# Patient Record
Sex: FEMALE | Race: WHITE | NOT HISPANIC OR LATINO
[De-identification: names, ages, dates, MRNs, and addresses within clinical notes are randomized per-mention and may not be internally consistent; named-entity substitution may affect disease eponyms.]

---

## 2017-04-03 ENCOUNTER — APPOINTMENT (OUTPATIENT)
Dept: OTOLARYNGOLOGY | Facility: CLINIC | Age: 82
End: 2017-04-03

## 2017-04-03 VITALS
WEIGHT: 125 LBS | HEIGHT: 66 IN | HEART RATE: 53 BPM | SYSTOLIC BLOOD PRESSURE: 143 MMHG | BODY MASS INDEX: 20.09 KG/M2 | DIASTOLIC BLOOD PRESSURE: 63 MMHG

## 2017-04-03 RX ORDER — LEVOTHYROXINE SODIUM 0.11 MG/1
112 TABLET ORAL
Qty: 90 | Refills: 0 | Status: ACTIVE | COMMUNITY
Start: 2016-10-31

## 2017-04-05 PROBLEM — Z86.79 HISTORY OF ATRIAL FIBRILLATION: Status: RESOLVED | Noted: 2017-04-05 | Resolved: 2017-04-05

## 2017-04-05 PROBLEM — Z86.39 HISTORY OF HYPOTHYROIDISM: Status: RESOLVED | Noted: 2017-04-05 | Resolved: 2017-04-05

## 2017-04-05 PROBLEM — I10 HTN (HYPERTENSION), BENIGN: Status: RESOLVED | Noted: 2017-04-05 | Resolved: 2017-04-05

## 2017-04-05 PROBLEM — Z86.39 HISTORY OF HYPERCHOLESTEROLEMIA: Status: RESOLVED | Noted: 2017-04-05 | Resolved: 2017-04-05

## 2017-04-05 PROBLEM — Z87.19 HISTORY OF HIATAL HERNIA: Status: RESOLVED | Noted: 2017-04-05 | Resolved: 2017-04-05

## 2017-04-05 PROBLEM — Z87.19 HISTORY OF SMALL BOWEL OBSTRUCTION: Status: RESOLVED | Noted: 2017-04-05 | Resolved: 2017-04-05

## 2017-04-06 ENCOUNTER — APPOINTMENT (OUTPATIENT)
Dept: THORACIC SURGERY | Facility: CLINIC | Age: 82
End: 2017-04-06

## 2017-04-06 DIAGNOSIS — Z86.79 PERSONAL HISTORY OF OTHER DISEASES OF THE CIRCULATORY SYSTEM: ICD-10-CM

## 2017-04-06 DIAGNOSIS — Z86.39 PERSONAL HISTORY OF OTHER ENDOCRINE, NUTRITIONAL AND METABOLIC DISEASE: ICD-10-CM

## 2017-04-06 DIAGNOSIS — I10 ESSENTIAL (PRIMARY) HYPERTENSION: ICD-10-CM

## 2017-04-06 DIAGNOSIS — Z87.19 PERSONAL HISTORY OF OTHER DISEASES OF THE DIGESTIVE SYSTEM: ICD-10-CM

## 2017-04-20 ENCOUNTER — APPOINTMENT (OUTPATIENT)
Dept: THORACIC SURGERY | Facility: CLINIC | Age: 82
End: 2017-04-20

## 2017-04-20 DIAGNOSIS — Z80.0 FAMILY HISTORY OF MALIGNANT NEOPLASM OF DIGESTIVE ORGANS: ICD-10-CM

## 2017-04-20 DIAGNOSIS — Z82.49 FAMILY HISTORY OF ISCHEMIC HEART DISEASE AND OTHER DISEASES OF THE CIRCULATORY SYSTEM: ICD-10-CM

## 2017-04-21 VITALS
HEART RATE: 62 BPM | DIASTOLIC BLOOD PRESSURE: 76 MMHG | SYSTOLIC BLOOD PRESSURE: 132 MMHG | BODY MASS INDEX: 20.18 KG/M2 | WEIGHT: 125 LBS | OXYGEN SATURATION: 98 % | RESPIRATION RATE: 16 BRPM

## 2017-04-21 PROBLEM — Z82.49 FAMILY HISTORY OF MYOCARDIAL INFARCTION: Status: ACTIVE | Noted: 2017-04-21

## 2017-04-21 PROBLEM — Z80.0 FAMILY HISTORY OF PANCREATIC CANCER: Status: ACTIVE | Noted: 2017-04-21

## 2017-04-21 RX ORDER — AMLODIPINE BESYLATE 5 MG/1
5 TABLET ORAL
Qty: 90 | Refills: 0 | Status: COMPLETED | COMMUNITY
Start: 2016-10-24 | End: 2017-04-21

## 2017-10-19 ENCOUNTER — APPOINTMENT (OUTPATIENT)
Dept: THORACIC SURGERY | Facility: CLINIC | Age: 82
End: 2017-10-19
Payer: MEDICARE

## 2017-10-19 VITALS
RESPIRATION RATE: 18 BRPM | OXYGEN SATURATION: 99 % | HEART RATE: 110 BPM | WEIGHT: 127 LBS | BODY MASS INDEX: 20.5 KG/M2 | SYSTOLIC BLOOD PRESSURE: 122 MMHG | DIASTOLIC BLOOD PRESSURE: 82 MMHG

## 2017-10-19 PROCEDURE — 99214 OFFICE O/P EST MOD 30 MIN: CPT

## 2017-10-19 RX ORDER — UBIDECARENONE/VIT E ACET 100MG-5
CAPSULE ORAL
Refills: 0 | Status: ACTIVE | COMMUNITY

## 2017-10-19 RX ORDER — VIT A AND D3 IN COD LIVER OIL 1250-135
1000 CAPSULE ORAL
Refills: 0 | Status: ACTIVE | COMMUNITY

## 2017-10-19 RX ORDER — OSELTAMIVIR PHOSPHATE 75 MG/1
75 CAPSULE ORAL
Qty: 10 | Refills: 0 | Status: COMPLETED | COMMUNITY
Start: 2016-12-23 | End: 2017-10-19

## 2018-05-29 ENCOUNTER — APPOINTMENT (OUTPATIENT)
Dept: OTOLARYNGOLOGY | Facility: CLINIC | Age: 83
End: 2018-05-29
Payer: MEDICARE

## 2018-05-29 VITALS
HEIGHT: 66 IN | SYSTOLIC BLOOD PRESSURE: 145 MMHG | HEART RATE: 71 BPM | DIASTOLIC BLOOD PRESSURE: 78 MMHG | BODY MASS INDEX: 1.93 KG/M2 | WEIGHT: 12 LBS

## 2018-05-29 DIAGNOSIS — H90.3 SENSORINEURAL HEARING LOSS, BILATERAL: ICD-10-CM

## 2018-05-29 DIAGNOSIS — H93.13 TINNITUS, BILATERAL: ICD-10-CM

## 2018-05-29 DIAGNOSIS — H61.23 IMPACTED CERUMEN, BILATERAL: ICD-10-CM

## 2018-05-29 PROCEDURE — 92567 TYMPANOMETRY: CPT

## 2018-05-29 PROCEDURE — 69210 REMOVE IMPACTED EAR WAX UNI: CPT

## 2018-05-29 PROCEDURE — 92557 COMPREHENSIVE HEARING TEST: CPT

## 2018-05-29 PROCEDURE — 99214 OFFICE O/P EST MOD 30 MIN: CPT | Mod: 25

## 2018-10-23 ENCOUNTER — APPOINTMENT (OUTPATIENT)
Dept: THORACIC SURGERY | Facility: CLINIC | Age: 83
End: 2018-10-23
Payer: MEDICARE

## 2018-10-23 VITALS
TEMPERATURE: 97.8 F | RESPIRATION RATE: 16 BRPM | HEIGHT: 66 IN | SYSTOLIC BLOOD PRESSURE: 152 MMHG | DIASTOLIC BLOOD PRESSURE: 69 MMHG | OXYGEN SATURATION: 99 % | HEART RATE: 60 BPM | WEIGHT: 118 LBS | BODY MASS INDEX: 18.96 KG/M2

## 2018-10-23 PROCEDURE — 99214 OFFICE O/P EST MOD 30 MIN: CPT

## 2018-10-23 RX ORDER — SIMVASTATIN 10 MG/1
10 TABLET, FILM COATED ORAL
Qty: 90 | Refills: 0 | Status: DISCONTINUED | COMMUNITY
Start: 2016-02-03 | End: 2018-10-23

## 2018-10-23 RX ORDER — APIXABAN 2.5 MG/1
2.5 TABLET, FILM COATED ORAL
Refills: 0 | Status: ACTIVE | COMMUNITY

## 2018-10-23 RX ORDER — METOPROLOL SUCCINATE 25 MG/1
25 TABLET, EXTENDED RELEASE ORAL DAILY
Refills: 0 | Status: ACTIVE | COMMUNITY
Start: 2016-10-31

## 2018-10-23 RX ORDER — ASPIRIN 81 MG
81 TABLET, DELAYED RELEASE (ENTERIC COATED) ORAL
Refills: 0 | Status: DISCONTINUED | COMMUNITY
End: 2018-10-23

## 2018-10-23 RX ORDER — POTASSIUM CHLORIDE 1500 MG/1
20 TABLET, EXTENDED RELEASE ORAL
Refills: 0 | Status: ACTIVE | COMMUNITY
Start: 2016-07-11

## 2018-10-23 RX ORDER — AMLODIPINE BESYLATE 10 MG/1
10 TABLET ORAL
Qty: 90 | Refills: 0 | Status: DISCONTINUED | COMMUNITY
Start: 2016-11-28 | End: 2018-10-23

## 2018-10-23 RX ORDER — OMEPRAZOLE 40 MG/1
40 CAPSULE, DELAYED RELEASE ORAL TWICE DAILY
Refills: 0 | Status: ACTIVE | COMMUNITY
Start: 2016-10-31

## 2018-11-05 ENCOUNTER — FORM ENCOUNTER (OUTPATIENT)
Age: 83
End: 2018-11-05

## 2018-11-06 ENCOUNTER — OUTPATIENT (OUTPATIENT)
Dept: OUTPATIENT SERVICES | Facility: HOSPITAL | Age: 83
LOS: 1 days | End: 2018-11-06

## 2018-11-06 ENCOUNTER — APPOINTMENT (OUTPATIENT)
Dept: THORACIC SURGERY | Facility: CLINIC | Age: 83
End: 2018-11-06
Payer: MEDICARE

## 2018-11-06 ENCOUNTER — APPOINTMENT (OUTPATIENT)
Dept: RADIOLOGY | Facility: HOSPITAL | Age: 83
End: 2018-11-06
Payer: MEDICARE

## 2018-11-06 VITALS
HEIGHT: 66 IN | WEIGHT: 114 LBS | SYSTOLIC BLOOD PRESSURE: 150 MMHG | BODY MASS INDEX: 18.32 KG/M2 | OXYGEN SATURATION: 98 % | DIASTOLIC BLOOD PRESSURE: 77 MMHG | RESPIRATION RATE: 16 BRPM | HEART RATE: 78 BPM

## 2018-11-06 DIAGNOSIS — K22.0 ACHALASIA OF CARDIA: ICD-10-CM

## 2018-11-06 PROCEDURE — 74220 X-RAY XM ESOPHAGUS 1CNTRST: CPT | Mod: 26

## 2018-11-06 PROCEDURE — 99213 OFFICE O/P EST LOW 20 MIN: CPT

## 2019-08-23 ENCOUNTER — APPOINTMENT (OUTPATIENT)
Dept: WOUND CARE | Facility: CLINIC | Age: 84
End: 2019-08-23
Payer: MEDICARE

## 2019-08-23 DIAGNOSIS — R64 CACHEXIA: ICD-10-CM

## 2019-08-23 DIAGNOSIS — S41.131S: ICD-10-CM

## 2019-08-23 DIAGNOSIS — S41.101A UNSPECIFIED OPEN WOUND OF RIGHT UPPER ARM, INITIAL ENCOUNTER: ICD-10-CM

## 2019-08-23 PROCEDURE — 99203 OFFICE O/P NEW LOW 30 MIN: CPT

## 2019-08-23 NOTE — REASON FOR VISIT
[Consultation] : a consultation visit [Family Member] : family member [FreeTextEntry1] : right arm wound

## 2019-08-26 ENCOUNTER — INPATIENT (INPATIENT)
Facility: HOSPITAL | Age: 84
LOS: 8 days | Discharge: HOME | End: 2019-09-04
Attending: SURGERY | Admitting: SURGERY
Payer: MEDICARE

## 2019-08-26 VITALS
TEMPERATURE: 98 F | WEIGHT: 132.28 LBS | RESPIRATION RATE: 16 BRPM | OXYGEN SATURATION: 98 % | HEART RATE: 61 BPM | SYSTOLIC BLOOD PRESSURE: 110 MMHG | DIASTOLIC BLOOD PRESSURE: 65 MMHG

## 2019-08-26 LAB
ALBUMIN SERPL ELPH-MCNC: 3.9 G/DL — SIGNIFICANT CHANGE UP (ref 3.3–5)
ALP SERPL-CCNC: 128 U/L — HIGH (ref 40–120)
ALT FLD-CCNC: 7 U/L — SIGNIFICANT CHANGE UP (ref 4–33)
ANION GAP SERPL CALC-SCNC: 16 MMO/L — HIGH (ref 7–14)
AST SERPL-CCNC: 22 U/L — SIGNIFICANT CHANGE UP (ref 4–32)
BASE EXCESS BLDV CALC-SCNC: 1 MMOL/L — SIGNIFICANT CHANGE UP
BASOPHILS # BLD AUTO: 0.08 K/UL — SIGNIFICANT CHANGE UP (ref 0–0.2)
BASOPHILS NFR BLD AUTO: 0.7 % — SIGNIFICANT CHANGE UP (ref 0–2)
BILIRUB SERPL-MCNC: 0.3 MG/DL — SIGNIFICANT CHANGE UP (ref 0.2–1.2)
BLOOD GAS VENOUS - CREATININE: 0.71 MG/DL — SIGNIFICANT CHANGE UP (ref 0.5–1.3)
BUN SERPL-MCNC: 16 MG/DL — SIGNIFICANT CHANGE UP (ref 7–23)
CALCIUM SERPL-MCNC: 9.3 MG/DL — SIGNIFICANT CHANGE UP (ref 8.4–10.5)
CHLORIDE BLDV-SCNC: 104 MMOL/L — SIGNIFICANT CHANGE UP (ref 96–108)
CHLORIDE SERPL-SCNC: 97 MMOL/L — LOW (ref 98–107)
CO2 SERPL-SCNC: 24 MMOL/L — SIGNIFICANT CHANGE UP (ref 22–31)
CREAT SERPL-MCNC: 0.69 MG/DL — SIGNIFICANT CHANGE UP (ref 0.5–1.3)
EOSINOPHIL # BLD AUTO: 0.04 K/UL — SIGNIFICANT CHANGE UP (ref 0–0.5)
EOSINOPHIL NFR BLD AUTO: 0.3 % — SIGNIFICANT CHANGE UP (ref 0–6)
GAS PNL BLDV: 133 MMOL/L — LOW (ref 136–146)
GLUCOSE BLDV-MCNC: 132 MG/DL — HIGH (ref 70–99)
GLUCOSE SERPL-MCNC: 133 MG/DL — HIGH (ref 70–99)
HCO3 BLDV-SCNC: 26 MMOL/L — SIGNIFICANT CHANGE UP (ref 20–27)
HCT VFR BLD CALC: 33.1 % — LOW (ref 34.5–45)
HCT VFR BLDV CALC: 33.1 % — LOW (ref 34.5–45)
HGB BLD-MCNC: 10.5 G/DL — LOW (ref 11.5–15.5)
HGB BLDV-MCNC: 10.7 G/DL — LOW (ref 11.5–15.5)
IMM GRANULOCYTES NFR BLD AUTO: 0.4 % — SIGNIFICANT CHANGE UP (ref 0–1.5)
LACTATE BLDV-MCNC: 2 MMOL/L — SIGNIFICANT CHANGE UP (ref 0.5–2)
LIDOCAIN IGE QN: 90.6 U/L — HIGH (ref 7–60)
LYMPHOCYTES # BLD AUTO: 1.7 K/UL — SIGNIFICANT CHANGE UP (ref 1–3.3)
LYMPHOCYTES # BLD AUTO: 14.9 % — SIGNIFICANT CHANGE UP (ref 13–44)
MCHC RBC-ENTMCNC: 29.2 PG — SIGNIFICANT CHANGE UP (ref 27–34)
MCHC RBC-ENTMCNC: 31.7 % — LOW (ref 32–36)
MCV RBC AUTO: 91.9 FL — SIGNIFICANT CHANGE UP (ref 80–100)
MONOCYTES # BLD AUTO: 0.53 K/UL — SIGNIFICANT CHANGE UP (ref 0–0.9)
MONOCYTES NFR BLD AUTO: 4.6 % — SIGNIFICANT CHANGE UP (ref 2–14)
NEUTROPHILS # BLD AUTO: 9.03 K/UL — HIGH (ref 1.8–7.4)
NEUTROPHILS NFR BLD AUTO: 79.1 % — HIGH (ref 43–77)
NRBC # FLD: 0 K/UL — SIGNIFICANT CHANGE UP (ref 0–0)
PCO2 BLDV: 36 MMHG — LOW (ref 41–51)
PH BLDV: 7.45 PH — HIGH (ref 7.32–7.43)
PLATELET # BLD AUTO: 386 K/UL — SIGNIFICANT CHANGE UP (ref 150–400)
PMV BLD: 9.5 FL — SIGNIFICANT CHANGE UP (ref 7–13)
PO2 BLDV: 77 MMHG — HIGH (ref 35–40)
POTASSIUM BLDV-SCNC: 3.8 MMOL/L — SIGNIFICANT CHANGE UP (ref 3.4–4.5)
POTASSIUM SERPL-MCNC: 4.3 MMOL/L — SIGNIFICANT CHANGE UP (ref 3.5–5.3)
POTASSIUM SERPL-SCNC: 4.3 MMOL/L — SIGNIFICANT CHANGE UP (ref 3.5–5.3)
PROT SERPL-MCNC: 7.2 G/DL — SIGNIFICANT CHANGE UP (ref 6–8.3)
RBC # BLD: 3.6 M/UL — LOW (ref 3.8–5.2)
RBC # FLD: 13.3 % — SIGNIFICANT CHANGE UP (ref 10.3–14.5)
SAO2 % BLDV: 93.8 % — HIGH (ref 60–85)
SODIUM SERPL-SCNC: 137 MMOL/L — SIGNIFICANT CHANGE UP (ref 135–145)
WBC # BLD: 11.43 K/UL — HIGH (ref 3.8–10.5)
WBC # FLD AUTO: 11.43 K/UL — HIGH (ref 3.8–10.5)

## 2019-08-26 PROCEDURE — 74018 RADEX ABDOMEN 1 VIEW: CPT | Mod: 26

## 2019-08-26 RX ORDER — ACETAMINOPHEN 500 MG
1000 TABLET ORAL ONCE
Refills: 0 | Status: COMPLETED | OUTPATIENT
Start: 2019-08-26 | End: 2019-08-26

## 2019-08-26 RX ORDER — SODIUM CHLORIDE 9 MG/ML
1000 INJECTION INTRAMUSCULAR; INTRAVENOUS; SUBCUTANEOUS ONCE
Refills: 0 | Status: COMPLETED | OUTPATIENT
Start: 2019-08-26 | End: 2019-08-26

## 2019-08-26 RX ADMIN — SODIUM CHLORIDE 1000 MILLILITER(S): 9 INJECTION INTRAMUSCULAR; INTRAVENOUS; SUBCUTANEOUS at 21:51

## 2019-08-26 RX ADMIN — Medication 400 MILLIGRAM(S): at 21:04

## 2019-08-26 NOTE — ED PROVIDER NOTE - TEMPLATE, MLM
General Pt has hx of hypertension. She is prescribed lisinopril, but does not take any medication at home. Pts BP in hospital consistently in 110s.   - monitor BP in hospital, restart home lisinopril if pt becomes hypertensive Pt has hx of asthma, is prescribed singulair, advair, and a rescue inhaler. Pt not taking any of her home inhalers regularly. Reports she last used rescue inhaler over a month ago and singulair two weeks ago. Currently not wheezing or SOB on exam.  - Duonebs PRN for wheezing

## 2019-08-26 NOTE — ED PROVIDER NOTE - NS ED MD DISPO ISOLATION TYPES
October 15, 2018     Patient: Dirk Good   YOB: 2006   Date of Visit: 10/15/2018       To Whom it May Concern:    Dirk Good is under my professional care  She was seen in my office on 10/15/2018  She may return to gym class or sports on 10/16/18 with return to play protocol       If you have any questions or concerns, please don't hesitate to call           Sincerely,          KAILYN Angulo        CC: No Recipients None

## 2019-08-26 NOTE — ED PROVIDER NOTE - CLINICAL SUMMARY MEDICAL DECISION MAKING FREE TEXT BOX
89F presenting with abdominal pain and distention. having bowel movements and passing gas but unable to tolerate po, abdomen is distended and symptoms are similar to previous episodes of sbo. h/o multiple abdominal surgeries and multiple sbo's. plan for labs, ct abdomen and pelvis, symptom control. will reassess.

## 2019-08-26 NOTE — ED PROVIDER NOTE - OBJECTIVE STATEMENT
89F, pmh of hypothyroid, AFIB (on eliquis) presenting with abdominal pain. patient reports abdominal pain and distention sine 2pm today. pain is similar to previous episodes of sbo. had bowel movement and has passed gas. unable to tolerate food or fluids. has nausea and vomiting. denies any fever, chest pain, difficulty breathing, pain or burning with urination, pain or swelling of lower extremities. 89F, pmh of hypothyroid, AFIB (on eliquis) presenting with abdominal pain. patient reports abdominal pain and distention sine 2pm today. pain is similar to previous episodes of sbo. had bowel movement and has passed gas. unable to tolerate food or fluids. has nausea and vomiting. denies any fever, chest pain, difficulty breathing, pain or burning with urination, pain or swelling of lower extremities.    Daughter Emmy [phone]

## 2019-08-26 NOTE — ED ADULT NURSE REASSESSMENT NOTE - NS ED NURSE REASSESS COMMENT FT1
Report received from CHIP Acevedo. Pt is A&Ox4, ambulates with a walker. Pt c/o of abd pain & distension, hx of SBO. Last BM was 12pm, abd pain began at 2pm. Abd is distended. Pt has a left broken finger from mechanical fall from August 3. Pt had a door hit her behind as she was walking with walker and was pushed forward. Pt has healing wound from same fall. Wound is wrapped in gauze. Family at bedside.

## 2019-08-26 NOTE — ED PROVIDER NOTE - ATTENDING CONTRIBUTION TO CARE
I have personally performed a face to face bedside history and physical examination of this patient. I have discussed the history, examination, review of systems, assessment and plan of management with the resident. I have reviewed the electronic medical record and amended it to reflect my history, review of systems, physical exam, assessment and plan.    89F, pmh of hypothyroid, AFIB (on eliquis) presenting with abdominal pain. patient reports abdominal pain and distention sine 2pm today. pain is similar to previous episodes of sbo. had bowel movement and has passed gas. unable to tolerate food or fluids. has nausea and vomiting. denies any fever, chest pain, difficulty breathing, pain or burning with urination, pain or swelling of lower extremities.  VSS afebrile.  Exam alert, non-toxic appearing, +distention, minimally tender in all quadrants, decreased bowel sounds.  Suspect sbo given history and exam.  Labs, IVF, ct.  Dispo pending.

## 2019-08-26 NOTE — ED ADULT TRIAGE NOTE - CHIEF COMPLAINT QUOTE
Pt complaining of abdominal pain and N/V since earlier today. Pt states she has a hx of multiple SbBO. states her pain is similar to her last SBO. Pt denies chest pain, SOB, fever or chills. Pt appears uncomfortable in triage.

## 2019-08-26 NOTE — ED PROVIDER NOTE - PROGRESS NOTE DETAILS
called to bedside because patient was complaining arm was cold at IV site. hand has 2+ radial pulse and normal sensation. forearm is cold along path of IV, likely 2/2 fluid infusion. - resident Cheikh Oneil called by radiology, patient has high grade sbo. patient seen by surgery, NG placed and cxr ordered. called patient's daughter Emmy and updated on results. - resident Cheikh Oneil called by patient's daughter Emmy and told that the patient previously went into cardiac arrest when she had an NG tube placed and electrolytes were not checked daily. informed surgery team A resident and recommended patient should have daily labs drawn including electrolytes. - resident Cheikh Oneil

## 2019-08-26 NOTE — ED ADULT NURSE NOTE - OBJECTIVE STATEMENT
Pt a&ox3 c/o abdominal pain starting at 1400 accompanied with n/v, and abdominal distention, last bowel movement was at 1200, pt reports symptoms feel similar to when she had a sbo in the past, pt breathing even and unlabored, denies cp/discomfort, denies headache/dizziness, skin is cool dry and intact, ivl placed, labs sent, will continue to monitor.

## 2019-08-26 NOTE — ED PROVIDER NOTE - PHYSICAL EXAMINATION
general: uncomfortable appearing female, no acute distress   heent: normocephalic, atraumatic   respiratory: normal work of breathing, lungs clear to auscultation bilaterally   cardiac: regular rate and rhythm   abdomen: soft, distended, diffuse tenderness to palpation   msk: No swelling or tenderness of lower extremities   skin: warm, dry   neuro: A&Ox3

## 2019-08-27 DIAGNOSIS — Z90.49 ACQUIRED ABSENCE OF OTHER SPECIFIED PARTS OF DIGESTIVE TRACT: Chronic | ICD-10-CM

## 2019-08-27 DIAGNOSIS — K56.609 UNSPECIFIED INTESTINAL OBSTRUCTION, UNSPECIFIED AS TO PARTIAL VERSUS COMPLETE OBSTRUCTION: ICD-10-CM

## 2019-08-27 DIAGNOSIS — Z98.890 OTHER SPECIFIED POSTPROCEDURAL STATES: Chronic | ICD-10-CM

## 2019-08-27 DIAGNOSIS — Z90.710 ACQUIRED ABSENCE OF BOTH CERVIX AND UTERUS: Chronic | ICD-10-CM

## 2019-08-27 LAB
APTT BLD: 26.9 SEC — LOW (ref 27.5–36.3)
BLD GP AB SCN SERPL QL: NEGATIVE — SIGNIFICANT CHANGE UP
HCT VFR BLD CALC: 33.1 % — LOW (ref 34.5–45)
HGB BLD-MCNC: 10.2 G/DL — LOW (ref 11.5–15.5)
INR BLD: 1.24 — HIGH (ref 0.88–1.17)
MCHC RBC-ENTMCNC: 28.7 PG — SIGNIFICANT CHANGE UP (ref 27–34)
MCHC RBC-ENTMCNC: 30.8 % — LOW (ref 32–36)
MCV RBC AUTO: 93 FL — SIGNIFICANT CHANGE UP (ref 80–100)
NRBC # FLD: 0 K/UL — SIGNIFICANT CHANGE UP (ref 0–0)
PLATELET # BLD AUTO: 336 K/UL — SIGNIFICANT CHANGE UP (ref 150–400)
PMV BLD: 10 FL — SIGNIFICANT CHANGE UP (ref 7–13)
PROTHROM AB SERPL-ACNC: 13.8 SEC — HIGH (ref 9.8–13.1)
RBC # BLD: 3.56 M/UL — LOW (ref 3.8–5.2)
RBC # FLD: 13.3 % — SIGNIFICANT CHANGE UP (ref 10.3–14.5)
RH IG SCN BLD-IMP: POSITIVE — SIGNIFICANT CHANGE UP
RH IG SCN BLD-IMP: POSITIVE — SIGNIFICANT CHANGE UP
WBC # BLD: 8.72 K/UL — SIGNIFICANT CHANGE UP (ref 3.8–10.5)
WBC # FLD AUTO: 8.72 K/UL — SIGNIFICANT CHANGE UP (ref 3.8–10.5)

## 2019-08-27 PROCEDURE — 71045 X-RAY EXAM CHEST 1 VIEW: CPT | Mod: 26

## 2019-08-27 PROCEDURE — 76000 FLUOROSCOPY <1 HR PHYS/QHP: CPT | Mod: 26

## 2019-08-27 PROCEDURE — 74177 CT ABD & PELVIS W/CONTRAST: CPT | Mod: 26

## 2019-08-27 RX ORDER — ONDANSETRON 8 MG/1
4 TABLET, FILM COATED ORAL ONCE
Refills: 0 | Status: COMPLETED | OUTPATIENT
Start: 2019-08-27 | End: 2019-08-27

## 2019-08-27 RX ORDER — ENOXAPARIN SODIUM 100 MG/ML
60 INJECTION SUBCUTANEOUS
Refills: 0 | Status: DISCONTINUED | OUTPATIENT
Start: 2019-08-27 | End: 2019-08-31

## 2019-08-27 RX ORDER — ACETAMINOPHEN 500 MG
1000 TABLET ORAL ONCE
Refills: 0 | Status: COMPLETED | OUTPATIENT
Start: 2019-08-27 | End: 2019-08-27

## 2019-08-27 RX ORDER — LIDOCAINE 4 G/100G
10 CREAM TOPICAL ONCE
Refills: 0 | Status: COMPLETED | OUTPATIENT
Start: 2019-08-27 | End: 2019-08-27

## 2019-08-27 RX ORDER — ENOXAPARIN SODIUM 100 MG/ML
40 INJECTION SUBCUTANEOUS DAILY
Refills: 0 | Status: DISCONTINUED | OUTPATIENT
Start: 2019-08-27 | End: 2019-08-27

## 2019-08-27 RX ORDER — LEVOTHYROXINE SODIUM 125 MCG
70 TABLET ORAL AT BEDTIME
Refills: 0 | Status: DISCONTINUED | OUTPATIENT
Start: 2019-08-27 | End: 2019-08-31

## 2019-08-27 RX ORDER — DEXTROSE MONOHYDRATE, SODIUM CHLORIDE, AND POTASSIUM CHLORIDE 50; .745; 4.5 G/1000ML; G/1000ML; G/1000ML
1000 INJECTION, SOLUTION INTRAVENOUS
Refills: 0 | Status: DISCONTINUED | OUTPATIENT
Start: 2019-08-27 | End: 2019-08-29

## 2019-08-27 RX ORDER — SODIUM CHLORIDE 9 MG/ML
1000 INJECTION, SOLUTION INTRAVENOUS
Refills: 0 | Status: DISCONTINUED | OUTPATIENT
Start: 2019-08-27 | End: 2019-08-27

## 2019-08-27 RX ORDER — HEPARIN SODIUM 5000 [USP'U]/ML
1100 INJECTION INTRAVENOUS; SUBCUTANEOUS
Qty: 25000 | Refills: 0 | Status: DISCONTINUED | OUTPATIENT
Start: 2019-08-27 | End: 2019-08-27

## 2019-08-27 RX ORDER — LEVOTHYROXINE SODIUM 125 MCG
137 TABLET ORAL
Qty: 0 | Refills: 0 | DISCHARGE

## 2019-08-27 RX ADMIN — Medication 400 MILLIGRAM(S): at 02:26

## 2019-08-27 RX ADMIN — HEPARIN SODIUM 11 UNIT(S)/HR: 5000 INJECTION INTRAVENOUS; SUBCUTANEOUS at 10:07

## 2019-08-27 RX ADMIN — SODIUM CHLORIDE 75 MILLILITER(S): 9 INJECTION, SOLUTION INTRAVENOUS at 10:07

## 2019-08-27 RX ADMIN — HEPARIN SODIUM 11 UNIT(S)/HR: 5000 INJECTION INTRAVENOUS; SUBCUTANEOUS at 07:14

## 2019-08-27 RX ADMIN — Medication 70 MICROGRAM(S): at 23:05

## 2019-08-27 RX ADMIN — DEXTROSE MONOHYDRATE, SODIUM CHLORIDE, AND POTASSIUM CHLORIDE 75 MILLILITER(S): 50; .745; 4.5 INJECTION, SOLUTION INTRAVENOUS at 13:59

## 2019-08-27 RX ADMIN — ENOXAPARIN SODIUM 60 MILLIGRAM(S): 100 INJECTION SUBCUTANEOUS at 18:38

## 2019-08-27 RX ADMIN — Medication 400 MILLIGRAM(S): at 10:28

## 2019-08-27 RX ADMIN — Medication 1000 MILLIGRAM(S): at 10:58

## 2019-08-27 RX ADMIN — LIDOCAINE 10 MILLILITER(S): 4 CREAM TOPICAL at 04:59

## 2019-08-27 RX ADMIN — SODIUM CHLORIDE 75 MILLILITER(S): 9 INJECTION, SOLUTION INTRAVENOUS at 06:11

## 2019-08-27 RX ADMIN — ONDANSETRON 4 MILLIGRAM(S): 8 TABLET, FILM COATED ORAL at 02:29

## 2019-08-27 NOTE — ED ADULT NURSE REASSESSMENT NOTE - NS ED NURSE REASSESS COMMENT FT1
Pt has 9x3cm wound from fall on August 3. Pt seeing wound care MD for tx. Wound is pink in color. Pt instructed RN not to removed the "special dressing." Dressing & wound is clean, dry and intact.

## 2019-08-27 NOTE — H&P ADULT - NSICDXPASTSURGICALHX_GEN_ALL_CORE_FT
PAST SURGICAL HISTORY:  H/O colectomy     History of repair of hiatal hernia     S/P appendectomy     S/P hysterectomy

## 2019-08-27 NOTE — ED ADULT NURSE REASSESSMENT NOTE - NS ED NURSE REASSESS COMMENT FT1
NG tube in place. Pt walked to bathroom, 1 person assist. Pt's skin is clean dry and intact. Pt walked back to stretcher, repositioned with blankets. Call bell within reach.

## 2019-08-27 NOTE — H&P ADULT - NSHPLABSRESULTS_GEN_ALL_CORE
CT Abdomen and Pelvis w/ IV Cont (08.27.19 @ 03:14)    FINDINGS:    LUNG BASES:  There are no pleural effusions. The heart is enlarged.   Bibasilar streaky atelectasis.  PERITONEUM:  There is no free air or focal collection.  Mild free fluid.  LIVER: Normal.  SPLEEN: Normal.  GALLBLADDER: Not visualized  BILIARY TREE: Mild pneumobilia. Mild prominence the biliary system is   within normal limits for postoperative patient.  PANCREAS: Normal.  ADRENAL GLANDS: Normal.  KIDNEYS: Indeterminate 1.7 cm hypodensity in the left anterior mid kidney   may be further characterized with ultrasound.  BOWEL: Clips at the region of the GE junction may be related to previous   fundoplication. The stomach is massively distended and enters the pelvis.   The duodenum is filled with fluid and air. Numerous fluidfilled loops of   jejunum with a transition point in the left lower quadrant, best seen   coronal most likely related to adhesions from previous hysterectomy.   Distal small bowel bile ducts are collapsed. Surgical anastomotic sutures   are seen in the right colon. The colon is collapsed. There is mild   mesenteric edema and interloop fluid.    URINARY BLADDER: Collapsed.  PELVIC ORGANS: No pelvic masses.    There is no significant adenopathy.  VASCULATURE: The aorta is not dilated. There is moderate atherosclerotic   vascular calcification.  RETROPERITONEUM:  There is no mass.  BONES: 3 long screws are seen in the left proximal femur. Mild   levoscoliosis of the lumbar spine  ABDOMINAL WALL: Unremarkable.    IMPRESSION:    High-grade small bowel obstruction with transition point in the left   lower quadrant, likely related to postoperative adhesions. Mild   mesenteric edema and intrahepatic fluid. Distal small bowel is collapsed.   Status post right hemicolectomy. The stomach is massively distended and   the patient would benefit from nasogastric tube placement.

## 2019-08-27 NOTE — ED ADULT NURSE REASSESSMENT NOTE - NS ED NURSE REASSESS COMMENT FT1
Received report from CHIP Loredo. Pt AxOx4 at this time. Pt appears comfortable, is sleeping but arousable to touch and verbal stimuli. Pt c/o return of abdominal pain- wishes to receive more tylenol at this time. Abdomen soft and nondistended. MAR to be notified of pt's pain. Pt denies CP, SOB, dizziness, H/A, chills. Pt on Heparin infusion at 11mls/hr and on maintenance fluids. IV sites dry and intact. Pt vitally stable, VS as noted. Pt tolerating NG tube well- NG tube attached to suction. MD at bedside, will continue to monitor.

## 2019-08-27 NOTE — ED ADULT NURSE REASSESSMENT NOTE - NS ED NURSE REASSESS COMMENT FT1
Pt returned back from CT. Endorses "pain feels better" and denies nausea. Awaiting CT results. Respirations even & unlabored.

## 2019-08-27 NOTE — H&P ADULT - ASSESSMENT
89F PMH hypothyroidism, A-fib (on Eliquis), DDD, HTN, GERD, achalasia s/p myotomy, multiple abdominal surgeries including; Hysterectomy, appendectomy, colectomy (for volvulus), hiatal hernia repair, ex-lap SHERRY for SBO (8/2018), presenting with abdominal pain, found on imaging to have high grade SBO.    - Admit to A team surgery  - NPO/IVF  - NGT to LCWS - f/u CXR for placement  - Serial abdominal exams, no standing pain medication  - Heparin gtt (pt on Eliquis at home)  - Monitor for GI function  - Discussed with attending Dr. Marcelo Hickey PGY3  A team surgery  [id number]

## 2019-08-27 NOTE — H&P ADULT - ATTENDING COMMENTS
Patient seen and examined.    She presented to [facility] with complaints of abdominal distension, abdominal pain, and nausea. She has had multiple small bowel obstructions in the past. Approximately a year ago, she had to undergo a ex-lap, and SHERRY for a SBO at an outside hospital.    At the current time, after she had her NGT placed, she denies any nausea, vomiting, fever or chills. She denies any abdominal pain, and feels she is less distended. She is not passing any flatus or having bowel movements.     On exam: she is awake, alert and oriented  She is breathing comfortably  There is no scleral icterus  Her abdomen is soft, distended, and NOT tender, NGT in place    89 year old woman with SBO  1. NPO  2. IV fluids  3. NGT to low wall suction  4. Serial abdominal exams  5. Await GI function

## 2019-08-27 NOTE — PROVIDER CONTACT NOTE (OTHER) - ACTION/TREATMENT ORDERED:
OK to try for ordered BMP later. OK to try for ordered BMP later. Will speak to senior about removal of NG tube.

## 2019-08-27 NOTE — PROVIDER CONTACT NOTE (OTHER) - SITUATION
Patient CBC and PTT sent but BMP was unable to be drawn. Patient is very hard stick and was stuck twice.

## 2019-08-27 NOTE — H&P ADULT - HISTORY OF PRESENT ILLNESS
89F PMH hypothyroidism, A-fib (on Eliquis), DDD, HTN, GERD, achalasia s/p myotomy, multiple abdominal surgeries including; Hysterectomy, appendectomy, colectomy (for volvulus), hiatal hernia repair, ex-lap SHERRY for SBO (8/2018), presenting with abdominal pain. Patient states she has had innumerous SBOs in the past, all treated at [facility]. Patient developed abdominal pain yesterday morning after eating breakfast, associated with several episodes of nausea and emesis. Last BM was yesterday AM. Last flatus yesterday afternoon. Pt states these symptoms are similar to those of previous SBOs.     Upon arrival to [facility] ED, AVSS. Afebrile. Normotensive. WBC 11.43. Alkphos 128. Lipase 90. CT abd/pelvis obtained, which demonstrated high grade small bowel obstruction with transition point in the LLQ. Mild mesenteric edema and intrahepatic fluid.

## 2019-08-27 NOTE — H&P ADULT - NSHPPHYSICALEXAM_GEN_ALL_CORE
Gen: Laying in bed, in NAD  Resp: Nonlabored, no increased WOB  Abd: Soft, distended. Mildly TTP in epigastric area. No rebound or guarding  Ext: No C/C/E

## 2019-08-28 LAB
ANION GAP SERPL CALC-SCNC: 11 MMO/L — SIGNIFICANT CHANGE UP (ref 7–14)
BUN SERPL-MCNC: 21 MG/DL — SIGNIFICANT CHANGE UP (ref 7–23)
CALCIUM SERPL-MCNC: 8.2 MG/DL — LOW (ref 8.4–10.5)
CHLORIDE SERPL-SCNC: 99 MMOL/L — SIGNIFICANT CHANGE UP (ref 98–107)
CO2 SERPL-SCNC: 28 MMOL/L — SIGNIFICANT CHANGE UP (ref 22–31)
CREAT SERPL-MCNC: 0.68 MG/DL — SIGNIFICANT CHANGE UP (ref 0.5–1.3)
GLUCOSE SERPL-MCNC: 112 MG/DL — HIGH (ref 70–99)
HCT VFR BLD CALC: 28.2 % — LOW (ref 34.5–45)
HGB BLD-MCNC: 9.1 G/DL — LOW (ref 11.5–15.5)
MAGNESIUM SERPL-MCNC: 2.1 MG/DL — SIGNIFICANT CHANGE UP (ref 1.6–2.6)
MCHC RBC-ENTMCNC: 29.4 PG — SIGNIFICANT CHANGE UP (ref 27–34)
MCHC RBC-ENTMCNC: 32.3 % — SIGNIFICANT CHANGE UP (ref 32–36)
MCV RBC AUTO: 91 FL — SIGNIFICANT CHANGE UP (ref 80–100)
NRBC # FLD: 0.02 K/UL — SIGNIFICANT CHANGE UP (ref 0–0)
PHOSPHATE SERPL-MCNC: 3.9 MG/DL — SIGNIFICANT CHANGE UP (ref 2.5–4.5)
PLATELET # BLD AUTO: 307 K/UL — SIGNIFICANT CHANGE UP (ref 150–400)
PMV BLD: 9.8 FL — SIGNIFICANT CHANGE UP (ref 7–13)
POTASSIUM SERPL-MCNC: 3.9 MMOL/L — SIGNIFICANT CHANGE UP (ref 3.5–5.3)
POTASSIUM SERPL-SCNC: 3.9 MMOL/L — SIGNIFICANT CHANGE UP (ref 3.5–5.3)
RBC # BLD: 3.1 M/UL — LOW (ref 3.8–5.2)
RBC # FLD: 13.4 % — SIGNIFICANT CHANGE UP (ref 10.3–14.5)
SODIUM SERPL-SCNC: 138 MMOL/L — SIGNIFICANT CHANGE UP (ref 135–145)
WBC # BLD: 5.67 K/UL — SIGNIFICANT CHANGE UP (ref 3.8–10.5)
WBC # FLD AUTO: 5.67 K/UL — SIGNIFICANT CHANGE UP (ref 3.8–10.5)

## 2019-08-28 RX ORDER — BENZOCAINE AND MENTHOL 5; 1 G/100ML; G/100ML
1 LIQUID ORAL ONCE
Refills: 0 | Status: DISCONTINUED | OUTPATIENT
Start: 2019-08-28 | End: 2019-08-28

## 2019-08-28 RX ORDER — BENZOCAINE AND MENTHOL 5; 1 G/100ML; G/100ML
1 LIQUID ORAL ONCE
Refills: 0 | Status: COMPLETED | OUTPATIENT
Start: 2019-08-28 | End: 2019-08-28

## 2019-08-28 RX ADMIN — BENZOCAINE AND MENTHOL 1 LOZENGE: 5; 1 LIQUID ORAL at 22:09

## 2019-08-28 RX ADMIN — ENOXAPARIN SODIUM 60 MILLIGRAM(S): 100 INJECTION SUBCUTANEOUS at 18:04

## 2019-08-28 RX ADMIN — ENOXAPARIN SODIUM 60 MILLIGRAM(S): 100 INJECTION SUBCUTANEOUS at 05:52

## 2019-08-28 RX ADMIN — BENZOCAINE AND MENTHOL 1 LOZENGE: 5; 1 LIQUID ORAL at 18:50

## 2019-08-28 RX ADMIN — Medication 70 MICROGRAM(S): at 22:18

## 2019-08-28 NOTE — PROGRESS NOTE ADULT - ASSESSMENT
89F PMH hypothyroidism, A-fib (on Eliquis), DDD, HTN, GERD, achalasia s/p myotomy, multiple abdominal surgeries including; Hysterectomy, appendectomy, colectomy (for volvulus), hiatal hernia repair, ex-lap SHERRY for SBO (8/2018), presenting with abdominal pain, found on imaging to have high grade SBO.    Plan   - Diet: NPO, NGT to LCWS  - IVF: LR @ 75  - DVT PPx: Heparin gtt (pt on Eliquis at home)  - Pain: Give as needed, no standing  - Monitor for GI function      A team surgery  [id number] 89F PMH hypothyroidism, A-fib (on Eliquis), DDD, HTN, GERD, achalasia s/p myotomy, multiple abdominal surgeries including; Hysterectomy, appendectomy, colectomy (for volvulus), hiatal hernia repair, ex-lap SHERRY for SBO (8/2018), presenting with abdominal pain, found on imaging to have high grade SBO.    Plan   - Diet: NPO, NGT to LCWS  - IVF: LR @ 75  - DVT PPx: Heparin gtt (pt on Eliquis at home)  - Pain: Give as needed, no standing  - Monitor for GI function  - Monitor Urine output and f/u AM BMP      A team surgery  [id number]

## 2019-08-28 NOTE — PROGRESS NOTE ADULT - SUBJECTIVE AND OBJECTIVE BOX
GENERAL SURGERY DAILY PROGRESS NOTE:    Interval:  No acute events overnight.    S: Patient seen and examined. Reports pain is well controlled. NPO with NGT in place.  - Flatus, - BM. - OOB.    O:   Exam:  Gen: NAD. Well developed, alert and cooperative.   Resp: No addition work of breathing.   Card: NSR. No peripheral edema, cyanosis or pallor.   Abd: No masses. Softly distended. Diffusely tender. No rebound or gaurding.   Ext: WWP. No significant deformity. Able to move all extremities equally.  Neuro: AA&Ox3. No focal defects.    Vital Signs Last 24 Hrs  T(C): 36.6 (28 Aug 2019 01:48), Max: 36.8 (27 Aug 2019 09:50)  T(F): 97.9 (28 Aug 2019 01:48), Max: 98.3 (27 Aug 2019 09:50)  HR: 69 (28 Aug 2019 01:48) (63 - 70)  BP: 146/53 (28 Aug 2019 01:48) (123/59 - 159/71)  BP(mean): --  RR: 18 (28 Aug 2019 01:48) (17 - 19)  SpO2: 99% (28 Aug 2019 01:48) (97% - 100%)    I&O's Detail    27 Aug 2019 07:01  -  28 Aug 2019 04:28  --------------------------------------------------------  IN:    heparin Infusion: 55 mL    IV PiggyBack: 100 mL    lactated ringers.: 600 mL  Total IN: 755 mL    OUT:    Nasoenteral Tube: 2650 mL    Voided: 900 mL  Total OUT: 3550 mL    Total NET: -2795 mL          Daily Height in cm: 167.64 (27 Aug 2019 22:12)    Daily     MEDICATIONS  (STANDING):  dextrose 5% + sodium chloride 0.45% with potassium chloride 20 mEq/L 1000 milliLiter(s) (75 mL/Hr) IV Continuous <Continuous>  enoxaparin Injectable 60 milliGRAM(s) SubCutaneous two times a day  levothyroxine Injectable 70 MICROGram(s) IV Push at bedtime    MEDICATIONS  (PRN):      LABS:                        10.2   8.72  )-----------( 336      ( 27 Aug 2019 12:19 )             33.1     08-26    137  |  97<L>  |  16  ----------------------------<  133<H>  4.3   |  24  |  0.69    Ca    9.3      26 Aug 2019 20:16    TPro  7.2  /  Alb  3.9  /  TBili  0.3  /  DBili  x   /  AST  22  /  ALT  7   /  AlkPhos  128<H>  08-26    PT/INR - ( 27 Aug 2019 05:10 )   PT: 13.8 SEC;   INR: 1.24          PTT - ( 27 Aug 2019 05:10 )  PTT:26.9 SEC GENERAL SURGERY DAILY PROGRESS NOTE:    Interval:  No acute events overnight.    S: Patient seen and examined. Reports pain is well controlled. NPO with NGT in place.  - Flatus, - BM. - OOB.    O:   Exam:  Gen: NAD. Well developed, alert and cooperative.   Resp: No addition work of breathing.   Card: NSR. No peripheral edema, cyanosis or pallor.   Abd: No masses. Softly distended. Diffusely tender around umbilicus. No rebound or guarding   Ext: WWP. No significant deformity. Able to move all extremities equally.  Neuro: AA&Ox3. No focal defects.    Vital Signs Last 24 Hrs  T(C): 36.6 (28 Aug 2019 01:48), Max: 36.8 (27 Aug 2019 09:50)  T(F): 97.9 (28 Aug 2019 01:48), Max: 98.3 (27 Aug 2019 09:50)  HR: 69 (28 Aug 2019 01:48) (63 - 70)  BP: 146/53 (28 Aug 2019 01:48) (123/59 - 159/71)  BP(mean): --  RR: 18 (28 Aug 2019 01:48) (17 - 19)  SpO2: 99% (28 Aug 2019 01:48) (97% - 100%)    I&O's Detail    27 Aug 2019 07:01  -  28 Aug 2019 04:28  --------------------------------------------------------  IN:    heparin Infusion: 55 mL    IV PiggyBack: 100 mL    lactated ringers.: 600 mL  Total IN: 755 mL    OUT:    Nasoenteral Tube: 2650 mL    Voided: 900 mL  Total OUT: 3550 mL    Total NET: -2795 mL          Daily Height in cm: 167.64 (27 Aug 2019 22:12)    Daily     MEDICATIONS  (STANDING):  dextrose 5% + sodium chloride 0.45% with potassium chloride 20 mEq/L 1000 milliLiter(s) (75 mL/Hr) IV Continuous <Continuous>  enoxaparin Injectable 60 milliGRAM(s) SubCutaneous two times a day  levothyroxine Injectable 70 MICROGram(s) IV Push at bedtime    MEDICATIONS  (PRN):      LABS:                        10.2   8.72  )-----------( 336      ( 27 Aug 2019 12:19 )             33.1     08-26    137  |  97<L>  |  16  ----------------------------<  133<H>  4.3   |  24  |  0.69    Ca    9.3      26 Aug 2019 20:16    TPro  7.2  /  Alb  3.9  /  TBili  0.3  /  DBili  x   /  AST  22  /  ALT  7   /  AlkPhos  128<H>  08-26    PT/INR - ( 27 Aug 2019 05:10 )   PT: 13.8 SEC;   INR: 1.24          PTT - ( 27 Aug 2019 05:10 )  PTT:26.9 SEC

## 2019-08-29 LAB
ANION GAP SERPL CALC-SCNC: 9 MMO/L — SIGNIFICANT CHANGE UP (ref 7–14)
BUN SERPL-MCNC: 14 MG/DL — SIGNIFICANT CHANGE UP (ref 7–23)
CALCIUM SERPL-MCNC: 8.7 MG/DL — SIGNIFICANT CHANGE UP (ref 8.4–10.5)
CHLORIDE SERPL-SCNC: 100 MMOL/L — SIGNIFICANT CHANGE UP (ref 98–107)
CO2 SERPL-SCNC: 29 MMOL/L — SIGNIFICANT CHANGE UP (ref 22–31)
CREAT SERPL-MCNC: 0.56 MG/DL — SIGNIFICANT CHANGE UP (ref 0.5–1.3)
GLUCOSE SERPL-MCNC: 101 MG/DL — HIGH (ref 70–99)
HCT VFR BLD CALC: 27.3 % — LOW (ref 34.5–45)
HGB BLD-MCNC: 8.8 G/DL — LOW (ref 11.5–15.5)
MAGNESIUM SERPL-MCNC: 2 MG/DL — SIGNIFICANT CHANGE UP (ref 1.6–2.6)
MCHC RBC-ENTMCNC: 29.3 PG — SIGNIFICANT CHANGE UP (ref 27–34)
MCHC RBC-ENTMCNC: 32.2 % — SIGNIFICANT CHANGE UP (ref 32–36)
MCV RBC AUTO: 91 FL — SIGNIFICANT CHANGE UP (ref 80–100)
NRBC # FLD: 0 K/UL — SIGNIFICANT CHANGE UP (ref 0–0)
PHOSPHATE SERPL-MCNC: 3 MG/DL — SIGNIFICANT CHANGE UP (ref 2.5–4.5)
PLATELET # BLD AUTO: 290 K/UL — SIGNIFICANT CHANGE UP (ref 150–400)
PMV BLD: 9.7 FL — SIGNIFICANT CHANGE UP (ref 7–13)
POTASSIUM SERPL-MCNC: 3.7 MMOL/L — SIGNIFICANT CHANGE UP (ref 3.5–5.3)
POTASSIUM SERPL-SCNC: 3.7 MMOL/L — SIGNIFICANT CHANGE UP (ref 3.5–5.3)
RBC # BLD: 3 M/UL — LOW (ref 3.8–5.2)
RBC # FLD: 13.2 % — SIGNIFICANT CHANGE UP (ref 10.3–14.5)
SODIUM SERPL-SCNC: 138 MMOL/L — SIGNIFICANT CHANGE UP (ref 135–145)
WBC # BLD: 5.4 K/UL — SIGNIFICANT CHANGE UP (ref 3.8–10.5)
WBC # FLD AUTO: 5.4 K/UL — SIGNIFICANT CHANGE UP (ref 3.8–10.5)

## 2019-08-29 RX ORDER — BENZOCAINE AND MENTHOL 5; 1 G/100ML; G/100ML
1 LIQUID ORAL ONCE
Refills: 0 | Status: COMPLETED | OUTPATIENT
Start: 2019-08-29 | End: 2019-08-29

## 2019-08-29 RX ORDER — ACETAMINOPHEN 500 MG
1000 TABLET ORAL ONCE
Refills: 0 | Status: COMPLETED | OUTPATIENT
Start: 2019-08-29 | End: 2019-08-29

## 2019-08-29 RX ORDER — BENZOCAINE AND MENTHOL 5; 1 G/100ML; G/100ML
1 LIQUID ORAL ONCE
Refills: 0 | Status: DISCONTINUED | OUTPATIENT
Start: 2019-08-29 | End: 2019-08-29

## 2019-08-29 RX ORDER — DEXTROSE MONOHYDRATE, SODIUM CHLORIDE, AND POTASSIUM CHLORIDE 50; .745; 4.5 G/1000ML; G/1000ML; G/1000ML
1000 INJECTION, SOLUTION INTRAVENOUS
Refills: 0 | Status: DISCONTINUED | OUTPATIENT
Start: 2019-08-29 | End: 2019-08-31

## 2019-08-29 RX ORDER — POTASSIUM CHLORIDE 20 MEQ
10 PACKET (EA) ORAL ONCE
Refills: 0 | Status: COMPLETED | OUTPATIENT
Start: 2019-08-29 | End: 2019-08-29

## 2019-08-29 RX ADMIN — Medication 100 MILLIEQUIVALENT(S): at 09:48

## 2019-08-29 RX ADMIN — Medication 70 MICROGRAM(S): at 23:19

## 2019-08-29 RX ADMIN — DEXTROSE MONOHYDRATE, SODIUM CHLORIDE, AND POTASSIUM CHLORIDE 75 MILLILITER(S): 50; .745; 4.5 INJECTION, SOLUTION INTRAVENOUS at 17:39

## 2019-08-29 RX ADMIN — Medication 400 MILLIGRAM(S): at 22:06

## 2019-08-29 RX ADMIN — BENZOCAINE AND MENTHOL 1 LOZENGE: 5; 1 LIQUID ORAL at 09:48

## 2019-08-29 RX ADMIN — Medication 1000 MILLIGRAM(S): at 22:40

## 2019-08-29 RX ADMIN — BENZOCAINE AND MENTHOL 1 LOZENGE: 5; 1 LIQUID ORAL at 17:37

## 2019-08-29 RX ADMIN — DEXTROSE MONOHYDRATE, SODIUM CHLORIDE, AND POTASSIUM CHLORIDE 75 MILLILITER(S): 50; .745; 4.5 INJECTION, SOLUTION INTRAVENOUS at 09:48

## 2019-08-29 RX ADMIN — ENOXAPARIN SODIUM 60 MILLIGRAM(S): 100 INJECTION SUBCUTANEOUS at 17:37

## 2019-08-29 RX ADMIN — ENOXAPARIN SODIUM 60 MILLIGRAM(S): 100 INJECTION SUBCUTANEOUS at 05:06

## 2019-08-29 RX ADMIN — DEXTROSE MONOHYDRATE, SODIUM CHLORIDE, AND POTASSIUM CHLORIDE 75 MILLILITER(S): 50; .745; 4.5 INJECTION, SOLUTION INTRAVENOUS at 22:06

## 2019-08-29 NOTE — PROGRESS NOTE ADULT - ASSESSMENT
89F PMH hypothyroidism, A-fib (on Eliquis), DDD, HTN, GERD, achalasia s/p myotomy, multiple abdominal surgeries including; Hysterectomy, appendectomy, colectomy (for volvulus), hiatal hernia repair, ex-lap SHERRY for SBO (8/2018), presenting with abdominal pain, found on imaging to have high grade SBO.    Plan   - Diet: NPO, NGT to LCWS  - IVF: MIVF @ 75  - A fib: On therapeutic dosing of lovenox  - Pain: Give as needed, no standing  - Monitor for GI function  - Monitor Urine output and f/u AM BMP      A team surgery  [id number]

## 2019-08-29 NOTE — PROGRESS NOTE ADULT - SUBJECTIVE AND OBJECTIVE BOX
Interval Events:    No acute events overnight  NGT 1050 overnight    S: Patient denies fevers, chills, nausea, emesis, chest pain, SOB.  Pain is well controlled aside from discomfort around NGT. Passing minimal flatus, - BM's  O: Vital Signs Last 24 Hrs  T(C): 36.7 (29 Aug 2019 09:33), Max: 36.9 (29 Aug 2019 00:51)  T(F): 98 (29 Aug 2019 09:33), Max: 98.4 (29 Aug 2019 00:51)  HR: 51 (29 Aug 2019 09:33) (51 - 64)  BP: 142/58 (29 Aug 2019 09:33) (136/57 - 152/55)  BP(mean): --  RR: 18 (29 Aug 2019 09:33) (17 - 18)  SpO2: 96% (29 Aug 2019 09:33) (96% - 100%)      28 Aug 2019 07:01  -  29 Aug 2019 07:00  --------------------------------------------------------  IN:    dextrose 5% + sodium chloride 0.45% with potassium chloride 20 mEq/L: 1800 mL  Total IN: 1800 mL    OUT:    Nasoenteral Tube: 1050 mL    Voided: 1150 mL  Total OUT: 2200 mL    Total NET: -400 mL          Physical Exam:    Gen: Elderly female in no acute distress  Resp: Clear to auscultation bilaterally with no wheezes, rale, or rhonchi  CV: Regular rate and rhythm with no murmur, gallop, or rub  GI: Soft, non-tender, non-distended with normoactive bowel sounds; overall much improved from initial exam.  No masses. NGT on suction with greenish-brown output.  MSK: Moves all extremities equally  Skin: No rashes    Labs:                        8.8    5.40  )-----------( 290      ( 29 Aug 2019 06:43 )             27.3     29 Aug 2019 06:43    138    |  100    |  14     ----------------------------<  101    3.7     |  29     |  0.56     Ca    8.7        29 Aug 2019 06:43  Phos  3.0       29 Aug 2019 06:43  Mg     2.0       29 Aug 2019 06:43        CAPILLARY BLOOD GLUCOSE                    MEDICATIONS  (STANDING):  dextrose 5% + sodium chloride 0.45% with potassium chloride 20 mEq/L 1000 milliLiter(s) (75 mL/Hr) IV Continuous <Continuous>  enoxaparin Injectable 60 milliGRAM(s) SubCutaneous two times a day  levothyroxine Injectable 70 MICROGram(s) IV Push at bedtime    MEDICATIONS  (PRN):

## 2019-08-30 LAB
ANION GAP SERPL CALC-SCNC: 11 MMO/L — SIGNIFICANT CHANGE UP (ref 7–14)
BUN SERPL-MCNC: 10 MG/DL — SIGNIFICANT CHANGE UP (ref 7–23)
CALCIUM SERPL-MCNC: 8.3 MG/DL — LOW (ref 8.4–10.5)
CHLORIDE SERPL-SCNC: 99 MMOL/L — SIGNIFICANT CHANGE UP (ref 98–107)
CO2 SERPL-SCNC: 26 MMOL/L — SIGNIFICANT CHANGE UP (ref 22–31)
CREAT SERPL-MCNC: 0.51 MG/DL — SIGNIFICANT CHANGE UP (ref 0.5–1.3)
GLUCOSE SERPL-MCNC: 106 MG/DL — HIGH (ref 70–99)
HCT VFR BLD CALC: 28.8 % — LOW (ref 34.5–45)
HGB BLD-MCNC: 8.9 G/DL — LOW (ref 11.5–15.5)
MAGNESIUM SERPL-MCNC: 1.9 MG/DL — SIGNIFICANT CHANGE UP (ref 1.6–2.6)
MCHC RBC-ENTMCNC: 28.8 PG — SIGNIFICANT CHANGE UP (ref 27–34)
MCHC RBC-ENTMCNC: 30.9 % — LOW (ref 32–36)
MCV RBC AUTO: 93.2 FL — SIGNIFICANT CHANGE UP (ref 80–100)
NRBC # FLD: 0 K/UL — SIGNIFICANT CHANGE UP (ref 0–0)
PHOSPHATE SERPL-MCNC: 2.9 MG/DL — SIGNIFICANT CHANGE UP (ref 2.5–4.5)
PLATELET # BLD AUTO: 283 K/UL — SIGNIFICANT CHANGE UP (ref 150–400)
PMV BLD: 10 FL — SIGNIFICANT CHANGE UP (ref 7–13)
POTASSIUM SERPL-MCNC: 3.7 MMOL/L — SIGNIFICANT CHANGE UP (ref 3.5–5.3)
POTASSIUM SERPL-SCNC: 3.7 MMOL/L — SIGNIFICANT CHANGE UP (ref 3.5–5.3)
RBC # BLD: 3.09 M/UL — LOW (ref 3.8–5.2)
RBC # FLD: 13.2 % — SIGNIFICANT CHANGE UP (ref 10.3–14.5)
SODIUM SERPL-SCNC: 136 MMOL/L — SIGNIFICANT CHANGE UP (ref 135–145)
WBC # BLD: 5.15 K/UL — SIGNIFICANT CHANGE UP (ref 3.8–10.5)
WBC # FLD AUTO: 5.15 K/UL — SIGNIFICANT CHANGE UP (ref 3.8–10.5)

## 2019-08-30 PROCEDURE — 74018 RADEX ABDOMEN 1 VIEW: CPT | Mod: 26

## 2019-08-30 PROCEDURE — 74018 RADEX ABDOMEN 1 VIEW: CPT | Mod: 26,77

## 2019-08-30 RX ORDER — DIATRIZOATE MEGLUMINE 180 MG/ML
100 INJECTION, SOLUTION INTRAVESICAL ONCE
Refills: 0 | Status: COMPLETED | OUTPATIENT
Start: 2019-08-30 | End: 2019-08-30

## 2019-08-30 RX ORDER — POTASSIUM CHLORIDE 20 MEQ
10 PACKET (EA) ORAL
Refills: 0 | Status: DISCONTINUED | OUTPATIENT
Start: 2019-08-30 | End: 2019-08-30

## 2019-08-30 RX ORDER — BENZOCAINE AND MENTHOL 5; 1 G/100ML; G/100ML
1 LIQUID ORAL ONCE
Refills: 0 | Status: COMPLETED | OUTPATIENT
Start: 2019-08-30 | End: 2019-08-30

## 2019-08-30 RX ADMIN — BENZOCAINE AND MENTHOL 1 LOZENGE: 5; 1 LIQUID ORAL at 17:53

## 2019-08-30 RX ADMIN — DIATRIZOATE MEGLUMINE 100 MILLILITER(S): 180 INJECTION, SOLUTION INTRAVESICAL at 12:30

## 2019-08-30 RX ADMIN — DEXTROSE MONOHYDRATE, SODIUM CHLORIDE, AND POTASSIUM CHLORIDE 75 MILLILITER(S): 50; .745; 4.5 INJECTION, SOLUTION INTRAVENOUS at 17:56

## 2019-08-30 RX ADMIN — Medication 70 MICROGRAM(S): at 21:50

## 2019-08-30 RX ADMIN — ENOXAPARIN SODIUM 60 MILLIGRAM(S): 100 INJECTION SUBCUTANEOUS at 17:53

## 2019-08-30 RX ADMIN — ENOXAPARIN SODIUM 60 MILLIGRAM(S): 100 INJECTION SUBCUTANEOUS at 06:26

## 2019-08-30 NOTE — PROGRESS NOTE ADULT - SUBJECTIVE AND OBJECTIVE BOX
Interval Events:    No acute events overnight    S: Patient doing well, denies fevers, chills, nausea, emesis, chest pain, SOB.  Pain is well controlled. C/o some discomfort around NGT. -/- F/BM.    O: Vital Signs Last 24 Hrs  T(C): 36.5 (30 Aug 2019 01:29), Max: 37.1 (29 Aug 2019 17:38)  T(F): 97.7 (30 Aug 2019 01:29), Max: 98.7 (29 Aug 2019 17:38)  HR: 61 (30 Aug 2019 01:29) (51 - 65)  BP: 135/51 (30 Aug 2019 01:29) (135/51 - 153/61)  BP(mean): --  RR: 18 (30 Aug 2019 01:29) (16 - 18)  SpO2: 98% (30 Aug 2019 01:29) (96% - 99%)      28 Aug 2019 07:01  -  29 Aug 2019 07:00  --------------------------------------------------------  IN:    dextrose 5% + sodium chloride 0.45% with potassium chloride 20 mEq/L: 1800 mL  Total IN: 1800 mL    OUT:    Nasoenteral Tube: 1050 mL    Voided: 1150 mL  Total OUT: 2200 mL    Total NET: -400 mL      29 Aug 2019 07:01  -  30 Aug 2019 01:56  --------------------------------------------------------  IN:    dextrose 5% + sodium chloride 0.45% with potassium chloride 20 mEq/L: 1125 mL    IV PiggyBack: 100 mL  Total IN: 1225 mL    OUT:    Nasoenteral Tube: 400 mL    Voided: 1350 mL  Total OUT: 1750 mL    Total NET: -525 mL          Physical Exam:    Gen: Elderly female in no acute distress  Resp: Clear to auscultation bilaterally with no wheezes, rale, or rhonchi  CV: Regular rate and rhythm with no murmur, gallop, or rub  GI: Soft, non-tender, non-distended with normoactive bowel sounds; overall much improved from initial exam.  No masses. NGT on suction with greenish-brown output.  MSK: Moves all extremities equally  Skin: No rashes    Labs:                        8.8    5.40  )-----------( 290      ( 29 Aug 2019 06:43 )             27.3     29 Aug 2019 06:43    138    |  100    |  14     ----------------------------<  101    3.7     |  29     |  0.56     Ca    8.7        29 Aug 2019 06:43  Phos  3.0       29 Aug 2019 06:43  Mg     2.0       29 Aug 2019 06:43        CAPILLARY BLOOD GLUCOSE                    MEDICATIONS  (STANDING):  dextrose 5% + sodium chloride 0.45% with potassium chloride 20 mEq/L 1000 milliLiter(s) (75 mL/Hr) IV Continuous <Continuous>  enoxaparin Injectable 60 milliGRAM(s) SubCutaneous two times a day  levothyroxine Injectable 70 MICROGram(s) IV Push at bedtime    MEDICATIONS  (PRN): Interval Events:    No acute events overnight    S: Patient doing well, denies fevers, chills, nausea, emesis, chest pain, SOB.  Pain is well controlled. C/o some discomfort around NGT. -/- F/BM.    O:  Physical Exam:  Gen: Elderly female in no acute distress  Resp: Clear to auscultation bilaterally with no wheezes, rale, or rhonchi  CV: Regular rate and rhythm with no murmur, gallop, or rub  GI: Soft, non-tender, non-distended with normoactive bowel sounds; overall much improved from initial exam.  No masses. NGT on suction with greenish-brown output.  MSK: Moves all extremities equally  Skin: No rashes    T(C): 36.8 (08-30-19 @ 06:25), Max: 37.1 (08-29-19 @ 17:38)  HR: 60 (08-30-19 @ 06:25) (51 - 65)  BP: 148/73 (08-30-19 @ 06:25) (135/51 - 153/61)  RR: 16 (08-30-19 @ 06:25) (16 - 18)  SpO2: 97% (08-30-19 @ 06:25) (96% - 99%)  Wt(kg): --    LABS:                        8.8    5.40  )-----------( 290      ( 29 Aug 2019 06:43 )             27.3     08-29    138  |  100  |  14  ----------------------------<  101<H>  3.7   |  29  |  0.56    Ca    8.7      29 Aug 2019 06:43  Phos  3.0     08-29  Mg     2.0     08-29    Is&O's    08-28-19 @ 07:01  -  08-29-19 @ 07:00  --------------------------------------------------------  IN: 1800 mL / OUT: 2200 mL / NET: -400 mL    08-29-19 @ 07:01  -  08-30-19 @ 06:48  --------------------------------------------------------  IN: 1225 mL / OUT: 2000 mL / NET: -775 mL        MEDS  dextrose 5% + sodium chloride 0.45% with potassium chloride 20 mEq/L 1000 milliLiter(s) IV Continuous <Continuous>  enoxaparin Injectable 60 milliGRAM(s) SubCutaneous two times a day  levothyroxine Injectable 70 MICROGram(s) IV Push at bedtime

## 2019-08-30 NOTE — PROGRESS NOTE ADULT - ASSESSMENT
89F PMH hypothyroidism, A-fib (on Eliquis), DDD, HTN, GERD, achalasia s/p myotomy, multiple abdominal surgeries including; Hysterectomy, appendectomy, colectomy (for volvulus), hiatal hernia repair, ex-lap SHERRY for SBO (8/2018), presenting with abdominal pain, found on imaging to have high grade SBO.    Plan   - Diet: NPO, NGT to LCWS  - IVF: MIVF @ 75  - A fib: On therapeutic dosing of lovenox  - Pain: Give as needed, no standing  - Monitor for GI function  - Monitor Urine output and f/u AM BMP      A team surgery  [id number] 89F PMH hypothyroidism, A-fib (on Eliquis), DDD, HTN, GERD, achalasia s/p myotomy, multiple abdominal surgeries including; Hysterectomy, appendectomy, colectomy (for volvulus), hiatal hernia repair, ex-lap SHERRY for SBO (8/2018), presenting with abdominal pain, found on imaging to have high grade SBO.    Plan   - Diet: NPO, NGT to LCWS  	- consider clamp trial (NGT suction was off overnight)   - IVF: MIVF @ 75  - A fib: On therapeutic dosing of lovenox  - Pain: Give as needed, no standing  - Monitor for GI function  - Monitor Urine output and f/u AM BMP      A team surgery  [id number]

## 2019-08-31 LAB
ANION GAP SERPL CALC-SCNC: 8 MMO/L — SIGNIFICANT CHANGE UP (ref 7–14)
BUN SERPL-MCNC: 20 MG/DL — SIGNIFICANT CHANGE UP (ref 7–23)
CALCIUM SERPL-MCNC: 8.4 MG/DL — SIGNIFICANT CHANGE UP (ref 8.4–10.5)
CHLORIDE SERPL-SCNC: 99 MMOL/L — SIGNIFICANT CHANGE UP (ref 98–107)
CO2 SERPL-SCNC: 28 MMOL/L — SIGNIFICANT CHANGE UP (ref 22–31)
CREAT SERPL-MCNC: 0.53 MG/DL — SIGNIFICANT CHANGE UP (ref 0.5–1.3)
GLUCOSE SERPL-MCNC: 131 MG/DL — HIGH (ref 70–99)
HCT VFR BLD CALC: 26.3 % — LOW (ref 34.5–45)
HGB BLD-MCNC: 8.4 G/DL — LOW (ref 11.5–15.5)
MAGNESIUM SERPL-MCNC: 1.8 MG/DL — SIGNIFICANT CHANGE UP (ref 1.6–2.6)
MCHC RBC-ENTMCNC: 29.3 PG — SIGNIFICANT CHANGE UP (ref 27–34)
MCHC RBC-ENTMCNC: 31.9 % — LOW (ref 32–36)
MCV RBC AUTO: 91.6 FL — SIGNIFICANT CHANGE UP (ref 80–100)
NRBC # FLD: 0.03 K/UL — SIGNIFICANT CHANGE UP (ref 0–0)
PHOSPHATE SERPL-MCNC: 2.6 MG/DL — SIGNIFICANT CHANGE UP (ref 2.5–4.5)
PLATELET # BLD AUTO: 274 K/UL — SIGNIFICANT CHANGE UP (ref 150–400)
PMV BLD: 10.3 FL — SIGNIFICANT CHANGE UP (ref 7–13)
POTASSIUM SERPL-MCNC: 3.8 MMOL/L — SIGNIFICANT CHANGE UP (ref 3.5–5.3)
POTASSIUM SERPL-SCNC: 3.8 MMOL/L — SIGNIFICANT CHANGE UP (ref 3.5–5.3)
RBC # BLD: 2.87 M/UL — LOW (ref 3.8–5.2)
RBC # FLD: 13 % — SIGNIFICANT CHANGE UP (ref 10.3–14.5)
SODIUM SERPL-SCNC: 135 MMOL/L — SIGNIFICANT CHANGE UP (ref 135–145)
WBC # BLD: 4.92 K/UL — SIGNIFICANT CHANGE UP (ref 3.8–10.5)
WBC # FLD AUTO: 4.92 K/UL — SIGNIFICANT CHANGE UP (ref 3.8–10.5)

## 2019-08-31 PROCEDURE — 74018 RADEX ABDOMEN 1 VIEW: CPT | Mod: 26

## 2019-08-31 RX ORDER — APIXABAN 2.5 MG/1
2.5 TABLET, FILM COATED ORAL EVERY 12 HOURS
Refills: 0 | Status: DISCONTINUED | OUTPATIENT
Start: 2019-08-31 | End: 2019-09-01

## 2019-08-31 RX ORDER — DILTIAZEM HCL 120 MG
120 CAPSULE, EXT RELEASE 24 HR ORAL DAILY
Refills: 0 | Status: DISCONTINUED | OUTPATIENT
Start: 2019-08-31 | End: 2019-08-31

## 2019-08-31 RX ORDER — MAGNESIUM SULFATE 500 MG/ML
1 VIAL (ML) INJECTION ONCE
Refills: 0 | Status: COMPLETED | OUTPATIENT
Start: 2019-08-31 | End: 2019-08-31

## 2019-08-31 RX ORDER — DILTIAZEM HCL 120 MG
120 CAPSULE, EXT RELEASE 24 HR ORAL DAILY
Refills: 0 | Status: DISCONTINUED | OUTPATIENT
Start: 2019-08-31 | End: 2019-09-01

## 2019-08-31 RX ORDER — POTASSIUM CHLORIDE 20 MEQ
10 PACKET (EA) ORAL ONCE
Refills: 0 | Status: DISCONTINUED | OUTPATIENT
Start: 2019-08-31 | End: 2019-08-31

## 2019-08-31 RX ORDER — POTASSIUM CHLORIDE 20 MEQ
20 PACKET (EA) ORAL ONCE
Refills: 0 | Status: COMPLETED | OUTPATIENT
Start: 2019-08-31 | End: 2019-08-31

## 2019-08-31 RX ORDER — LEVOTHYROXINE SODIUM 125 MCG
137 TABLET ORAL DAILY
Refills: 0 | Status: DISCONTINUED | OUTPATIENT
Start: 2019-08-31 | End: 2019-09-04

## 2019-08-31 RX ADMIN — ENOXAPARIN SODIUM 60 MILLIGRAM(S): 100 INJECTION SUBCUTANEOUS at 17:50

## 2019-08-31 RX ADMIN — ENOXAPARIN SODIUM 60 MILLIGRAM(S): 100 INJECTION SUBCUTANEOUS at 05:23

## 2019-08-31 RX ADMIN — Medication 100 GRAM(S): at 17:50

## 2019-08-31 RX ADMIN — Medication 20 MILLIEQUIVALENT(S): at 17:50

## 2019-08-31 NOTE — PROGRESS NOTE ADULT - ASSESSMENT
89F PMH hypothyroidism, A-fib (on Eliquis), DDD, HTN, GERD, achalasia s/p myotomy, multiple abdominal surgeries including; Hysterectomy, appendectomy, colectomy (for volvulus), hiatal hernia repair, ex-lap SHERRY for SBO (8/2018), presenting with abdominal pain, found on imaging to have high grade SBO.    Plan   - Diet: CLD--> advance to LRD if tolerating at dinner  - IVF: MIVF @ 75. IV lock if tolerating  - Pain: PRN  - DVT PPx: therapuetic lovenox for Afib      A team surgery  [id number]

## 2019-08-31 NOTE — PROGRESS NOTE ADULT - SUBJECTIVE AND OBJECTIVE BOX
89y Female  resolving SBO    T(C): 36.4 (08-31-19 @ 05:21), Max: 36.8 (08-30-19 @ 14:11)  HR: 64 (08-31-19 @ 05:21) (64 - 72)  BP: 144/59 (08-31-19 @ 05:21) (131/71 - 153/74)  RR: 18 (08-31-19 @ 05:21) (16 - 18)  SpO2: 99% (08-31-19 @ 05:21) (98% - 100%)  Wt(kg): --    08-30 @ 07:01  -  08-31 @ 07:00  --------------------------------------------------------  IN: 1200 mL / OUT: 1700 mL / NET: -500 mL                            8.4    4.92  )-----------( 274      ( 31 Aug 2019 05:30 )             26.3     08-31    135  |  99  |  20  ----------------------------<  131<H>  3.8   |  28  |  0.53    Ca    8.4      31 Aug 2019 05:30  Phos  2.6     08-31  Mg     1.8     08-31          dextrose 5% + sodium chloride 0.45% with potassium chloride 20 mEq/L 1000 milliLiter(s) IV Continuous <Continuous>  enoxaparin Injectable 60 milliGRAM(s) SubCutaneous two times a day  levothyroxine Injectable 70 MICROGram(s) IV Push at bedtime    Physical exam    Plan     Ahsan Neal MD FACS FASCRS  [phone] office

## 2019-08-31 NOTE — PROGRESS NOTE ADULT - SUBJECTIVE AND OBJECTIVE BOX
Interval Events: {atient self removed NGT    S: Patient doing well, denies fevers, chills,chest pain, SOB.  Pain is well controlled. NGT out with no N/V. +/+ F/BM. +OOB.     O:  Physical Exam:  Gen: Elderly female in no acute distress  Resp: Clear to auscultation bilaterally   CV: RRR  GI: Soft, NT, ND. Normoactive bowel sounds.  No masses.   MSK: Moves all extremities equally  Skin: No rashes      T(C): 36.5 (08-31-19 @ 10:17), Max: 36.8 (08-30-19 @ 14:11)  HR: 64 (08-31-19 @ 10:17) (64 - 72)  BP: 165/75 (08-31-19 @ 10:17) (131/71 - 165/75)  RR: 18 (08-31-19 @ 10:17) (16 - 18)  SpO2: 97% (08-31-19 @ 10:17) (97% - 100%)  Wt(kg): --    LABS:                        8.4    4.92  )-----------( 274      ( 31 Aug 2019 05:30 )             26.3     08-31    135  |  99  |  20  ----------------------------<  131<H>  3.8   |  28  |  0.53    Ca    8.4      31 Aug 2019 05:30  Phos  2.6     08-31  Mg     1.8     08-31      RECENT CULTURES:      Is&O's    08-30-19 @ 07:01  -  08-31-19 @ 07:00  --------------------------------------------------------  IN: 1200 mL / OUT: 1700 mL / NET: -500 mL        MEDS  dextrose 5% + sodium chloride 0.45% with potassium chloride 20 mEq/L 1000 milliLiter(s) IV Continuous <Continuous>  enoxaparin Injectable 60 milliGRAM(s) SubCutaneous two times a day  levothyroxine Injectable 70 MICROGram(s) IV Push at bedtime  magnesium sulfate  IVPB 1 Gram(s) IV Intermittent once  potassium chloride  10 mEq/100 mL IVPB 10 milliEquivalent(s) IV Intermittent once

## 2019-09-01 LAB
ANION GAP SERPL CALC-SCNC: 9 MMO/L — SIGNIFICANT CHANGE UP (ref 7–14)
BLD GP AB SCN SERPL QL: NEGATIVE — SIGNIFICANT CHANGE UP
BUN SERPL-MCNC: 27 MG/DL — HIGH (ref 7–23)
CALCIUM SERPL-MCNC: 8.3 MG/DL — LOW (ref 8.4–10.5)
CHLORIDE SERPL-SCNC: 101 MMOL/L — SIGNIFICANT CHANGE UP (ref 98–107)
CO2 SERPL-SCNC: 27 MMOL/L — SIGNIFICANT CHANGE UP (ref 22–31)
CREAT SERPL-MCNC: 0.49 MG/DL — LOW (ref 0.5–1.3)
GLUCOSE SERPL-MCNC: 101 MG/DL — HIGH (ref 70–99)
HCT VFR BLD CALC: 20.3 % — CRITICAL LOW (ref 34.5–45)
HCT VFR BLD CALC: 21.8 % — LOW (ref 34.5–45)
HCT VFR BLD CALC: 27.1 % — LOW (ref 34.5–45)
HGB BLD-MCNC: 6.7 G/DL — CRITICAL LOW (ref 11.5–15.5)
HGB BLD-MCNC: 6.8 G/DL — CRITICAL LOW (ref 11.5–15.5)
HGB BLD-MCNC: 9.2 G/DL — LOW (ref 11.5–15.5)
MAGNESIUM SERPL-MCNC: 1.9 MG/DL — SIGNIFICANT CHANGE UP (ref 1.6–2.6)
MCHC RBC-ENTMCNC: 29.2 PG — SIGNIFICANT CHANGE UP (ref 27–34)
MCHC RBC-ENTMCNC: 30 PG — SIGNIFICANT CHANGE UP (ref 27–34)
MCHC RBC-ENTMCNC: 30.7 PG — SIGNIFICANT CHANGE UP (ref 27–34)
MCHC RBC-ENTMCNC: 31.2 % — LOW (ref 32–36)
MCHC RBC-ENTMCNC: 33 % — SIGNIFICANT CHANGE UP (ref 32–36)
MCHC RBC-ENTMCNC: 33.9 % — SIGNIFICANT CHANGE UP (ref 32–36)
MCV RBC AUTO: 90.3 FL — SIGNIFICANT CHANGE UP (ref 80–100)
MCV RBC AUTO: 91 FL — SIGNIFICANT CHANGE UP (ref 80–100)
MCV RBC AUTO: 93.6 FL — SIGNIFICANT CHANGE UP (ref 80–100)
NRBC # FLD: 0 K/UL — SIGNIFICANT CHANGE UP (ref 0–0)
PHOSPHATE SERPL-MCNC: 2.3 MG/DL — LOW (ref 2.5–4.5)
PLATELET # BLD AUTO: 203 K/UL — SIGNIFICANT CHANGE UP (ref 150–400)
PLATELET # BLD AUTO: 215 K/UL — SIGNIFICANT CHANGE UP (ref 150–400)
PLATELET # BLD AUTO: 239 K/UL — SIGNIFICANT CHANGE UP (ref 150–400)
PMV BLD: 10 FL — SIGNIFICANT CHANGE UP (ref 7–13)
PMV BLD: 10.5 FL — SIGNIFICANT CHANGE UP (ref 7–13)
PMV BLD: 9.8 FL — SIGNIFICANT CHANGE UP (ref 7–13)
POTASSIUM SERPL-MCNC: 4.3 MMOL/L — SIGNIFICANT CHANGE UP (ref 3.5–5.3)
POTASSIUM SERPL-SCNC: 4.3 MMOL/L — SIGNIFICANT CHANGE UP (ref 3.5–5.3)
RBC # BLD: 2.23 M/UL — LOW (ref 3.8–5.2)
RBC # BLD: 2.33 M/UL — LOW (ref 3.8–5.2)
RBC # BLD: 3 M/UL — LOW (ref 3.8–5.2)
RBC # FLD: 13.1 % — SIGNIFICANT CHANGE UP (ref 10.3–14.5)
RBC # FLD: 13.2 % — SIGNIFICANT CHANGE UP (ref 10.3–14.5)
RBC # FLD: 13.4 % — SIGNIFICANT CHANGE UP (ref 10.3–14.5)
RH IG SCN BLD-IMP: POSITIVE — SIGNIFICANT CHANGE UP
SODIUM SERPL-SCNC: 137 MMOL/L — SIGNIFICANT CHANGE UP (ref 135–145)
WBC # BLD: 5.3 K/UL — SIGNIFICANT CHANGE UP (ref 3.8–10.5)
WBC # BLD: 5.59 K/UL — SIGNIFICANT CHANGE UP (ref 3.8–10.5)
WBC # BLD: 5.66 K/UL — SIGNIFICANT CHANGE UP (ref 3.8–10.5)
WBC # FLD AUTO: 5.3 K/UL — SIGNIFICANT CHANGE UP (ref 3.8–10.5)
WBC # FLD AUTO: 5.59 K/UL — SIGNIFICANT CHANGE UP (ref 3.8–10.5)
WBC # FLD AUTO: 5.66 K/UL — SIGNIFICANT CHANGE UP (ref 3.8–10.5)

## 2019-09-01 RX ORDER — FUROSEMIDE 40 MG
40 TABLET ORAL ONCE
Refills: 0 | Status: DISCONTINUED | OUTPATIENT
Start: 2019-09-01 | End: 2019-09-01

## 2019-09-01 RX ORDER — SODIUM CHLORIDE 9 MG/ML
1000 INJECTION, SOLUTION INTRAVENOUS
Refills: 0 | Status: DISCONTINUED | OUTPATIENT
Start: 2019-09-01 | End: 2019-09-02

## 2019-09-01 RX ORDER — SODIUM CHLORIDE 9 MG/ML
1000 INJECTION, SOLUTION INTRAVENOUS
Refills: 0 | Status: DISCONTINUED | OUTPATIENT
Start: 2019-09-01 | End: 2019-09-01

## 2019-09-01 RX ORDER — FUROSEMIDE 40 MG
20 TABLET ORAL ONCE
Refills: 0 | Status: COMPLETED | OUTPATIENT
Start: 2019-09-01 | End: 2020-07-30

## 2019-09-01 RX ORDER — PANTOPRAZOLE SODIUM 20 MG/1
40 TABLET, DELAYED RELEASE ORAL
Refills: 0 | Status: DISCONTINUED | OUTPATIENT
Start: 2019-09-01 | End: 2019-09-04

## 2019-09-01 RX ORDER — FUROSEMIDE 40 MG
20 TABLET ORAL ONCE
Refills: 0 | Status: COMPLETED | OUTPATIENT
Start: 2019-09-01 | End: 2019-09-01

## 2019-09-01 RX ADMIN — SODIUM CHLORIDE 100 MILLILITER(S): 9 INJECTION, SOLUTION INTRAVENOUS at 21:54

## 2019-09-01 RX ADMIN — Medication 63.75 MILLIMOLE(S): at 10:04

## 2019-09-01 RX ADMIN — Medication 20 MILLIGRAM(S): at 12:16

## 2019-09-01 RX ADMIN — PANTOPRAZOLE SODIUM 40 MILLIGRAM(S): 20 TABLET, DELAYED RELEASE ORAL at 06:58

## 2019-09-01 RX ADMIN — PANTOPRAZOLE SODIUM 40 MILLIGRAM(S): 20 TABLET, DELAYED RELEASE ORAL at 17:55

## 2019-09-01 RX ADMIN — SODIUM CHLORIDE 100 MILLILITER(S): 9 INJECTION, SOLUTION INTRAVENOUS at 17:55

## 2019-09-01 RX ADMIN — Medication 137 MICROGRAM(S): at 06:58

## 2019-09-01 NOTE — PROVIDER CONTACT NOTE (CRITICAL VALUE NOTIFICATION) - ACTION/TREATMENT ORDERED:
MD made aware. Stated we would give unit of blood. Will continue to monitor.
MD made aware. No new orders received at this time. Will continue to monitor.

## 2019-09-01 NOTE — PROGRESS NOTE ADULT - ASSESSMENT
89F PMH hypothyroidism, A-fib (on Eliquis), DDD, HTN, GERD, achalasia s/p myotomy, multiple abdominal surgeries including; Hysterectomy, appendectomy, colectomy (for volvulus), hiatal hernia repair, ex-lap SHERRY for SBO (8/2018), presenting with abdominal pain, found on imaging to have high grade SBO.    Plan   - Diet: LRD  - IVF: None  - Pain: PRN  - DVT PPx: Eliquis for Afib, consider holding  - Holding home Dilt    A team surgery  [id number] 89F PMH hypothyroidism, A-fib (on Eliquis), DDD, HTN, GERD, achalasia s/p myotomy, multiple abdominal surgeries including; Hysterectomy, appendectomy, colectomy (for volvulus), hiatal hernia repair, ex-lap SHERRY for SBO (8/2018), presenting with abdominal pain, found on imaging to have high grade SBO.    Plan   - Diet: LRD  - IVF: None  - Pain: PRN  - DVT PPx: Eliquis for Afib, consider holding  - f/u post-transfusion CBC  - Holding home Dilt    A team surgery  [id number] 89F PMH hypothyroidism, A-fib (on Eliquis), DDD, HTN, GERD, achalasia s/p myotomy, multiple abdominal surgeries including; Hysterectomy, appendectomy, colectomy (for volvulus), hiatal hernia repair, ex-lap SHERRY for SBO (8/2018), presenting with abdominal pain, found on imaging to have high grade SBO.    Plan   - Diet: NPO  - IVF: D5 1/2 nl @75  - Pain: PRN  - DVT PPx: Eliquis for Afib, holding for possible bleed  - f/u post-transfusion CBC  - Holding home Dilt    A team surgery  [id number]

## 2019-09-01 NOTE — CONSULT NOTE ADULT - ASSESSMENT
Impression:  # Black Stools/BRBPR: In the setting of decreased hemoglobin, concerning for active GI bleed. Differential Diagnosis includes ulcer disease, angioectasias, esophagitis, erosive gastritis and slow right sided colonic bleed (ie. AVMs)  # Achalasia s/p Myotomy  # Volvulus s/p surgical repair  # Afib: Eliquis on hold  # GERD    Recommendation:  - Discussed potential EGD/Colon with patient given concerning for GI bleeding. She is currently refusing any endoscopic intervention and prefers to wait to see what her blood count does after transfusions.  - Trend CBC  - Clear liquid diet  - Protonix ggt  - Supportive care per primary team    Ami Jennings MD  Gastroenterology Fellow  [phone]  Please page on call fellow on weekends and after 5pm on weekdays

## 2019-09-01 NOTE — PROGRESS NOTE ADULT - SUBJECTIVE AND OBJECTIVE BOX
Interval Events: Patient found to have black BM with red blood on tp. H/H at 1:30 6.7/20.3. Second black BM recorded. Repeat at 3:30 stable at 6.8/20.8.    S: Patient doing well, denies fevers, chills, chest pain, SOB. Feels weak. No abdominal pain. Tolerating regular diet with no N/V. +/+ F/BM. Loose, black stool in toilet. +OOB without assistance.     O:  Physical Exam:  Gen: Elderly female in no acute distress  Resp: No extra work of breathing.   CV: RRR. No edema or pallor.   GI: Soft, NT, ND. Normoactive bowel sounds.  No masses.   MSK: Moves all extremities equally  Skin: No rashes Interval Events: Patient found to have black BM with red blood on tp. H/H at 1:30 6.7/20.3. Second black BM recorded. Repeat at 3:30 stable at 6.8/20.8.    S: Patient doing well, denies fevers, chills, chest pain, SOB. Feels weak. No abdominal pain. Tolerating regular diet with no N/V. +/+ F/BM. Loose, black stool in toilet. +OOB without assistance.     O:  Physical Exam:  Gen: Elderly female in no acute distress  Resp: No extra work of breathing.   CV: RRR. No edema or pallor.   GI: Soft, NT, ND. Normoactive bowel sounds.  No masses.   MSK: Moves all extremities equally  Skin: No rashes      T(C): 36.9 (09-01-19 @ 06:30), Max: 36.9 (08-31-19 @ 21:08)  HR: 67 (09-01-19 @ 06:30) (54 - 76)  BP: 163/73 (09-01-19 @ 06:30) (145/64 - 171/72)  RR: 16 (09-01-19 @ 06:30) (16 - 18)  SpO2: 100% (09-01-19 @ 06:30) (97% - 100%)  Wt(kg): --    LABS:                        6.8    5.30  )-----------( 239      ( 01 Sep 2019 03:55 )             21.8     09-01    137  |  101  |  27<H>  ----------------------------<  101<H>  4.3   |  27  |  0.49<L>    Ca    8.3<L>      01 Sep 2019 03:55  Phos  2.3     09-01  Mg     1.9     09-01    MEDS  furosemide   Injectable 20 milliGRAM(s) IV Push once  levothyroxine 137 MICROGram(s) Oral daily  pantoprazole  Injectable 40 milliGRAM(s) IV Push two times a day  sodium phosphate IVPB 15 milliMole(s) IV Intermittent once

## 2019-09-01 NOTE — CHART NOTE - NSCHARTNOTEFT_GEN_A_CORE
9.1.19 @ 00:00  Nurse called that patient was found to have a dark/black stool with blood on toilet paper. Patient asleep comfortably when arrived at bedside. States she often has blood on toilet paper at home. Denies dizziness, headache, SOB. No N/V. Has been OOB unassisted and tolerating diet.     Exam:  Physical Exam:   Gen: NAD. Alert, oriented and cooperative.   Resp: No addition work of breathing.   Card: RRR. No peripheral edema or pallor.   Abd: Soft, ND, NT.   Ext: WWP. Able to move all extremities equally.    Complete Blood Count STAT (09.01.19 @ 01:20)    WBC Count: 5.66 K/uL    RBC Count: 2.23 M/uL    Hemoglobin: 6.7 g/dL    Hematocrit: 20.3 %    Mean Cell Volume: 91.0 fL    Mean Cell Hemoglobin: 30.0 pg    Mean Cell Hemoglobin Conc: 33.0 %    Red Cell Distrib Width: 13.2 %    Platelet Count - Automated: 215 K/uL    MPV: 9.8 fl    Nucleated RBC #: 0 K/uL    H/H found to be down from 8.4/26.3 at 6:00.     Patient asymptomatic. A repeat CBC and T&S ordered for 5am.    9.1.19 @ 3:00  Patient had a second dark BM. No blood on toilet paper. Patient report feeling weak/fatigued. Still denies dizziness, headache and SOB. No N/V.    Physical exam largely unchanged. Patient remains alert and oriented. 9.1.19 @ 00:00  Nurse called that patient was found to have a dark/black stool with blood on toilet paper. Patient asleep comfortably when arrived at bedside. States she often has blood on toilet paper at home. Denies dizziness, headache, SOB. No N/V. Has been OOB unassisted and tolerating diet.     Exam:  Physical Exam:   Gen: NAD. Alert, oriented and cooperative.   Resp: No addition work of breathing.   Card: RRR. No peripheral edema or pallor.   Abd: Soft, ND, NT.   Ext: WWP. Able to move all extremities equally.    Complete Blood Count STAT (09.01.19 @ 01:20)    WBC Count: 5.66 K/uL    RBC Count: 2.23 M/uL    Hemoglobin: 6.7 g/dL    Hematocrit: 20.3 %    Mean Cell Volume: 91.0 fL    Mean Cell Hemoglobin: 30.0 pg    Mean Cell Hemoglobin Conc: 33.0 %    Red Cell Distrib Width: 13.2 %    Platelet Count - Automated: 215 K/uL    MPV: 9.8 fl    Nucleated RBC #: 0 K/uL    H/H found to be down from 8.4/26.3 at 6:00.     Patient asymptomatic. A repeat CBC and T&S ordered for 5am.    9.1.19 @ 3:00  Patient had a second dark BM. No blood on toilet paper. Patient report feeling weak/fatigued. Still denies dizziness, headache and SOB. No N/V.    Physical exam largely unchanged. Patient remains alert and oriented.    Complete Blood Count (09.01.19 @ 03:55)    WBC Count: 5.30 K/uL    RBC Count: 2.33 M/uL    Hemoglobin: 6.8: Test Repeated g/dL    Hematocrit: 21.8 %    Mean Cell Volume: 93.6 fL    Mean Cell Hemoglobin: 29.2 pg    Mean Cell Hemoglobin Conc: 31.2 %    Red Cell Distrib Width: 13.1 %    Platelet Count - Automated: 239 K/uL    MPV: 10.5 fl    Nucleated RBC #: 0 K/uL    H/H stable. Repeat CBC in am. Follow vitals and watch for symptoms.     ICU Vital Signs Last 24 Hrs  T(C): 36.5 (01 Sep 2019 03:10), Max: 36.9 (31 Aug 2019 21:08)  T(F): 97.7 (01 Sep 2019 03:10), Max: 98.5 (31 Aug 2019 21:08)  HR: 66 (01 Sep 2019 03:10) (54 - 76)  BP: 148/64 (01 Sep 2019 03:10) (144/59 - 171/72)  BP(mean): --  ABP: --  ABP(mean): --  RR: 17 (01 Sep 2019 03:10) (16 - 18)  SpO2: 99% (01 Sep 2019 03:10) (97% - 100%) 9.1.19 @ 00:00  Nurse called that patient was found to have a dark/black stool with blood on toilet paper. Patient asleep comfortably when arrived at bedside. States she often has blood on toilet paper at home. Denies dizziness, headache, SOB. No N/V. Has been OOB unassisted and tolerating diet.     Exam:  Physical Exam:   Gen: NAD. Alert, oriented and cooperative.   Resp: No addition work of breathing.   Card: RRR. No peripheral edema or pallor.   Abd: Soft, ND, NT.   Ext: WWP. Able to move all extremities equally.    Complete Blood Count STAT (09.01.19 @ 01:20)    WBC Count: 5.66 K/uL    RBC Count: 2.23 M/uL    Hemoglobin: 6.7 g/dL    Hematocrit: 20.3 %    Mean Cell Volume: 91.0 fL    Mean Cell Hemoglobin: 30.0 pg    Mean Cell Hemoglobin Conc: 33.0 %    Red Cell Distrib Width: 13.2 %    Platelet Count - Automated: 215 K/uL    MPV: 9.8 fl    Nucleated RBC #: 0 K/uL    H/H found to be down from 8.4/26.3 at 6:00.     Patient asymptomatic. A repeat CBC and T&S ordered for 5am.    9.1.19 @ 3:00  Patient had a second dark BM. No blood on toilet paper. Patient report feeling weak/fatigued. Still denies dizziness, headache and SOB. No N/V.    Physical exam largely unchanged. Patient remains alert and oriented.    Complete Blood Count (09.01.19 @ 03:55)    WBC Count: 5.30 K/uL    RBC Count: 2.33 M/uL    Hemoglobin: 6.8: Test Repeated g/dL    Hematocrit: 21.8 %    Mean Cell Volume: 93.6 fL    Mean Cell Hemoglobin: 29.2 pg    Mean Cell Hemoglobin Conc: 31.2 %    Red Cell Distrib Width: 13.1 %    Platelet Count - Automated: 239 K/uL    MPV: 10.5 fl    Nucleated RBC #: 0 K/uL    H/H stable, but <7.1.  Patient given one unit of pRBC. F/u CBC post-transfusion. Follow vitals and watch for symptoms.     ICU Vital Signs Last 24 Hrs  T(C): 36.5 (01 Sep 2019 03:10), Max: 36.9 (31 Aug 2019 21:08)  T(F): 97.7 (01 Sep 2019 03:10), Max: 98.5 (31 Aug 2019 21:08)  HR: 66 (01 Sep 2019 03:10) (54 - 76)  BP: 148/64 (01 Sep 2019 03:10) (144/59 - 171/72)  BP(mean): --  ABP: --  ABP(mean): --  RR: 17 (01 Sep 2019 03:10) (16 - 18)  SpO2: 99% (01 Sep 2019 03:10) (97% - 100%)

## 2019-09-02 LAB
ANION GAP SERPL CALC-SCNC: 10 MMO/L — SIGNIFICANT CHANGE UP (ref 7–14)
BUN SERPL-MCNC: 18 MG/DL — SIGNIFICANT CHANGE UP (ref 7–23)
CALCIUM SERPL-MCNC: 8.2 MG/DL — LOW (ref 8.4–10.5)
CHLORIDE SERPL-SCNC: 101 MMOL/L — SIGNIFICANT CHANGE UP (ref 98–107)
CO2 SERPL-SCNC: 26 MMOL/L — SIGNIFICANT CHANGE UP (ref 22–31)
CREAT SERPL-MCNC: 0.49 MG/DL — LOW (ref 0.5–1.3)
GLUCOSE SERPL-MCNC: 91 MG/DL — SIGNIFICANT CHANGE UP (ref 70–99)
HCT VFR BLD CALC: 26 % — LOW (ref 34.5–45)
HGB BLD-MCNC: 8.7 G/DL — LOW (ref 11.5–15.5)
MAGNESIUM SERPL-MCNC: 1.7 MG/DL — SIGNIFICANT CHANGE UP (ref 1.6–2.6)
MCHC RBC-ENTMCNC: 30.3 PG — SIGNIFICANT CHANGE UP (ref 27–34)
MCHC RBC-ENTMCNC: 33.5 % — SIGNIFICANT CHANGE UP (ref 32–36)
MCV RBC AUTO: 90.6 FL — SIGNIFICANT CHANGE UP (ref 80–100)
NRBC # FLD: 0 K/UL — SIGNIFICANT CHANGE UP (ref 0–0)
PHOSPHATE SERPL-MCNC: 3.3 MG/DL — SIGNIFICANT CHANGE UP (ref 2.5–4.5)
PLATELET # BLD AUTO: 184 K/UL — SIGNIFICANT CHANGE UP (ref 150–400)
PMV BLD: 9.6 FL — SIGNIFICANT CHANGE UP (ref 7–13)
POTASSIUM SERPL-MCNC: 3.4 MMOL/L — LOW (ref 3.5–5.3)
POTASSIUM SERPL-SCNC: 3.4 MMOL/L — LOW (ref 3.5–5.3)
RBC # BLD: 2.87 M/UL — LOW (ref 3.8–5.2)
RBC # FLD: 13.3 % — SIGNIFICANT CHANGE UP (ref 10.3–14.5)
SODIUM SERPL-SCNC: 137 MMOL/L — SIGNIFICANT CHANGE UP (ref 135–145)
WBC # BLD: 5.39 K/UL — SIGNIFICANT CHANGE UP (ref 3.8–10.5)
WBC # FLD AUTO: 5.39 K/UL — SIGNIFICANT CHANGE UP (ref 3.8–10.5)

## 2019-09-02 PROCEDURE — 99222 1ST HOSP IP/OBS MODERATE 55: CPT

## 2019-09-02 RX ORDER — SODIUM CHLORIDE 9 MG/ML
1000 INJECTION, SOLUTION INTRAVENOUS
Refills: 0 | Status: DISCONTINUED | OUTPATIENT
Start: 2019-09-02 | End: 2019-09-03

## 2019-09-02 RX ORDER — SIMETHICONE 80 MG/1
80 TABLET, CHEWABLE ORAL ONCE
Refills: 0 | Status: COMPLETED | OUTPATIENT
Start: 2019-09-02 | End: 2019-09-02

## 2019-09-02 RX ORDER — POTASSIUM CHLORIDE 20 MEQ
20 PACKET (EA) ORAL
Refills: 0 | Status: COMPLETED | OUTPATIENT
Start: 2019-09-02 | End: 2019-09-02

## 2019-09-02 RX ORDER — MAGNESIUM SULFATE 500 MG/ML
2 VIAL (ML) INJECTION ONCE
Refills: 0 | Status: COMPLETED | OUTPATIENT
Start: 2019-09-02 | End: 2019-09-02

## 2019-09-02 RX ORDER — ENOXAPARIN SODIUM 100 MG/ML
40 INJECTION SUBCUTANEOUS DAILY
Refills: 0 | Status: DISCONTINUED | OUTPATIENT
Start: 2019-09-02 | End: 2019-09-02

## 2019-09-02 RX ADMIN — Medication 20 MILLIEQUIVALENT(S): at 14:25

## 2019-09-02 RX ADMIN — Medication 137 MICROGRAM(S): at 05:31

## 2019-09-02 RX ADMIN — PANTOPRAZOLE SODIUM 40 MILLIGRAM(S): 20 TABLET, DELAYED RELEASE ORAL at 05:31

## 2019-09-02 RX ADMIN — SODIUM CHLORIDE 75 MILLILITER(S): 9 INJECTION, SOLUTION INTRAVENOUS at 21:13

## 2019-09-02 RX ADMIN — SIMETHICONE 80 MILLIGRAM(S): 80 TABLET, CHEWABLE ORAL at 21:13

## 2019-09-02 RX ADMIN — PANTOPRAZOLE SODIUM 40 MILLIGRAM(S): 20 TABLET, DELAYED RELEASE ORAL at 18:10

## 2019-09-02 RX ADMIN — Medication 50 GRAM(S): at 14:24

## 2019-09-02 NOTE — PROGRESS NOTE ADULT - ASSESSMENT
89F PMH hypothyroidism, A-fib (on Eliquis), DDD, HTN, GERD, achalasia s/p myotomy, multiple abdominal surgeries including; Hysterectomy, appendectomy, colectomy (for volvulus), hiatal hernia repair, ex-lap SHERRY for SBO (8/2018), presenting with abdominal pain, found on imaging to have high grade SBO.    Plan   - Diet: NPO  - IVF: LR@75  - Pain: PRN  - DVT PPx: Eliquis for Afib, holding for possible bleed  - f/u AM CBC  - Holding home Dilt    A team surgery  [id number]

## 2019-09-02 NOTE — PROGRESS NOTE ADULT - SUBJECTIVE AND OBJECTIVE BOX
Interval Events:    S/p 2 u PRBc's overnight.  H&H adelos appropriately, but fell to 8.7/26.0 on recheck.    S: Patient doing well, denies fevers, chills, nausea, emesis, chest pain, SOB.  Pain is well controlled. Tolerating PO w/o N/V.  +/- F/BM.    O: Vital Signs Last 24 Hrs  T(C): 36.8 (02 Sep 2019 05:30), Max: 36.8 (01 Sep 2019 08:45)  T(F): 98.2 (02 Sep 2019 05:30), Max: 98.3 (01 Sep 2019 08:45)  HR: 66 (02 Sep 2019 05:30) (53 - 69)  BP: 148/64 (02 Sep 2019 05:30) (141/56 - 174/70)  BP(mean): --  RR: 16 (02 Sep 2019 05:30) (16 - 17)  SpO2: 100% (02 Sep 2019 05:30) (97% - 100%)      01 Sep 2019 07:01  -  02 Sep 2019 06:57  --------------------------------------------------------  IN:    lactated ringers.: 1600 mL    Packed Red Blood Cells: 600 mL  Total IN: 2200 mL    OUT:    Voided: 700 mL  Total OUT: 700 mL    Total NET: 1500 mL          Physical Exam:    Gen: Well-developed, well-nourished in no acute distress  Resp: Clear to auscultation bilaterally with no wheezes, rale, or rhonchi  CV: Regular rate and rhythm with no murmur, gallop, or rub  GI: Soft, non-tender, non-distended with normoactive bowel sounds.  No masses.  MSK: Moves all extremities equally  Skin: No rashes    Labs:                        8.7    5.39  )-----------( 184      ( 02 Sep 2019 02:49 )             26.0     02 Sep 2019 02:49    137    |  101    |  18     ----------------------------<  91     3.4     |  26     |  0.49     Ca    8.2        02 Sep 2019 02:49  Phos  3.3       02 Sep 2019 02:49  Mg     1.7       02 Sep 2019 02:49        CAPILLARY BLOOD GLUCOSE                    MEDICATIONS  (STANDING):  lactated ringers. 1000 milliLiter(s) (100 mL/Hr) IV Continuous <Continuous>  levothyroxine 137 MICROGram(s) Oral daily  pantoprazole  Injectable 40 milliGRAM(s) IV Push two times a day    MEDICATIONS  (PRN):

## 2019-09-03 LAB
HCT VFR BLD CALC: 24.6 % — LOW (ref 34.5–45)
HGB BLD-MCNC: 8.1 G/DL — LOW (ref 11.5–15.5)
MCHC RBC-ENTMCNC: 30.3 PG — SIGNIFICANT CHANGE UP (ref 27–34)
MCHC RBC-ENTMCNC: 32.9 % — SIGNIFICANT CHANGE UP (ref 32–36)
MCV RBC AUTO: 92.1 FL — SIGNIFICANT CHANGE UP (ref 80–100)
NRBC # FLD: 0.05 K/UL — SIGNIFICANT CHANGE UP (ref 0–0)
NRBC FLD-RTO: 1 — SIGNIFICANT CHANGE UP
PLATELET # BLD AUTO: 184 K/UL — SIGNIFICANT CHANGE UP (ref 150–400)
PMV BLD: 10.2 FL — SIGNIFICANT CHANGE UP (ref 7–13)
RBC # BLD: 2.67 M/UL — LOW (ref 3.8–5.2)
RBC # FLD: 14 % — SIGNIFICANT CHANGE UP (ref 10.3–14.5)
WBC # BLD: 5.15 K/UL — SIGNIFICANT CHANGE UP (ref 3.8–10.5)
WBC # FLD AUTO: 5.15 K/UL — SIGNIFICANT CHANGE UP (ref 3.8–10.5)

## 2019-09-03 PROCEDURE — 99232 SBSQ HOSP IP/OBS MODERATE 35: CPT | Mod: GC

## 2019-09-03 PROCEDURE — 99231 SBSQ HOSP IP/OBS SF/LOW 25: CPT

## 2019-09-03 RX ORDER — HEPARIN SODIUM 5000 [USP'U]/ML
5000 INJECTION INTRAVENOUS; SUBCUTANEOUS EVERY 8 HOURS
Refills: 0 | Status: DISCONTINUED | OUTPATIENT
Start: 2019-09-03 | End: 2019-09-04

## 2019-09-03 RX ADMIN — PANTOPRAZOLE SODIUM 40 MILLIGRAM(S): 20 TABLET, DELAYED RELEASE ORAL at 05:46

## 2019-09-03 RX ADMIN — Medication 137 MICROGRAM(S): at 05:46

## 2019-09-03 RX ADMIN — PANTOPRAZOLE SODIUM 40 MILLIGRAM(S): 20 TABLET, DELAYED RELEASE ORAL at 17:36

## 2019-09-03 NOTE — PROVIDER CONTACT NOTE (OTHER) - ASSESSMENT
Pt AxOx4. VS stable. Pt complaining of feeling worn out.
PTT sent at 12:19 was QNS.
Patient is very hard stick and was stuck twice. Patient CBC and PTT was drawn but BMP could not be drawn.
Second PTT sent after first was QNS and result was QNS again. Patient was stuck 4 times total.
patient abdomen very distended. Patient has + flatus but requesting simethicone to relieve pain.
pt aaox4 sitting in bed in no apparent distress.

## 2019-09-03 NOTE — PROVIDER CONTACT NOTE (OTHER) - ACTION/TREATMENT ORDERED:
Simethicone order will not be placed due to SBO. Assist patient with ambulation to help pass flatus. Will replace NG tube if pain persists. Simethicone order will not be placed due to SBO. Assist pt. with ambulation to help pass flatus. Will replace NG tube if pain persists. Heparin ordered for DVT prophylaxis. OK to wait on daughter.

## 2019-09-03 NOTE — PROVIDER CONTACT NOTE (OTHER) - SITUATION
Patient abdomen distended and complaining of gas pain Patient abdomen distended and complaining of gas pain. Patient refusing heparin since team was supposed to contact cardiologist regarding medication. Pt. wants to wait on daughter before taking it.

## 2019-09-03 NOTE — PROGRESS NOTE ADULT - SUBJECTIVE AND OBJECTIVE BOX
Interval Events:   She continues to have black stools but at the same time she is refusing any endoscopic interventions.  She states she doesn't any invasive interventions.   She wants to give her body time to recover. She said I am 89 years and I want to be comfortable   No abdominal pain, nausea, vomiting    Allergies:  No Known Allergies      Hospital Medications:  heparin  Injectable 5000 Unit(s) SubCutaneous every 8 hours  levothyroxine 137 MICROGram(s) Oral daily  pantoprazole  Injectable 40 milliGRAM(s) IV Push two times a day      PMHX/PSHX:  Atrial fibrillation  Achalasia  Hypertension  Hypothyroidism  History of repair of hiatal hernia  H/O colectomy  S/P appendectomy  S/P hysterectomy      ROS:   General:  No fevers, chills or night sweats  ENT:  No sore throat or dysphagia  CV:  No pain or palpitations  Resp:  No dyspnea, cough or  wheezing  GI:  as above  Skin:  No rash or edema    PHYSICAL EXAM:   Vital Signs:  Vital Signs Last 24 Hrs  T(C): 36.6 (03 Sep 2019 16:39), Max: 36.8 (03 Sep 2019 10:16)  T(F): 97.8 (03 Sep 2019 16:39), Max: 98.3 (03 Sep 2019 10:16)  HR: 57 (03 Sep 2019 16:39) (53 - 62)  BP: 149/75 (03 Sep 2019 16:39) (116/60 - 153/56)  BP(mean): --  RR: 16 (03 Sep 2019 16:39) (16 - 18)  SpO2: 100% (03 Sep 2019 16:39) (99% - 100%)  Daily     Daily Weight in k.4 (03 Sep 2019 12:20)    PHYSICAL EXAM:   GENERAL:  NAD, Appears stated age  HEENT:  NC/AT,  conjunctivae clear and pink, sclera -anicteric  CHEST:  CTA B/L, Normal effort  HEART:  RRR S1/S2, No murmurs  ABDOMEN:  Soft, non-tender, non-distended, BS+  EXTREMITIES:  No cyanosis  SKIN:  Warm & Dry  NEURO:  Alert, oriented    LABS:                        8.1    5.15  )-----------( 184      ( 03 Sep 2019 06:22 )             24.6     Mean Cell Volume: 92.1 fL (- @ 06:22)        137  |  101  |  18  ----------------------------<  91  3.4<L>   |  26  |  0.49<L>    Ca    8.2<L>      02 Sep 2019 02:49  Phos  3.3     09-02  Mg     1.7     09                                    8.1    5.15  )-----------( 184      ( 03 Sep 2019 06:22 )             24.6                         8.7    5.39  )-----------( 184      ( 02 Sep 2019 02:49 )             26.0                         9.2    5.59  )-----------( 203      ( 01 Sep 2019 20:40 )             27.1                         6.8    5.30  )-----------( 239      ( 01 Sep 2019 03:55 )             21.8                         6.7    5.66  )-----------( 215      ( 01 Sep 2019 01:20 )             20.3       Imaging:

## 2019-09-03 NOTE — PROGRESS NOTE ADULT - ASSESSMENT
89F PMH hypothyroidism, A-fib (on Eliquis), DDD, HTN, GERD, achalasia s/p myotomy, multiple abdominal surgeries including; Hysterectomy, appendectomy, colectomy (for volvulus), hiatal hernia repair, ex-lap SHERRY for SBO (8/2018), presenting with abdominal pain, found on imaging to have high grade SBO.    Plan   - Diet: NPO  - IVF: LR@75  - Pain: PRN  - DVT PPx: Eliquis for Afib, holding for possible bleed  - f/u AM CBC  - Holding home Dilt    A team surgery  [id number] 89F PMH hypothyroidism, A-fib (on Eliquis), DDD, HTN, GERD, achalasia s/p myotomy, multiple abdominal surgeries including; Hysterectomy, appendectomy, colectomy (for volvulus), hiatal hernia repair, ex-lap SHERRY for SBO (8/2018), presenting with abdominal pain, found on imaging to have high grade SBO.    Plan   - Diet: Regular   - IVF: LR@75  - Pain: PRN  -hold eliquis for now  -trend CBC  - DVT PPx: SQH  - Holding home Dilt HR ~ 60's    A team surgery  [id number]

## 2019-09-03 NOTE — DIETITIAN INITIAL EVALUATION ADULT. - OTHER INFO
89 y/o female admitted with dx of small bowl obstruction - resolved with NGT being NPO as per GI. Visited pt for nutrition hx. Pt reported abdominal pain, N/V and bloody BMs for a couple days PTA. Said her appetite was poor for those few days, however it has improved since this hospital course. She does not think she had any wt loss PTA. UBW: 130 lbs. Pt denies food allergies, nausea/vomiting/diarrhea/constipation, or issues with chewing/swallowing at this time. Food preferences taken and will be honored.

## 2019-09-03 NOTE — DIETITIAN INITIAL EVALUATION ADULT. - PERTINENT MEDS FT
MEDICATIONS  (STANDING):  heparin  Injectable 5000 Unit(s) SubCutaneous every 8 hours  lactated ringers. 1000 milliLiter(s) (75 mL/Hr) IV Continuous <Continuous>  levothyroxine 137 MICROGram(s) Oral daily  pantoprazole  Injectable 40 milliGRAM(s) IV Push two times a day

## 2019-09-03 NOTE — PROGRESS NOTE ADULT - SUBJECTIVE AND OBJECTIVE BOX
Interval Events:    No acute events overnight    S: Patient doing well, denies fevers, chills, nausea, emesis, chest pain, SOB.  Pain is well controlled. Tolerating PO w/o N/V.  +/+ F/BM.  BM's dark and well formed, no víctor blood.    O: Vital Signs Last 24 Hrs  T(C): 36.4 (03 Sep 2019 01:35), Max: 36.9 (02 Sep 2019 12:29)  T(F): 97.5 (03 Sep 2019 01:35), Max: 98.5 (02 Sep 2019 12:29)  HR: 60 (03 Sep 2019 01:35) (60 - 66)  BP: 153/56 (03 Sep 2019 01:35) (115/50 - 153/56)  BP(mean): --  RR: 17 (03 Sep 2019 01:35) (16 - 17)  SpO2: 99% (03 Sep 2019 01:35) (99% - 110%)      01 Sep 2019 07:01  -  02 Sep 2019 07:00  --------------------------------------------------------  IN:    lactated ringers.: 1600 mL    Packed Red Blood Cells: 600 mL  Total IN: 2200 mL    OUT:    Voided: 700 mL  Total OUT: 700 mL    Total NET: 1500 mL      02 Sep 2019 07:01  -  03 Sep 2019 04:47  --------------------------------------------------------  IN:  Total IN: 0 mL    OUT:    Voided: 400 mL  Total OUT: 400 mL    Total NET: -400 mL          Physical Exam:    Gen: Elderly female in no acute distress  Resp: Clear to auscultation bilaterally with no wheezes, rale, or rhonchi  CV: Regular rate and rhythm with no murmur, gallop, or rub  GI: Soft, non-tender, non-distended with normoactive bowel sounds.  No masses.  MSK: Moves all extremities equally  Skin: No rashes    Labs:                        8.7    5.39  )-----------( 184      ( 02 Sep 2019 02:49 )             26.0     02 Sep 2019 02:49    137    |  101    |  18     ----------------------------<  91     3.4     |  26     |  0.49     Ca    8.2        02 Sep 2019 02:49  Phos  3.3       02 Sep 2019 02:49  Mg     1.7       02 Sep 2019 02:49        CAPILLARY BLOOD GLUCOSE                    MEDICATIONS  (STANDING):  lactated ringers. 1000 milliLiter(s) (75 mL/Hr) IV Continuous <Continuous>  levothyroxine 137 MICROGram(s) Oral daily  pantoprazole  Injectable 40 milliGRAM(s) IV Push two times a day    MEDICATIONS  (PRN): Interval Events:    No acute events overnight    S: Patient doing well, denies fevers, chills, nausea, emesis, chest pain, SOB.  Pain is well controlled. Tolerating PO w/o N/V.  +/+ F/BM.  BM's dark and well formed, no víctor blood.    O: Vital Signs Last 24 Hrs  T(C): 36.8 (09-03-19 @ 10:16), Max: 36.9 (09-02-19 @ 12:29)  HR: 55 (09-03-19 @ 10:16) (55 - 64)  BP: 128/54 (09-03-19 @ 10:16) (115/50 - 153/56)  ABP: --  ABP(mean): --  RR: 18 (09-03-19 @ 10:16) (16 - 18)  SpO2: 99% (09-03-19 @ 10:16) (99% - 110%)  Wt(kg): --  CVP(mm Hg): --      09-02 @ 07:01  -  09-03 @ 07:00  --------------------------------------------------------  IN:    lactated ringers.: 900 mL  Total IN: 900 mL    OUT:    Voided: 500 mL  Total OUT: 500 mL    Total NET: 400 mL      09-03 @ 07:01  -  09-03 @ 10:32  --------------------------------------------------------  IN:  Total IN: 0 mL    OUT:    Voided: 200 mL  Total OUT: 200 mL    Total NET: -200 mL        Physical Exam:    Gen: Elderly female in no acute distress  Resp: Clear to auscultation bilaterally with no wheezes, rale, or rhonchi  CV: Regular rate and rhythm with no murmur, gallop, or rub  GI: Soft, non-tender, non-distended with normoactive bowel sounds.  No masses.  MSK: Moves all extremities equally  Skin: No rashes    Labs:             CBC (09-03 @ 06:22)                              8.1<L>                         5.15    )----------------(  184        --    % Neutrophils, --    % Lymphocytes, ANC: --                                  24.6<L>  CBC (09-02 @ 02:49)                              8.7<L>                         5.39    )----------------(  184        --    % Neutrophils, --    % Lymphocytes, ANC: --                                  26.0<L>    BMP (09-02 @ 02:49)             137     |  101     |  18    		Ca++ --      Ca 8.2<L>             ---------------------------------( 91    		Mg 1.7                3.4<L>  |  26      |  0.49<L>			Ph 3.3             MEDICATIONS  (STANDING):  lactated ringers. 1000 milliLiter(s) (75 mL/Hr) IV Continuous <Continuous>  levothyroxine 137 MICROGram(s) Oral daily  pantoprazole  Injectable 40 milliGRAM(s) IV Push two times a day    MEDICATIONS  (PRN):

## 2019-09-03 NOTE — PROGRESS NOTE ADULT - ASSESSMENT
Impression:  # Black Stools/ BRBPR: h/h continue to be trending down, concerning for active GI bleed. Differential Diagnosis includes ulcer disease, angioectasias, esophagitis, erosive gastritis and slow right sided colonic bleed (ie. AVMs)  # Achalasia s/p Myotomy  # Volvulus s/p surgical repair  # Afib: Eliquis on hold  # GERD    Recommendation:  - Discussed potential EGD/Colon with patient given concerning for GI bleeding. She is still refusing any endoscopic intervention and prefers to wait until bleeding stops on it's own.  - Trend CBC and transfuse if Hb below 7  - Clear liquid diet  - Protonix ggt  - Supportive care per primary team

## 2019-09-03 NOTE — PROVIDER CONTACT NOTE (MEDICATION) - ASSESSMENT
Patient refusing heparin. Patient was awaiting arrival of daughter before taking heparin. Daughter at bedside and pt. confirmed does not want heparin. Would like to speak to team.

## 2019-09-04 ENCOUNTER — TRANSCRIPTION ENCOUNTER (OUTPATIENT)
Age: 84
End: 2019-09-04

## 2019-09-04 VITALS
TEMPERATURE: 98 F | RESPIRATION RATE: 17 BRPM | OXYGEN SATURATION: 100 % | HEART RATE: 52 BPM | SYSTOLIC BLOOD PRESSURE: 131 MMHG | DIASTOLIC BLOOD PRESSURE: 56 MMHG

## 2019-09-04 LAB
ANION GAP SERPL CALC-SCNC: 11 MMO/L — SIGNIFICANT CHANGE UP (ref 7–14)
BUN SERPL-MCNC: 14 MG/DL — SIGNIFICANT CHANGE UP (ref 7–23)
CALCIUM SERPL-MCNC: 8.3 MG/DL — LOW (ref 8.4–10.5)
CHLORIDE SERPL-SCNC: 100 MMOL/L — SIGNIFICANT CHANGE UP (ref 98–107)
CO2 SERPL-SCNC: 26 MMOL/L — SIGNIFICANT CHANGE UP (ref 22–31)
CREAT SERPL-MCNC: 0.63 MG/DL — SIGNIFICANT CHANGE UP (ref 0.5–1.3)
GLUCOSE SERPL-MCNC: 102 MG/DL — HIGH (ref 70–99)
HCT VFR BLD CALC: 27.6 % — LOW (ref 34.5–45)
HGB BLD-MCNC: 8.9 G/DL — LOW (ref 11.5–15.5)
MAGNESIUM SERPL-MCNC: 1.8 MG/DL — SIGNIFICANT CHANGE UP (ref 1.6–2.6)
MCHC RBC-ENTMCNC: 30.3 PG — SIGNIFICANT CHANGE UP (ref 27–34)
MCHC RBC-ENTMCNC: 32.2 % — SIGNIFICANT CHANGE UP (ref 32–36)
MCV RBC AUTO: 93.9 FL — SIGNIFICANT CHANGE UP (ref 80–100)
NRBC # FLD: 0 K/UL — SIGNIFICANT CHANGE UP (ref 0–0)
PHOSPHATE SERPL-MCNC: 3 MG/DL — SIGNIFICANT CHANGE UP (ref 2.5–4.5)
PLATELET # BLD AUTO: 223 K/UL — SIGNIFICANT CHANGE UP (ref 150–400)
PMV BLD: 10.6 FL — SIGNIFICANT CHANGE UP (ref 7–13)
POTASSIUM SERPL-MCNC: 3.9 MMOL/L — SIGNIFICANT CHANGE UP (ref 3.5–5.3)
POTASSIUM SERPL-SCNC: 3.9 MMOL/L — SIGNIFICANT CHANGE UP (ref 3.5–5.3)
RBC # BLD: 2.94 M/UL — LOW (ref 3.8–5.2)
RBC # FLD: 14 % — SIGNIFICANT CHANGE UP (ref 10.3–14.5)
SODIUM SERPL-SCNC: 137 MMOL/L — SIGNIFICANT CHANGE UP (ref 135–145)
WBC # BLD: 7.37 K/UL — SIGNIFICANT CHANGE UP (ref 3.8–10.5)
WBC # FLD AUTO: 7.37 K/UL — SIGNIFICANT CHANGE UP (ref 3.8–10.5)

## 2019-09-04 PROCEDURE — 99232 SBSQ HOSP IP/OBS MODERATE 35: CPT | Mod: GC

## 2019-09-04 RX ORDER — DILTIAZEM HCL 120 MG
120 CAPSULE, EXT RELEASE 24 HR ORAL
Qty: 0 | Refills: 0 | DISCHARGE

## 2019-09-04 RX ORDER — PANTOPRAZOLE SODIUM 20 MG/1
1 TABLET, DELAYED RELEASE ORAL
Qty: 30 | Refills: 0
Start: 2019-09-04 | End: 2019-10-03

## 2019-09-04 RX ORDER — APIXABAN 2.5 MG/1
1 TABLET, FILM COATED ORAL
Qty: 0 | Refills: 0 | DISCHARGE

## 2019-09-04 RX ORDER — MAGNESIUM SULFATE 500 MG/ML
1 VIAL (ML) INJECTION ONCE
Refills: 0 | Status: DISCONTINUED | OUTPATIENT
Start: 2019-09-04 | End: 2019-09-04

## 2019-09-04 RX ADMIN — PANTOPRAZOLE SODIUM 40 MILLIGRAM(S): 20 TABLET, DELAYED RELEASE ORAL at 06:13

## 2019-09-04 RX ADMIN — Medication 137 MICROGRAM(S): at 06:13

## 2019-09-04 NOTE — DISCHARGE NOTE PROVIDER - NSDCCPCAREPLAN_GEN_ALL_CORE_FT
PRINCIPAL DISCHARGE DIAGNOSIS  Diagnosis: SBO (small bowel obstruction)  Assessment and Plan of Treatment: ACTIVITY: You may return to your usual level of physical activity. If you are taking narcotic pain medication DO NOT drive a car, operate machinery or make important decisions.  DIET: Return to your usual diet.  NOTIFY YOUR SURGEON IF: Increased pain, any fever (over 100.4 F) persistent nausea/vomiting.  Please follow up with your primary care physician in 1-2 weeks regarding your hospitalization.  Please follow up with your surgeon, Dr. Marcelo Jang in 1-2 weeks. Please call office to make an appointment.

## 2019-09-04 NOTE — DISCHARGE NOTE PROVIDER - NSDCFUADDAPPT_GEN_ALL_CORE_FT
Please follow up with your cardiologist: Dr. Dial. Your Cardizem and Eliquis were discontinued on discharge. Please follow up with your cardiologist: Dr. Dial. Your Cardizem and Eliquis were discontinued on discharge.      Please follow up at the Gastroenterology Clinic. Please call the office listed for an appointment (Dr. Mason)

## 2019-09-04 NOTE — PROGRESS NOTE ADULT - SUBJECTIVE AND OBJECTIVE BOX
Interval Events:    No acute events overnight    S: Patient doing well, denies fevers, chills, nausea, emesis, chest pain, SOB.  Pain is well controlled. Tolerating PO w/o N/V.  +/+ F/BM.  BM's non-bloody    O: Vital Signs Last 24 Hrs  T(C): 36.4 (04 Sep 2019 06:07), Max: 36.8 (03 Sep 2019 10:16)  T(F): 97.6 (04 Sep 2019 06:07), Max: 98.3 (03 Sep 2019 10:16)  HR: 58 (04 Sep 2019 06:07) (53 - 60)  BP: 143/61 (04 Sep 2019 06:07) (128/54 - 158/63)  BP(mean): --  RR: 17 (04 Sep 2019 06:07) (16 - 18)  SpO2: 99% (04 Sep 2019 06:07) (98% - 100%)      02 Sep 2019 07:01  -  03 Sep 2019 07:00  --------------------------------------------------------  IN:    lactated ringers.: 900 mL  Total IN: 900 mL    OUT:    Voided: 500 mL  Total OUT: 500 mL    Total NET: 400 mL      03 Sep 2019 07:01  -  04 Sep 2019 06:50  --------------------------------------------------------  IN:    lactated ringers.: 825 mL  Total IN: 825 mL    OUT:    Voided: 1150 mL  Total OUT: 1150 mL    Total NET: -325 mL          Physical Exam:    Gen: Well-developed, well-nourished in no acute distress  Resp: Clear to auscultation bilaterally with no wheezes, rale, or rhonchi  CV: Regular rate and rhythm with no murmur, gallop, or rub  GI: Soft, non-tender, non-distended with normoactive bowel sounds.  No masses.  MSK: Moves all extremities equally  Skin: No rashes    Labs:                        8.9    7.37  )-----------( 223      ( 04 Sep 2019 05:00 )             27.6     04 Sep 2019 05:00    137    |  100    |  14     ----------------------------<  102    3.9     |  26     |  0.63     Ca    8.3        04 Sep 2019 05:00  Phos  3.0       04 Sep 2019 05:00  Mg     1.8       04 Sep 2019 05:00        CAPILLARY BLOOD GLUCOSE                    MEDICATIONS  (STANDING):  heparin  Injectable 5000 Unit(s) SubCutaneous every 8 hours  levothyroxine 137 MICROGram(s) Oral daily  pantoprazole  Injectable 40 milliGRAM(s) IV Push two times a day    MEDICATIONS  (PRN):

## 2019-09-04 NOTE — DISCHARGE NOTE NURSING/CASE MANAGEMENT/SOCIAL WORK - PATIENT PORTAL LINK FT
You can access the FollowMyHealth Patient Portal offered by [facility] by registering at the following website: [email or web address]. By joining [employer]’s FollowMyHealth portal, you will also be able to view your health information using other applications (apps) compatible with our system.

## 2019-09-04 NOTE — PROGRESS NOTE ADULT - ASSESSMENT
89F PMH hypothyroidism, A-fib (on Eliquis), DDD, HTN, GERD, achalasia s/p myotomy, multiple abdominal surgeries including; Hysterectomy, appendectomy, colectomy (for volvulus), hiatal hernia repair, ex-lap SHERRY for SBO (8/2018), presenting with abdominal pain, found on imaging to have high grade SBO.    Plan   - Diet: Regular   - Pain: PRN  -hold eliquis for now  -trend CBC  - DVT PPx: SQH  - Holding home Dilt HR ~ 60's    H&H stable likely discharge today    A team surgery  [id number]

## 2019-09-04 NOTE — DISCHARGE NOTE PROVIDER - NSFOLLOWUPCLINICS_GEN_ALL_ED_FT
[facility]  Gastroenterology  [address] [place] [id number]  Phone: [phone]  Fax:   Follow Up Time:

## 2019-09-04 NOTE — DISCHARGE NOTE NURSING/CASE MANAGEMENT/SOCIAL WORK - NSDCPNDISPN_GEN_ALL_CORE
Activities of daily living, including home environment that might     exacerbate pain or reduce effectiveness of the pain management plan of care as well as strategies to address these issues/Safe use, storage and disposal of opioids when prescribed/Side effects of pain management treatment

## 2019-09-04 NOTE — DISCHARGE NOTE NURSING/CASE MANAGEMENT/SOCIAL WORK - NSDCFUADDAPPT_GEN_ALL_CORE_FT
Please follow up with your cardiologist: Dr. Dial. Your Cardizem and Eliquis were discontinued on discharge.

## 2019-09-04 NOTE — PROGRESS NOTE ADULT - PROVIDER SPECIALTY LIST ADULT
Colorectal Surgery
Gastroenterology
Colorectal Surgery
Colorectal Surgery
Gastroenterology

## 2019-09-04 NOTE — PROGRESS NOTE ADULT - SUBJECTIVE AND OBJECTIVE BOX
Interval Events:   H/H stable. She continues to report     Allergies:  No Known Allergies      Hospital Medications:  heparin  Injectable 5000 Unit(s) SubCutaneous every 8 hours  levothyroxine 137 MICROGram(s) Oral daily  magnesium sulfate  IVPB 1 Gram(s) IV Intermittent once  pantoprazole  Injectable 40 milliGRAM(s) IV Push two times a day      PMHX/PSHX:  Atrial fibrillation  Achalasia  Hypertension  Hypothyroidism  History of repair of hiatal hernia  H/O colectomy  S/P appendectomy  S/P hysterectomy      ROS:   General:  No fevers, chills or night sweats  ENT:  No sore throat or dysphagia  CV:  No pain or palpitations  Resp:  No dyspnea, cough or  wheezing  GI:  as above  Skin:  No rash or edema    PHYSICAL EXAM:   Vital Signs:  Vital Signs Last 24 Hrs  T(C): 36.6 (04 Sep 2019 10:31), Max: 36.7 (03 Sep 2019 13:19)  T(F): 97.8 (04 Sep 2019 10:31), Max: 98.1 (03 Sep 2019 13:19)  HR: 52 (04 Sep 2019 10:31) (52 - 60)  BP: 131/56 (04 Sep 2019 10:31) (131/56 - 158/63)  BP(mean): --  RR: 17 (04 Sep 2019 10:31) (16 - 17)  SpO2: 100% (04 Sep 2019 10:31) (98% - 100%)  Daily     Daily Weight in k.4 (03 Sep 2019 12:20)    GENERAL:  NAD, Appears stated age  HEENT:  NC/AT,  conjunctivae clear and pink, sclera -anicteric  CHEST:  Normal Effort, Breath sounds clear  HEART:  RRR, S1 + S2, no murmurs  ABDOMEN:  Soft, non-tender, non-distended, normoactive bowel sounds,  no masses ,no hepato-splenomegaly, no signs of chronic liver disease  EXTEREMITIES:  no cyanosis or edema  SKIN:  Warm & Dry.No rash or erythema  NEURO:  Alert, oriented    LABS:                        8.9    7.37  )-----------( 223      ( 04 Sep 2019 05:00 )             27.6     Mean Cell Volume: 93.9 fL (19 @ 05:00)        137  |  100  |  14  ----------------------------<  102<H>  3.9   |  26  |  0.63    Ca    8.3<L>      04 Sep 2019 05:00  Phos  3.0     09-04  Mg     1.8     09-04                                    8.9    7.37  )-----------( 223      ( 04 Sep 2019 05:00 )             27.6                         8.1    5.15  )-----------( 184      ( 03 Sep 2019 06:22 )             24.6                         8.7    5.39  )-----------( 184      ( 02 Sep 2019 02:49 )             26.0                         9.2    5.59  )-----------( 203      ( 01 Sep 2019 20:40 )             27.1       Imaging: Interval Events:   H/H stable. She continues to report black stools but her H&H is better today.  She continues to refuse any  endoscopic interventions.  She wants to follow up as outpatient.  No abdominal pain, nausea, vomiting      Allergies:  No Known Allergies      Hospital Medications:  heparin  Injectable 5000 Unit(s) SubCutaneous every 8 hours  levothyroxine 137 MICROGram(s) Oral daily  magnesium sulfate  IVPB 1 Gram(s) IV Intermittent once  pantoprazole  Injectable 40 milliGRAM(s) IV Push two times a day      PMHX/PSHX:  Atrial fibrillation  Achalasia  Hypertension  Hypothyroidism  History of repair of hiatal hernia  H/O colectomy  S/P appendectomy  S/P hysterectomy      ROS:   General:  No fevers, chills or night sweats  ENT:  No sore throat or dysphagia  CV:  No pain or palpitations  Resp:  No dyspnea, cough or  wheezing  GI:  as above  Skin:  No rash or edema    PHYSICAL EXAM:   Vital Signs:  Vital Signs Last 24 Hrs  T(C): 36.6 (04 Sep 2019 10:31), Max: 36.7 (03 Sep 2019 13:19)  T(F): 97.8 (04 Sep 2019 10:31), Max: 98.1 (03 Sep 2019 13:19)  HR: 52 (04 Sep 2019 10:31) (52 - 60)  BP: 131/56 (04 Sep 2019 10:31) (131/56 - 158/63)  BP(mean): --  RR: 17 (04 Sep 2019 10:31) (16 - 17)  SpO2: 100% (04 Sep 2019 10:31) (98% - 100%)  Daily     Daily Weight in k.4 (03 Sep 2019 12:20)    PHYSICAL EXAM:   GENERAL:  NAD, Appears stated age  HEENT:  NC/AT,  conjunctivae clear and pink, sclera -anicteric  CHEST:  CTA B/L, Normal effort  HEART:  RRR S1/S2, No murmurs  ABDOMEN:  Soft, non-tender, non-distended, BS+  EXTREMITIES:  No cyanosis  SKIN:  Warm & Dry  NEURO:  Alert, oriented    LABS:                        8.9    7.37  )-----------( 223      ( 04 Sep 2019 05:00 )             27.6     Mean Cell Volume: 93.9 fL (- @ 05:00)        137  |  100  |  14  ----------------------------<  102<H>  3.9   |  26  |  0.63    Ca    8.3<L>      04 Sep 2019 05:00  Phos  3.0     09-04  Mg     1.8     09-04                                    8.9    7.37  )-----------( 223      ( 04 Sep 2019 05:00 )             27.6                         8.1    5.15  )-----------( 184      ( 03 Sep 2019 06:22 )             24.6                         8.7    5.39  )-----------( 184      ( 02 Sep 2019 02:49 )             26.0                         9.2    5.59  )-----------( 203      ( 01 Sep 2019 20:40 )             27.1       Imaging:

## 2019-09-04 NOTE — PROGRESS NOTE ADULT - ASSESSMENT
Impression:  # Black Stools/ BRBPR: h/h suspected GI bleed. Differential Diagnosis includes ulcer disease, angioectasias, esophagitis, erosive gastritis and slow right sided colonic bleed (ie. AVMs)  # Achalasia s/p Myotomy  # Volvulus s/p surgical repair  # Afib: Eliquis on hold  # GERD    Recommendation:  - Discussed potential EGD/Colon with patient given concerning for GI bleeding. She is still refusing any endoscopic intervention and prefers to follow up as outpatient giving her H&H is stable now.  - Trend CBC and transfuse if Hb below 7 while she is in the hospital  - advance diet as per surgery team  - PPI po 40 mg/ daily upon discharge   - Supportive care per primary team  - Outpatient follow up with GI

## 2019-09-04 NOTE — DISCHARGE NOTE PROVIDER - HOSPITAL COURSE
89F PMH hypothyroidism, A-fib (on Eliquis), DDD, HTN, GERD, achalasia s/p myotomy, multiple abdominal surgeries including; Hysterectomy, appendectomy, colectomy (for volvulus), hiatal hernia repair, ex-lap SHERRY for SBO (8/2018), presenting with abdominal pain. Patient states she has had innumerous SBOs in the past, all treated at [facility]. Patient developed abdominal pain yesterday morning after eating breakfast, associated with several episodes of nausea and emesis. Last BM was yesterday AM. Last flatus yesterday afternoon. Pt states these symptoms are similar to those of previous SBOs.         CT abd/pelvis obtained, which demonstrated high grade small bowel obstruction with transition point in the LLQ. Mild mesenteric edema and intrahepatic fluid.  NGT placed.  After return of bowel function and her diet was advanced as tolerated. On 9/1 Pt had an episode of melana and a drop in h/h. Pt received 2 unit of PRBC and GI was consulted. At this time patient refusing endoscopy. Pts h/h remained stable and she continued to tolerate a diet and have GI function. At this time patient has been deemed stable for discharge by attending.

## 2019-09-04 NOTE — PROGRESS NOTE ADULT - ATTENDING COMMENTS
resolving sbo  araceli diet   possible dc tomorrow if continues to do well
Patient seen and examined.  She denies any nausea, vomiting, fever or chills.  She denies any abdominal pain  She states she is passing a lot of flatus and had a non-bloody BM  She denies chest pain, shortness of breath, or palpitations    On exam she is awake, alert and oriented  She is breathing comfortably on room air  She is moving her extremities  Her abdomen is soft, not tender and not distended                          8.9    7.37  )-----------( 223      ( 04 Sep 2019 05:00 )             27.6       Ms. Bess with resolved SBO and stable H/H  1. Plan for discharge to home today
Patient seen and examined.  She feels less distended, denies abdominal pain, denies nausea or vomiting  States she hasn't passed flatus since yesterday afternoon, but feels rumbling.    Vital Signs Last 24 Hrs  T(C): 36.7 (29 Aug 2019 09:33), Max: 36.9 (29 Aug 2019 00:51)  T(F): 98 (29 Aug 2019 09:33), Max: 98.4 (29 Aug 2019 00:51)  HR: 51 (29 Aug 2019 09:33) (51 - 64)  BP: 142/58 (29 Aug 2019 09:33) (136/57 - 152/55)  BP(mean): --  RR: 18 (29 Aug 2019 09:33) (17 - 18)  SpO2: 96% (29 Aug 2019 09:33) (96% - 100%)    I&O's Detail    28 Aug 2019 07:01  -  29 Aug 2019 07:00  --------------------------------------------------------  IN:    dextrose 5% + sodium chloride 0.45% with potassium chloride 20 mEq/L: 1800 mL  Total IN: 1800 mL    OUT:    Nasoenteral Tube: 1050 mL    Voided: 1150 mL  Total OUT: 2200 mL    Total NET: -400 mL      29 Aug 2019 07:01  -  29 Aug 2019 13:45  --------------------------------------------------------  IN:    dextrose 5% + sodium chloride 0.45% with potassium chloride 20 mEq/L: 300 mL    IV PiggyBack: 100 mL  Total IN: 400 mL    OUT:    Nasoenteral Tube: 200 mL  Total OUT: 200 mL    Total NET: 200 mL      Gen: comfortable, awake, alert  ResP: breathing comfortably  Abd: soft, much less distended, not tender. NGT in place 1000mL of output over 24 hours, 200mL already today    - Continue NGT to wall suction  - Await more GI function
no further melena  hct stable  refusing endo  adv diet  hold blood thinners  Patient seen and examined   agree with above plan
Patient seen and examined.  Feels better  Had some flatus and BM    Abd is soft, much less distended, NGT in place    Follow up small bowel follow through  If contrast in colon, will remove NGT tomorrow
Pt seen. Agree with above.    - cont PPI  - d/w pt and family regarding egd (pt refusing)  - f/u HH, if cont dropping would transfuse
She is comfortable, denies nausea, vomiting, fever or chills.  Denies abdominal pain  States she is passing flatus, at least three times    Vital Signs Last 24 Hrs  T(C): 36.7 (28 Aug 2019 21:25), Max: 36.9 (28 Aug 2019 05:45)  T(F): 98 (28 Aug 2019 21:25), Max: 98.4 (28 Aug 2019 05:45)  HR: 56 (28 Aug 2019 21:25) (56 - 72)  BP: 151/66 (28 Aug 2019 21:25) (118/49 - 151/66)  BP(mean): --  RR: 18 (28 Aug 2019 21:25) (18 - 18)  SpO2: 99% (28 Aug 2019 21:25) (98% - 100%)    gen: comfortable, awake, alert  Abd: soft, not tender, and much less distended. NGT in place    - Continue serial abd exams  - Continue NGT to suction  - If continues to pass flatus, will clamp NGT tomorrow morning.
hold all blood thinners  transfuse  f/u hct  gi involvemnt for pan endoscopy

## 2019-09-04 NOTE — DISCHARGE NOTE PROVIDER - CARE PROVIDER_API CALL
Marcelo Jang)  Surgery  [address], [address]  [place] [address]  Phone: [phone]  Fax: [phone]  Follow Up Time: 1 week

## 2019-09-11 PROBLEM — I48.91 UNSPECIFIED ATRIAL FIBRILLATION: Chronic | Status: ACTIVE | Noted: 2019-08-27

## 2019-09-11 PROBLEM — I10 ESSENTIAL (PRIMARY) HYPERTENSION: Chronic | Status: ACTIVE | Noted: 2019-08-27

## 2019-09-11 PROBLEM — K22.0 ACHALASIA OF CARDIA: Chronic | Status: ACTIVE | Noted: 2019-08-27

## 2019-09-11 PROBLEM — E03.9 HYPOTHYROIDISM, UNSPECIFIED: Chronic | Status: ACTIVE | Noted: 2019-08-27

## 2019-09-23 ENCOUNTER — EMERGENCY (EMERGENCY)
Facility: HOSPITAL | Age: 84
LOS: 1 days | Discharge: HOME | End: 2019-09-23
Attending: EMERGENCY MEDICINE | Admitting: EMERGENCY MEDICINE
Payer: MEDICARE

## 2019-09-23 VITALS
DIASTOLIC BLOOD PRESSURE: 61 MMHG | TEMPERATURE: 98 F | OXYGEN SATURATION: 99 % | RESPIRATION RATE: 16 BRPM | SYSTOLIC BLOOD PRESSURE: 138 MMHG | HEART RATE: 59 BPM

## 2019-09-23 VITALS
SYSTOLIC BLOOD PRESSURE: 165 MMHG | HEART RATE: 70 BPM | RESPIRATION RATE: 16 BRPM | DIASTOLIC BLOOD PRESSURE: 72 MMHG | TEMPERATURE: 97 F | OXYGEN SATURATION: 100 %

## 2019-09-23 DIAGNOSIS — Z98.890 OTHER SPECIFIED POSTPROCEDURAL STATES: Chronic | ICD-10-CM

## 2019-09-23 DIAGNOSIS — Z90.710 ACQUIRED ABSENCE OF BOTH CERVIX AND UTERUS: Chronic | ICD-10-CM

## 2019-09-23 DIAGNOSIS — Z90.49 ACQUIRED ABSENCE OF OTHER SPECIFIED PARTS OF DIGESTIVE TRACT: Chronic | ICD-10-CM

## 2019-09-23 LAB
ALBUMIN SERPL ELPH-MCNC: 3.3 G/DL — SIGNIFICANT CHANGE UP (ref 3.3–5)
ALP SERPL-CCNC: 90 U/L — SIGNIFICANT CHANGE UP (ref 40–120)
ALT FLD-CCNC: 5 U/L — SIGNIFICANT CHANGE UP (ref 4–33)
ANION GAP SERPL CALC-SCNC: 14 MMO/L — SIGNIFICANT CHANGE UP (ref 7–14)
AST SERPL-CCNC: 11 U/L — SIGNIFICANT CHANGE UP (ref 4–32)
BASE EXCESS BLDV CALC-SCNC: 6.4 MMOL/L — SIGNIFICANT CHANGE UP
BASOPHILS # BLD AUTO: 0.06 K/UL — SIGNIFICANT CHANGE UP (ref 0–0.2)
BASOPHILS NFR BLD AUTO: 0.9 % — SIGNIFICANT CHANGE UP (ref 0–2)
BILIRUB SERPL-MCNC: 0.4 MG/DL — SIGNIFICANT CHANGE UP (ref 0.2–1.2)
BLD GP AB SCN SERPL QL: NEGATIVE — SIGNIFICANT CHANGE UP
BLOOD GAS VENOUS - CREATININE: 0.66 MG/DL — SIGNIFICANT CHANGE UP (ref 0.5–1.3)
BUN SERPL-MCNC: 13 MG/DL — SIGNIFICANT CHANGE UP (ref 7–23)
CALCIUM SERPL-MCNC: 8.6 MG/DL — SIGNIFICANT CHANGE UP (ref 8.4–10.5)
CHLORIDE BLDV-SCNC: 100 MMOL/L — SIGNIFICANT CHANGE UP (ref 96–108)
CHLORIDE SERPL-SCNC: 97 MMOL/L — LOW (ref 98–107)
CO2 SERPL-SCNC: 24 MMOL/L — SIGNIFICANT CHANGE UP (ref 22–31)
CREAT SERPL-MCNC: 0.59 MG/DL — SIGNIFICANT CHANGE UP (ref 0.5–1.3)
EOSINOPHIL # BLD AUTO: 0.06 K/UL — SIGNIFICANT CHANGE UP (ref 0–0.5)
EOSINOPHIL NFR BLD AUTO: 0.9 % — SIGNIFICANT CHANGE UP (ref 0–6)
GAS PNL BLDV: 134 MMOL/L — LOW (ref 136–146)
GLUCOSE BLDV-MCNC: 97 MG/DL — SIGNIFICANT CHANGE UP (ref 70–99)
GLUCOSE SERPL-MCNC: 98 MG/DL — SIGNIFICANT CHANGE UP (ref 70–99)
HCO3 BLDV-SCNC: 29 MMOL/L — HIGH (ref 20–27)
HCT VFR BLD CALC: 29.2 % — LOW (ref 34.5–45)
HCT VFR BLDV CALC: 29.9 % — LOW (ref 34.5–45)
HGB BLD-MCNC: 9.3 G/DL — LOW (ref 11.5–15.5)
HGB BLDV-MCNC: 9.7 G/DL — LOW (ref 11.5–15.5)
IMM GRANULOCYTES NFR BLD AUTO: 0.5 % — SIGNIFICANT CHANGE UP (ref 0–1.5)
LACTATE BLDV-MCNC: 1.5 MMOL/L — SIGNIFICANT CHANGE UP (ref 0.5–2)
LYMPHOCYTES # BLD AUTO: 1.72 K/UL — SIGNIFICANT CHANGE UP (ref 1–3.3)
LYMPHOCYTES # BLD AUTO: 25.9 % — SIGNIFICANT CHANGE UP (ref 13–44)
MCHC RBC-ENTMCNC: 30 PG — SIGNIFICANT CHANGE UP (ref 27–34)
MCHC RBC-ENTMCNC: 31.8 % — LOW (ref 32–36)
MCV RBC AUTO: 94.2 FL — SIGNIFICANT CHANGE UP (ref 80–100)
MONOCYTES # BLD AUTO: 0.44 K/UL — SIGNIFICANT CHANGE UP (ref 0–0.9)
MONOCYTES NFR BLD AUTO: 6.6 % — SIGNIFICANT CHANGE UP (ref 2–14)
NEUTROPHILS # BLD AUTO: 4.34 K/UL — SIGNIFICANT CHANGE UP (ref 1.8–7.4)
NEUTROPHILS NFR BLD AUTO: 65.2 % — SIGNIFICANT CHANGE UP (ref 43–77)
NRBC # FLD: 0 K/UL — SIGNIFICANT CHANGE UP (ref 0–0)
PCO2 BLDV: 57 MMHG — HIGH (ref 41–51)
PH BLDV: 7.37 PH — SIGNIFICANT CHANGE UP (ref 7.32–7.43)
PLATELET # BLD AUTO: 267 K/UL — SIGNIFICANT CHANGE UP (ref 150–400)
PMV BLD: 9.7 FL — SIGNIFICANT CHANGE UP (ref 7–13)
PO2 BLDV: 31 MMHG — LOW (ref 35–40)
POTASSIUM BLDV-SCNC: 3.2 MMOL/L — LOW (ref 3.4–4.5)
POTASSIUM SERPL-MCNC: 3.5 MMOL/L — SIGNIFICANT CHANGE UP (ref 3.5–5.3)
POTASSIUM SERPL-SCNC: 3.5 MMOL/L — SIGNIFICANT CHANGE UP (ref 3.5–5.3)
PROT SERPL-MCNC: 6.5 G/DL — SIGNIFICANT CHANGE UP (ref 6–8.3)
RBC # BLD: 3.1 M/UL — LOW (ref 3.8–5.2)
RBC # FLD: 14.9 % — HIGH (ref 10.3–14.5)
RH IG SCN BLD-IMP: POSITIVE — SIGNIFICANT CHANGE UP
SAO2 % BLDV: 46.6 % — LOW (ref 60–85)
SODIUM SERPL-SCNC: 135 MMOL/L — SIGNIFICANT CHANGE UP (ref 135–145)
WBC # BLD: 6.65 K/UL — SIGNIFICANT CHANGE UP (ref 3.8–10.5)
WBC # FLD AUTO: 6.65 K/UL — SIGNIFICANT CHANGE UP (ref 3.8–10.5)

## 2019-09-23 PROCEDURE — 99284 EMERGENCY DEPT VISIT MOD MDM: CPT | Mod: 25,GC

## 2019-09-23 PROCEDURE — 71045 X-RAY EXAM CHEST 1 VIEW: CPT | Mod: 26

## 2019-09-23 PROCEDURE — 93010 ELECTROCARDIOGRAM REPORT: CPT

## 2019-09-23 PROCEDURE — 74177 CT ABD & PELVIS W/CONTRAST: CPT | Mod: 26

## 2019-09-23 RX ORDER — SODIUM CHLORIDE 9 MG/ML
500 INJECTION INTRAMUSCULAR; INTRAVENOUS; SUBCUTANEOUS ONCE
Refills: 0 | Status: COMPLETED | OUTPATIENT
Start: 2019-09-23 | End: 2019-09-23

## 2019-09-23 RX ADMIN — SODIUM CHLORIDE 500 MILLILITER(S): 9 INJECTION INTRAMUSCULAR; INTRAVENOUS; SUBCUTANEOUS at 18:30

## 2019-09-23 NOTE — ED PROVIDER NOTE - PATIENT PORTAL LINK FT
You can access the FollowMyHealth Patient Portal offered by [facility] by registering at the following website: [email or web address]. By joining [employer]’s FollowMyHealth portal, you will also be able to view your health information using other applications (apps) compatible with our system.

## 2019-09-23 NOTE — ED PROVIDER NOTE - OBJECTIVE STATEMENT
Patient is a 89 yo F with history of hypothyroidism, A-fib (on Eliquis), DDD, HTN, GERD, achalasia s/p myotomy, multiple abdominal surgeries including; Hysterectomy, appendectomy, colectomy (for volvulus), hiatal hernia repair, ex-lap SHERRY for SBO (8/2018), presenting with abdominal distention and discomfort. LBM was yesterday. Patient has been unable to pass gas since last night. She called her doctor who told her to come to the ED for evaluation for possible SBO. Patient has had SBO 8 other times, last time August 2019. Deneis nausea, vomiting. No fevers. Berny BECKER: Patient is a 89 yo F with history of hypothyroidism, A-fib (on Eliquis), DDD, HTN, GERD, achalasia s/p myotomy, multiple abdominal surgeries including; Hysterectomy, appendectomy, colectomy (for volvulus), hiatal hernia repair, ex-lap SHERRY for SBO (8/2018), presenting with abdominal distention and discomfort. LBM was yesterday. Patient has been unable to pass gas since last night. She called her doctor who told her to come to the ED for evaluation for possible SBO. Patient has had SBO 8 other times, last time August 2019. Deneis nausea, vomiting. No fevers.

## 2019-09-23 NOTE — ED ADULT NURSE NOTE - OBJECTIVE STATEMENT
as per triage note, "Pt with PMH of multiple SBOs, last admission in August for same, c/o abd pain, constipation, and inability to pass gas since early this morning. Pt denies N/V. Pt was sent to ED by her doctor."

## 2019-09-23 NOTE — ED ADULT TRIAGE NOTE - CHIEF COMPLAINT QUOTE
Pt with PMH of multiple SBOs, last admission in August for same, c/o abd pain, constipation, and inability to pass gas since early this morning. Pt denies N/V. Pt was sent to ED by her doctor.

## 2019-09-23 NOTE — ED PROVIDER NOTE - CARE PLAN
Principal Discharge DX:	Abdominal pain  Secondary Diagnosis:	Ileus  Secondary Diagnosis:	Abdominal distension

## 2019-09-23 NOTE — ED PROVIDER NOTE - CARE PROVIDER_API CALL
Marcelo Jang)  Surgery  [address], [address]  [place] [address]  Phone: [phone]  Fax: [phone]  Follow Up Time:

## 2019-09-23 NOTE — ED PROVIDER NOTE - CLINICAL SUMMARY MEDICAL DECISION MAKING FREE TEXT BOX
hx of multiple abdominal surgeries and SBO many times, now here for abdominal distention, decreased flatus. Exam shows abd distention. Concern for SBO. Plan for labs, CT A/P. Patient cannot tolerate PO secondary to achalasia. Will get CT A/P with IV contrast only.

## 2019-09-23 NOTE — ED PROVIDER NOTE - PROGRESS NOTE DETAILS
Sebastian, PGY3: Patient endorsing improvement in all symptoms. Patient feels abdomen non-tendern, less distended, and patient now passing gas. Has flatus while examining patient in room. Patient continues to deny nausea or vomiting, and would like to go home. Will discharge patient home w/ PMD and general surgeon followup.

## 2019-09-23 NOTE — ED PROVIDER NOTE - NSFOLLOWUPINSTRUCTIONS_ED_ALL_ED_FT
Please follow up with your general surgeon, Dr. Jang.    Marcelo Jang)  Surgery  [address] [facility] [facility] [address] [facility], [address]  [place] [address]  Phone: [phone]    Please follow up with a primary care provider as soon as possible. If you do not have a primary care doctor, you can make an appointment with one at the following location.   [facility] [place]  Phone: [phone]  Fax: [phone]  Address: [address] [place] [address], [address], [place] [address]    Additionally, you may use the following web address to find a [facility] physician close to you: [email or web address]     Please return to the emergency department if you experience worsening symptoms, such as abdominal distention, vomiting, not passing gas or stool, or if you develop new symptoms like headache, neck stiffness, fever/chills, changes in vision, chest pain, shortness of breath, difficulty breathing, palpitations, lightheadedness, weakness, dizziness, numbness, tingling, changes in your urine, blood in the urine, painful urination, syncope (passing out), or for any other concerns.

## 2019-09-24 ENCOUNTER — APPOINTMENT (OUTPATIENT)
Dept: THORACIC SURGERY | Facility: CLINIC | Age: 84
End: 2019-09-24
Payer: MEDICARE

## 2019-09-24 VITALS
WEIGHT: 103 LBS | RESPIRATION RATE: 18 BRPM | HEART RATE: 63 BPM | SYSTOLIC BLOOD PRESSURE: 142 MMHG | OXYGEN SATURATION: 98 % | DIASTOLIC BLOOD PRESSURE: 74 MMHG | BODY MASS INDEX: 16.62 KG/M2

## 2019-09-24 PROCEDURE — 99214 OFFICE O/P EST MOD 30 MIN: CPT

## 2019-09-24 RX ORDER — DILTIAZEM HYDROCHLORIDE 60 MG/1
TABLET, COATED ORAL
Refills: 0 | Status: ACTIVE | COMMUNITY

## 2019-09-25 NOTE — CONSULT LETTER
[Dear  ___] : Dear  [unfilled], [( Thank you for referring [unfilled] for consultation for _____ )] : Thank you for referring [unfilled] for consultation for [unfilled] [Consult Letter:] : I had the pleasure of evaluating your patient, [unfilled]. [Please see my note below.] : Please see my note below. [Consult Closing:] : Thank you very much for allowing me to participate in the care of this patient.  If you have any questions, please do not hesitate to contact me. [Sincerely,] : Sincerely, [FreeTextEntry2] : Adria Staton MD  [FreeTextEntry3] : Asif Jang MD, MPH \par System Director of Thoracic Surgery \par Director of Comprehensive Lung and Foregut [place] \par Professor Cardiovascular & Thoracic Surgery  \par [facility] [employer] at [facility]\par

## 2019-09-25 NOTE — ASSESSMENT
[FreeTextEntry1] : 89 y/o F, never smoker, w/ multiple PMHx including HTN, Achalasia s/p dilation x 2, hiatal hernia s/o repair w/ Mesh, also hx of emergency Volvulus surgical repair in 4/2014.\par \par Now 4yr s/p EGD CRE balloon dilation of GE junction up to 16mm in size, Botox injection 100 units into the lower esophageal sphincter on 8/10/15. \par EGD dilation on 12/22/15.\par EGD, balloon dilation of GE junction on 3/3/16. Path showed JEFFRY.\par Now 3.5yr s/p EGD Lap Heller myotomy, and extensive lysis of adhesion on 4/13/16.\par \par Barium Esophagram on 6/18/19:\par - decreased motility in the distal half of the esophagus, w/ mildly dilated proximal esophagus\par - multiple tertiary contractions seen\par - findings suspicious for achalasia\par \par CT A/P w/ IV contrast on 8/27/19:\par - clips at the region of the GE junction r/t previous fundoplication\par - stomach is massively distended and enters the pelvis\par - High-grade small bowel obstruction w/ transition point in the Lt lower quadrant, likely r/t post-op adhesions\par \par I have reviewed the patient's medical records and diagnostic images at time of this office consultation and have made the following recommendation:\par 1. Barium Esophagram\par 2. I recommended an EGD w/ dilation and botox injection on 10/10/19. Risks and benefits and alternatives explained to patient, all questions answered, patient agreed to proceed with procedure.\par 3. Hold Eliquis 3 days prior to procedure\par \par \par Written by Minh Leggett NP, acting as a scribe for Dr. Asif Sumner.\par \par The documentation recorded by the scribe accurately reflects the service I personally performed and the decisions made by me. ASIF SUMNER MD\par

## 2019-09-25 NOTE — HISTORY OF PRESENT ILLNESS
[FreeTextEntry1] : 89 y/o F, never smoker, w/ multiple PMHx including HTN, Achalasia s/p dilation x 2, hiatal hernia s/o repair w/ Mesh, also hx of emergency Volvulus surgical repair in 4/2014.\par \par Now 4yr s/p EGD CRE balloon dilation of GE junction up to 16mm in size, Botox injection 100 units into the lower esophageal sphincter on 8/10/15. \par EGD dilation on 12/22/15.\par EGD, balloon dilation of GE junction on 3/3/16. Path showed JEFFRY.\par Now 3.5yr s/p EGD Lap Heller myotomy, and extensive lysis of adhesion on 4/13/16.\par \par Barium swallow on 11/6/2018:\par -Multiple tertiary contractions of the esophagus consistent with significant dysmotility\par -No evidence of achalasia\par \par Barium Esophagram on 6/18/19:\par - decreased motility in the distal half of the esophagus, w/ mildly dilated proximal esophagus\par - multiple tertiary contractions seen\par - findings suspicious for achalasia\par \par CT A/P w/ IV contrast on 8/27/19:\par - clips at the region of the GE junction r/t previous fundoplication\par - stomach is massively distended and enters the pelvis\par - High-grade small bowel obstruction w/ transition point in the Lt lower quadrant, likely r/t post-op adhesions\par \par Patient is here today for a follow up. Recently hospitalized for small bowel obstruction, c/o severe dysphagia, food regurgitation, significant wt loss.

## 2019-09-25 NOTE — PHYSICAL EXAM
[Heart Rate And Rhythm] : heart rate was normal and rhythm regular [Auscultation Breath Sounds / Voice Sounds] : lungs were clear to auscultation bilaterally [Heart Sounds] : normal S1 and S2 [Heart Sounds Gallop] : no gallops [Murmurs] : no murmurs [Heart Sounds Pericardial Friction Rub] : no pericardial rub [Examination Of The Chest] : the chest was normal in appearance [Chest Visual Inspection Thoracic Asymmetry] : no chest asymmetry [Diminished Respiratory Excursion] : normal chest expansion [Bowel Sounds] : normal bowel sounds [Abdomen Soft] : soft [Abdomen Tenderness] : non-tender [Abdomen Mass (___ Cm)] : no abdominal mass palpated [Nail Clubbing] : no clubbing  or cyanosis of the fingernails [Musculoskeletal - Swelling] : no joint swelling seen [Skin Color & Pigmentation] : normal skin color and pigmentation [Skin Turgor] : normal skin turgor [] : no rash [Deep Tendon Reflexes (DTR)] : deep tendon reflexes were 2+ and symmetric [Sensation] : the sensory exam was normal to light touch and pinprick [Oriented To Time, Place, And Person] : oriented to person, place, and time [No Focal Deficits] : no focal deficits [Impaired Insight] : insight and judgment were intact [Affect] : the affect was normal [FreeTextEntry1] : using a walker

## 2019-09-25 NOTE — DATA REVIEWED
[FreeTextEntry1] : Barium Esophagram on 6/18/19:\par - decreased motility in the distal half of the esophagus, w/ mildly dilated proximal esophagus\par - multiple tertiary contractions seen\par - findings suspicious for achalasia\par \par CT A/P w/ IV contrast on 8/27/19:\par - clips at the region of the GE junction r/t previous fundoplication\par - stomach is massively distended and enters the pelvis\par - High-grade small bowel obstruction w/ transition point in the Lt lower quadrant, likely r/t post-op adhesions

## 2019-10-03 ENCOUNTER — FORM ENCOUNTER (OUTPATIENT)
Age: 84
End: 2019-10-03

## 2019-10-04 ENCOUNTER — APPOINTMENT (OUTPATIENT)
Dept: RADIOLOGY | Facility: HOSPITAL | Age: 84
End: 2019-10-04
Payer: MEDICARE

## 2019-10-04 ENCOUNTER — OUTPATIENT (OUTPATIENT)
Dept: OUTPATIENT SERVICES | Facility: HOSPITAL | Age: 84
LOS: 1 days | End: 2019-10-04

## 2019-10-04 DIAGNOSIS — Z90.710 ACQUIRED ABSENCE OF BOTH CERVIX AND UTERUS: Chronic | ICD-10-CM

## 2019-10-04 DIAGNOSIS — Z90.49 ACQUIRED ABSENCE OF OTHER SPECIFIED PARTS OF DIGESTIVE TRACT: Chronic | ICD-10-CM

## 2019-10-04 DIAGNOSIS — Z98.890 OTHER SPECIFIED POSTPROCEDURAL STATES: Chronic | ICD-10-CM

## 2019-10-04 DIAGNOSIS — K22.0 ACHALASIA OF CARDIA: ICD-10-CM

## 2019-10-04 PROCEDURE — 74220 X-RAY XM ESOPHAGUS 1CNTRST: CPT | Mod: 26

## 2019-10-08 ENCOUNTER — APPOINTMENT (OUTPATIENT)
Dept: THORACIC SURGERY | Facility: HOSPITAL | Age: 84
End: 2019-10-08

## 2019-10-08 ENCOUNTER — OUTPATIENT (OUTPATIENT)
Dept: OUTPATIENT SERVICES | Facility: HOSPITAL | Age: 84
LOS: 1 days | Discharge: HOME | End: 2019-10-08
Payer: MEDICARE

## 2019-10-08 DIAGNOSIS — Z90.710 ACQUIRED ABSENCE OF BOTH CERVIX AND UTERUS: Chronic | ICD-10-CM

## 2019-10-08 DIAGNOSIS — Z90.49 ACQUIRED ABSENCE OF OTHER SPECIFIED PARTS OF DIGESTIVE TRACT: Chronic | ICD-10-CM

## 2019-10-08 DIAGNOSIS — K22.0 ACHALASIA OF CARDIA: ICD-10-CM

## 2019-10-08 DIAGNOSIS — Z98.890 OTHER SPECIFIED POSTPROCEDURAL STATES: Chronic | ICD-10-CM

## 2019-10-08 PROCEDURE — 43235 EGD DIAGNOSTIC BRUSH WASH: CPT

## 2019-10-08 RX ORDER — SODIUM CHLORIDE 9 MG/ML
1000 INJECTION, SOLUTION INTRAVENOUS
Refills: 0 | Status: DISCONTINUED | OUTPATIENT
Start: 2019-10-08 | End: 2019-10-25

## 2019-10-08 RX ORDER — METOPROLOL TARTRATE 50 MG
2.5 TABLET ORAL ONCE
Refills: 0 | Status: COMPLETED | OUTPATIENT
Start: 2019-10-08 | End: 2019-10-08

## 2019-10-08 RX ADMIN — Medication 105 MILLIGRAM(S): at 09:40

## 2019-10-08 RX ADMIN — SODIUM CHLORIDE 30 MILLILITER(S): 9 INJECTION, SOLUTION INTRAVENOUS at 07:55

## 2019-10-08 NOTE — BRIEF OPERATIVE NOTE - OPERATION/FINDINGS
EGD with visualization of foreign body. Erosion of mesh into esophageal lumen near GE junction around 45cm.

## 2019-10-08 NOTE — BRIEF OPERATIVE NOTE - COMMENTS
EGD with visualization of foreign body. Erosion of mesh into esophageal lumen near GE junction around 45cm.
Statement Selected

## 2019-10-08 NOTE — BRIEF OPERATIVE NOTE - NSICDXBRIEFPROCEDURE_GEN_ALL_CORE_FT
PROCEDURES:  EGD (esophagogastroduodenoscopy) 08-Oct-2019 08:47:32 EGD w/ visualization of foreign body Manda Ramirez

## 2019-10-10 ENCOUNTER — APPOINTMENT (OUTPATIENT)
Dept: THORACIC SURGERY | Facility: HOSPITAL | Age: 84
End: 2019-10-10

## 2019-10-10 ENCOUNTER — OTHER (OUTPATIENT)
Age: 84
End: 2019-10-10

## 2019-10-13 ENCOUNTER — FORM ENCOUNTER (OUTPATIENT)
Age: 84
End: 2019-10-13

## 2019-10-14 ENCOUNTER — APPOINTMENT (OUTPATIENT)
Dept: CT IMAGING | Facility: IMAGING CENTER | Age: 84
End: 2019-10-14

## 2019-10-14 ENCOUNTER — OUTPATIENT (OUTPATIENT)
Dept: OUTPATIENT SERVICES | Facility: HOSPITAL | Age: 84
LOS: 1 days | End: 2019-10-14
Payer: MEDICARE

## 2019-10-14 DIAGNOSIS — Z90.710 ACQUIRED ABSENCE OF BOTH CERVIX AND UTERUS: Chronic | ICD-10-CM

## 2019-10-14 DIAGNOSIS — Z98.890 OTHER SPECIFIED POSTPROCEDURAL STATES: Chronic | ICD-10-CM

## 2019-10-14 DIAGNOSIS — Z00.8 ENCOUNTER FOR OTHER GENERAL EXAMINATION: ICD-10-CM

## 2019-10-14 DIAGNOSIS — Z90.49 ACQUIRED ABSENCE OF OTHER SPECIFIED PARTS OF DIGESTIVE TRACT: Chronic | ICD-10-CM

## 2019-10-14 PROCEDURE — 71260 CT THORAX DX C+: CPT

## 2019-10-14 PROCEDURE — 74177 CT ABD & PELVIS W/CONTRAST: CPT | Mod: 26

## 2019-10-14 PROCEDURE — 71260 CT THORAX DX C+: CPT | Mod: 26

## 2019-10-14 PROCEDURE — 74177 CT ABD & PELVIS W/CONTRAST: CPT

## 2019-10-15 ENCOUNTER — INPATIENT (INPATIENT)
Facility: HOSPITAL | Age: 84
LOS: 22 days | Discharge: NURSING, REHAB OR LONG-TERM CARE | End: 2019-11-07
Attending: THORACIC SURGERY (CARDIOTHORACIC VASCULAR SURGERY) | Admitting: THORACIC SURGERY (CARDIOTHORACIC VASCULAR SURGERY)
Payer: MEDICARE

## 2019-10-15 ENCOUNTER — APPOINTMENT (OUTPATIENT)
Dept: THORACIC SURGERY | Facility: CLINIC | Age: 84
End: 2019-10-15
Payer: MEDICARE

## 2019-10-15 VITALS
TEMPERATURE: 97 F | RESPIRATION RATE: 18 BRPM | OXYGEN SATURATION: 97 % | DIASTOLIC BLOOD PRESSURE: 67 MMHG | SYSTOLIC BLOOD PRESSURE: 140 MMHG | HEIGHT: 66 IN | HEART RATE: 62 BPM | WEIGHT: 98.11 LBS

## 2019-10-15 VITALS
BODY MASS INDEX: 15.82 KG/M2 | RESPIRATION RATE: 18 BRPM | WEIGHT: 98 LBS | OXYGEN SATURATION: 98 % | SYSTOLIC BLOOD PRESSURE: 133 MMHG | DIASTOLIC BLOOD PRESSURE: 78 MMHG | HEART RATE: 59 BPM

## 2019-10-15 DIAGNOSIS — Z90.710 ACQUIRED ABSENCE OF BOTH CERVIX AND UTERUS: Chronic | ICD-10-CM

## 2019-10-15 DIAGNOSIS — R13.0 APHAGIA: ICD-10-CM

## 2019-10-15 DIAGNOSIS — I10 ESSENTIAL (PRIMARY) HYPERTENSION: ICD-10-CM

## 2019-10-15 DIAGNOSIS — I48.91 UNSPECIFIED ATRIAL FIBRILLATION: ICD-10-CM

## 2019-10-15 DIAGNOSIS — K22.0 ACHALASIA OF CARDIA: ICD-10-CM

## 2019-10-15 DIAGNOSIS — Z90.49 ACQUIRED ABSENCE OF OTHER SPECIFIED PARTS OF DIGESTIVE TRACT: Chronic | ICD-10-CM

## 2019-10-15 DIAGNOSIS — Z98.890 OTHER SPECIFIED POSTPROCEDURAL STATES: Chronic | ICD-10-CM

## 2019-10-15 DIAGNOSIS — E03.9 HYPOTHYROIDISM, UNSPECIFIED: ICD-10-CM

## 2019-10-15 DIAGNOSIS — R13.10 DYSPHAGIA, UNSPECIFIED: ICD-10-CM

## 2019-10-15 LAB
ALBUMIN SERPL ELPH-MCNC: 3.2 G/DL — LOW (ref 3.3–5)
ALP SERPL-CCNC: 88 U/L — SIGNIFICANT CHANGE UP (ref 40–120)
ALT FLD-CCNC: 5 U/L — SIGNIFICANT CHANGE UP (ref 4–33)
ANION GAP SERPL CALC-SCNC: 10 MMO/L — SIGNIFICANT CHANGE UP (ref 7–14)
APTT BLD: 31.2 SEC — SIGNIFICANT CHANGE UP (ref 27.5–36.3)
AST SERPL-CCNC: 10 U/L — SIGNIFICANT CHANGE UP (ref 4–32)
BASOPHILS # BLD AUTO: 0.05 K/UL — SIGNIFICANT CHANGE UP (ref 0–0.2)
BASOPHILS NFR BLD AUTO: 1.1 % — SIGNIFICANT CHANGE UP (ref 0–2)
BILIRUB SERPL-MCNC: 0.4 MG/DL — SIGNIFICANT CHANGE UP (ref 0.2–1.2)
BLD GP AB SCN SERPL QL: NEGATIVE — SIGNIFICANT CHANGE UP
BUN SERPL-MCNC: 14 MG/DL — SIGNIFICANT CHANGE UP (ref 7–23)
CALCIUM SERPL-MCNC: 8.6 MG/DL — SIGNIFICANT CHANGE UP (ref 8.4–10.5)
CHLORIDE SERPL-SCNC: 98 MMOL/L — SIGNIFICANT CHANGE UP (ref 98–107)
CO2 SERPL-SCNC: 32 MMOL/L — HIGH (ref 22–31)
CREAT SERPL-MCNC: 0.56 MG/DL — SIGNIFICANT CHANGE UP (ref 0.5–1.3)
EOSINOPHIL # BLD AUTO: 0.02 K/UL — SIGNIFICANT CHANGE UP (ref 0–0.5)
EOSINOPHIL NFR BLD AUTO: 0.4 % — SIGNIFICANT CHANGE UP (ref 0–6)
GLUCOSE SERPL-MCNC: 90 MG/DL — SIGNIFICANT CHANGE UP (ref 70–99)
HBA1C BLD-MCNC: 5.2 % — SIGNIFICANT CHANGE UP (ref 4–5.6)
HCT VFR BLD CALC: 28.5 % — LOW (ref 34.5–45)
HGB BLD-MCNC: 9.2 G/DL — LOW (ref 11.5–15.5)
IMM GRANULOCYTES NFR BLD AUTO: 0.4 % — SIGNIFICANT CHANGE UP (ref 0–1.5)
INR BLD: 1.05 — SIGNIFICANT CHANGE UP (ref 0.88–1.17)
LYMPHOCYTES # BLD AUTO: 1.09 K/UL — SIGNIFICANT CHANGE UP (ref 1–3.3)
LYMPHOCYTES # BLD AUTO: 23.6 % — SIGNIFICANT CHANGE UP (ref 13–44)
MCHC RBC-ENTMCNC: 30 PG — SIGNIFICANT CHANGE UP (ref 27–34)
MCHC RBC-ENTMCNC: 32.3 % — SIGNIFICANT CHANGE UP (ref 32–36)
MCV RBC AUTO: 92.8 FL — SIGNIFICANT CHANGE UP (ref 80–100)
MONOCYTES # BLD AUTO: 0.28 K/UL — SIGNIFICANT CHANGE UP (ref 0–0.9)
MONOCYTES NFR BLD AUTO: 6.1 % — SIGNIFICANT CHANGE UP (ref 2–14)
NEUTROPHILS # BLD AUTO: 3.15 K/UL — SIGNIFICANT CHANGE UP (ref 1.8–7.4)
NEUTROPHILS NFR BLD AUTO: 68.4 % — SIGNIFICANT CHANGE UP (ref 43–77)
NRBC # FLD: 0 K/UL — SIGNIFICANT CHANGE UP (ref 0–0)
NT-PROBNP SERPL-SCNC: 1510 PG/ML — SIGNIFICANT CHANGE UP
PLATELET # BLD AUTO: 305 K/UL — SIGNIFICANT CHANGE UP (ref 150–400)
PMV BLD: 9.7 FL — SIGNIFICANT CHANGE UP (ref 7–13)
POTASSIUM SERPL-MCNC: 3.1 MMOL/L — LOW (ref 3.5–5.3)
POTASSIUM SERPL-SCNC: 3.1 MMOL/L — LOW (ref 3.5–5.3)
PREALB SERPL-MCNC: 10 MG/DL — LOW (ref 20–40)
PROT SERPL-MCNC: 5.9 G/DL — LOW (ref 6–8.3)
PROTHROM AB SERPL-ACNC: 11.7 SEC — SIGNIFICANT CHANGE UP (ref 9.8–13.1)
RBC # BLD: 3.07 M/UL — LOW (ref 3.8–5.2)
RBC # FLD: 14.7 % — HIGH (ref 10.3–14.5)
RH IG SCN BLD-IMP: POSITIVE — SIGNIFICANT CHANGE UP
SODIUM SERPL-SCNC: 140 MMOL/L — SIGNIFICANT CHANGE UP (ref 135–145)
TSH SERPL-MCNC: 4.32 UIU/ML — HIGH (ref 0.27–4.2)
WBC # BLD: 4.61 K/UL — SIGNIFICANT CHANGE UP (ref 3.8–10.5)
WBC # FLD AUTO: 4.61 K/UL — SIGNIFICANT CHANGE UP (ref 3.8–10.5)

## 2019-10-15 PROCEDURE — 71045 X-RAY EXAM CHEST 1 VIEW: CPT | Mod: 26

## 2019-10-15 PROCEDURE — 99213 OFFICE O/P EST LOW 20 MIN: CPT | Mod: PD

## 2019-10-15 PROCEDURE — 99222 1ST HOSP IP/OBS MODERATE 55: CPT | Mod: GC

## 2019-10-15 RX ORDER — SODIUM CHLORIDE 9 MG/ML
3 INJECTION INTRAMUSCULAR; INTRAVENOUS; SUBCUTANEOUS EVERY 8 HOURS
Refills: 0 | Status: DISCONTINUED | OUTPATIENT
Start: 2019-10-15 | End: 2019-10-22

## 2019-10-15 RX ORDER — SODIUM CHLORIDE 9 MG/ML
1000 INJECTION, SOLUTION INTRAVENOUS
Refills: 0 | Status: DISCONTINUED | OUTPATIENT
Start: 2019-10-15 | End: 2019-10-15

## 2019-10-15 RX ORDER — PANTOPRAZOLE SODIUM 20 MG/1
40 TABLET, DELAYED RELEASE ORAL DAILY
Refills: 0 | Status: DISCONTINUED | OUTPATIENT
Start: 2019-10-15 | End: 2019-10-22

## 2019-10-15 RX ORDER — HEPARIN SODIUM 5000 [USP'U]/ML
5000 INJECTION INTRAVENOUS; SUBCUTANEOUS EVERY 12 HOURS
Refills: 0 | Status: DISCONTINUED | OUTPATIENT
Start: 2019-10-15 | End: 2019-10-16

## 2019-10-15 RX ORDER — POTASSIUM CHLORIDE 20 MEQ
10 PACKET (EA) ORAL
Refills: 0 | Status: COMPLETED | OUTPATIENT
Start: 2019-10-15 | End: 2019-10-15

## 2019-10-15 RX ORDER — LEVOTHYROXINE SODIUM 125 MCG
68.5 TABLET ORAL AT BEDTIME
Refills: 0 | Status: DISCONTINUED | OUTPATIENT
Start: 2019-10-15 | End: 2019-10-22

## 2019-10-15 RX ORDER — DEXTROSE MONOHYDRATE, SODIUM CHLORIDE, AND POTASSIUM CHLORIDE 50; .745; 4.5 G/1000ML; G/1000ML; G/1000ML
1000 INJECTION, SOLUTION INTRAVENOUS
Refills: 0 | Status: DISCONTINUED | OUTPATIENT
Start: 2019-10-15 | End: 2019-10-16

## 2019-10-15 RX ORDER — METOPROLOL TARTRATE 50 MG
2.5 TABLET ORAL EVERY 6 HOURS
Refills: 0 | Status: DISCONTINUED | OUTPATIENT
Start: 2019-10-15 | End: 2019-10-18

## 2019-10-15 RX ADMIN — Medication 100 MILLIEQUIVALENT(S): at 23:19

## 2019-10-15 RX ADMIN — DEXTROSE MONOHYDRATE, SODIUM CHLORIDE, AND POTASSIUM CHLORIDE 75 MILLILITER(S): 50; .745; 4.5 INJECTION, SOLUTION INTRAVENOUS at 21:43

## 2019-10-15 RX ADMIN — SODIUM CHLORIDE 3 MILLILITER(S): 9 INJECTION INTRAMUSCULAR; INTRAVENOUS; SUBCUTANEOUS at 21:35

## 2019-10-15 RX ADMIN — Medication 100 MILLIEQUIVALENT(S): at 21:44

## 2019-10-15 NOTE — H&P ADULT - PROBLEM SELECTOR PLAN 1
Admit to thoracic surgery under Dr. Jang  NPO as pt has been vomiting   IVF for hydration   GI consulted - Manometry testing ordered - preop  CBC, CMP, prealbumin, coags, T&S, tsh, ekg, echo ordered

## 2019-10-15 NOTE — CONSULT NOTE ADULT - ASSESSMENT
Impression:  # Dysphagia secondary to mesh eroding into the esophagus  # Afib (on Eliquis)  # Achalasia s/p myotomy  # Volvulus s/p surgical repair  # SBO s/p ex-lap with lysis of adhesions     Recommendation:  - IV hydration  - NPO @ [facility] for Manometry with Dr. Mansfield tomorrow  - Supportive care per primary team    Ami Jennings MD  Gastroenterology Fellow  [phone]  Please page on call fellow on weekends and after 5pm on weekdays

## 2019-10-15 NOTE — H&P ADULT - ASSESSMENT
89 y/o female w/ PMH of HTN, achalasia s/p dilation x2, HH s/p repair w/ mesh, emergency Volvulus surgical repair 4/2014, s/p EGD CRE balloon dilation w/ botox injection 8/2015, s/p EGD dilation 3/2016, s/p EGD lap heller myotomy 2016 and recent Barium swallow on 6/2019 for dysphagia which ended up showing decreased motility with EGD on 10/8/19 showing what appeared to be erosion of mesh into esophagus. Pt is now s/p CT abdomen done yesterday and persistent vomiting since then unable to tolerate PO food. She was seen in the Thoracic surgery office today by Dr. clemente and was admitted. 89 y/o female w/ PMH of HTN, achalasia s/p dilation x2, HH s/p repair w/ mesh, emergency Volvulus surgical repair 4/2014, s/p EGD CRE balloon dilation w/ botox injection 8/2015, s/p EGD dilation 3/2016, s/p EGD lap heller myotomy 2016 and recent Barium swallow on 6/2019 for dysphagia which ended up showing decreased motility with EGD on 10/8/19 showing what appeared to be erosion of mesh into esophagus.    Presented to office and was seen by Dr. Jang for persistent vomiting since then unable to tolerate PO food.   Admitted to Thoracic surgery

## 2019-10-15 NOTE — HISTORY OF PRESENT ILLNESS
[FreeTextEntry1] : 89 y/o F, never smoker, w/ multiple PMHx including HTN, Achalasia s/p dilation x 2, hiatal hernia s/o repair w/ Mesh, also hx of emergency Volvulus surgical repair in 4/2014.\par \par Now 4yr s/p EGD CRE balloon dilation of GE junction up to 16mm in size, Botox injection 100 units into the lower esophageal sphincter on 8/10/15. \par EGD dilation on 12/22/15.\par EGD, balloon dilation of GE junction on 3/3/16. Path showed JEFFRY.\par Now 3.5yr s/p EGD Lap Heller myotomy, and extensive lysis of adhesion on 4/13/16.\par \par Barium swallow on 11/6/2018:\par -Multiple tertiary contractions of the esophagus consistent with significant dysmotility\par -No evidence of achalasia\par \par Barium Esophagram on 6/18/19:\par - decreased motility in the distal half of the esophagus, w/ mildly dilated proximal esophagus\par - multiple tertiary contractions seen\par - findings suspicious for achalasia\par \par CT A/P w/ IV contrast on 8/27/19:\par - clips at the region of the GE junction r/t previous fundoplication\par - stomach is massively distended and enters the pelvis\par - High-grade small bowel obstruction w/ transition point in the Lt lower quadrant, likely r/t post-op adhesions\par \par Barium Esophagram on 10/4/19. The patient swallowed barium which prompted significant belching. The proximal esophagus is mildly dilated. The esophagus demonstrates normal course and contour. Multiple tertiary contractions were observed consistent with severe dysmotility. +GE reflux.\par \par Pt is s/p EGD on 10/8/19. \par \par CT C/A/P w/ contrast on 10/14/19:\par - Small amount of ascites, obscuring thorough visualization of the GE junction and distal esophagus \par - No gross abnormality identified in this location however\par - Dilated main, right and left pulmonary arteries which can be seen with pulmonary hypertension\par \par Patient is here today for a follow up, c/o dysphagia and food regurgitation since 10/14/19, unable to keep anything down.

## 2019-10-15 NOTE — H&P ADULT - PROBLEM SELECTOR PLAN 2
PAF   NSR now  start lopressor 2.5 q6 with parameters   on eliquis at home  - unable to take PO meds - reassess need for AC after manometry  Cardiac consult for cardiac evaluation

## 2019-10-15 NOTE — CONSULT LETTER
[Consult Letter:] : I had the pleasure of evaluating your patient, [unfilled]. [Dear  ___] : Dear  [unfilled], [Consult Closing:] : Thank you very much for allowing me to participate in the care of this patient.  If you have any questions, please do not hesitate to contact me. [( Thank you for referring [unfilled] for consultation for _____ )] : Thank you for referring [unfilled] for consultation for [unfilled] [Please see my note below.] : Please see my note below. [Sincerely,] : Sincerely, [FreeTextEntry2] : Adria Staton MD  [FreeTextEntry3] : Asif Jang MD, MPH \par System Director of Thoracic Surgery \par Director of Comprehensive Lung and Foregut [place] \par Professor Cardiovascular & Thoracic Surgery  \par [facility] [employer] at [facility]\par

## 2019-10-15 NOTE — CONSULT NOTE ADULT - SUBJECTIVE AND OBJECTIVE BOX
Chief Complaint:  Patient is a 89y old  Female who presents with a chief complaint of dysphagia    HPI:  89 year old female with hx of hypothyroidism, A-fib (on Eliquis), DDD, HTN, GERD, achalasia s/p myotomy (2016), volvulus s/p surgical repair, SBO s/p ex-lap with lysis of adhesions and recent admission for SBO (resolved with NGT) presents with dysphagia. Patient went to Dr. Asif Jang's office today complaining of dysphagia and food regurgitation since 10/14/19 so she was sent to the hospital. Of note, patient underwent an EGD with Dr. Jang on 10/8/19 showing rosion of mesh into esophageal lumen near GE junction around 45cm. She is scheduled for manometry testing with Dr. Mansfield tomorrow. . No recent complaints of fevers, chills, chest pain, shortness of breath, diarrhea, constipation, abdominal pain, melena, hematochezia, hematemesis and dizziness.    Allergies:  No Known Allergies    Home Medications:  Reviewed    Hospital Medications:  None    PMHX/PSHX:  Atrial fibrillation  Achalasia  Hypertension  Hypothyroidism  History of repair of hiatal hernia  H/O colectomy  S/P appendectomy  S/P hysterectomy    Family history:    No pertinent family hx    Social History:   Denies tobacco use, EtOH use or illicit drug use     ROS:   General:  No fevers or chills  ENT:  No sore throat or dysphagia  CV:  No pain or palpitations  Resp:  No dyspnea, cough, wheezing  GI:  + dysphagia, No pain, No nausea, No vomiting, No rectal bleeding, No tarry stools  Skin:  No rash or edema    PHYSICAL EXAM:   GENERAL:  NAD, Appears stated age  HEENT:  NC/AT,  conjunctivae clear and pink, sclera -anicteric  CHEST:  CTA B/L, Normal effort  HEART:  RRR S1/S2  ABDOMEN:  Soft, non-tender, non-distended, BS+  EXTEREMITIES:  No cyanosis  SKIN:  Warm & Dry.  NEURO:  Alert, oriented    Vital Signs:  Vital Signs Last 24 Hrs  T(C): 36.3 (15 Oct 2019 15:25), Max: 36.3 (15 Oct 2019 15:25)  T(F): 97.4 (15 Oct 2019 15:25), Max: 97.4 (15 Oct 2019 15:25)  HR: 62 (15 Oct 2019 15:25) (62 - 62)  BP: 140/67 (15 Oct 2019 15:25) (140/67 - 140/67)  BP(mean): --  RR: 18 (15 Oct 2019 15:25) (18 - 18)  SpO2: 97% (15 Oct 2019 15:25) (97% - 97%)  Daily Height in cm: 167.64 (15 Oct 2019 15:25)    Daily     LABS:                      Imaging:

## 2019-10-15 NOTE — H&P ADULT - HISTORY OF PRESENT ILLNESS
89 y/o female w/ PMH of HTN, achalasia s/p dilation x2, HH s/p repair w/ mesh, emergency Volvulus surgical repair 4/2014, s/p EGD CRE balloon dilation w/ botox injection 8/2015, s/p EGD dilation 3/2016, s/p EGD lap heller myotomy 2016 and recent Barium swallow on 6/2019 for dysphagia which ended up showing decreased motility with EGD on 10/8/19 showing what appeared to be erosion of mesh into esophagus. Pt is now s/p CT abdomen done yesterday and persistent vomiting since then unable to tolerate PO food. She was seen in the Thoracic surgery office today by Dr. clemente and was admitted.       At bedside pt seen with daughter. She is a thin female and is stable in NAD. At this time she denies sob, cp.  She does admit to vomiting 4x yesterday into this morning after eating. She has not been vomiting since she stopped eating.

## 2019-10-15 NOTE — PHYSICAL EXAM
[Auscultation Breath Sounds / Voice Sounds] : lungs were clear to auscultation bilaterally [Heart Rate And Rhythm] : heart rate was normal and rhythm regular [Heart Sounds] : normal S1 and S2 [Heart Sounds Gallop] : no gallops [Murmurs] : no murmurs [Heart Sounds Pericardial Friction Rub] : no pericardial rub [Examination Of The Chest] : the chest was normal in appearance [Chest Visual Inspection Thoracic Asymmetry] : no chest asymmetry [Diminished Respiratory Excursion] : normal chest expansion [Bowel Sounds] : normal bowel sounds [Abdomen Soft] : soft [Skin Color & Pigmentation] : normal skin color and pigmentation [Skin Turgor] : normal skin turgor [] : no rash [Deep Tendon Reflexes (DTR)] : deep tendon reflexes were 2+ and symmetric [Sensation] : the sensory exam was normal to light touch and pinprick [No Focal Deficits] : no focal deficits [Oriented To Time, Place, And Person] : oriented to person, place, and time [Impaired Insight] : insight and judgment were intact [Affect] : the affect was normal [FreeTextEntry1] : using a walker

## 2019-10-15 NOTE — H&P ADULT - NSHPSOCIALHISTORY_GEN_ALL_CORE
Lives with: Daughter  Occupation: Retired      Tobacco use: Denies  Alcohol use: Denies  Illicit drug use: Denies

## 2019-10-15 NOTE — ASSESSMENT
[FreeTextEntry1] : 89 y/o F, never smoker, w/ multiple PMHx including HTN, Achalasia s/p dilation x 2, hiatal hernia s/o repair w/ Mesh, also hx of emergency Volvulus surgical repair in 4/2014.\par \par Now 4yr s/p EGD CRE balloon dilation of GE junction up to 16mm in size, Botox injection 100 units into the lower esophageal sphincter on 8/10/15. \par EGD dilation on 12/22/15.\par EGD, balloon dilation of GE junction on 3/3/16. Path showed JEFFRY.\par Now 3.5yr s/p EGD Lap Heller myotomy, and extensive lysis of adhesion on 4/13/16.\par \par CT A/P w/ IV contrast on 8/27/19:\par - clips at the region of the GE junction r/t previous fundoplication\par - stomach is massively distended and enters the pelvis\par - High-grade small bowel obstruction w/ transition point in the Lt lower quadrant, likely r/t post-op adhesions\par \par Barium Esophagram on 10/4/19. The patient swallowed barium which prompted significant belching. The proximal esophagus is mildly dilated. The esophagus demonstrates normal course and contour. Multiple tertiary contractions were observed consistent with severe dysmotility. +GE reflux.\par \par Pt is s/p EGD on 10/8/19. \par \par CT C/A/P w/ contrast on 10/14/19:\par - Small amount of ascites, obscuring thorough visualization of the GE junction and distal esophagus \par - No gross abnormality identified in this location however\par - Dilated main, right and left pulmonary arteries which can be seen with pulmonary hypertension\par \par I have reviewed the patient's medical records and diagnostic images at time of this office consultation and have made the following recommendation:\par 1. Direct admit patient to [address] [place] [address] for IV hydration, will let Dr. Elidia Mansfield do Manometry while patient is in house (10/16/19).\par \par \par Written by Minh Leggett NP, acting as a scribe for Dr. Asif Sumner.\par \par The documentation recorded by the scribe accurately reflects the service I personally performed and the decisions made by me. ASIF SUMNER MD\par

## 2019-10-15 NOTE — H&P ADULT - NSHPPHYSICALEXAM_GEN_ALL_CORE
Neuro: A+O x 3, non-focal, speech clear and intact  HEENT: PERRL, EOMI, oral mucosa pink and moist  Neck: supple, no JVD  CV: regular rate, regular rhythm, +S1S2, no murmurs or rub  Pulm/chest: lung sounds CTA and equal bilaterally, no accessory muscle use noted  Abd: soft, NT, ND, +BS  Ext: Moves all extremities x 4, without clubbing/cyanosis/edema  Skin: warm, well perfused, no rashes

## 2019-10-15 NOTE — DATA REVIEWED
[FreeTextEntry1] : CT C/A/P w/ contrast on 10/14/19:\par - Small amount of ascites, obscuring thorough visualization of the GE junction and distal esophagus \par - No gross abnormality identified in this location however\par - Dilated main, right and left pulmonary arteries which can be seen with pulmonary hypertension\par

## 2019-10-16 ENCOUNTER — APPOINTMENT (OUTPATIENT)
Dept: GASTROENTEROLOGY | Facility: HOSPITAL | Age: 84
End: 2019-10-16

## 2019-10-16 LAB
ANION GAP SERPL CALC-SCNC: 14 MMO/L — SIGNIFICANT CHANGE UP (ref 7–14)
BLD GP AB SCN SERPL QL: NEGATIVE — SIGNIFICANT CHANGE UP
BUN SERPL-MCNC: 11 MG/DL — SIGNIFICANT CHANGE UP (ref 7–23)
CALCIUM SERPL-MCNC: 8.3 MG/DL — LOW (ref 8.4–10.5)
CHLORIDE SERPL-SCNC: 99 MMOL/L — SIGNIFICANT CHANGE UP (ref 98–107)
CO2 SERPL-SCNC: 27 MMOL/L — SIGNIFICANT CHANGE UP (ref 22–31)
CREAT SERPL-MCNC: 0.49 MG/DL — LOW (ref 0.5–1.3)
GLUCOSE SERPL-MCNC: 111 MG/DL — HIGH (ref 70–99)
HCT VFR BLD CALC: 27.9 % — LOW (ref 34.5–45)
HGB BLD-MCNC: 9.1 G/DL — LOW (ref 11.5–15.5)
MCHC RBC-ENTMCNC: 30.7 PG — SIGNIFICANT CHANGE UP (ref 27–34)
MCHC RBC-ENTMCNC: 32.6 % — SIGNIFICANT CHANGE UP (ref 32–36)
MCV RBC AUTO: 94.3 FL — SIGNIFICANT CHANGE UP (ref 80–100)
NRBC # FLD: 0 K/UL — SIGNIFICANT CHANGE UP (ref 0–0)
PLATELET # BLD AUTO: 302 K/UL — SIGNIFICANT CHANGE UP (ref 150–400)
PMV BLD: 9.8 FL — SIGNIFICANT CHANGE UP (ref 7–13)
POTASSIUM SERPL-MCNC: 3.3 MMOL/L — LOW (ref 3.5–5.3)
POTASSIUM SERPL-SCNC: 3.3 MMOL/L — LOW (ref 3.5–5.3)
RBC # BLD: 2.96 M/UL — LOW (ref 3.8–5.2)
RBC # FLD: 14.6 % — HIGH (ref 10.3–14.5)
RH IG SCN BLD-IMP: POSITIVE — SIGNIFICANT CHANGE UP
SODIUM SERPL-SCNC: 140 MMOL/L — SIGNIFICANT CHANGE UP (ref 135–145)
WBC # BLD: 5.1 K/UL — SIGNIFICANT CHANGE UP (ref 3.8–10.5)
WBC # FLD AUTO: 5.1 K/UL — SIGNIFICANT CHANGE UP (ref 3.8–10.5)

## 2019-10-16 PROCEDURE — 93306 TTE W/DOPPLER COMPLETE: CPT | Mod: 26

## 2019-10-16 PROCEDURE — 91010 ESOPHAGUS MOTILITY STUDY: CPT | Mod: 26,GC

## 2019-10-16 PROCEDURE — 99232 SBSQ HOSP IP/OBS MODERATE 35: CPT

## 2019-10-16 PROCEDURE — 99223 1ST HOSP IP/OBS HIGH 75: CPT

## 2019-10-16 PROCEDURE — 91037 ESOPH IMPED FUNCTION TEST: CPT | Mod: 26,GC

## 2019-10-16 PROCEDURE — 99231 SBSQ HOSP IP/OBS SF/LOW 25: CPT

## 2019-10-16 RX ORDER — HEPARIN SODIUM 5000 [USP'U]/ML
INJECTION INTRAVENOUS; SUBCUTANEOUS
Qty: 25000 | Refills: 0 | Status: DISCONTINUED | OUTPATIENT
Start: 2019-10-16 | End: 2019-10-22

## 2019-10-16 RX ORDER — POTASSIUM CHLORIDE 20 MEQ
10 PACKET (EA) ORAL ONCE
Refills: 0 | Status: COMPLETED | OUTPATIENT
Start: 2019-10-16 | End: 2019-10-16

## 2019-10-16 RX ADMIN — SODIUM CHLORIDE 3 MILLILITER(S): 9 INJECTION INTRAMUSCULAR; INTRAVENOUS; SUBCUTANEOUS at 20:50

## 2019-10-16 RX ADMIN — PANTOPRAZOLE SODIUM 40 MILLIGRAM(S): 20 TABLET, DELAYED RELEASE ORAL at 12:07

## 2019-10-16 RX ADMIN — HEPARIN SODIUM 800 UNIT(S)/HR: 5000 INJECTION INTRAVENOUS; SUBCUTANEOUS at 20:49

## 2019-10-16 RX ADMIN — SODIUM CHLORIDE 3 MILLILITER(S): 9 INJECTION INTRAMUSCULAR; INTRAVENOUS; SUBCUTANEOUS at 17:51

## 2019-10-16 RX ADMIN — Medication 100 MILLIEQUIVALENT(S): at 00:17

## 2019-10-16 RX ADMIN — Medication 100 MILLIEQUIVALENT(S): at 10:07

## 2019-10-16 RX ADMIN — Medication 2.5 MILLIGRAM(S): at 05:31

## 2019-10-16 RX ADMIN — SODIUM CHLORIDE 3 MILLILITER(S): 9 INJECTION INTRAMUSCULAR; INTRAVENOUS; SUBCUTANEOUS at 05:30

## 2019-10-16 RX ADMIN — HEPARIN SODIUM 5000 UNIT(S): 5000 INJECTION INTRAVENOUS; SUBCUTANEOUS at 05:31

## 2019-10-16 NOTE — CONSULT NOTE ADULT - ASSESSMENT
89 y/o woman with HTN, achalasia s/p dilation x2, HH s/p repair w/ mesh, emergency Volvulus surgical repair 4/2014, s/p EGD CRE balloon dilation w/ botox injection 8/2015, s/p EGD dilation 3/2016, s/p EGD lap heller myotomy 2016 and recent Barium swallow on 6/2019 for dysphagia which ended up showing decreased motility with EGD on 10/8/19 showing what appeared to be erosion of mesh into esophagus.    Presented to office and was seen by Dr. Jang for persistent vomiting since then unable to tolerate PO food.     Admitted to Thoracic surgery with plan for surgery.  Patient to undergo manometry testing with GI testing.    Patient has a history of paroxysmal atrial fibrillation.  Currently in SR with PVCs on telemetry.  She has been on Eliquis 2.5 mg BID.  Can hold this for now and transition to heparin gtt if needed.      From the cardiac perspective, patient reports being asymptomatic without current cardiac complaints.  Denies significant past cardiac issues.      Echocardiogram demonstrated LVEF 51%, with mild segmental LV systolic dysfunction, with hypokinesis of the basal inferior and basal inferolateral wall, mild diastolic dysfunction, mild MR, severely dilated LA.    The patient may proceed with surgery without the need for additional cardiac testing or risk stratification.

## 2019-10-16 NOTE — PROGRESS NOTE ADULT - SUBJECTIVE AND OBJECTIVE BOX
Subjective: Patient denies any chest pains, SOB, or dizziness at this time.    Vital Signs:  Vital Signs Last 24 Hrs  T(C): 36.7 (10-16-19 @ 07:57), Max: 36.7 (10-16-19 @ 07:57)  T(F): 98.1 (10-16-19 @ 07:57), Max: 98.1 (10-16-19 @ 07:57)  HR: 62 (10-16-19 @ 07:57) (58 - 70)  BP: 130/57 (10-16-19 @ 07:57) (124/58 - 151/72)  RR: 17 (10-16-19 @ 07:57) (16 - 18)  SpO2: 98% (10-16-19 @ 07:57) (97% - 99%) on (O2)  89y    General: WN/WD NAD  Neurology: Awake, nonfocal, LOCK x 4  Eyes: Scleras clear, PERRLA/ EOMI, Gross vision intact  ENT: Gross hearing intact, grossly patent pharynx, no stridor  Neck: Neck supple, trachea midline, No JVD  Respiratory: CTA B/L, No wheezing, rales, rhonchi  CV: RRR, S1S2, no murmurs, rubs or gallops  Abdominal: Soft, NT, ND  Extremities: No edema, + peripheral pulses  Skin: No Rashes, Hematoma, Ecchymosis  Lymphatic: No Neck, axilla, groin LAD  Psych: Oriented x 3, normal affect      Relevant labs, radiology and Medications reviewed                        9.1    5.10  )-----------( 302      ( 16 Oct 2019 06:21 )             27.9     10-16    140  |  99  |  11  ----------------------------<  111<H>  3.3<L>   |  27  |  0.49<L>    Ca    8.3<L>      16 Oct 2019 06:21    TPro  5.9<L>  /  Alb  3.2<L>  /  TBili  0.4  /  DBili  x   /  AST  10  /  ALT  5   /  AlkPhos  88  10-15    PT/INR - ( 15 Oct 2019 18:37 )   PT: 11.7 SEC;   INR: 1.05          PTT - ( 15 Oct 2019 18:37 )  PTT:31.2 SEC  MEDICATIONS  (STANDING):  dextrose 5% + sodium chloride 0.45% with potassium chloride 10 mEq/L 1000 milliLiter(s) (75 mL/Hr) IV Continuous <Continuous>  heparin  Injectable 5000 Unit(s) SubCutaneous every 12 hours  levothyroxine Injectable 68.5 MICROGram(s) IV Push at bedtime  metoprolol tartrate Injectable 2.5 milliGRAM(s) IV Push every 6 hours  pantoprazole  Injectable 40 milliGRAM(s) IV Push daily  sodium chloride 0.9% lock flush 3 milliLiter(s) IV Push every 8 hours    MEDICATIONS  (PRN):        Assessment  89y Female  w/ PAST MEDICAL & SURGICAL HISTORY:  Atrial fibrillation  Achalasia  Hypertension  Hypothyroidism  History of repair of hiatal hernia  H/O colectomy  S/P appendectomy  S/P hysterectomy    Admitted on 10/15/19 from office with complaints of Dysphagia and Vomiting.  Being evaluated for surgery.    PLAN  Cardio: Dr. Brown to see patient for clearance for surgery.  TTE today.  Received eliquis yesterday - will discuss with Cardiology whether patient can be transitioned to hepain drip.  GI: NPO now for manometry today with Dr. Mansfield.  After manometry, will start Ensure shakes to evaluate whether patient tolerates.  Renal: Monitor urine output, supplement electrolytes as needed  Vasc: Heparin SC/SCDs for DVT prophylaxis  Heme: Stable H/H.  Therapy: OOB/ambulate    Discussed with Dr. Jang and Cardiothoracic Team at AM rounds.    Murray Downey PA-C  Thoracic Surgery   #[id number]

## 2019-10-16 NOTE — CONSULT NOTE ADULT - SUBJECTIVE AND OBJECTIVE BOX
Cardiology/Vascular Medicine Inpatient Consultation Note    HISTORY OF PRESENT ILLNESS:  89 y/o woman with HTN, achalasia s/p dilation x2, HH s/p repair w/ mesh, emergency Volvulus surgical repair 4/2014, s/p EGD CRE balloon dilation w/ botox injection 8/2015, s/p EGD dilation 3/2016, s/p EGD lap heller myotomy 2016 and recent Barium swallow on 6/2019 for dysphagia which ended up showing decreased motility with EGD on 10/8/19 showing what appeared to be erosion of mesh into esophagus.    Presented to office and was seen by Dr. Jang for persistent vomiting since then unable to tolerate PO food.     Admitted to Thoracic surgery with plan for surgery.  Patient to undergo manometry testing with GI testing.    Patient has a history of paroxysmal atrial fibrillation.  Currently in SR with PVCs on telemetry.  She has been on Eliquis 2.5 mg BID.  Can hold this for now and transition to heparin gtt if needed.      From the cardiac perspective, patient reports being asymptomatic without current cardiac complaints.  Denies significant past cardiac issues.      Echocardiogram demonstrated LVEF 51%, with mild segmental LV systolic dysfunction, with hypokinesis of the basal inferior and basal inferolateral wall, mild diastolic dysfunction, mild MR, severely dilated LA.    The patient may proceed with surgery without the need for additional cardiac testing or risk stratification.      CONCLUSIONS:  1. Mitral annular calcification, otherwise normal mitral  valve. Mild mitral regurgitation.  2. Calcified trileaflet aortic valve with normal opening.  Mild aortic regurgitation.  3. Severely dilated left atrium.  LA volume index = 60  cc/m2.  4. Normal left ventricular internal dimensions and wall  thicknesses.  5. Mild segmental left ventricular systolicdysfunction.  Hypokinesis of the basal inferior wall and basal  inferolateral wall.  6. Mild diastolic dysfunction (Stage I).  7. Normal right ventricular size and function.  8. Estimated right ventricular systolic pressure equals 45  mm Hg, assumingright atrial pressure equals 10 mm Hg,  consistent with mild pulmonary hypertension.    Allergies  No Known Allergies  	  MEDICATIONS:  heparin  Infusion.  Unit(s)/Hr IV Continuous <Continuous>  metoprolol tartrate Injectable 2.5 milliGRAM(s) IV Push every 6 hours  pantoprazole  Injectable 40 milliGRAM(s) IV Push daily  levothyroxine Injectable 68.5 MICROGram(s) IV Push at bedtime  sodium chloride 0.9% lock flush 3 milliLiter(s) IV Push every 8 hours      PAST MEDICAL & SURGICAL HISTORY:  Atrial fibrillation  Achalasia  Hypertension  Hypothyroidism  History of repair of hiatal hernia  H/O colectomy  S/P appendectomy  S/P hysterectomy      FAMILY HISTORY:  NC    SOCIAL HISTORY:    NC    REVIEW OF SYSTEMS:  As per HPI    PHYSICAL EXAM:  T(C): 36.5 (10-16-19 @ 16:10), Max: 36.7 (10-16-19 @ 07:57)  HR: 57 (10-16-19 @ 16:58) (50 - 70)  BP: 152/75 (10-16-19 @ 16:58) (124/58 - 152/75)  RR: 16 (10-16-19 @ 16:10) (16 - 18)  SpO2: 99% (10-16-19 @ 16:58) (98% - 100%)  Wt(kg): --  I&O's Summary    15 Oct 2019 07:01  -  16 Oct 2019 07:00  --------------------------------------------------------  IN: 900 mL / OUT: 1200 mL / NET: -300 mL    16 Oct 2019 07:01  -  16 Oct 2019 17:37  --------------------------------------------------------  IN: 150 mL / OUT: 500 mL / NET: -350 mL        Appearance: NAD, elderly woman, laying flat in bed  HEENT:   Normal oral mucosa, PERRL, EOMI	  Cardiovascular: Normal S1 S2, No JVD, Grade 2/6 SM  Respiratory: Decreased breath sounds bilateral bases  Psychiatry: Awake, alert  Gastrointestinal:  Soft, Non-tender, + BS	  Skin: No rashes, No ecchymoses, No cyanosis	  Neurologic: Non-focal  Extremities: Normal range of motion, No clubbing, cyanosis or edema  Vascular: Peripheral pulses palpable 2+ bilaterally      LABS:	 	                          9.1    5.10  )-----------( 302      ( 16 Oct 2019 06:21 )             27.9     10-16    140  |  99  |  11  ----------------------------<  111<H>  3.3<L>   |  27  |  0.49<L>  10-15    140  |  98  |  14  ----------------------------<  90  3.1<L>   |  32<H>  |  0.56    Ca    8.3<L>      16 Oct 2019 06:21  Ca    8.6      15 Oct 2019 18:37    TPro  5.9<L>  /  Alb  3.2<L>  /  TBili  0.4  /  DBili  x   /  AST  10  /  ALT  5   /  AlkPhos  88  10-15      proBNP: Serum Pro-Brain Natriuretic Peptide: 1510 pg/mL (10-15-19 @ 18:37)    Lipid Profile:   HgA1c: Hemoglobin A1C, Whole Blood: 5.2 % (10-15 @ 18:37)    TSH: Thyroid Stimulating Hormone, Serum: 4.32 uIU/mL (10-15-19 @ 18:37)    < from: Xray Chest 1 View AP/PA (10.15.19 @ 18:02) >  EXAM:  XR CHEST AP OR PA 1V        PROCEDURE DATE:  Oct 15 2019         INTERPRETATION:  CLINICAL INFORMATION: Cough, nausea and vomiting.    EXAM: Frontal radiograph of the chest.    COMPARISON: 9/23/2019.    IMPRESSION:    Clear lungs. No pleuraleffusion or pneumothorax.    Heart size cannot be accurately assessed in this projection.        NATAN WHITTAKER M.D., RADIOLOGIST RESIDENT  This document has been electronically signed.  JOHN PRIEOT M.D. ATTENDING RADIOLOGIST  This document has been electronically signed. Oct 16 2019  2:52PM        < from: CT Chest w/ IV Cont (10.14.19 @ 11:33) >  EXAM:  CT CHEST IC      EXAM:  CT ABDOMEN AND PELVIS OC IC        PROCEDURE DATE:  10/14/2019           INTERPRETATION:  CLINICAL INFORMATION: Achalasia. Evaluate for esophageal   mass/obstruction.    COMPARISON: Abdomen and pelvis CT scan dated 9/23/2019. No prior chest CT   scan is available for comparison.    PROCEDURE:   CT of the Chest, Abdomen and Pelvis was performed with intravenous   contrast.   Intravenous contrast: 90 ml Omnipaque 350. 10 ml discarded.  Oral contrast: Positive contrast was administered.  Sagittal and coronal reformats were performed.    FINDINGS:    CHEST:     LUNGS AND LARGE AIRWAYS: Patent central airways. No pulmonary nodules.  PLEURA: No pleural effusion.  VESSELS: The main and main right and left pulmonary arteries are dilated.   Thoracic aortic and coronary artery calcifications are demonstrated.  HEART: The heart is enlarged. No pericardial effusion.  MEDIASTINUM AND TERI: No lymphadenopathy.  CHEST WALL AND LOWER NECK: Within normal limits. Right breast coarse   calcifications and clips.    ABDOMEN AND PELVIS:    LIVER: Within normal limits. Ill-defined nonspecific hypodense region   within the left hepatic lobe (series 2, image 81) is unchanged.  BILE DUCTS: Normal caliber.  GALLBLADDER: Not visualized.  SPLEEN: Within normal limits.  PANCREAS: Within normal limits.  ADRENALS: Within normal limits.  KIDNEYS/URETERS: The kidneys enhance symmetrically without evidence for   hydronephrosis. There is a 1.9 cm cyst in the midpole the left kidney.    BLADDER: Within normal limits.  REPRODUCTIVE ORGANS: Status post hysterectomy. No adnexal masses.    BOWEL: No bowel obstruction or inflammation. Postsurgical changes   including multiple clips visualized in the stomach, from history of prior   fundoplication.  PERITONEUM: Small amount of ascites.  VESSELS: Diffuse aortoiliac vascular calcifications.  RETROPERITONEUM/LYMPH NODES: No lymphadenopathy. Calcified right pelvic   sidewall lymph nodes, as on the prior study.  ABDOMINAL WALL: Within normal limits. Cachexia.  BONES: Degenerative changes and scoliosis of the spine. Left femoral neck   orthopedic screws.    IMPRESSION:     Small amount of ascites, obscuring thorough visualization of the GE   junction and distal esophagus. No gross abnormality identified in this   location however.    Dilated main, right and left pulmonary arteries which can be seen with   pulmonary hypertension.      DEV MARVIN M.D., ATTENDING RADIOLOGIST Phone #: [phone]  This document has been electronically signed. Oct 14 2019 12:45PM    < from: Transthoracic Echocardiogram (10.16.19 @ 12:59) >    Patient name: CLIFFORD DOLAN  YOB: 1927   Age: 89 (F)   MR#: [id number]  Study Date: 10/16/2019  Location: [id number]: Yasmin Tobar RDCS  Study quality: Technically good  Referring Physician: Asif Jang MD / Rajesh Brown MD  Blood Pressure: 147/82 mmHg  Height: 167 cm  Weight: 44 kg  BSA: 1.5 m2  ------------------------------------------------------------------------  PROCEDURE: Transthoracic echocardiogram with 2-D, M-Mode  and complete spectral and color flow Doppler.  INDICATION: Unspecified atrial flutter (I48.92)  ------------------------------------------------------------------------  DIMENSIONS:  Dimensions:     Normal Values:  LA:     4.8 cm    2.0 - 4.0 cm  Ao:     3.3 cm    2.0 - 3.8 cm  SEPTUM: 0.8 cm    0.6 - 1.2 cm  PWT:    0.8 cm    0.6 - 1.1 cm  LVIDd:  4.3 cm    3.0 - 5.6 cm  LVIDs:  3.2 cm    1.8 - 4.0 cm  Derived Variables:  LVMI: 71 g/m2  RWT: 0.37  Fractional short: 26 %  Ejection Fraction (Teicholtz): 51 %  ------------------------------------------------------------------------  OBSERVATIONS:  Mitral Valve: Mitral annular calcification, otherwise  normal mitral valve. Mild mitral regurgitation.  Aortic Root: Normal aortic root.  Aortic Valve: Calcified trileaflet aortic valve with normal  opening. Mild aortic regurgitation.  Left Atrium: Severely dilated left atrium.  LA volume index  = 60 cc/m2.  Left Ventricle: Mild segmental left ventricular systolic  dysfunction.  Hypokinesis of the basal inferior wall and  basal inferolateral wall. Normal left ventricular internal  dimensions and wall thicknesses. Mild diastolic dysfunction  (Stage I).  Right Heart: Normal right atrium. Normal right ventricular  size and function. Normal tricuspid valve.   Mild-moderate  tricuspid regurgitation. Normal pulmonic valve.  Mild  pulmonic regurgitation.  Pericardium/PleuraNormal pericardium with no pericardial  effusion.  Hemodynamic: Estimated right ventricular systolic pressure  equals 45 mm Hg, assuming right atrial pressure equals 10  mm Hg, consistent with mild pulmonary hypertension.  ------------------------------------------------------------------------  CONCLUSIONS:  1. Mitral annular calcification, otherwise normal mitral  valve. Mild mitral regurgitation.  2. Calcified trileaflet aortic valve with normal opening.  Mild aortic regurgitation.  3. Severely dilated left atrium.  LA volume index = 60  cc/m2.  4. Normal left ventricular internal dimensions and wall  thicknesses.  5. Mild segmental left ventricular systolicdysfunction.  Hypokinesis of the basal inferior wall and basal  inferolateral wall.  6. Mild diastolic dysfunction (Stage I).  7. Normal right ventricular size and function.  8. Estimated right ventricular systolic pressure equals 45  mm Hg, assumingright atrial pressure equals 10 mm Hg,  consistent with mild pulmonary hypertension.  ------------------------------------------------------------------------  Confirmed on  10/16/2019 - 14:23:02 by Yong Suresh M.D.  ------------------------------------------------------------------------    < end of copied text >

## 2019-10-17 LAB
ANION GAP SERPL CALC-SCNC: 13 MMO/L — SIGNIFICANT CHANGE UP (ref 7–14)
APTT BLD: 135.1 SEC — CRITICAL HIGH (ref 27.5–36.3)
APTT BLD: 48.9 SEC — HIGH (ref 27.5–36.3)
APTT BLD: 55 SEC — HIGH (ref 27.5–36.3)
APTT BLD: 76.2 SEC — HIGH (ref 27.5–36.3)
BUN SERPL-MCNC: 9 MG/DL — SIGNIFICANT CHANGE UP (ref 7–23)
CALCIUM SERPL-MCNC: 8.8 MG/DL — SIGNIFICANT CHANGE UP (ref 8.4–10.5)
CHLORIDE SERPL-SCNC: 98 MMOL/L — SIGNIFICANT CHANGE UP (ref 98–107)
CO2 SERPL-SCNC: 27 MMOL/L — SIGNIFICANT CHANGE UP (ref 22–31)
CREAT SERPL-MCNC: 0.51 MG/DL — SIGNIFICANT CHANGE UP (ref 0.5–1.3)
GLUCOSE SERPL-MCNC: 78 MG/DL — SIGNIFICANT CHANGE UP (ref 70–99)
HCT VFR BLD CALC: 31.7 % — LOW (ref 34.5–45)
HGB BLD-MCNC: 10.3 G/DL — LOW (ref 11.5–15.5)
MCHC RBC-ENTMCNC: 30.2 PG — SIGNIFICANT CHANGE UP (ref 27–34)
MCHC RBC-ENTMCNC: 32.5 % — SIGNIFICANT CHANGE UP (ref 32–36)
MCV RBC AUTO: 93 FL — SIGNIFICANT CHANGE UP (ref 80–100)
NRBC # FLD: 0 K/UL — SIGNIFICANT CHANGE UP (ref 0–0)
PLATELET # BLD AUTO: 310 K/UL — SIGNIFICANT CHANGE UP (ref 150–400)
PMV BLD: 9.6 FL — SIGNIFICANT CHANGE UP (ref 7–13)
POTASSIUM SERPL-MCNC: 3.1 MMOL/L — LOW (ref 3.5–5.3)
POTASSIUM SERPL-SCNC: 3.1 MMOL/L — LOW (ref 3.5–5.3)
RBC # BLD: 3.41 M/UL — LOW (ref 3.8–5.2)
RBC # FLD: 14.4 % — SIGNIFICANT CHANGE UP (ref 10.3–14.5)
SODIUM SERPL-SCNC: 138 MMOL/L — SIGNIFICANT CHANGE UP (ref 135–145)
WBC # BLD: 6.31 K/UL — SIGNIFICANT CHANGE UP (ref 3.8–10.5)
WBC # FLD AUTO: 6.31 K/UL — SIGNIFICANT CHANGE UP (ref 3.8–10.5)

## 2019-10-17 PROCEDURE — 99232 SBSQ HOSP IP/OBS MODERATE 35: CPT | Mod: GC

## 2019-10-17 PROCEDURE — 99232 SBSQ HOSP IP/OBS MODERATE 35: CPT

## 2019-10-17 RX ORDER — DEXTROSE MONOHYDRATE, SODIUM CHLORIDE, AND POTASSIUM CHLORIDE 50; .745; 4.5 G/1000ML; G/1000ML; G/1000ML
1000 INJECTION, SOLUTION INTRAVENOUS
Refills: 0 | Status: DISCONTINUED | OUTPATIENT
Start: 2019-10-17 | End: 2019-10-20

## 2019-10-17 RX ORDER — POTASSIUM CHLORIDE 20 MEQ
10 PACKET (EA) ORAL
Refills: 0 | Status: DISCONTINUED | OUTPATIENT
Start: 2019-10-17 | End: 2019-10-17

## 2019-10-17 RX ORDER — ACETAMINOPHEN 500 MG
650 TABLET ORAL ONCE
Refills: 0 | Status: DISCONTINUED | OUTPATIENT
Start: 2019-10-17 | End: 2019-10-22

## 2019-10-17 RX ADMIN — HEPARIN SODIUM 900 UNIT(S)/HR: 5000 INJECTION INTRAVENOUS; SUBCUTANEOUS at 04:56

## 2019-10-17 RX ADMIN — DEXTROSE MONOHYDRATE, SODIUM CHLORIDE, AND POTASSIUM CHLORIDE 75 MILLILITER(S): 50; .745; 4.5 INJECTION, SOLUTION INTRAVENOUS at 11:31

## 2019-10-17 RX ADMIN — Medication 2.5 MILLIGRAM(S): at 00:24

## 2019-10-17 RX ADMIN — HEPARIN SODIUM 0 UNIT(S)/HR: 5000 INJECTION INTRAVENOUS; SUBCUTANEOUS at 21:36

## 2019-10-17 RX ADMIN — HEPARIN SODIUM 1000 UNIT(S)/HR: 5000 INJECTION INTRAVENOUS; SUBCUTANEOUS at 14:59

## 2019-10-17 RX ADMIN — SODIUM CHLORIDE 3 MILLILITER(S): 9 INJECTION INTRAMUSCULAR; INTRAVENOUS; SUBCUTANEOUS at 21:40

## 2019-10-17 RX ADMIN — DEXTROSE MONOHYDRATE, SODIUM CHLORIDE, AND POTASSIUM CHLORIDE 50 MILLILITER(S): 50; .745; 4.5 INJECTION, SOLUTION INTRAVENOUS at 21:38

## 2019-10-17 RX ADMIN — SODIUM CHLORIDE 3 MILLILITER(S): 9 INJECTION INTRAMUSCULAR; INTRAVENOUS; SUBCUTANEOUS at 04:44

## 2019-10-17 RX ADMIN — Medication 68.5 MICROGRAM(S): at 00:23

## 2019-10-17 RX ADMIN — PANTOPRAZOLE SODIUM 40 MILLIGRAM(S): 20 TABLET, DELAYED RELEASE ORAL at 11:32

## 2019-10-17 RX ADMIN — HEPARIN SODIUM 900 UNIT(S)/HR: 5000 INJECTION INTRAVENOUS; SUBCUTANEOUS at 22:41

## 2019-10-17 RX ADMIN — SODIUM CHLORIDE 3 MILLILITER(S): 9 INJECTION INTRAMUSCULAR; INTRAVENOUS; SUBCUTANEOUS at 13:15

## 2019-10-17 RX ADMIN — HEPARIN SODIUM 1000 UNIT(S)/HR: 5000 INJECTION INTRAVENOUS; SUBCUTANEOUS at 07:47

## 2019-10-17 RX ADMIN — Medication 68.5 MICROGRAM(S): at 21:37

## 2019-10-17 RX ADMIN — Medication 100 MILLIEQUIVALENT(S): at 10:52

## 2019-10-17 NOTE — PROGRESS NOTE ADULT - SUBJECTIVE AND OBJECTIVE BOX
Subjective: pt to trial clear liquids this AM    Vital Signs:  Vital Signs Last 24 Hrs  T(C): 36.6 (10-17-19 @ 04:44), Max: 36.8 (10-16-19 @ 18:02)  T(F): 97.8 (10-17-19 @ 04:44), Max: 98.2 (10-16-19 @ 18:02)  HR: 59 (10-17-19 @ 04:44) (50 - 64)  BP: 135/66 (10-17-19 @ 04:44) (130/57 - 160/65)  RR: 16 (10-17-19 @ 04:44) (16 - 17)  SpO2: 99% (10-17-19 @ 04:44) (97% - 100%) on (O2)    Telemetry/Alarms:  General: WN/WD NAD  Neurology: Awake, nonfocal, LOCK x 4  Eyes: Scleras clear, PERRLA/ EOMI, Gross vision intact  ENT:Gross hearing intact, grossly patent pharynx, no stridor  Neck: Neck supple, trachea midline, No JVD,   Respiratory: CTA B/L, No wheezing, rales, rhonchi  CV: RRR, S1S2, no murmurs, rubs or gallops  Abdominal: Soft, NT, ND +BS,   Extremities: No edema, + peripheral pulses  Skin: No Rashes, Hematoma, Ecchymosis  Lymphatic: No Neck, axilla, groin LAD  Psych: Oriented x 3, normal affect    Relevant labs, radiology and Medications reviewed                        9.1    5.10  )-----------( 302      ( 16 Oct 2019 06:21 )             27.9     10-16    140  |  99  |  11  ----------------------------<  111<H>  3.3<L>   |  27  |  0.49<L>    Ca    8.3<L>      16 Oct 2019 06:21    TPro  5.9<L>  /  Alb  3.2<L>  /  TBili  0.4  /  DBili  x   /  AST  10  /  ALT  5   /  AlkPhos  88  10-15    PT/INR - ( 15 Oct 2019 18:37 )   PT: 11.7 SEC;   INR: 1.05          PTT - ( 17 Oct 2019 06:20 )  PTT:48.9 SEC  MEDICATIONS  (STANDING):  heparin  Infusion.  Unit(s)/Hr (8 mL/Hr) IV Continuous <Continuous>  levothyroxine Injectable 68.5 MICROGram(s) IV Push at bedtime  metoprolol tartrate Injectable 2.5 milliGRAM(s) IV Push every 6 hours  pantoprazole  Injectable 40 milliGRAM(s) IV Push daily  sodium chloride 0.9% lock flush 3 milliLiter(s) IV Push every 8 hours    MEDICATIONS  (PRN):    Pertinent Physical Exam  I&O's Summary    16 Oct 2019 07:01  -  17 Oct 2019 07:00  --------------------------------------------------------  IN: 210 mL / OUT: 2500 mL / NET: -2290 mL        Assessment  89y Female  w/ PAST MEDICAL & SURGICAL HISTORY:  Atrial fibrillation  Achalasia  Hypertension  Hypothyroidism  History of repair of hiatal hernia  H/O colectomy  S/P appendectomy  S/P hysterectomy  admitted with complaints of Patient is a 89y old  Female who presents with a chief complaint of Dysphagia and Vomiting (16 Oct 2019 17:36)  .  On (10/17/19), patient underwent EGD on 10/8/19 showing what appeared to be erosion of mesh into esophagus.    PLAN  Neuro: Pain management  Pulm: Encourage coughing, deep breathing and use of incentive spirometry. Wean off supplemental oxygen as able. Daily CXR.   Cardio: Monitor telemetry/alarms  GI: Tolerating diet. Continue stool softeners.  Renal: monitor urine output, supplement electrolytes as needed  Vasc: Heparin SC/SCDs for DVT prophylaxis  Heme: Stable H/H. .   ID: Off antibiotics. Stable.  Therapy: OOB/ambulate    Disposition: will follow up surgical plan with Dr Jang  Discussed with Cardiothoracic Team at AM rounds.

## 2019-10-17 NOTE — PROGRESS NOTE ADULT - ASSESSMENT
Impression:  # Dysphagia secondary to mesh eroding into the esophagus  # Afib (on Eliquis)  # Achalasia s/p myotomy  # Volvulus s/p surgical repair  # SBO s/p ex-lap with lysis of adhesions     Recommendation:  - Agree with clear liquid diet  - Follow up with Dr. Mansfield regarding motility results  - Follow up with Dr. Jang regarding surgery plans  - Supportive care per primary team    Ami Jennings MD  Gastroenterology Fellow  [phone]  Please page on call fellow on weekends and after 5pm on weekdays

## 2019-10-17 NOTE — PROGRESS NOTE ADULT - SUBJECTIVE AND OBJECTIVE BOX
Chief Complaint:  Patient is a 89y old  Female who presents with a chief complaint of Dysphagia and Vomiting (17 Oct 2019 07:34)    Interval Events:   No acute overnight events.  Complaining of right sided shoulder pain    Allergies:  No Known Allergies    Hospital Medications:  heparin  Infusion.  Unit(s)/Hr IV Continuous <Continuous>  levothyroxine Injectable 68.5 MICROGram(s) IV Push at bedtime  metoprolol tartrate Injectable 2.5 milliGRAM(s) IV Push every 6 hours  pantoprazole  Injectable 40 milliGRAM(s) IV Push daily  sodium chloride 0.9% lock flush 3 milliLiter(s) IV Push every 8 hours    PMHX/PSHX:  Atrial fibrillation  Achalasia  Hypertension  Hypothyroidism  History of repair of hiatal hernia  H/O colectomy  S/P appendectomy  S/P hysterectomy    ROS:   General:  No fevers or chills  ENT:  No sore throat or dysphagia  CV:  No pain or palpitations  Resp:  No dyspnea, cough or  wheezing  GI:  No pain, No nausea, No vomiting, No rectal bleeding, No tarry stools,  Skin:  No rash or edema    PHYSICAL EXAM:   Vital Signs:  Vital Signs Last 24 Hrs  T(C): 36.3 (17 Oct 2019 08:44), Max: 36.8 (16 Oct 2019 18:02)  T(F): 97.4 (17 Oct 2019 08:44), Max: 98.2 (16 Oct 2019 18:02)  HR: 69 (17 Oct 2019 08:44) (50 - 69)  BP: 135/81 (17 Oct 2019 08:44) (135/66 - 160/65)  BP(mean): --  RR: 17 (17 Oct 2019 08:44) (16 - 17)  SpO2: 94% (17 Oct 2019 08:44) (94% - 100%)  Daily     Daily Weight in k.3 (17 Oct 2019 04:44)    GENERAL:  NAD, Appears stated age  HEENT:  NC/AT,  conjunctivae clear and pink, sclera -anicteric  CHEST:  CTA B/L, Normal effort  HEART:  RRR S1/S2  ABDOMEN:  Soft, non-tender, non-distended, BS+  EXTEREMITIES:  No cyanosis  SKIN:  Warm & Dry.  NEURO:  Alert, oriented    LABS:                        10.3   6.31  )-----------( 310      ( 17 Oct 2019 06:20 )             31.7     Mean Cell Volume: 93.0 fL (10-17- @ 06:20)    10-17    138  |  98  |  9   ----------------------------<  78  3.1<L>   |  27  |  0.51    Ca    8.8      17 Oct 2019 06:20    TPro  5.9<L>  /  Alb  3.2<L>  /  TBili  0.4  /  DBili  x   /  AST  10  /  ALT  5   /  AlkPhos  88  10-15    LIVER FUNCTIONS - ( 15 Oct 2019 18:37 )  Alb: 3.2 g/dL / Pro: 5.9 g/dL / ALK PHOS: 88 u/L / ALT: 5 u/L / AST: 10 u/L / GGT: x           PT/INR - ( 15 Oct 2019 18:37 )   PT: 11.7 SEC;   INR: 1.05        PTT - ( 17 Oct 2019 06:20 )  PTT:48.9 SEC                       10.3   6.31  )-----------( 310      ( 17 Oct 2019 06:20 )             31.7                         9.1    5.10  )-----------( 302      ( 16 Oct 2019 06:21 )             27.9                         9.2    4.61  )-----------( 305      ( 15 Oct 2019 18:37 )             28.5       Imaging:

## 2019-10-18 LAB
ANION GAP SERPL CALC-SCNC: 8 MMO/L — SIGNIFICANT CHANGE UP (ref 7–14)
APTT BLD: 63.7 SEC — HIGH (ref 27.5–36.3)
APTT BLD: 74.8 SEC — HIGH (ref 27.5–36.3)
BUN SERPL-MCNC: 10 MG/DL — SIGNIFICANT CHANGE UP (ref 7–23)
CALCIUM SERPL-MCNC: 8.4 MG/DL — SIGNIFICANT CHANGE UP (ref 8.4–10.5)
CHLORIDE SERPL-SCNC: 98 MMOL/L — SIGNIFICANT CHANGE UP (ref 98–107)
CO2 SERPL-SCNC: 28 MMOL/L — SIGNIFICANT CHANGE UP (ref 22–31)
CREAT SERPL-MCNC: 0.57 MG/DL — SIGNIFICANT CHANGE UP (ref 0.5–1.3)
GLUCOSE SERPL-MCNC: 128 MG/DL — HIGH (ref 70–99)
HCT VFR BLD CALC: 28.2 % — LOW (ref 34.5–45)
HGB BLD-MCNC: 9.4 G/DL — LOW (ref 11.5–15.5)
MCHC RBC-ENTMCNC: 30.3 PG — SIGNIFICANT CHANGE UP (ref 27–34)
MCHC RBC-ENTMCNC: 33.3 % — SIGNIFICANT CHANGE UP (ref 32–36)
MCV RBC AUTO: 91 FL — SIGNIFICANT CHANGE UP (ref 80–100)
NRBC # FLD: 0 K/UL — SIGNIFICANT CHANGE UP (ref 0–0)
PLATELET # BLD AUTO: 259 K/UL — SIGNIFICANT CHANGE UP (ref 150–400)
PMV BLD: 9.3 FL — SIGNIFICANT CHANGE UP (ref 7–13)
POTASSIUM SERPL-MCNC: 3.1 MMOL/L — LOW (ref 3.5–5.3)
POTASSIUM SERPL-SCNC: 3.1 MMOL/L — LOW (ref 3.5–5.3)
RBC # BLD: 3.1 M/UL — LOW (ref 3.8–5.2)
RBC # FLD: 14.4 % — SIGNIFICANT CHANGE UP (ref 10.3–14.5)
SODIUM SERPL-SCNC: 134 MMOL/L — LOW (ref 135–145)
WBC # BLD: 5.83 K/UL — SIGNIFICANT CHANGE UP (ref 3.8–10.5)
WBC # FLD AUTO: 5.83 K/UL — SIGNIFICANT CHANGE UP (ref 3.8–10.5)

## 2019-10-18 PROCEDURE — 99233 SBSQ HOSP IP/OBS HIGH 50: CPT

## 2019-10-18 RX ORDER — POLYETHYLENE GLYCOL 3350 17 G/17G
17 POWDER, FOR SOLUTION ORAL DAILY
Refills: 0 | Status: DISCONTINUED | OUTPATIENT
Start: 2019-10-18 | End: 2019-10-22

## 2019-10-18 RX ORDER — SENNA PLUS 8.6 MG/1
2 TABLET ORAL AT BEDTIME
Refills: 0 | Status: DISCONTINUED | OUTPATIENT
Start: 2019-10-18 | End: 2019-10-22

## 2019-10-18 RX ORDER — POTASSIUM CHLORIDE 20 MEQ
10 PACKET (EA) ORAL ONCE
Refills: 0 | Status: COMPLETED | OUTPATIENT
Start: 2019-10-18 | End: 2019-10-18

## 2019-10-18 RX ORDER — AMLODIPINE BESYLATE 2.5 MG/1
2.5 TABLET ORAL DAILY
Refills: 0 | Status: DISCONTINUED | OUTPATIENT
Start: 2019-10-18 | End: 2019-10-22

## 2019-10-18 RX ORDER — DOCUSATE SODIUM 100 MG
100 CAPSULE ORAL
Refills: 0 | Status: DISCONTINUED | OUTPATIENT
Start: 2019-10-18 | End: 2019-10-22

## 2019-10-18 RX ADMIN — Medication 100 MILLIEQUIVALENT(S): at 12:22

## 2019-10-18 RX ADMIN — HEPARIN SODIUM 900 UNIT(S)/HR: 5000 INJECTION INTRAVENOUS; SUBCUTANEOUS at 13:34

## 2019-10-18 RX ADMIN — Medication 2.5 MILLIGRAM(S): at 12:21

## 2019-10-18 RX ADMIN — SODIUM CHLORIDE 3 MILLILITER(S): 9 INJECTION INTRAMUSCULAR; INTRAVENOUS; SUBCUTANEOUS at 12:22

## 2019-10-18 RX ADMIN — SODIUM CHLORIDE 3 MILLILITER(S): 9 INJECTION INTRAMUSCULAR; INTRAVENOUS; SUBCUTANEOUS at 05:01

## 2019-10-18 RX ADMIN — Medication 68.5 MICROGRAM(S): at 21:52

## 2019-10-18 RX ADMIN — PANTOPRAZOLE SODIUM 40 MILLIGRAM(S): 20 TABLET, DELAYED RELEASE ORAL at 12:21

## 2019-10-18 RX ADMIN — HEPARIN SODIUM 900 UNIT(S)/HR: 5000 INJECTION INTRAVENOUS; SUBCUTANEOUS at 06:18

## 2019-10-18 RX ADMIN — SENNA PLUS 2 TABLET(S): 8.6 TABLET ORAL at 21:52

## 2019-10-18 RX ADMIN — SODIUM CHLORIDE 3 MILLILITER(S): 9 INJECTION INTRAMUSCULAR; INTRAVENOUS; SUBCUTANEOUS at 21:48

## 2019-10-18 NOTE — PROGRESS NOTE ADULT - SUBJECTIVE AND OBJECTIVE BOX
Subjective: Patient denies any chest pains or SOB at this time.    Vital Signs:  Vital Signs Last 24 Hrs  T(C): 36.4 (10-18-19 @ 12:19), Max: 36.4 (10-18-19 @ 09:43)  T(F): 97.5 (10-18-19 @ 12:19), Max: 97.5 (10-18-19 @ 09:43)  HR: 70 (10-18-19 @ 12:19) (56 - 70)  BP: 125/50 (10-18-19 @ 12:19) (125/50 - 148/68)  RR: 21 (10-18-19 @ 12:19) (16 - 21)  SpO2: 100% (10-18-19 @ 12:19) (98% - 100%) on (O2)  89y    General: WN/WD NAD  Neurology: Awake, nonfocal, LOCK x 4  Eyes: Scleras clear, PERRLA/ EOMI, Gross vision intact  ENT: Gross hearing intact, grossly patent pharynx, no stridor  Neck: Neck supple, trachea midline, No JVD  Respiratory: CTA B/L, No wheezing, rales, rhonchi  CV: RRR, S1S2, no murmurs, rubs or gallops  Abdominal: Soft, NT, ND  Extremities: No edema, + peripheral pulses  Skin: No Rashes, Hematoma, Ecchymosis  Lymphatic: No Neck, axilla, groin LAD  Psych: Oriented x 3, normal affect      Relevant labs, radiology and Medications reviewed                        9.4    5.83  )-----------( 259      ( 18 Oct 2019 04:45 )             28.2     10-18    134<L>  |  98  |  10  ----------------------------<  128<H>  3.1<L>   |  28  |  0.57    Ca    8.4      18 Oct 2019 04:45      PTT - ( 18 Oct 2019 12:30 )  PTT:63.7 SEC  MEDICATIONS  (STANDING):  dextrose 5% + sodium chloride 0.45% with potassium chloride 20 mEq/L 1000 milliLiter(s) (50 mL/Hr) IV Continuous <Continuous>  heparin  Infusion.  Unit(s)/Hr (8 mL/Hr) IV Continuous <Continuous>  levothyroxine Injectable 68.5 MICROGram(s) IV Push at bedtime  metoprolol tartrate Injectable 2.5 milliGRAM(s) IV Push every 6 hours  pantoprazole  Injectable 40 milliGRAM(s) IV Push daily  sodium chloride 0.9% lock flush 3 milliLiter(s) IV Push every 8 hours    MEDICATIONS  (PRN):  acetaminophen  IVPB .. 650 milliGRAM(s) IV Intermittent once PRN Mild Pain (1 - 3)        Assessment  89y Female  w/ PAST MEDICAL & SURGICAL HISTORY:  Atrial fibrillation  Achalasia  Hypertension  Hypothyroidism  History of repair of hiatal hernia  H/O colectomy  S/P appendectomy  S/P hysterectomy    89y old  Female admitted with a chief complaint of Dysphagia and Vomiting on 10/15/19    PLAN  Plan is for esophagectomy on Tuesday, 10/22  Pulm: Encourage coughing, deep breathing and use of incentive spirometry  Cardio: Monitor telemetry/alarms; heparin drip for afib  GI: Patient did not tolerate full-liquid diet yesterday, and had one episode of vomitting; She was placed on a clear liquid diet and received clears for breakfast, but desperately wanted to try full-liquids again today.  Renal: Monitor urine output, supplement electrolytes as needed  Vasc: Heparin drip  Heme: Stable H/H.  Therapy: OOB/ambulate    Seen at bedside and discussed with Dr. Nash  Discussed with Cardiothoracic Team at AM rounds.    Murray Downey PA-C  Thoracic Surgery   #[phone]

## 2019-10-19 LAB
ANION GAP SERPL CALC-SCNC: 9 MMO/L — SIGNIFICANT CHANGE UP (ref 7–14)
APTT BLD: 59 SEC — HIGH (ref 27.5–36.3)
BUN SERPL-MCNC: 8 MG/DL — SIGNIFICANT CHANGE UP (ref 7–23)
CALCIUM SERPL-MCNC: 8.3 MG/DL — LOW (ref 8.4–10.5)
CHLORIDE SERPL-SCNC: 98 MMOL/L — SIGNIFICANT CHANGE UP (ref 98–107)
CO2 SERPL-SCNC: 25 MMOL/L — SIGNIFICANT CHANGE UP (ref 22–31)
CREAT SERPL-MCNC: 0.51 MG/DL — SIGNIFICANT CHANGE UP (ref 0.5–1.3)
GLUCOSE SERPL-MCNC: 124 MG/DL — HIGH (ref 70–99)
HCT VFR BLD CALC: 26.1 % — LOW (ref 34.5–45)
HGB BLD-MCNC: 8.7 G/DL — LOW (ref 11.5–15.5)
MAGNESIUM SERPL-MCNC: 1.6 MG/DL — SIGNIFICANT CHANGE UP (ref 1.6–2.6)
MCHC RBC-ENTMCNC: 30.3 PG — SIGNIFICANT CHANGE UP (ref 27–34)
MCHC RBC-ENTMCNC: 33.3 % — SIGNIFICANT CHANGE UP (ref 32–36)
MCV RBC AUTO: 90.9 FL — SIGNIFICANT CHANGE UP (ref 80–100)
NRBC # FLD: 0 K/UL — SIGNIFICANT CHANGE UP (ref 0–0)
PHOSPHATE SERPL-MCNC: 3.3 MG/DL — SIGNIFICANT CHANGE UP (ref 2.5–4.5)
PLATELET # BLD AUTO: 213 K/UL — SIGNIFICANT CHANGE UP (ref 150–400)
PMV BLD: 10.6 FL — SIGNIFICANT CHANGE UP (ref 7–13)
POTASSIUM SERPL-MCNC: 3.2 MMOL/L — LOW (ref 3.5–5.3)
POTASSIUM SERPL-SCNC: 3.2 MMOL/L — LOW (ref 3.5–5.3)
RBC # BLD: 2.87 M/UL — LOW (ref 3.8–5.2)
RBC # FLD: 14.3 % — SIGNIFICANT CHANGE UP (ref 10.3–14.5)
SODIUM SERPL-SCNC: 132 MMOL/L — LOW (ref 135–145)
WBC # BLD: 5.3 K/UL — SIGNIFICANT CHANGE UP (ref 3.8–10.5)
WBC # FLD AUTO: 5.3 K/UL — SIGNIFICANT CHANGE UP (ref 3.8–10.5)

## 2019-10-19 PROCEDURE — 99232 SBSQ HOSP IP/OBS MODERATE 35: CPT

## 2019-10-19 RX ORDER — SIMETHICONE 80 MG/1
80 TABLET, CHEWABLE ORAL ONCE
Refills: 0 | Status: COMPLETED | OUTPATIENT
Start: 2019-10-19 | End: 2019-10-19

## 2019-10-19 RX ORDER — MINERAL OIL
133 OIL (ML) MISCELLANEOUS ONCE
Refills: 0 | Status: COMPLETED | OUTPATIENT
Start: 2019-10-19 | End: 2019-10-19

## 2019-10-19 RX ADMIN — HEPARIN SODIUM 900 UNIT(S)/HR: 5000 INJECTION INTRAVENOUS; SUBCUTANEOUS at 10:23

## 2019-10-19 RX ADMIN — Medication 68.5 MICROGRAM(S): at 22:29

## 2019-10-19 RX ADMIN — SODIUM CHLORIDE 3 MILLILITER(S): 9 INJECTION INTRAMUSCULAR; INTRAVENOUS; SUBCUTANEOUS at 13:32

## 2019-10-19 RX ADMIN — AMLODIPINE BESYLATE 2.5 MILLIGRAM(S): 2.5 TABLET ORAL at 05:37

## 2019-10-19 RX ADMIN — SODIUM CHLORIDE 3 MILLILITER(S): 9 INJECTION INTRAMUSCULAR; INTRAVENOUS; SUBCUTANEOUS at 22:31

## 2019-10-19 RX ADMIN — SODIUM CHLORIDE 3 MILLILITER(S): 9 INJECTION INTRAMUSCULAR; INTRAVENOUS; SUBCUTANEOUS at 05:36

## 2019-10-19 RX ADMIN — PANTOPRAZOLE SODIUM 40 MILLIGRAM(S): 20 TABLET, DELAYED RELEASE ORAL at 13:39

## 2019-10-19 RX ADMIN — Medication 100 MILLIGRAM(S): at 18:35

## 2019-10-19 RX ADMIN — Medication 133 MILLILITER(S): at 13:38

## 2019-10-19 RX ADMIN — Medication 100 MILLIGRAM(S): at 05:37

## 2019-10-19 RX ADMIN — POLYETHYLENE GLYCOL 3350 17 GRAM(S): 17 POWDER, FOR SOLUTION ORAL at 13:39

## 2019-10-19 RX ADMIN — SIMETHICONE 80 MILLIGRAM(S): 80 TABLET, CHEWABLE ORAL at 22:57

## 2019-10-19 NOTE — PROGRESS NOTE ADULT - SUBJECTIVE AND OBJECTIVE BOX
Subjective: pt tolerating small quantities of clears and full liquids; admits a lot of burping which has become a common symptom for her  she is agreeable to ambulate today with the nurse in the hallway    Vital Signs:  Vital Signs Last 24 Hrs  T(C): 36.3 (10-19-19 @ 09:11), Max: 36.5 (10-18-19 @ 23:56)  T(F): 97.4 (10-19-19 @ 09:11), Max: 97.7 (10-18-19 @ 23:56)  HR: 98 (10-19-19 @ 09:11) (53 - 98)  BP: 134/67 (10-19-19 @ 09:11) (134/67 - 157/68)  RR: 16 (10-19-19 @ 09:11) (14 - 18)  SpO2: 98% (10-19-19 @ 09:11) (97% - 100%) on (O2)    Telemetry/Alarms:  General: WN/WD NAD  Neurology: Awake, nonfocal, LOCK x 4  Eyes: Scleras clear, PERRLA/ EOMI, Gross vision intact  ENT:Gross hearing intact, grossly patent pharynx, no stridor  Neck: Neck supple, trachea midline, No JVD,   Respiratory: CTA B/L, No wheezing, rales, rhonchi  CV: RRR, S1S2, no murmurs, rubs or gallops  Abdominal: Soft, NT, ND +BS,   Extremities: No edema, + peripheral pulses  Skin: No Rashes, Hematoma, Ecchymosis  Lymphatic: No Neck, axilla, groin LAD  Psych: Oriented x 3, normal affect    Relevant labs, radiology and Medications reviewed                        8.7    5.30  )-----------( 213      ( 19 Oct 2019 06:13 )             26.1     10-19    132<L>  |  98  |  8   ----------------------------<  124<H>  3.2<L>   |  25  |  0.51    Ca    8.3<L>      19 Oct 2019 06:13  Phos  3.3     10-19  Mg     1.6     10-19      PTT - ( 19 Oct 2019 06:13 )  PTT:59.0 SEC  MEDICATIONS  (STANDING):  amLODIPine   Tablet 2.5 milliGRAM(s) Oral daily  dextrose 5% + sodium chloride 0.45% with potassium chloride 20 mEq/L 1000 milliLiter(s) (50 mL/Hr) IV Continuous <Continuous>  docusate sodium 100 milliGRAM(s) Oral two times a day  heparin  Infusion.  Unit(s)/Hr (8 mL/Hr) IV Continuous <Continuous>  levothyroxine Injectable 68.5 MICROGram(s) IV Push at bedtime  pantoprazole  Injectable 40 milliGRAM(s) IV Push daily  polyethylene glycol 3350 17 Gram(s) Oral daily  senna 2 Tablet(s) Oral at bedtime  sodium chloride 0.9% lock flush 3 milliLiter(s) IV Push every 8 hours    MEDICATIONS  (PRN):  acetaminophen  IVPB .. 650 milliGRAM(s) IV Intermittent once PRN Mild Pain (1 - 3)    Pertinent Physical Exam  I&O's Summary    18 Oct 2019 07:01  -  19 Oct 2019 07:00  --------------------------------------------------------  IN: 1531 mL / OUT: 1200 mL / NET: 331 mL        Assessment  89y Female  w/ PAST MEDICAL & SURGICAL HISTORY:  Atrial fibrillation  Achalasia  Hypertension  Hypothyroidism  History of repair of hiatal hernia  H/O colectomy  S/P appendectomy  S/P hysterectomy  admitted with complaints of Patient is a 89y old  Female who presents with a chief complaint of Dysphagia and Vomiting (18 Oct 2019 16:12)  .  89y old  Female admitted with a chief complaint of Dysphagia and Vomiting on 10/15/19    PLAN  Plan is for esophagectomy on Tuesday, 10/22  Pulm: Encourage coughing, deep breathing and use of incentive spirometry  Cardio: Monitor telemetry/alarms; heparin drip for afib  GI: small amounts of clear and full liquids  Renal: Monitor urine output, supplement electrolytes as needed  Vasc: Heparin drip  Heme: Stable H/H.  Therapy: OOB/ambulate Subjective: pt tolerating small quantities of clears and full liquids; admits a lot of burping which has become a common symptom for her  she is agreeable to ambulate today with the nurse in the hallway; pt constipated and requests an enema      Vital Signs:  Vital Signs Last 24 Hrs  T(C): 36.3 (10-19-19 @ 09:11), Max: 36.5 (10-18-19 @ 23:56)  T(F): 97.4 (10-19-19 @ 09:11), Max: 97.7 (10-18-19 @ 23:56)  HR: 98 (10-19-19 @ 09:11) (53 - 98)  BP: 134/67 (10-19-19 @ 09:11) (134/67 - 157/68)  RR: 16 (10-19-19 @ 09:11) (14 - 18)  SpO2: 98% (10-19-19 @ 09:11) (97% - 100%) on (O2)    Telemetry/Alarms:  General: WN/WD NAD  Neurology: Awake, nonfocal, LOCK x 4  Eyes: Scleras clear, PERRLA/ EOMI, Gross vision intact  ENT:Gross hearing intact, grossly patent pharynx, no stridor  Neck: Neck supple, trachea midline, No JVD,   Respiratory: CTA B/L, No wheezing, rales, rhonchi  CV: RRR, S1S2, no murmurs, rubs or gallops  Abdominal: Soft, NT, ND +BS,   Extremities: No edema, + peripheral pulses  Skin: No Rashes, Hematoma, Ecchymosis  Lymphatic: No Neck, axilla, groin LAD  Psych: Oriented x 3, normal affect    Relevant labs, radiology and Medications reviewed                        8.7    5.30  )-----------( 213      ( 19 Oct 2019 06:13 )             26.1     10-19    132<L>  |  98  |  8   ----------------------------<  124<H>  3.2<L>   |  25  |  0.51    Ca    8.3<L>      19 Oct 2019 06:13  Phos  3.3     10-19  Mg     1.6     10-19      PTT - ( 19 Oct 2019 06:13 )  PTT:59.0 SEC  MEDICATIONS  (STANDING):  amLODIPine   Tablet 2.5 milliGRAM(s) Oral daily  dextrose 5% + sodium chloride 0.45% with potassium chloride 20 mEq/L 1000 milliLiter(s) (50 mL/Hr) IV Continuous <Continuous>  docusate sodium 100 milliGRAM(s) Oral two times a day  heparin  Infusion.  Unit(s)/Hr (8 mL/Hr) IV Continuous <Continuous>  levothyroxine Injectable 68.5 MICROGram(s) IV Push at bedtime  pantoprazole  Injectable 40 milliGRAM(s) IV Push daily  polyethylene glycol 3350 17 Gram(s) Oral daily  senna 2 Tablet(s) Oral at bedtime  sodium chloride 0.9% lock flush 3 milliLiter(s) IV Push every 8 hours    MEDICATIONS  (PRN):  acetaminophen  IVPB .. 650 milliGRAM(s) IV Intermittent once PRN Mild Pain (1 - 3)    Pertinent Physical Exam  I&O's Summary    18 Oct 2019 07:01  -  19 Oct 2019 07:00  --------------------------------------------------------  IN: 1531 mL / OUT: 1200 mL / NET: 331 mL        Assessment  89y Female  w/ PAST MEDICAL & SURGICAL HISTORY:  Atrial fibrillation  Achalasia  Hypertension  Hypothyroidism  History of repair of hiatal hernia  H/O colectomy  S/P appendectomy  S/P hysterectomy  admitted with complaints of Patient is a 89y old  Female who presents with a chief complaint of Dysphagia and Vomiting (18 Oct 2019 16:12)  .  89y old  Female admitted with a chief complaint of Dysphagia and Vomiting on 10/15/19    PLAN  Plan is for esophagectomy on Tuesday, 10/22  Pulm: Encourage coughing, deep breathing and use of incentive spirometry  Cardio: Monitor telemetry/alarms; heparin drip for afib  GI: small amounts of clear and full liquids; enema for constipation  Renal: Monitor urine output, supplement electrolytes as needed  Vasc: Heparin drip  Heme: Stable H/H.  Therapy: OOB/ambulate

## 2019-10-20 LAB
ANION GAP SERPL CALC-SCNC: 15 MMO/L — HIGH (ref 7–14)
APTT BLD: 66.1 SEC — HIGH (ref 27.5–36.3)
BLD GP AB SCN SERPL QL: NEGATIVE — SIGNIFICANT CHANGE UP
BUN SERPL-MCNC: 11 MG/DL — SIGNIFICANT CHANGE UP (ref 7–23)
CALCIUM SERPL-MCNC: 8.6 MG/DL — SIGNIFICANT CHANGE UP (ref 8.4–10.5)
CHLORIDE SERPL-SCNC: 95 MMOL/L — LOW (ref 98–107)
CO2 SERPL-SCNC: 21 MMOL/L — LOW (ref 22–31)
CREAT SERPL-MCNC: 0.5 MG/DL — SIGNIFICANT CHANGE UP (ref 0.5–1.3)
GLUCOSE SERPL-MCNC: 148 MG/DL — HIGH (ref 70–99)
HCT VFR BLD CALC: 24.6 % — LOW (ref 34.5–45)
HGB BLD-MCNC: 8.3 G/DL — LOW (ref 11.5–15.5)
MCHC RBC-ENTMCNC: 30.5 PG — SIGNIFICANT CHANGE UP (ref 27–34)
MCHC RBC-ENTMCNC: 33.7 % — SIGNIFICANT CHANGE UP (ref 32–36)
MCV RBC AUTO: 90.4 FL — SIGNIFICANT CHANGE UP (ref 80–100)
NRBC # FLD: 0 K/UL — SIGNIFICANT CHANGE UP (ref 0–0)
PLATELET # BLD AUTO: 217 K/UL — SIGNIFICANT CHANGE UP (ref 150–400)
PMV BLD: 10.1 FL — SIGNIFICANT CHANGE UP (ref 7–13)
POTASSIUM SERPL-MCNC: 4 MMOL/L — SIGNIFICANT CHANGE UP (ref 3.5–5.3)
POTASSIUM SERPL-SCNC: 4 MMOL/L — SIGNIFICANT CHANGE UP (ref 3.5–5.3)
RBC # BLD: 2.72 M/UL — LOW (ref 3.8–5.2)
RBC # FLD: 14.5 % — SIGNIFICANT CHANGE UP (ref 10.3–14.5)
RH IG SCN BLD-IMP: POSITIVE — SIGNIFICANT CHANGE UP
SODIUM SERPL-SCNC: 131 MMOL/L — LOW (ref 135–145)
WBC # BLD: 5.09 K/UL — SIGNIFICANT CHANGE UP (ref 3.8–10.5)
WBC # FLD AUTO: 5.09 K/UL — SIGNIFICANT CHANGE UP (ref 3.8–10.5)

## 2019-10-20 PROCEDURE — 99232 SBSQ HOSP IP/OBS MODERATE 35: CPT

## 2019-10-20 RX ORDER — SODIUM CHLORIDE 9 MG/ML
1000 INJECTION, SOLUTION INTRAVENOUS
Refills: 0 | Status: DISCONTINUED | OUTPATIENT
Start: 2019-10-20 | End: 2019-10-21

## 2019-10-20 RX ORDER — POTASSIUM CHLORIDE 20 MEQ
10 PACKET (EA) ORAL
Refills: 0 | Status: DISCONTINUED | OUTPATIENT
Start: 2019-10-20 | End: 2019-10-20

## 2019-10-20 RX ORDER — SIMETHICONE 80 MG/1
80 TABLET, CHEWABLE ORAL THREE TIMES A DAY
Refills: 0 | Status: DISCONTINUED | OUTPATIENT
Start: 2019-10-20 | End: 2019-10-22

## 2019-10-20 RX ORDER — FUROSEMIDE 40 MG
10 TABLET ORAL ONCE
Refills: 0 | Status: COMPLETED | OUTPATIENT
Start: 2019-10-20 | End: 2019-10-20

## 2019-10-20 RX ORDER — SODIUM CHLORIDE 9 MG/ML
1000 INJECTION, SOLUTION INTRAVENOUS
Refills: 0 | Status: DISCONTINUED | OUTPATIENT
Start: 2019-10-20 | End: 2019-10-20

## 2019-10-20 RX ADMIN — Medication 68.5 MICROGRAM(S): at 21:23

## 2019-10-20 RX ADMIN — SODIUM CHLORIDE 3 MILLILITER(S): 9 INJECTION INTRAMUSCULAR; INTRAVENOUS; SUBCUTANEOUS at 21:23

## 2019-10-20 RX ADMIN — SODIUM CHLORIDE 3 MILLILITER(S): 9 INJECTION INTRAMUSCULAR; INTRAVENOUS; SUBCUTANEOUS at 05:36

## 2019-10-20 RX ADMIN — SENNA PLUS 2 TABLET(S): 8.6 TABLET ORAL at 21:24

## 2019-10-20 RX ADMIN — SIMETHICONE 80 MILLIGRAM(S): 80 TABLET, CHEWABLE ORAL at 18:36

## 2019-10-20 RX ADMIN — POLYETHYLENE GLYCOL 3350 17 GRAM(S): 17 POWDER, FOR SOLUTION ORAL at 13:24

## 2019-10-20 RX ADMIN — Medication 100 MILLIGRAM(S): at 18:35

## 2019-10-20 RX ADMIN — Medication 100 MILLIGRAM(S): at 05:45

## 2019-10-20 RX ADMIN — HEPARIN SODIUM 900 UNIT(S)/HR: 5000 INJECTION INTRAVENOUS; SUBCUTANEOUS at 10:12

## 2019-10-20 RX ADMIN — SODIUM CHLORIDE 3 MILLILITER(S): 9 INJECTION INTRAMUSCULAR; INTRAVENOUS; SUBCUTANEOUS at 13:17

## 2019-10-20 RX ADMIN — Medication 10 MILLIGRAM(S): at 16:02

## 2019-10-20 RX ADMIN — PANTOPRAZOLE SODIUM 40 MILLIGRAM(S): 20 TABLET, DELAYED RELEASE ORAL at 13:24

## 2019-10-20 RX ADMIN — AMLODIPINE BESYLATE 2.5 MILLIGRAM(S): 2.5 TABLET ORAL at 05:45

## 2019-10-20 NOTE — PROGRESS NOTE ADULT - SUBJECTIVE AND OBJECTIVE BOX
Subjective: pt c/o constipation which is a chronic problem for her, she is agreeable to trying enema and bowel regimen today  tolerating very small amounts of clear and full liquids    Vital Signs:  Vital Signs Last 24 Hrs  T(C): 36.5 (10-20-19 @ 05:43), Max: 36.8 (10-19-19 @ 20:41)  T(F): 97.7 (10-20-19 @ 05:43), Max: 98.3 (10-19-19 @ 20:41)  HR: 60 (10-20-19 @ 05:43) (60 - 83)  BP: 161/63 (10-20-19 @ 05:43) (123/57 - 161/63)  RR: 18 (10-20-19 @ 05:43) (17 - 18)  SpO2: 100% (10-20-19 @ 05:43) (97% - 100%) on (O2)    Telemetry/Alarms:  General: WN/WD NAD  Neurology: Awake, nonfocal, LOCK x 4  Eyes: Scleras clear, PERRLA/ EOMI, Gross vision intact  ENT:Gross hearing intact, grossly patent pharynx, no stridor  Neck: Neck supple, trachea midline, No JVD,   Respiratory: CTA B/L, No wheezing, rales, rhonchi  CV: RRR, S1S2, no murmurs, rubs or gallops  Abdominal: Soft, NT, ND +BS,   Extremities: No edema, + peripheral pulses  Skin: No Rashes, Hematoma, Ecchymosis  Lymphatic: No Neck, axilla, groin LAD  Psych: Oriented x 3, normal affect    Relevant labs, radiology and Medications reviewed                        8.3    5.09  )-----------( 217      ( 20 Oct 2019 06:13 )             24.6     10-19    132<L>  |  98  |  8   ----------------------------<  124<H>  3.2<L>   |  25  |  0.51    Ca    8.3<L>      19 Oct 2019 06:13  Phos  3.3     10-19  Mg     1.6     10-19      PTT - ( 20 Oct 2019 06:13 )  PTT:66.1 SEC  MEDICATIONS  (STANDING):  amLODIPine   Tablet 2.5 milliGRAM(s) Oral daily  dextrose 5% + sodium chloride 0.45% with potassium chloride 20 mEq/L 1000 milliLiter(s) (50 mL/Hr) IV Continuous <Continuous>  docusate sodium 100 milliGRAM(s) Oral two times a day  heparin  Infusion.  Unit(s)/Hr (8 mL/Hr) IV Continuous <Continuous>  levothyroxine Injectable 68.5 MICROGram(s) IV Push at bedtime  pantoprazole  Injectable 40 milliGRAM(s) IV Push daily  polyethylene glycol 3350 17 Gram(s) Oral daily  senna 2 Tablet(s) Oral at bedtime  sodium chloride 0.9% lock flush 3 milliLiter(s) IV Push every 8 hours    MEDICATIONS  (PRN):  acetaminophen  IVPB .. 650 milliGRAM(s) IV Intermittent once PRN Mild Pain (1 - 3)    Pertinent Physical Exam  I&O's Summary      Assessment  89y Female  w/ PAST MEDICAL & SURGICAL HISTORY:  Atrial fibrillation  Achalasia  Hypertension  Hypothyroidism  History of repair of hiatal hernia  H/O colectomy  S/P appendectomy  S/P hysterectomy  admitted with complaints of Patient is a 89y old  Female who presents with a chief complaint of Dysphagia and Vomiting (19 Oct 2019 15:23)  .  89y old  Female admitted with a chief complaint of Dysphagia and Vomiting on 10/15/19    PLAN  Plan is for esophagectomy on Tuesday, 10/22  Pulm: Encourage coughing, deep breathing and use of incentive spirometry  Cardio: Monitor telemetry/alarms; heparin drip for afib  GI: small amounts of clear and full liquids; enema for constipation  Renal: Monitor urine output, supplement electrolytes as needed  Vasc: Heparin drip  Heme: Stable H/H.  Therapy: OOB/ambulate Subjective: pt c/o constipation which is a chronic problem for her, she is agreeable to trying enema and bowel regimen today  tolerating very small amounts of clear and full liquids    Vital Signs:  Vital Signs Last 24 Hrs  T(C): 36.5 (10-20-19 @ 05:43), Max: 36.8 (10-19-19 @ 20:41)  T(F): 97.7 (10-20-19 @ 05:43), Max: 98.3 (10-19-19 @ 20:41)  HR: 60 (10-20-19 @ 05:43) (60 - 83)  BP: 161/63 (10-20-19 @ 05:43) (123/57 - 161/63)  RR: 18 (10-20-19 @ 05:43) (17 - 18)  SpO2: 100% (10-20-19 @ 05:43) (97% - 100%) on (O2)    Telemetry/Alarms:  General: WN/WD NAD  Neurology: Awake, nonfocal, LOCK x 4  Eyes: Scleras clear, PERRLA/ EOMI, Gross vision intact  ENT:Gross hearing intact, grossly patent pharynx, no stridor  Neck: Neck supple, trachea midline, No JVD,   Respiratory: CTA B/L, No wheezing, rales, rhonchi  CV: RRR, S1S2, no murmurs, rubs or gallops  Abdominal: Soft, NT, ND +BS,   Extremities: No edema, + peripheral pulses  Skin: No Rashes, Hematoma, Ecchymosis  Lymphatic: No Neck, axilla, groin LAD  Psych: Oriented x 3, normal affect    Relevant labs, radiology and Medications reviewed                        8.3    5.09  )-----------( 217      ( 20 Oct 2019 06:13 )             24.6     10-19    132<L>  |  98  |  8   ----------------------------<  124<H>  3.2<L>   |  25  |  0.51    Ca    8.3<L>      19 Oct 2019 06:13  Phos  3.3     10-19  Mg     1.6     10-19      PTT - ( 20 Oct 2019 06:13 )  PTT:66.1 SEC  MEDICATIONS  (STANDING):  amLODIPine   Tablet 2.5 milliGRAM(s) Oral daily  dextrose 5% + sodium chloride 0.45% with potassium chloride 20 mEq/L 1000 milliLiter(s) (50 mL/Hr) IV Continuous <Continuous>  docusate sodium 100 milliGRAM(s) Oral two times a day  heparin  Infusion.  Unit(s)/Hr (8 mL/Hr) IV Continuous <Continuous>  levothyroxine Injectable 68.5 MICROGram(s) IV Push at bedtime  pantoprazole  Injectable 40 milliGRAM(s) IV Push daily  polyethylene glycol 3350 17 Gram(s) Oral daily  senna 2 Tablet(s) Oral at bedtime  sodium chloride 0.9% lock flush 3 milliLiter(s) IV Push every 8 hours    MEDICATIONS  (PRN):  acetaminophen  IVPB .. 650 milliGRAM(s) IV Intermittent once PRN Mild Pain (1 - 3)    Pertinent Physical Exam  I&O's Summary      Assessment  89y Female  w/ PAST MEDICAL & SURGICAL HISTORY:  Atrial fibrillation  Achalasia  Hypertension  Hypothyroidism  History of repair of hiatal hernia  H/O colectomy  S/P appendectomy  S/P hysterectomy  admitted with complaints of Patient is a 89y old  Female who presents with a chief complaint of Dysphagia and Vomiting (19 Oct 2019 15:23)  .  89y old  Female admitted with a chief complaint of Dysphagia and Vomiting on 10/15/19    PLAN  Plan is for esophagectomy on Tuesday, 10/22  Pulm: Encourage coughing, deep breathing and use of incentive spirometry  Cardio: Monitor telemetry/alarms; heparin drip for afib  GI: small amounts of clear and full liquids; enema for constipation  Renal: Monitor urine output, supplement electrolytes as needed  Vasc: Heparin drip  Heme: pt transfused upRBC with lasix for optimization prior to OR  Therapy: OOB/ambulate

## 2019-10-21 ENCOUNTER — TRANSCRIPTION ENCOUNTER (OUTPATIENT)
Age: 84
End: 2019-10-21

## 2019-10-21 LAB
ANION GAP SERPL CALC-SCNC: 10 MMO/L — SIGNIFICANT CHANGE UP (ref 7–14)
APTT BLD: 69.3 SEC — HIGH (ref 27.5–36.3)
BUN SERPL-MCNC: 9 MG/DL — SIGNIFICANT CHANGE UP (ref 7–23)
CALCIUM SERPL-MCNC: 8.6 MG/DL — SIGNIFICANT CHANGE UP (ref 8.4–10.5)
CHLORIDE SERPL-SCNC: 98 MMOL/L — SIGNIFICANT CHANGE UP (ref 98–107)
CO2 SERPL-SCNC: 27 MMOL/L — SIGNIFICANT CHANGE UP (ref 22–31)
CREAT SERPL-MCNC: 0.46 MG/DL — LOW (ref 0.5–1.3)
GLUCOSE SERPL-MCNC: 117 MG/DL — HIGH (ref 70–99)
HCT VFR BLD CALC: 31.1 % — LOW (ref 34.5–45)
HGB BLD-MCNC: 9.8 G/DL — LOW (ref 11.5–15.5)
MCHC RBC-ENTMCNC: 28.9 PG — SIGNIFICANT CHANGE UP (ref 27–34)
MCHC RBC-ENTMCNC: 31.5 % — LOW (ref 32–36)
MCV RBC AUTO: 91.7 FL — SIGNIFICANT CHANGE UP (ref 80–100)
NRBC # FLD: 0 K/UL — SIGNIFICANT CHANGE UP (ref 0–0)
PLATELET # BLD AUTO: 191 K/UL — SIGNIFICANT CHANGE UP (ref 150–400)
PMV BLD: 10.6 FL — SIGNIFICANT CHANGE UP (ref 7–13)
POTASSIUM SERPL-MCNC: 3.5 MMOL/L — SIGNIFICANT CHANGE UP (ref 3.5–5.3)
POTASSIUM SERPL-SCNC: 3.5 MMOL/L — SIGNIFICANT CHANGE UP (ref 3.5–5.3)
RBC # BLD: 3.39 M/UL — LOW (ref 3.8–5.2)
RBC # FLD: 14.7 % — HIGH (ref 10.3–14.5)
SODIUM SERPL-SCNC: 135 MMOL/L — SIGNIFICANT CHANGE UP (ref 135–145)
WBC # BLD: 4.98 K/UL — SIGNIFICANT CHANGE UP (ref 3.8–10.5)
WBC # FLD AUTO: 4.98 K/UL — SIGNIFICANT CHANGE UP (ref 3.8–10.5)

## 2019-10-21 PROCEDURE — 99233 SBSQ HOSP IP/OBS HIGH 50: CPT

## 2019-10-21 RX ORDER — MULTIVIT WITH MIN/MFOLATE/K2 340-15/3 G
1 POWDER (GRAM) ORAL EVERY 12 HOURS
Refills: 0 | Status: DISCONTINUED | OUTPATIENT
Start: 2019-10-21 | End: 2019-10-21

## 2019-10-21 RX ORDER — ACETAMINOPHEN 500 MG
650 TABLET ORAL ONCE
Refills: 0 | Status: DISCONTINUED | OUTPATIENT
Start: 2019-10-21 | End: 2019-10-22

## 2019-10-21 RX ORDER — AER TRAVELER 0.5 G/1
1 SOLUTION RECTAL; TOPICAL ONCE
Refills: 0 | Status: COMPLETED | OUTPATIENT
Start: 2019-10-21 | End: 2019-10-22

## 2019-10-21 RX ORDER — DEXTROSE MONOHYDRATE, SODIUM CHLORIDE, AND POTASSIUM CHLORIDE 50; .745; 4.5 G/1000ML; G/1000ML; G/1000ML
1000 INJECTION, SOLUTION INTRAVENOUS
Refills: 0 | Status: DISCONTINUED | OUTPATIENT
Start: 2019-10-21 | End: 2019-10-22

## 2019-10-21 RX ADMIN — Medication 68.5 MICROGRAM(S): at 22:14

## 2019-10-21 RX ADMIN — SODIUM CHLORIDE 3 MILLILITER(S): 9 INJECTION INTRAMUSCULAR; INTRAVENOUS; SUBCUTANEOUS at 22:12

## 2019-10-21 RX ADMIN — AMLODIPINE BESYLATE 2.5 MILLIGRAM(S): 2.5 TABLET ORAL at 05:34

## 2019-10-21 RX ADMIN — SIMETHICONE 80 MILLIGRAM(S): 80 TABLET, CHEWABLE ORAL at 13:10

## 2019-10-21 RX ADMIN — HEPARIN SODIUM 900 UNIT(S)/HR: 5000 INJECTION INTRAVENOUS; SUBCUTANEOUS at 07:47

## 2019-10-21 RX ADMIN — DEXTROSE MONOHYDRATE, SODIUM CHLORIDE, AND POTASSIUM CHLORIDE 75 MILLILITER(S): 50; .745; 4.5 INJECTION, SOLUTION INTRAVENOUS at 11:47

## 2019-10-21 RX ADMIN — PANTOPRAZOLE SODIUM 40 MILLIGRAM(S): 20 TABLET, DELAYED RELEASE ORAL at 11:48

## 2019-10-21 RX ADMIN — SODIUM CHLORIDE 3 MILLILITER(S): 9 INJECTION INTRAMUSCULAR; INTRAVENOUS; SUBCUTANEOUS at 05:32

## 2019-10-21 RX ADMIN — SODIUM CHLORIDE 3 MILLILITER(S): 9 INJECTION INTRAMUSCULAR; INTRAVENOUS; SUBCUTANEOUS at 11:43

## 2019-10-21 RX ADMIN — SIMETHICONE 80 MILLIGRAM(S): 80 TABLET, CHEWABLE ORAL at 22:14

## 2019-10-21 RX ADMIN — Medication 1 BOTTLE: at 06:22

## 2019-10-21 RX ADMIN — SIMETHICONE 80 MILLIGRAM(S): 80 TABLET, CHEWABLE ORAL at 05:34

## 2019-10-21 NOTE — PROGRESS NOTE ADULT - SUBJECTIVE AND OBJECTIVE BOX
Subjective: "I'm able to drink some Ensure and that goes down ok" Pt denies n/v at present. Tolerated 1 Ensure this am. States multiple episodes of diarrhea yesterday and early this morning. No Cp or SOb.     Vital Signs:  Vital Signs Last 24 Hrs  T(C): 36.5 (10-21-19 @ 12:07), Max: 36.5 (10-20-19 @ 17:24)  T(F): 97.7 (10-21-19 @ 12:07), Max: 97.7 (10-20-19 @ 17:24)  HR: 60 (10-21-19 @ 12:07) (54 - 60)  BP: 125/54 (10-21-19 @ 12:07) (125/54 - 168/65)  RR: 18 (10-21-19 @ 12:07) (17 - 18)  SpO2: 99% (10-21-19 @ 12:07) (97% - 100%) on (O2)    Telemetry/Alarms: Tele AFib.   General: +cachexia NAD  Neurology: Awake, nonfocal, LOCK x 4  Eyes: Scleras clear, PERRLA/ EOMI, Gross vision intact  ENT:Gross hearing intact, grossly patent pharynx, no stridor  Neck: Neck supple, trachea midline, No JVD,   Respiratory: dec BS bilat LL  CV: Rhythm irreg no murmurs, rubs or gallops  Abdominal: distended, NT   Extremities: No edema, + peripheral pulses  Skin: No Rashes, Hematoma, + Ecchymosis to UE  Lymphatic: No Neck, axilla, groin LAD  Psych: Oriented x 3, normal affect  Incisions: none  Tubes: none  Relevant labs, radiology and Medications reviewed                        9.8    4.98  )-----------( 191      ( 21 Oct 2019 06:04 )             31.1     10-21    135  |  98  |  9   ----------------------------<  117<H>  3.5   |  27  |  0.46<L>    Ca    8.6      21 Oct 2019 06:04      PTT - ( 21 Oct 2019 06:04 )  PTT:69.3 SEC  MEDICATIONS  (STANDING):  amLODIPine   Tablet 2.5 milliGRAM(s) Oral daily  dextrose 5% + sodium chloride 0.9% with potassium chloride 20 mEq/L 1000 milliLiter(s) (75 mL/Hr) IV Continuous <Continuous>  docusate sodium 100 milliGRAM(s) Oral two times a day  heparin  Infusion.  Unit(s)/Hr (8 mL/Hr) IV Continuous <Continuous>  levothyroxine Injectable 68.5 MICROGram(s) IV Push at bedtime  pantoprazole  Injectable 40 milliGRAM(s) IV Push daily  polyethylene glycol 3350 17 Gram(s) Oral daily  senna 2 Tablet(s) Oral at bedtime  simethicone 80 milliGRAM(s) Chew three times a day  sodium chloride 0.9% lock flush 3 milliLiter(s) IV Push every 8 hours    MEDICATIONS  (PRN):  acetaminophen  IVPB .. 650 milliGRAM(s) IV Intermittent once PRN Mild Pain (1 - 3)    Pertinent Physical Exam  I&O's Summary  Social History:  Social History (marital status, living situation, occupation, tobacco use, alcohol and drug use, and sexual history): Lives with: Daughter  Occupation: Retired    Tobacco use: Denies  Alcohol use: Denies Illicit drug use: Denies	      Assessment  89y Female  w/ PAST MEDICAL & SURGICAL HISTORY:  Atrial fibrillation  Achalasia  Hypertension  Hypothyroidism  History of repair of hiatal hernia  H/O colectomy  S/P appendectomy  S/P hysterectomy  admitted with complaints of Patient is a 89y old  Female who presents with a chief complaint of Dysphagia and Vomiting (20 Oct 2019 09:15)  History of Present Illness:  Reason for Admission: Dysphagia and Vomiting	  History of Present Illness: 	  89 y/o female w/ PMH of HTN, achalasia s/p dilation x2, HH s/p repair w/ mesh, emergency Volvulus surgical repair 4/2014, s/p EGD CRE balloon dilation w/ botox injection 8/2015, s/p EGD dilation 3/2016, s/p EGD lap heller myotomy 2016 and recent Barium swallow on 6/2019 for dysphagia which ended up showing decreased motility with EGD on 10/8/19 showing what appeared to be erosion of mesh into esophagus. Pt is now s/p CT abdomen done yesterday and persistent vomiting since then unable to tolerate PO food. She was seen in the Thoracic surgery office today by Dr. clemente and was admitted.       At bedside pt seen with daughter. She is a thin female and is stable in NAD. At this time she denies sob, cp.  She does admit to vomiting 4x yesterday into this morning after eating. She has not been vomiting since she stopped eating. 10/16-Seen by Cardiology, Echo done, Cleared for OR for possible esphagectomy. Manometry done. Eliquis converted to Heparin gtt. 10/18-ordered for Full liquid diet as tolerated. Planned for esophagectomy on 10/22  10/20-given 1 u PRBC for optimization and chronic anemia.   PLAN  Neuro: Pain management  Pulm: Encourage coughing, deep breathing and use of incentive spirometry.   Cardio: Monitor telemetry/alarms. Will continue Heparin gtt but will d/c at 10pm tonight in anticipation of OR tomm w Epidural placement.   GI: Cont Full liquid diet today as tolerated. Will order Nutritional c/s, pt w severe protein malnutrition. Will require Tube feeds post op. As per Dr. Clemente, given diarrhea yesterday he wants pt to have only 1/2 of Mag citrate today.   Renal: monitor urine output, supplement electrolytes as needed. Will cont IVF hydration, inc to 75cc/hr and will add KCL supplementation.   Vasc: Heparin SC/SCDs for DVT prophylaxis  Heme: Stable H/H. Improved anemia after blood transfusion.   ID: Off antibiotics. Stable.  Therapy: OOB/ambulate. Will order PT eval   Disposition: Plan Esophagectomy tomorrow with Dr. Clemente  Discussed with Cardiothoracic Team at AM rounds.

## 2019-10-22 ENCOUNTER — APPOINTMENT (OUTPATIENT)
Dept: THORACIC SURGERY | Facility: HOSPITAL | Age: 84
End: 2019-10-22

## 2019-10-22 ENCOUNTER — RESULT REVIEW (OUTPATIENT)
Age: 84
End: 2019-10-22

## 2019-10-22 LAB
ALBUMIN SERPL ELPH-MCNC: 3.1 G/DL — LOW (ref 3.3–5)
ALP SERPL-CCNC: 59 U/L — SIGNIFICANT CHANGE UP (ref 40–120)
ALT FLD-CCNC: 17 U/L — SIGNIFICANT CHANGE UP (ref 4–33)
ANION GAP SERPL CALC-SCNC: 10 MMO/L — SIGNIFICANT CHANGE UP (ref 7–14)
ANION GAP SERPL CALC-SCNC: 12 MMO/L — SIGNIFICANT CHANGE UP (ref 7–14)
APTT BLD: 26.5 SEC — LOW (ref 27.5–36.3)
APTT BLD: 27.3 SEC — LOW (ref 27.5–36.3)
AST SERPL-CCNC: 43 U/L — HIGH (ref 4–32)
BASE EXCESS BLDA CALC-SCNC: 0.7 MMOL/L — SIGNIFICANT CHANGE UP
BASE EXCESS BLDA CALC-SCNC: 1.8 MMOL/L — SIGNIFICANT CHANGE UP
BASE EXCESS BLDA CALC-SCNC: 5.7 MMOL/L — SIGNIFICANT CHANGE UP
BILIRUB SERPL-MCNC: 0.6 MG/DL — SIGNIFICANT CHANGE UP (ref 0.2–1.2)
BLD GP AB SCN SERPL QL: NEGATIVE — SIGNIFICANT CHANGE UP
BUN SERPL-MCNC: 11 MG/DL — SIGNIFICANT CHANGE UP (ref 7–23)
BUN SERPL-MCNC: 13 MG/DL — SIGNIFICANT CHANGE UP (ref 7–23)
CA-I BLDA-SCNC: 1.12 MMOL/L — LOW (ref 1.15–1.29)
CA-I BLDA-SCNC: 1.12 MMOL/L — LOW (ref 1.15–1.29)
CA-I BLDA-SCNC: 1.23 MMOL/L — SIGNIFICANT CHANGE UP (ref 1.15–1.29)
CALCIUM SERPL-MCNC: 8.5 MG/DL — SIGNIFICANT CHANGE UP (ref 8.4–10.5)
CALCIUM SERPL-MCNC: 8.6 MG/DL — SIGNIFICANT CHANGE UP (ref 8.4–10.5)
CHLORIDE SERPL-SCNC: 100 MMOL/L — SIGNIFICANT CHANGE UP (ref 98–107)
CHLORIDE SERPL-SCNC: 99 MMOL/L — SIGNIFICANT CHANGE UP (ref 98–107)
CO2 SERPL-SCNC: 23 MMOL/L — SIGNIFICANT CHANGE UP (ref 22–31)
CO2 SERPL-SCNC: 26 MMOL/L — SIGNIFICANT CHANGE UP (ref 22–31)
CREAT SERPL-MCNC: 0.5 MG/DL — SIGNIFICANT CHANGE UP (ref 0.5–1.3)
CREAT SERPL-MCNC: 0.67 MG/DL — SIGNIFICANT CHANGE UP (ref 0.5–1.3)
GLUCOSE BLDA-MCNC: 136 MG/DL — HIGH (ref 70–99)
GLUCOSE BLDA-MCNC: 179 MG/DL — HIGH (ref 70–99)
GLUCOSE BLDA-MCNC: 210 MG/DL — HIGH (ref 70–99)
GLUCOSE SERPL-MCNC: 114 MG/DL — HIGH (ref 70–99)
GLUCOSE SERPL-MCNC: 167 MG/DL — HIGH (ref 70–99)
HCO3 BLDA-SCNC: 25 MMOL/L — SIGNIFICANT CHANGE UP (ref 22–26)
HCO3 BLDA-SCNC: 26 MMOL/L — SIGNIFICANT CHANGE UP (ref 22–26)
HCO3 BLDA-SCNC: 30 MMOL/L — HIGH (ref 22–26)
HCT VFR BLD CALC: 28.3 % — LOW (ref 34.5–45)
HCT VFR BLD CALC: 29 % — LOW (ref 34.5–45)
HCT VFR BLDA CALC: 28.7 % — LOW (ref 34.5–46.5)
HCT VFR BLDA CALC: 29.9 % — LOW (ref 34.5–46.5)
HCT VFR BLDA CALC: 35 % — SIGNIFICANT CHANGE UP (ref 34.5–46.5)
HGB BLD-MCNC: 9.4 G/DL — LOW (ref 11.5–15.5)
HGB BLD-MCNC: 9.7 G/DL — LOW (ref 11.5–15.5)
HGB BLDA-MCNC: 11.3 G/DL — LOW (ref 11.5–15.5)
HGB BLDA-MCNC: 9.2 G/DL — LOW (ref 11.5–15.5)
HGB BLDA-MCNC: 9.7 G/DL — LOW (ref 11.5–15.5)
INR BLD: 0.98 — SIGNIFICANT CHANGE UP (ref 0.88–1.17)
INR BLD: 1.06 — SIGNIFICANT CHANGE UP (ref 0.88–1.17)
MCHC RBC-ENTMCNC: 29.1 PG — SIGNIFICANT CHANGE UP (ref 27–34)
MCHC RBC-ENTMCNC: 30.1 PG — SIGNIFICANT CHANGE UP (ref 27–34)
MCHC RBC-ENTMCNC: 33.2 % — SIGNIFICANT CHANGE UP (ref 32–36)
MCHC RBC-ENTMCNC: 33.4 % — SIGNIFICANT CHANGE UP (ref 32–36)
MCV RBC AUTO: 87.1 FL — SIGNIFICANT CHANGE UP (ref 80–100)
MCV RBC AUTO: 90.7 FL — SIGNIFICANT CHANGE UP (ref 80–100)
NRBC # FLD: 0 K/UL — SIGNIFICANT CHANGE UP (ref 0–0)
NRBC # FLD: 0 K/UL — SIGNIFICANT CHANGE UP (ref 0–0)
PCO2 BLDA: 34 MMHG — SIGNIFICANT CHANGE UP (ref 32–48)
PCO2 BLDA: 41 MMHG — SIGNIFICANT CHANGE UP (ref 32–48)
PCO2 BLDA: 42 MMHG — SIGNIFICANT CHANGE UP (ref 32–48)
PH BLDA: 7.4 PH — SIGNIFICANT CHANGE UP (ref 7.35–7.45)
PH BLDA: 7.41 PH — SIGNIFICANT CHANGE UP (ref 7.35–7.45)
PH BLDA: 7.53 PH — HIGH (ref 7.35–7.45)
PLATELET # BLD AUTO: 150 K/UL — SIGNIFICANT CHANGE UP (ref 150–400)
PLATELET # BLD AUTO: 198 K/UL — SIGNIFICANT CHANGE UP (ref 150–400)
PMV BLD: 10.3 FL — SIGNIFICANT CHANGE UP (ref 7–13)
PMV BLD: 10.4 FL — SIGNIFICANT CHANGE UP (ref 7–13)
PO2 BLDA: 232 MMHG — HIGH (ref 83–108)
PO2 BLDA: 265 MMHG — HIGH (ref 83–108)
PO2 BLDA: 285 MMHG — HIGH (ref 83–108)
POTASSIUM BLDA-SCNC: 3.8 MMOL/L — SIGNIFICANT CHANGE UP (ref 3.4–4.5)
POTASSIUM BLDA-SCNC: 4 MMOL/L — SIGNIFICANT CHANGE UP (ref 3.4–4.5)
POTASSIUM BLDA-SCNC: 4.3 MMOL/L — SIGNIFICANT CHANGE UP (ref 3.4–4.5)
POTASSIUM SERPL-MCNC: 4.1 MMOL/L — SIGNIFICANT CHANGE UP (ref 3.5–5.3)
POTASSIUM SERPL-MCNC: 4.1 MMOL/L — SIGNIFICANT CHANGE UP (ref 3.5–5.3)
POTASSIUM SERPL-SCNC: 4.1 MMOL/L — SIGNIFICANT CHANGE UP (ref 3.5–5.3)
POTASSIUM SERPL-SCNC: 4.1 MMOL/L — SIGNIFICANT CHANGE UP (ref 3.5–5.3)
PROT SERPL-MCNC: 5.2 G/DL — LOW (ref 6–8.3)
PROTHROM AB SERPL-ACNC: 10.9 SEC — SIGNIFICANT CHANGE UP (ref 9.8–13.1)
PROTHROM AB SERPL-ACNC: 11.8 SEC — SIGNIFICANT CHANGE UP (ref 9.8–13.1)
RBC # BLD: 3.12 M/UL — LOW (ref 3.8–5.2)
RBC # BLD: 3.33 M/UL — LOW (ref 3.8–5.2)
RBC # FLD: 14.7 % — HIGH (ref 10.3–14.5)
RBC # FLD: 15.8 % — HIGH (ref 10.3–14.5)
RH IG SCN BLD-IMP: POSITIVE — SIGNIFICANT CHANGE UP
SAO2 % BLDA: 99.5 % — HIGH (ref 95–99)
SAO2 % BLDA: 99.6 % — HIGH (ref 95–99)
SAO2 % BLDA: 99.9 % — HIGH (ref 95–99)
SODIUM BLDA-SCNC: 132 MMOL/L — LOW (ref 136–146)
SODIUM BLDA-SCNC: 132 MMOL/L — LOW (ref 136–146)
SODIUM BLDA-SCNC: 134 MMOL/L — LOW (ref 136–146)
SODIUM SERPL-SCNC: 135 MMOL/L — SIGNIFICANT CHANGE UP (ref 135–145)
SODIUM SERPL-SCNC: 135 MMOL/L — SIGNIFICANT CHANGE UP (ref 135–145)
T4 FREE SERPL-MCNC: 1.32 NG/DL — SIGNIFICANT CHANGE UP (ref 0.9–1.8)
WBC # BLD: 5.67 K/UL — SIGNIFICANT CHANGE UP (ref 3.8–10.5)
WBC # BLD: 8.17 K/UL — SIGNIFICANT CHANGE UP (ref 3.8–10.5)
WBC # FLD AUTO: 5.67 K/UL — SIGNIFICANT CHANGE UP (ref 3.8–10.5)
WBC # FLD AUTO: 8.17 K/UL — SIGNIFICANT CHANGE UP (ref 3.8–10.5)

## 2019-10-22 PROCEDURE — 32551 INSERTION OF CHEST TUBE: CPT | Mod: 59

## 2019-10-22 PROCEDURE — 43235 EGD DIAGNOSTIC BRUSH WASH: CPT

## 2019-10-22 PROCEDURE — 43122 PARTIAL REMOVAL OF ESOPHAGUS: CPT | Mod: 80

## 2019-10-22 PROCEDURE — 43122 PARTIAL REMOVAL OF ESOPHAGUS: CPT

## 2019-10-22 PROCEDURE — 88309 TISSUE EXAM BY PATHOLOGIST: CPT | Mod: 26

## 2019-10-22 PROCEDURE — 44015 INSERT NEEDLE CATH BOWEL: CPT | Mod: 80

## 2019-10-22 PROCEDURE — 44015 INSERT NEEDLE CATH BOWEL: CPT

## 2019-10-22 PROCEDURE — 99233 SBSQ HOSP IP/OBS HIGH 50: CPT

## 2019-10-22 PROCEDURE — 71045 X-RAY EXAM CHEST 1 VIEW: CPT | Mod: 26

## 2019-10-22 PROCEDURE — 88312 SPECIAL STAINS GROUP 1: CPT | Mod: 26

## 2019-10-22 RX ORDER — ONDANSETRON 8 MG/1
4 TABLET, FILM COATED ORAL EVERY 6 HOURS
Refills: 0 | Status: DISCONTINUED | OUTPATIENT
Start: 2019-10-22 | End: 2019-10-29

## 2019-10-22 RX ORDER — PANTOPRAZOLE SODIUM 20 MG/1
40 TABLET, DELAYED RELEASE ORAL DAILY
Refills: 0 | Status: DISCONTINUED | OUTPATIENT
Start: 2019-10-22 | End: 2019-10-29

## 2019-10-22 RX ORDER — VANCOMYCIN HCL 1 G
750 VIAL (EA) INTRAVENOUS EVERY 24 HOURS
Refills: 0 | Status: DISCONTINUED | OUTPATIENT
Start: 2019-10-22 | End: 2019-10-24

## 2019-10-22 RX ORDER — PIPERACILLIN AND TAZOBACTAM 4; .5 G/20ML; G/20ML
3.38 INJECTION, POWDER, LYOPHILIZED, FOR SOLUTION INTRAVENOUS ONCE
Refills: 0 | Status: COMPLETED | OUTPATIENT
Start: 2019-10-22 | End: 2019-10-22

## 2019-10-22 RX ORDER — PIPERACILLIN AND TAZOBACTAM 4; .5 G/20ML; G/20ML
3.38 INJECTION, POWDER, LYOPHILIZED, FOR SOLUTION INTRAVENOUS EVERY 8 HOURS
Refills: 0 | Status: DISCONTINUED | OUTPATIENT
Start: 2019-10-22 | End: 2019-10-24

## 2019-10-22 RX ORDER — NALOXONE HYDROCHLORIDE 4 MG/.1ML
0.1 SPRAY NASAL
Refills: 0 | Status: DISCONTINUED | OUTPATIENT
Start: 2019-10-22 | End: 2019-10-29

## 2019-10-22 RX ORDER — LEVOTHYROXINE SODIUM 125 MCG
68 TABLET ORAL AT BEDTIME
Refills: 0 | Status: DISCONTINUED | OUTPATIENT
Start: 2019-10-22 | End: 2019-10-29

## 2019-10-22 RX ORDER — DIPHENHYDRAMINE HCL 50 MG
25 CAPSULE ORAL EVERY 4 HOURS
Refills: 0 | Status: DISCONTINUED | OUTPATIENT
Start: 2019-10-22 | End: 2019-10-22

## 2019-10-22 RX ORDER — SODIUM CHLORIDE 9 MG/ML
1000 INJECTION, SOLUTION INTRAVENOUS
Refills: 0 | Status: DISCONTINUED | OUTPATIENT
Start: 2019-10-22 | End: 2019-10-22

## 2019-10-22 RX ORDER — DEXMEDETOMIDINE HYDROCHLORIDE IN 0.9% SODIUM CHLORIDE 4 UG/ML
0.5 INJECTION INTRAVENOUS
Qty: 200 | Refills: 0 | Status: DISCONTINUED | OUTPATIENT
Start: 2019-10-22 | End: 2019-10-24

## 2019-10-22 RX ORDER — SODIUM CHLORIDE 9 MG/ML
1000 INJECTION, SOLUTION INTRAVENOUS
Refills: 0 | Status: DISCONTINUED | OUTPATIENT
Start: 2019-10-22 | End: 2019-10-24

## 2019-10-22 RX ORDER — HEPARIN SODIUM 5000 [USP'U]/ML
5000 INJECTION INTRAVENOUS; SUBCUTANEOUS EVERY 12 HOURS
Refills: 0 | Status: DISCONTINUED | OUTPATIENT
Start: 2019-10-23 | End: 2019-10-24

## 2019-10-22 RX ORDER — ALBUMIN HUMAN 25 %
250 VIAL (ML) INTRAVENOUS ONCE
Refills: 0 | Status: COMPLETED | OUTPATIENT
Start: 2019-10-22 | End: 2019-10-23

## 2019-10-22 RX ADMIN — SODIUM CHLORIDE 3 MILLILITER(S): 9 INJECTION INTRAMUSCULAR; INTRAVENOUS; SUBCUTANEOUS at 05:03

## 2019-10-22 RX ADMIN — AMLODIPINE BESYLATE 2.5 MILLIGRAM(S): 2.5 TABLET ORAL at 05:04

## 2019-10-22 RX ADMIN — Medication 125 MILLILITER(S): at 23:00

## 2019-10-22 RX ADMIN — SIMETHICONE 80 MILLIGRAM(S): 80 TABLET, CHEWABLE ORAL at 05:04

## 2019-10-22 RX ADMIN — SODIUM CHLORIDE 75 MILLILITER(S): 9 INJECTION, SOLUTION INTRAVENOUS at 20:57

## 2019-10-22 RX ADMIN — Medication 68 MICROGRAM(S): at 22:00

## 2019-10-22 RX ADMIN — PIPERACILLIN AND TAZOBACTAM 200 GRAM(S): 4; .5 INJECTION, POWDER, LYOPHILIZED, FOR SOLUTION INTRAVENOUS at 21:04

## 2019-10-22 RX ADMIN — Medication 250 MILLIGRAM(S): at 22:42

## 2019-10-22 RX ADMIN — AER TRAVELER 1 APPLICATION(S): 0.5 SOLUTION RECTAL; TOPICAL at 05:04

## 2019-10-22 NOTE — BRIEF OPERATIVE NOTE - NSICDXBRIEFPOSTOP_GEN_ALL_CORE_FT
POST-OP DIAGNOSIS:  Hiatal hernia 22-Oct-2019 19:19:44 mesh erosion into GE junction from previous hiatal hernia repair Aster Acosta

## 2019-10-22 NOTE — BRIEF OPERATIVE NOTE - NSICDXBRIEFPROCEDURE_GEN_ALL_CORE_FT
PROCEDURES:  Exploratory laparotomy 22-Oct-2019 19:18:06 Exploratory laparotomy, lysis of adhesions, partial esophagogastrectomy, feeding jejunustomy, pyloromyotomy, left chest tube placement Aster Acosta

## 2019-10-22 NOTE — BRIEF OPERATIVE NOTE - NSICDXBRIEFPREOP_GEN_ALL_CORE_FT
PRE-OP DIAGNOSIS:  Hiatal hernia 22-Oct-2019 19:19:13 mesh erosion into GE junction from hiatal hernia repair Aster Acosta

## 2019-10-22 NOTE — PROGRESS NOTE ADULT - SUBJECTIVE AND OBJECTIVE BOX
CLIFFORD DOLAN  [id number]  _________________________  VITAL SIGNS:  Vital Signs Last 24 Hrs  T(C): 36.8 (22 Oct 2019 19:45), Max: 36.8 (22 Oct 2019 19:45)  T(F): 98.2 (22 Oct 2019 19:45), Max: 98.2 (22 Oct 2019 19:45)  HR: 77 (22 Oct 2019 20:15) (53 - 78)  BP: 162/70 (22 Oct 2019 19:50) (144/54 - 176/65)  BP(mean): 94 (22 Oct 2019 19:50) (90 - 94)  RR: 19 (22 Oct 2019 20:15) (16 - 19)  SpO2: 100% (22 Oct 2019 20:15) (98% - 100%)  I/Os:   I&O's Detail    21 Oct 2019 07:01  -  22 Oct 2019 07:00  --------------------------------------------------------  IN:    dextrose 5% + sodium chloride 0.9% with potassium chloride 20 mEq/L: 900 mL    heparin  Infusion.: 18 mL    Oral Fluid: 240 mL  Total IN: 1158 mL    OUT:    Voided: 600 mL  Total OUT: 600 mL    Total NET: 558 mL              MEDICATIONS:  MEDICATIONS  (STANDING):  hydromorphone (10 MICROgram(s)/mL) + bupivacaine 0.0625% in 0.9% Sodium Chloride PCEA 250 milliLiter(s) Epidural PCA Continuous  lactated ringers. 1000 milliLiter(s) (75 mL/Hr) IV Continuous <Continuous>  levothyroxine Injectable 68 MICROGram(s) IV Push at bedtime  pantoprazole  Injectable 40 milliGRAM(s) IV Push daily  piperacillin/tazobactam IVPB. 3.375 Gram(s) IV Intermittent once  piperacillin/tazobactam IVPB.. 3.375 Gram(s) IV Intermittent every 8 hours  vancomycin  IVPB 750 milliGRAM(s) IV Intermittent every 12 hours    MEDICATIONS  (PRN):  hydromorphone (10 MICROgram(s)/mL) + bupivacaine 0.0625% in 0.9% Sodium Chloride PCEA Rescue Clinician  Bolus 5 milliLiter(s) Epidural every 15 minutes PRN for Pain Score greater than 6  naloxone Injectable 0.1 milliGRAM(s) IV Push every 3 minutes PRN For ANY of the following changes in patient status:  A. RR LESS THAN 10 breaths per minute, B. Oxygen saturation LESS THAN 90%, C. Sedation score of 6  ondansetron Injectable 4 milliGRAM(s) IV Push every 6 hours PRN Nausea      LABS:                        9.4    5.67  )-----------( 198      ( 22 Oct 2019 05:30 )             28.3     10-22    135  |  99  |  11  ----------------------------<  114<H>  4.1   |  26  |  0.50    Ca    8.6      22 Oct 2019 05:30        PT/INR - ( 22 Oct 2019 05:25 )   PT: 10.9 SEC;   INR: 0.98          PTT - ( 22 Oct 2019 05:25 )  PTT:26.5 SEC  ABG - ( 22 Oct 2019 18:00 )  pH, Arterial: 7.40  pH, Blood: x     /  pCO2: 41    /  pO2: 232   / HCO3: 25    / Base Excess: 0.7   /  SaO2: 99.6                _________________________    PROCEDURE: Exploratory laparotomy, lysis of adhesions, partial esophagogastrectomy, feeding jejunustomy, pyloromyotomy, left chest tube placement  22-Oct-2019       ISSUES:   Achalasia  Atrial fibrillation  Hypertension  Hypothyroidism  History of repair of hiatal hernia  H/O colectomy  S/P appendectomy  S/P hysterectomy  Post op pain  Chest tube in place    INTERVAL EVENTS:   OR today. extubated in OR. Transferred to CTICU    HISTORY:   Patient reports moderate pain at abdominal incision sites which is worse with coughing and deep breathing without associated fever or dyspnea. Pain is improved with use of pain meds.     PHYSICAL EXAM:   Gen: Comfortable, No acute distress  Eyes: Sclera white, Conjunctiva normal, Eyelids normal, Pupils symmetrical   ENT: Mucous membranes moist, NG tube in place,  ,    Neck: Trachea midline,  ,  ,  ,    CV: Rate regular, Rhythm irregular,    Resp: Breath sounds clear, No accessory muscles use, L chest tube in place,  ,    Abd: Soft, Non-distended, Moderately tender, Bowel sounds hypoactive, feeding tube in place, drain in place  Skin: Warm, No peripheral edema of lower extremities,    : No lopez  Neuro: Moving all 4 extremities  Psych: A&Ox3      ASSESSMENT AND PLAN:     NEURO:  Post-operative Pain - Pain control with PCEA and Tylenol IV PRN.            RESPIRATORY:  Hypoxia - Wean nasal cannula for goal O2sat above 92. Obtain CXR. Incentive spirometry. Chest PT and frequent suctioning. Continue bronchodilators. OOB to chair & ambulate w/ assistance. Continuous pulse oximetry for support & to prevent decompensation.    Chest tube – Pleurevac regulated suctioning. Monitor chest tube output.                 CARDIOVASCULAR:  Hemodynamically stable - Not on pressors. Continue hemodynamic monitoring.  HTN - stable. Continue home antihypertensives medications.  Afib - stable.  Hold anticoagulation      RENAL:  Stable – LR IVF 75mL/hr. Monitor IOs and electrolytes.          GASTROINTESTINAL:  NG tube to suction.  J tube to gravity drainage  GI prophylaxis with Protonix  Zofran and Reglan IV PRN for nausea  NPO until swallow study           HEMATOLOGIC:  No signs of active bleeding. Monitor Hgb in CBC in AM  DVT prophylaxis with heparin subQ and SCDs.      INFECTIOUS DISEASE:  All surgical sites appear clean. No signs of active infection. Will monitor for fever and leukocytosis.   Intraabdominal full thickness erosion from prior mesh with likely infection - vanc and zosyn. monitor vancomycin levels.                   ENDOCRINE:  Stable – Monitor glucose fingersticks for goal 120-180.  Hypothyroidism - stable. Continue Synthroid.         Pertinent clinical, laboratory, radiographic, hemodynamic, echocardiographic, respiratory data, microbiologic data and chart were reviewed by myself and analyzed frequently throughout the course of the day and night by myself.    Plan discussed at length with the CTICU staff and Attending CT Surgeon.   Patient's status was discussed with patient and family at bedside.

## 2019-10-23 LAB
ANION GAP SERPL CALC-SCNC: 14 MMO/L — SIGNIFICANT CHANGE UP (ref 7–14)
APTT BLD: 28.7 SEC — SIGNIFICANT CHANGE UP (ref 27.5–36.3)
BASOPHILS # BLD AUTO: 0.03 K/UL — SIGNIFICANT CHANGE UP (ref 0–0.2)
BASOPHILS NFR BLD AUTO: 0.2 % — SIGNIFICANT CHANGE UP (ref 0–2)
BUN SERPL-MCNC: 15 MG/DL — SIGNIFICANT CHANGE UP (ref 7–23)
CALCIUM SERPL-MCNC: 8.5 MG/DL — SIGNIFICANT CHANGE UP (ref 8.4–10.5)
CHLORIDE SERPL-SCNC: 101 MMOL/L — SIGNIFICANT CHANGE UP (ref 98–107)
CO2 SERPL-SCNC: 21 MMOL/L — LOW (ref 22–31)
CREAT SERPL-MCNC: 0.67 MG/DL — SIGNIFICANT CHANGE UP (ref 0.5–1.3)
EOSINOPHIL # BLD AUTO: 0.02 K/UL — SIGNIFICANT CHANGE UP (ref 0–0.5)
EOSINOPHIL NFR BLD AUTO: 0.2 % — SIGNIFICANT CHANGE UP (ref 0–6)
GLUCOSE SERPL-MCNC: 124 MG/DL — HIGH (ref 70–99)
HCT VFR BLD CALC: 28.2 % — LOW (ref 34.5–45)
HGB BLD-MCNC: 9.4 G/DL — LOW (ref 11.5–15.5)
IMM GRANULOCYTES NFR BLD AUTO: 0.3 % — SIGNIFICANT CHANGE UP (ref 0–1.5)
INR BLD: 1.09 — SIGNIFICANT CHANGE UP (ref 0.88–1.17)
LYMPHOCYTES # BLD AUTO: 0.49 K/UL — LOW (ref 1–3.3)
LYMPHOCYTES # BLD AUTO: 3.8 % — LOW (ref 13–44)
MAGNESIUM SERPL-MCNC: 1.5 MG/DL — LOW (ref 1.6–2.6)
MCHC RBC-ENTMCNC: 29.4 PG — SIGNIFICANT CHANGE UP (ref 27–34)
MCHC RBC-ENTMCNC: 33.3 % — SIGNIFICANT CHANGE UP (ref 32–36)
MCV RBC AUTO: 88.1 FL — SIGNIFICANT CHANGE UP (ref 80–100)
MONOCYTES # BLD AUTO: 0.45 K/UL — SIGNIFICANT CHANGE UP (ref 0–0.9)
MONOCYTES NFR BLD AUTO: 3.4 % — SIGNIFICANT CHANGE UP (ref 2–14)
NEUTROPHILS # BLD AUTO: 12.02 K/UL — HIGH (ref 1.8–7.4)
NEUTROPHILS NFR BLD AUTO: 92.1 % — HIGH (ref 43–77)
NRBC # FLD: 0.02 K/UL — SIGNIFICANT CHANGE UP (ref 0–0)
PLATELET # BLD AUTO: 139 K/UL — LOW (ref 150–400)
PMV BLD: 10.9 FL — SIGNIFICANT CHANGE UP (ref 7–13)
POTASSIUM SERPL-MCNC: 4.2 MMOL/L — SIGNIFICANT CHANGE UP (ref 3.5–5.3)
POTASSIUM SERPL-SCNC: 4.2 MMOL/L — SIGNIFICANT CHANGE UP (ref 3.5–5.3)
PROTHROM AB SERPL-ACNC: 12.1 SEC — SIGNIFICANT CHANGE UP (ref 9.8–13.1)
RBC # BLD: 3.2 M/UL — LOW (ref 3.8–5.2)
RBC # FLD: 15.9 % — HIGH (ref 10.3–14.5)
SODIUM SERPL-SCNC: 136 MMOL/L — SIGNIFICANT CHANGE UP (ref 135–145)
WBC # BLD: 13.05 K/UL — HIGH (ref 3.8–10.5)
WBC # FLD AUTO: 13.05 K/UL — HIGH (ref 3.8–10.5)

## 2019-10-23 PROCEDURE — 71045 X-RAY EXAM CHEST 1 VIEW: CPT | Mod: 26

## 2019-10-23 PROCEDURE — 99233 SBSQ HOSP IP/OBS HIGH 50: CPT

## 2019-10-23 RX ORDER — ALBUMIN HUMAN 25 %
250 VIAL (ML) INTRAVENOUS ONCE
Refills: 0 | Status: COMPLETED | OUTPATIENT
Start: 2019-10-23 | End: 2019-10-23

## 2019-10-23 RX ORDER — MAGNESIUM SULFATE 500 MG/ML
2 VIAL (ML) INJECTION ONCE
Refills: 0 | Status: COMPLETED | OUTPATIENT
Start: 2019-10-23 | End: 2019-10-23

## 2019-10-23 RX ORDER — FUROSEMIDE 40 MG
10 TABLET ORAL ONCE
Refills: 0 | Status: COMPLETED | OUTPATIENT
Start: 2019-10-24 | End: 2019-10-24

## 2019-10-23 RX ORDER — PHENYLEPHRINE HYDROCHLORIDE 10 MG/ML
0.5 INJECTION INTRAVENOUS
Qty: 40 | Refills: 0 | Status: DISCONTINUED | OUTPATIENT
Start: 2019-10-23 | End: 2019-10-29

## 2019-10-23 RX ORDER — SODIUM CHLORIDE 9 MG/ML
3 INJECTION INTRAMUSCULAR; INTRAVENOUS; SUBCUTANEOUS EVERY 6 HOURS
Refills: 0 | Status: COMPLETED | OUTPATIENT
Start: 2019-10-23 | End: 2019-10-26

## 2019-10-23 RX ADMIN — PIPERACILLIN AND TAZOBACTAM 25 GRAM(S): 4; .5 INJECTION, POWDER, LYOPHILIZED, FOR SOLUTION INTRAVENOUS at 05:13

## 2019-10-23 RX ADMIN — HEPARIN SODIUM 5000 UNIT(S): 5000 INJECTION INTRAVENOUS; SUBCUTANEOUS at 01:00

## 2019-10-23 RX ADMIN — PIPERACILLIN AND TAZOBACTAM 25 GRAM(S): 4; .5 INJECTION, POWDER, LYOPHILIZED, FOR SOLUTION INTRAVENOUS at 13:43

## 2019-10-23 RX ADMIN — SODIUM CHLORIDE 75 MILLILITER(S): 9 INJECTION, SOLUTION INTRAVENOUS at 19:33

## 2019-10-23 RX ADMIN — SODIUM CHLORIDE 3 MILLILITER(S): 9 INJECTION INTRAMUSCULAR; INTRAVENOUS; SUBCUTANEOUS at 21:56

## 2019-10-23 RX ADMIN — Medication 125 MILLILITER(S): at 10:08

## 2019-10-23 RX ADMIN — Medication 250 MILLIGRAM(S): at 22:03

## 2019-10-23 RX ADMIN — DEXMEDETOMIDINE HYDROCHLORIDE IN 0.9% SODIUM CHLORIDE 5.56 MICROGRAM(S)/KG/HR: 4 INJECTION INTRAVENOUS at 03:17

## 2019-10-23 RX ADMIN — HEPARIN SODIUM 5000 UNIT(S): 5000 INJECTION INTRAVENOUS; SUBCUTANEOUS at 12:01

## 2019-10-23 RX ADMIN — Medication 125 MILLILITER(S): at 07:22

## 2019-10-23 RX ADMIN — PIPERACILLIN AND TAZOBACTAM 25 GRAM(S): 4; .5 INJECTION, POWDER, LYOPHILIZED, FOR SOLUTION INTRAVENOUS at 22:03

## 2019-10-23 RX ADMIN — SODIUM CHLORIDE 75 MILLILITER(S): 9 INJECTION, SOLUTION INTRAVENOUS at 07:29

## 2019-10-23 RX ADMIN — SODIUM CHLORIDE 3 MILLILITER(S): 9 INJECTION INTRAMUSCULAR; INTRAVENOUS; SUBCUTANEOUS at 15:14

## 2019-10-23 RX ADMIN — Medication 68 MICROGRAM(S): at 22:03

## 2019-10-23 RX ADMIN — SODIUM CHLORIDE 3 MILLILITER(S): 9 INJECTION INTRAMUSCULAR; INTRAVENOUS; SUBCUTANEOUS at 10:54

## 2019-10-23 RX ADMIN — Medication 125 MILLILITER(S): at 23:45

## 2019-10-23 RX ADMIN — Medication 125 MILLILITER(S): at 20:15

## 2019-10-23 RX ADMIN — Medication 50 GRAM(S): at 06:12

## 2019-10-23 RX ADMIN — PANTOPRAZOLE SODIUM 40 MILLIGRAM(S): 20 TABLET, DELAYED RELEASE ORAL at 12:01

## 2019-10-23 NOTE — PROGRESS NOTE ADULT - SUBJECTIVE AND OBJECTIVE BOX
CLIFFORD DOLAN  MRN-[id number]  _________________________  VITAL SIGNS:  Vital Signs Last 24 Hrs  T(C): 36.4 (23 Oct 2019 04:00), Max: 36.8 (22 Oct 2019 19:45)  T(F): 97.6 (23 Oct 2019 04:00), Max: 98.2 (22 Oct 2019 19:45)  HR: 63 (23 Oct 2019 07:00) (53 - 78)  BP: 122/57 (22 Oct 2019 21:00) (122/57 - 176/65)  BP(mean): 72 (22 Oct 2019 21:00) (72 - 94)  RR: 18 (23 Oct 2019 07:00) (12 - 23)  SpO2: 98% (23 Oct 2019 07:00) (98% - 100%)  I/Os:   I&O's Detail    22 Oct 2019 07:01  -  23 Oct 2019 07:00  --------------------------------------------------------  IN:    dexmedetomidine Infusion: 8.8 mL    Enteral Tube Flush: 90 mL    Enteral Tube Flush: 90 mL    IV PiggyBack: 700 mL    lactated ringers.: 825 mL  Total IN: 1713.8 mL    OUT:    Bulb: 275 mL    Chest Tube: 410 mL    Estimated Blood Loss: 800 mL    Indwelling Catheter - Urethral: 220 mL    Nasoenteral Tube: 50 mL  Total OUT: 1755 mL    Total NET: -41.2 mL              MEDICATIONS:  MEDICATIONS  (STANDING):  albumin human  5% IVPB 250 milliLiter(s) IV Intermittent once  dexMEDEtomidine Infusion 0.5 MICROgram(s)/kG/Hr (5.563 mL/Hr) IV Continuous <Continuous>  heparin  Injectable 5000 Unit(s) SubCutaneous every 12 hours  hydromorphone (10 MICROgram(s)/mL) + bupivacaine 0.0625% in 0.9% Sodium Chloride PCEA 250 milliLiter(s) Epidural PCA Continuous  lactated ringers. 1000 milliLiter(s) (75 mL/Hr) IV Continuous <Continuous>  levothyroxine Injectable 68 MICROGram(s) IV Push at bedtime  pantoprazole  Injectable 40 milliGRAM(s) IV Push daily  piperacillin/tazobactam IVPB.. 3.375 Gram(s) IV Intermittent every 8 hours  vancomycin  IVPB 750 milliGRAM(s) IV Intermittent every 24 hours    MEDICATIONS  (PRN):  hydromorphone (10 MICROgram(s)/mL) + bupivacaine 0.0625% in 0.9% Sodium Chloride PCEA Rescue Clinician  Bolus 5 milliLiter(s) Epidural every 15 minutes PRN for Pain Score greater than 6  naloxone Injectable 0.1 milliGRAM(s) IV Push every 3 minutes PRN For ANY of the following changes in patient status:  A. RR LESS THAN 10 breaths per minute, B. Oxygen saturation LESS THAN 90%, C. Sedation score of 6  ondansetron Injectable 4 milliGRAM(s) IV Push every 6 hours PRN Nausea      LABS:                        9.4    13.05 )-----------( 139      ( 23 Oct 2019 04:00 )             28.2     10-23    136  |  101  |  15  ----------------------------<  124<H>  4.2   |  21<L>  |  0.67    Ca    8.5      23 Oct 2019 04:10  Mg     1.5     10-23    TPro  5.2<L>  /  Alb  3.1<L>  /  TBili  0.6  /  DBili  x   /  AST  43<H>  /  ALT  17  /  AlkPhos  59  10-22    LIVER FUNCTIONS - ( 22 Oct 2019 20:40 )  Alb: 3.1 g/dL / Pro: 5.2 g/dL / ALK PHOS: 59 u/L / ALT: 17 u/L / AST: 43 u/L / GGT: x           PT/INR - ( 23 Oct 2019 04:00 )   PT: 12.1 SEC;   INR: 1.09          PTT - ( 23 Oct 2019 04:00 )  PTT:28.7 SEC  ABG - ( 22 Oct 2019 18:00 )  pH, Arterial: 7.40  pH, Blood: x     /  pCO2: 41    /  pO2: 232   / HCO3: 25    / Base Excess: 0.7   /  SaO2: 99.6                _________________________        PROCEDURE: Exploratory laparotomy, lysis of adhesions, partial esophagogastrectomy, feeding jejunustomy, pyloromyotomy, left chest tube placement  22-Oct-2019       ISSUES:   Achalasia  Atrial fibrillation  Hypertension  Hypothyroidism  History of repair of hiatal hernia  H/O colectomy  S/P appendectomy  S/P hysterectomy  Post op pain  Chest tube in place    INTERVAL EVENTS:   Required precedex overnight due to agitation and attempt to remove tubes and lines after waking from anesthesia. PRecedex d/c'd at 4AM. Pt A&Ox4 today.    HISTORY:   Patient reports moderate pain at abdominal incision sites which is worse with coughing and deep breathing without associated fever or dyspnea. Pain is improved with use of pain meds.     PHYSICAL EXAM:   Gen: Comfortable, No acute distress  Eyes: Sclera white, Conjunctiva normal, Eyelids normal, Pupils symmetrical   ENT: Mucous membranes moist, NG tube in place,  ,    Neck: Trachea midline,  ,  ,  ,    CV: Rate regular, Rhythm irregular,    Resp: Breath sounds clear, No accessory muscles use, L chest tube in place,  ,    Abd: Soft, Non-distended, Moderately tender, Bowel sounds hypoactive, feeding tube in place, drain in place  Skin: Warm, No peripheral edema of lower extremities,    : No lopez  Neuro: Moving all 4 extremities  Psych: A&Ox3      ASSESSMENT AND PLAN:     NEURO:  Post-operative Pain - Pain control with PCEA and Tylenol IV PRN.            RESPIRATORY:  Hypoxia - Wean nasal cannula for goal O2sat above 92. Obtain CXR. Incentive spirometry. Chest PT and frequent suctioning. Continue bronchodilators. OOB to chair & ambulate w/ assistance. Continuous pulse oximetry for support & to prevent decompensation.    Chest tube – Pleurevac regulated suctioning. Monitor chest tube output.                 CARDIOVASCULAR:  Hemodynamically stable - Not on pressors. Continue hemodynamic monitoring.  HTN - stable. Continue home antihypertensives medications.  Afib - stable.  Hold anticoagulation      RENAL:  Stable – LR IVF 75mL/hr. Monitor IOs and electrolytes.          GASTROINTESTINAL:  NG tube to suction.  J tube to gravity drainage  GI prophylaxis with Protonix  Zofran and Reglan IV PRN for nausea  NPO until swallow study           HEMATOLOGIC:  No signs of active bleeding. Monitor Hgb in CBC in AM  DVT prophylaxis with heparin subQ and SCDs.      INFECTIOUS DISEASE:  All surgical sites appear clean. No signs of active infection. Will monitor for fever and leukocytosis.   Intraabdominal full thickness erosion from prior mesh with likely infection - vanc and zosyn. monitor vancomycin levels.                   ENDOCRINE:  Stable – Monitor glucose fingersticks for goal 120-180.  Hypothyroidism - stable. Continue Synthroid.         Pertinent clinical, laboratory, radiographic, hemodynamic, echocardiographic, respiratory data, microbiologic data and chart were reviewed by myself and analyzed frequently throughout the course of the day and night by myself.    Plan discussed at length with the CTICU staff and Attending CT Surgeon.   Patient's status was discussed with patient and family at bedside.

## 2019-10-23 NOTE — PROGRESS NOTE ADULT - SUBJECTIVE AND OBJECTIVE BOX
Anesthesia Pain Management Service    SUBJECTIVE: Pt doing well with PCEA without problems reported.    Therapy:	  [ ] IV PCA	   [ X] Epidural           [ ] s/p Spinal Opoid              [ ] Postpartum infusion	  [ ] Patient controlled regional anesthesia (PCRA)    [ ] prn Analgesics    Allergies    opioid-like analgesics (Other)    Intolerances      MEDICATIONS  (STANDING):  dexMEDEtomidine Infusion 0.5 MICROgram(s)/kG/Hr (5.563 mL/Hr) IV Continuous <Continuous>  heparin  Injectable 5000 Unit(s) SubCutaneous every 12 hours  hydromorphone (10 MICROgram(s)/mL) + bupivacaine 0.0625% in 0.9% Sodium Chloride PCEA 250 milliLiter(s) Epidural PCA Continuous  lactated ringers. 1000 milliLiter(s) (75 mL/Hr) IV Continuous <Continuous>  levothyroxine Injectable 68 MICROGram(s) IV Push at bedtime  pantoprazole  Injectable 40 milliGRAM(s) IV Push daily  piperacillin/tazobactam IVPB.. 3.375 Gram(s) IV Intermittent every 8 hours  sodium chloride 3%  Inhalation 3 milliLiter(s) Inhalation every 6 hours  vancomycin  IVPB 750 milliGRAM(s) IV Intermittent every 24 hours    MEDICATIONS  (PRN):  hydromorphone (10 MICROgram(s)/mL) + bupivacaine 0.0625% in 0.9% Sodium Chloride PCEA Rescue Clinician  Bolus 5 milliLiter(s) Epidural every 15 minutes PRN for Pain Score greater than 6  naloxone Injectable 0.1 milliGRAM(s) IV Push every 3 minutes PRN For ANY of the following changes in patient status:  A. RR LESS THAN 10 breaths per minute, B. Oxygen saturation LESS THAN 90%, C. Sedation score of 6  ondansetron Injectable 4 milliGRAM(s) IV Push every 6 hours PRN Nausea      OBJECTIVE:   [X] No new signs     [ ] Other:    Side Effects:  [X ] None			[ ] Other:    Assessment of Catheter Site:		[ X] Intact		[ ] Other:    ASSESSMENT/PLAN  [ X] Continue current therapy    [ ] Therapy changed to:    [ ] IV PCA       [ ] Epidural     [ ] prn Analgesics     Comments: Continue current PCEA settings. Recommend non-opioid adjuvant analgesics to be used when possible and when allowed by primary surgical team.    Progress Note written now but Patient was seen earlier.

## 2019-10-23 NOTE — PROGRESS NOTE ADULT - SUBJECTIVE AND OBJECTIVE BOX
ANESTHESIA POSTOP CHECK    89y Female POSTOP DAY 1 S/P  Exploratory laparotomy, lysis of adhesions, partial esophagogastrectomy, feeding jejunustomy, pyloromyotomy, left chest tube placement    Vital Signs Last 24 Hrs  T(C): 36.6 (23 Oct 2019 08:00), Max: 36.8 (22 Oct 2019 19:45)  T(F): 97.8 (23 Oct 2019 08:00), Max: 98.2 (22 Oct 2019 19:45)  HR: 59 (23 Oct 2019 11:00) (54 - 78)  BP: 122/57 (22 Oct 2019 21:00) (122/57 - 162/70)  BP(mean): 72 (22 Oct 2019 21:00) (72 - 94)  RR: 12 (23 Oct 2019 11:00) (11 - 23)  SpO2: 100% (23 Oct 2019 11:00) (98% - 100%)  I&O's Summary    22 Oct 2019 07:01  -  23 Oct 2019 07:00  --------------------------------------------------------  IN: 1713.8 mL / OUT: 1755 mL / NET: -41.2 mL    23 Oct 2019 07:01  -  23 Oct 2019 11:51  --------------------------------------------------------  IN: 435 mL / OUT: 195 mL / NET: 240 mL        [X ] NO APPARENT ANESTHESIA COMPLICATIONS      Comments:

## 2019-10-23 NOTE — PROGRESS NOTE ADULT - SUBJECTIVE AND OBJECTIVE BOX
POST ANESTHESIA EVALUATION    89y Female POSTOP DAY 1      MENTAL STATUS: Patient participation [ x ] Awake     [  ] Arousable     [  ] Sedated    AIRWAY PATENCY: [ x ] Satisfactory  [  ] Other:     Vital Signs Last 24 Hrs  T(C): 36.1 (23 Oct 2019 16:00), Max: 36.8 (22 Oct 2019 19:45)  T(F): 97 (23 Oct 2019 16:00), Max: 98.2 (22 Oct 2019 19:45)  HR: 55 (23 Oct 2019 16:00) (54 - 83)  BP: 122/57 (22 Oct 2019 21:00) (122/57 - 162/70)  BP(mean): 72 (22 Oct 2019 21:00) (72 - 94)  RR: 12 (23 Oct 2019 16:00) (11 - 23)  SpO2: 100% (23 Oct 2019 16:00) (98% - 100%)  I&O's Summary    22 Oct 2019 07:01  -  23 Oct 2019 07:00  --------------------------------------------------------  IN: 1713.8 mL / OUT: 1755 mL / NET: -41.2 mL    23 Oct 2019 07:01  -  23 Oct 2019 16:36  --------------------------------------------------------  IN: 955 mL / OUT: 455 mL / NET: 500 mL          NAUSEA/ VOMITTING:  [ x ] NONE  [  ] CONTROLLED [  ] OTHER     PAIN:  x ] CONTROLLED WITH CURRENT REGIMEN  [  ] OTHER    [x] NO APPARENT ANESTHESIA COMPLICATIONS

## 2019-10-23 NOTE — DIETITIAN INITIAL EVALUATION ADULT. - ENTERAL
Jevity 1.2 @ 10 ml/hr , increase 10 ml every 24 hrs supplementing electrolytes as needed to prevent re feeding  to the goal Jevity 1.2 @  50 ml/hr x 24hrs  = 1440 kcal &  66.6 gm protein/d.

## 2019-10-23 NOTE — DIETITIAN INITIAL EVALUATION ADULT. - OTHER INFO
Pt. alert, oriented , forgetful, but able to provide nutrition hx., reports wt. loss from 150# to 98# in last 5 years . No edema noted , reports no known food allergies, was taking PO food but vomiting , unable to tolerate PO nutrition , presents with severe fat loss & muscle waisting .

## 2019-10-23 NOTE — DIETITIAN INITIAL EVALUATION ADULT. - PHYSICAL APPEARANCE
other (specify)/emaciated Nutrition focused physical exam conducted - found signs of malnutrition  SEVERE Subcutaneous fat loss:   Orbital fat pads region,  Buccal fat region, Triceps region,  Ribs region. SEVERE Muscle wasting:  Temples region, Clavicle region, Shoulder region,  Scapula region,  Interosseous region , thigh region & Calf .region

## 2019-10-23 NOTE — PROGRESS NOTE ADULT - SUBJECTIVE AND OBJECTIVE BOX
Anesthesia Pain Management Service: Day _2_ of Epidural    SUBJECTIVE: Patient doing well with PCEA and no problems.  Pain Scale Score:   Refer to charted pain scores    THERAPY:  [x ] Epidural Bupivacaine 0.0625% and Hydromorphone  		[ X] 10 micrograms/mL	[ ] 5 micrograms/mL  [ ] Epidural Bupivacaine 0.0625% and Fentanyl - 2 micrograms/mL  [ ] Epidural Ropivacaine 0.1% plain – 1 mg/mL  [ ] Patient Controlled Regional Anesthesia (PCRA) Ropivacaine  		[ ] 0.2%			[ ] 0.1%    Demand dose __3_ lockout __15_ (minutes) Continuous Rate _6__ Total: _60.7___ ml used (in past 24 hours)      MEDICATIONS  (STANDING):  dexMEDEtomidine Infusion 0.5 MICROgram(s)/kG/Hr (5.563 mL/Hr) IV Continuous <Continuous>  heparin  Injectable 5000 Unit(s) SubCutaneous every 12 hours  hydromorphone (10 MICROgram(s)/mL) + bupivacaine 0.0625% in 0.9% Sodium Chloride PCEA 250 milliLiter(s) Epidural PCA Continuous  lactated ringers. 1000 milliLiter(s) (75 mL/Hr) IV Continuous <Continuous>  levothyroxine Injectable 68 MICROGram(s) IV Push at bedtime  pantoprazole  Injectable 40 milliGRAM(s) IV Push daily  piperacillin/tazobactam IVPB.. 3.375 Gram(s) IV Intermittent every 8 hours  sodium chloride 3%  Inhalation 3 milliLiter(s) Inhalation every 6 hours  vancomycin  IVPB 750 milliGRAM(s) IV Intermittent every 24 hours    MEDICATIONS  (PRN):  hydromorphone (10 MICROgram(s)/mL) + bupivacaine 0.0625% in 0.9% Sodium Chloride PCEA Rescue Clinician  Bolus 5 milliLiter(s) Epidural every 15 minutes PRN for Pain Score greater than 6  naloxone Injectable 0.1 milliGRAM(s) IV Push every 3 minutes PRN For ANY of the following changes in patient status:  A. RR LESS THAN 10 breaths per minute, B. Oxygen saturation LESS THAN 90%, C. Sedation score of 6  ondansetron Injectable 4 milliGRAM(s) IV Push every 6 hours PRN Nausea      OBJECTIVE: sitting in chair     Assessment of Catheter Site:	[ ] Left	[ ] Right  [x ] Epidural 	[ ] Femoral	      [ ] Saphenous   [ ] Supraclavicular   [ ] Other:    [x ] Dressing intact	[x ] Site non-tender	[ x] Site without erythema, discharge, edema  [x ] Epidural tubing and connection checked	[x] Gross neurological exam within normal limits  [ ] Catheter removed – tip intact		[ ] Afebrile  	[ ] Febrile: ___   [ X] see Temp under VS below)    PT/INR - ( 23 Oct 2019 04:00 )   PT: 12.1 SEC;   INR: 1.09          PTT - ( 23 Oct 2019 04:00 )  PTT:28.7 SEC                      9.4    13.05 )-----------( 139      ( 23 Oct 2019 04:00 )             28.2     Vital Signs Last 24 Hrs  T(C): 36.6 (10-23-19 @ 08:00), Max: 36.8 (10-22-19 @ 19:45)  T(F): 97.8 (10-23-19 @ 08:00), Max: 98.2 (10-22-19 @ 19:45)  HR: 60 (10-23-19 @ 09:13) (54 - 78)  BP: 122/57 (10-22-19 @ 21:00) (122/57 - 162/70)  BP(mean): 72 (10-22-19 @ 21:00) (72 - 94)  RR: 16 (10-23-19 @ 09:13) (12 - 23)  SpO2: 98% (10-23-19 @ 09:13) (98% - 100%)      Sedation Score:	[x ] Alert	[ ] Drowsy	[ ] Arousable	[ ] Asleep	[ ] Unresponsive    Side Effects:	[x ] None	[ ] Nausea	[ ] Vomiting	[ ] Pruritus  		[ ] Weakness		[ ] Numbness	[ ] Other:    ASSESSMENT/ PLAN:    Therapy to  be:	[x ] Continue   [ ] Discontinued   [ ] Change to prn Analgesics    Documentation and Verification of current medications:  [ X ] Done	[ ] Not done, not eligible, reason:    Comments: Doing OK with epidural and may continue.

## 2019-10-24 LAB
ANION GAP SERPL CALC-SCNC: 10 MMO/L — SIGNIFICANT CHANGE UP (ref 7–14)
ANION GAP SERPL CALC-SCNC: 14 MMO/L — SIGNIFICANT CHANGE UP (ref 7–14)
APTT BLD: 23.7 SEC — LOW (ref 27.5–36.3)
APTT BLD: 33.2 SEC — SIGNIFICANT CHANGE UP (ref 27.5–36.3)
BUN SERPL-MCNC: 19 MG/DL — SIGNIFICANT CHANGE UP (ref 7–23)
BUN SERPL-MCNC: 19 MG/DL — SIGNIFICANT CHANGE UP (ref 7–23)
CALCIUM SERPL-MCNC: 8.1 MG/DL — LOW (ref 8.4–10.5)
CALCIUM SERPL-MCNC: 8.6 MG/DL — SIGNIFICANT CHANGE UP (ref 8.4–10.5)
CHLORIDE SERPL-SCNC: 100 MMOL/L — SIGNIFICANT CHANGE UP (ref 98–107)
CHLORIDE SERPL-SCNC: 98 MMOL/L — SIGNIFICANT CHANGE UP (ref 98–107)
CO2 SERPL-SCNC: 23 MMOL/L — SIGNIFICANT CHANGE UP (ref 22–31)
CO2 SERPL-SCNC: 24 MMOL/L — SIGNIFICANT CHANGE UP (ref 22–31)
CREAT SERPL-MCNC: 0.77 MG/DL — SIGNIFICANT CHANGE UP (ref 0.5–1.3)
CREAT SERPL-MCNC: 0.83 MG/DL — SIGNIFICANT CHANGE UP (ref 0.5–1.3)
GLUCOSE SERPL-MCNC: 77 MG/DL — SIGNIFICANT CHANGE UP (ref 70–99)
GLUCOSE SERPL-MCNC: 78 MG/DL — SIGNIFICANT CHANGE UP (ref 70–99)
HCT VFR BLD CALC: 24.1 % — LOW (ref 34.5–45)
HCT VFR BLD CALC: 25 % — LOW (ref 34.5–45)
HCT VFR BLD CALC: 29.7 % — LOW (ref 34.5–45)
HGB BLD-MCNC: 7.9 G/DL — LOW (ref 11.5–15.5)
HGB BLD-MCNC: 8 G/DL — LOW (ref 11.5–15.5)
HGB BLD-MCNC: 9.7 G/DL — LOW (ref 11.5–15.5)
INR BLD: 1.06 — SIGNIFICANT CHANGE UP (ref 0.88–1.17)
INR BLD: 1.27 — HIGH (ref 0.88–1.17)
MAGNESIUM SERPL-MCNC: 1.9 MG/DL — SIGNIFICANT CHANGE UP (ref 1.6–2.6)
MAGNESIUM SERPL-MCNC: 2.2 MG/DL — SIGNIFICANT CHANGE UP (ref 1.6–2.6)
MCHC RBC-ENTMCNC: 28.8 PG — SIGNIFICANT CHANGE UP (ref 27–34)
MCHC RBC-ENTMCNC: 29.3 PG — SIGNIFICANT CHANGE UP (ref 27–34)
MCHC RBC-ENTMCNC: 29.6 PG — SIGNIFICANT CHANGE UP (ref 27–34)
MCHC RBC-ENTMCNC: 31.6 % — LOW (ref 32–36)
MCHC RBC-ENTMCNC: 32.7 % — SIGNIFICANT CHANGE UP (ref 32–36)
MCHC RBC-ENTMCNC: 33.2 % — SIGNIFICANT CHANGE UP (ref 32–36)
MCV RBC AUTO: 89.3 FL — SIGNIFICANT CHANGE UP (ref 80–100)
MCV RBC AUTO: 89.7 FL — SIGNIFICANT CHANGE UP (ref 80–100)
MCV RBC AUTO: 91.2 FL — SIGNIFICANT CHANGE UP (ref 80–100)
NRBC # FLD: 0 K/UL — SIGNIFICANT CHANGE UP (ref 0–0)
PHOSPHATE SERPL-MCNC: 4.2 MG/DL — SIGNIFICANT CHANGE UP (ref 2.5–4.5)
PLATELET # BLD AUTO: 102 K/UL — LOW (ref 150–400)
PLATELET # BLD AUTO: 122 K/UL — LOW (ref 150–400)
PLATELET # BLD AUTO: 129 K/UL — LOW (ref 150–400)
PMV BLD: 11.2 FL — SIGNIFICANT CHANGE UP (ref 7–13)
PMV BLD: 11.3 FL — SIGNIFICANT CHANGE UP (ref 7–13)
PMV BLD: 12.1 FL — SIGNIFICANT CHANGE UP (ref 7–13)
POTASSIUM SERPL-MCNC: 4.2 MMOL/L — SIGNIFICANT CHANGE UP (ref 3.5–5.3)
POTASSIUM SERPL-MCNC: 4.4 MMOL/L — SIGNIFICANT CHANGE UP (ref 3.5–5.3)
POTASSIUM SERPL-SCNC: 4.2 MMOL/L — SIGNIFICANT CHANGE UP (ref 3.5–5.3)
POTASSIUM SERPL-SCNC: 4.4 MMOL/L — SIGNIFICANT CHANGE UP (ref 3.5–5.3)
PROTHROM AB SERPL-ACNC: 12.1 SEC — SIGNIFICANT CHANGE UP (ref 9.8–13.1)
PROTHROM AB SERPL-ACNC: 14.6 SEC — HIGH (ref 9.8–13.1)
RBC # BLD: 2.7 M/UL — LOW (ref 3.8–5.2)
RBC # BLD: 2.74 M/UL — LOW (ref 3.8–5.2)
RBC # BLD: 3.31 M/UL — LOW (ref 3.8–5.2)
RBC # FLD: 15.9 % — HIGH (ref 10.3–14.5)
RBC # FLD: 16 % — HIGH (ref 10.3–14.5)
RBC # FLD: 16.1 % — HIGH (ref 10.3–14.5)
SODIUM SERPL-SCNC: 132 MMOL/L — LOW (ref 135–145)
SODIUM SERPL-SCNC: 137 MMOL/L — SIGNIFICANT CHANGE UP (ref 135–145)
WBC # BLD: 11.73 K/UL — HIGH (ref 3.8–10.5)
WBC # BLD: 13.03 K/UL — HIGH (ref 3.8–10.5)
WBC # BLD: 13.05 K/UL — HIGH (ref 3.8–10.5)
WBC # FLD AUTO: 11.73 K/UL — HIGH (ref 3.8–10.5)
WBC # FLD AUTO: 13.03 K/UL — HIGH (ref 3.8–10.5)
WBC # FLD AUTO: 13.05 K/UL — HIGH (ref 3.8–10.5)

## 2019-10-24 PROCEDURE — 99291 CRITICAL CARE FIRST HOUR: CPT

## 2019-10-24 PROCEDURE — 71045 X-RAY EXAM CHEST 1 VIEW: CPT | Mod: 26

## 2019-10-24 PROCEDURE — 99292 CRITICAL CARE ADDL 30 MIN: CPT

## 2019-10-24 RX ORDER — MAGNESIUM SULFATE 500 MG/ML
1 VIAL (ML) INJECTION ONCE
Refills: 0 | Status: COMPLETED | OUTPATIENT
Start: 2019-10-24 | End: 2019-10-24

## 2019-10-24 RX ORDER — SODIUM CHLORIDE 9 MG/ML
1000 INJECTION, SOLUTION INTRAVENOUS
Refills: 0 | Status: DISCONTINUED | OUTPATIENT
Start: 2019-10-24 | End: 2019-10-25

## 2019-10-24 RX ORDER — FUROSEMIDE 40 MG
10 TABLET ORAL ONCE
Refills: 0 | Status: COMPLETED | OUTPATIENT
Start: 2019-10-24 | End: 2019-10-24

## 2019-10-24 RX ORDER — PHYTONADIONE (VIT K1) 5 MG
5 TABLET ORAL ONCE
Refills: 0 | Status: COMPLETED | OUTPATIENT
Start: 2019-10-24 | End: 2019-10-24

## 2019-10-24 RX ORDER — FUROSEMIDE 40 MG
20 TABLET ORAL ONCE
Refills: 0 | Status: COMPLETED | OUTPATIENT
Start: 2019-10-24 | End: 2019-10-24

## 2019-10-24 RX ADMIN — SODIUM CHLORIDE 3 MILLILITER(S): 9 INJECTION INTRAMUSCULAR; INTRAVENOUS; SUBCUTANEOUS at 15:15

## 2019-10-24 RX ADMIN — SODIUM CHLORIDE 3 MILLILITER(S): 9 INJECTION INTRAMUSCULAR; INTRAVENOUS; SUBCUTANEOUS at 09:39

## 2019-10-24 RX ADMIN — SODIUM CHLORIDE 75 MILLILITER(S): 9 INJECTION, SOLUTION INTRAVENOUS at 08:09

## 2019-10-24 RX ADMIN — Medication 10 MILLIGRAM(S): at 01:18

## 2019-10-24 RX ADMIN — SODIUM CHLORIDE 50 MILLILITER(S): 9 INJECTION, SOLUTION INTRAVENOUS at 19:20

## 2019-10-24 RX ADMIN — PANTOPRAZOLE SODIUM 40 MILLIGRAM(S): 20 TABLET, DELAYED RELEASE ORAL at 13:07

## 2019-10-24 RX ADMIN — Medication 20 MILLIGRAM(S): at 22:51

## 2019-10-24 RX ADMIN — SODIUM CHLORIDE 3 MILLILITER(S): 9 INJECTION INTRAMUSCULAR; INTRAVENOUS; SUBCUTANEOUS at 04:21

## 2019-10-24 RX ADMIN — PHENYLEPHRINE HYDROCHLORIDE 8.34 MICROGRAM(S)/KG/MIN: 10 INJECTION INTRAVENOUS at 08:10

## 2019-10-24 RX ADMIN — PIPERACILLIN AND TAZOBACTAM 25 GRAM(S): 4; .5 INJECTION, POWDER, LYOPHILIZED, FOR SOLUTION INTRAVENOUS at 05:45

## 2019-10-24 RX ADMIN — SODIUM CHLORIDE 3 MILLILITER(S): 9 INJECTION INTRAMUSCULAR; INTRAVENOUS; SUBCUTANEOUS at 22:18

## 2019-10-24 RX ADMIN — HEPARIN SODIUM 5000 UNIT(S): 5000 INJECTION INTRAVENOUS; SUBCUTANEOUS at 01:18

## 2019-10-24 RX ADMIN — Medication 68 MICROGRAM(S): at 22:51

## 2019-10-24 RX ADMIN — HEPARIN SODIUM 5000 UNIT(S): 5000 INJECTION INTRAVENOUS; SUBCUTANEOUS at 13:07

## 2019-10-24 RX ADMIN — Medication 10 MILLIGRAM(S): at 06:41

## 2019-10-24 RX ADMIN — Medication 100 GRAM(S): at 05:45

## 2019-10-24 RX ADMIN — Medication 101 MILLIGRAM(S): at 10:30

## 2019-10-24 NOTE — PROGRESS NOTE ADULT - SUBJECTIVE AND OBJECTIVE BOX
CLIFFORD DOLAN  [id number]  _________________________  VITAL SIGNS:  Vital Signs Last 24 Hrs  T(C): 36.4 (24 Oct 2019 16:00), Max: 37.7 (24 Oct 2019 05:00)  T(F): 97.5 (24 Oct 2019 16:00), Max: 99.9 (24 Oct 2019 05:00)  HR: 59 (24 Oct 2019 20:00) (54 - 121)  BP: 132/65 (24 Oct 2019 20:00) (81/44 - 144/50)  BP(mean): 81 (24 Oct 2019 20:00) (52 - 82)  RR: 15 (24 Oct 2019 20:00) (11 - 23)  SpO2: 99% (24 Oct 2019 20:00) (95% - 100%)  I/Os:   I&O's Detail    23 Oct 2019 07:01  -  24 Oct 2019 07:00  --------------------------------------------------------  IN:    Enteral Tube Flush: 180 mL    Enteral Tube Flush: 180 mL    IV PiggyBack: 1150 mL    lactated ringers.: 1800 mL    phenylephrine   Infusion: 21.9 mL  Total IN: 3331.9 mL    OUT:    Bulb: 120 mL    Chest Tube: 340 mL    Indwelling Catheter - Urethral: 615 mL    Jejunostomy Tube: 160 mL    Nasoenteral Tube: 125 mL  Total OUT: 1360 mL    Total NET: 1971.9 mL      24 Oct 2019 07:01  -  24 Oct 2019 21:13  --------------------------------------------------------  IN:    Enteral Tube Flush: 120 mL    Enteral Tube Flush: 150 mL    IV PiggyBack: 50 mL    lactated ringers.: 650 mL    lactated ringers.: 75 mL    Packed Red Blood Cells: 273 mL    phenylephrine   Infusion: 37.2 mL    Pivot 1.5: 70 mL  Total IN: 1425.2 mL    OUT:    Bulb: 9 mL    Chest Tube: 80 mL    Indwelling Catheter - Urethral: 640 mL    Jejunostomy Tube: 25 mL    Nasoenteral Tube: 250 mL  Total OUT: 1004 mL    Total NET: 421.2 mL              MEDICATIONS:  MEDICATIONS  (STANDING):  heparin  Injectable 5000 Unit(s) SubCutaneous every 12 hours  hydromorphone (10 MICROgram(s)/mL) + bupivacaine 0.0625% in 0.9% Sodium Chloride PCEA 250 milliLiter(s) Epidural PCA Continuous  lactated ringers. 1000 milliLiter(s) (50 mL/Hr) IV Continuous <Continuous>  levothyroxine Injectable 68 MICROGram(s) IV Push at bedtime  pantoprazole  Injectable 40 milliGRAM(s) IV Push daily  phenylephrine    Infusion 0.5 MICROgram(s)/kG/Min (8.344 mL/Hr) IV Continuous <Continuous>  sodium chloride 3%  Inhalation 3 milliLiter(s) Inhalation every 6 hours    MEDICATIONS  (PRN):  hydromorphone (10 MICROgram(s)/mL) + bupivacaine 0.0625% in 0.9% Sodium Chloride PCEA Rescue Clinician  Bolus 5 milliLiter(s) Epidural every 15 minutes PRN for Pain Score greater than 6  naloxone Injectable 0.1 milliGRAM(s) IV Push every 3 minutes PRN For ANY of the following changes in patient status:  A. RR LESS THAN 10 breaths per minute, B. Oxygen saturation LESS THAN 90%, C. Sedation score of 6  ondansetron Injectable 4 milliGRAM(s) IV Push every 6 hours PRN Nausea      LABS:                        8.0    13.03 )-----------( 129      ( 24 Oct 2019 09:19 )             24.1     10-24    137  |  100  |  19  ----------------------------<  78  4.2   |  23  |  0.77    Ca    8.1<L>      24 Oct 2019 03:20  Phos  4.2     10-24  Mg     1.9     10-24        PT/INR - ( 24 Oct 2019 03:20 )   PT: 14.6 SEC;   INR: 1.27          PTT - ( 24 Oct 2019 03:20 )  PTT:33.2 SEC      _________________________        PROCEDURE: Exploratory laparotomy, lysis of adhesions, partial esophagogastrectomy, feeding jejunustomy, pyloromyotomy, left chest tube placement  22-Oct-2019       ISSUES:   Hypotension requiring IV pressors  ROSSY  Achalasia  Atrial fibrillation  Hypertension  Hypothyroidism  History of repair of hiatal hernia  H/O colectomy  S/P appendectomy  S/P hysterectomy  Post op pain  Chest tube in place    INTERVAL EVENTS:   Received patient on phenylephrine. Orthostatic with standing. Transfused 1 unit PRBC.  Phenylephrine was weaned off.    HISTORY:   Patient reports mild pain at abdominal incision sites which is worse with coughing and deep breathing without associated fever or dyspnea. Pain is improved with use of pain meds.     PHYSICAL EXAM:   Gen: Comfortable, No acute distress  Eyes: Sclera white, Conjunctiva normal, Eyelids normal, Pupils symmetrical   ENT: Mucous membranes moist, NG tube in place,  ,    Neck: Trachea midline,  ,  ,  ,    CV: Rate regular, Rhythm irregular,    Resp: Breath sounds clear, No accessory muscles use, L chest tube in place,  ,    Abd: Soft, Non-distended, Moderately tender, Bowel sounds hypoactive, feeding tube in place, drain in place  Skin: Warm, No peripheral edema of lower extremities,    : Rossi  Neuro: Moving all 4 extremities  Psych: A&Ox3      ASSESSMENT AND PLAN:     NEURO:  Post-operative Pain - Pain control with PCEA and Tylenol IV PRN.            RESPIRATORY:  Hypoxia - Wean nasal cannula for goal O2sat above 92. Obtain CXR. Incentive spirometry. Chest PT and frequent suctioning. Continue bronchodilators. OOB to chair & ambulate w/ assistance. Continuous pulse oximetry for support & to prevent decompensation.    Chest tube – Pleurevac regulated suctioning. Monitor chest tube output.                 CARDIOVASCULAR:  Hypotension requiring IV pressors -- Distributive shock in setting of epidural, mild fluid overload. Wean pressors for MAP goal 65.  CHF with mild systolic dyfunction. Diuresis with pressor support as needed.  HTN - stable. Hold antihypertensives medications.  Afib - stable.  Hold anticoagulation      RENAL:  ROSSY with oliguria – Maintain MAP 65. Lasix as needed. Monitor for hyperkalemia. Renally dose medications.  LR IVF 50mL/hr. Monitor IOs and electrolytes.          GASTROINTESTINAL:  NG tube to suction.  J tube to gravity drainage  GI prophylaxis with Protonix  Zofran and Reglan IV PRN for nausea  NPO until swallow study  Tube feeds through J tube.           HEMATOLOGIC:  No signs of active bleeding. Monitor Hgb in CBC in AM  DVT prophylaxis with heparin subQ and SCDs.      INFECTIOUS DISEASE:  All surgical sites appear clean. No signs of active infection. Will monitor for fever and leukocytosis.                 ENDOCRINE:  Stable – Monitor glucose fingersticks for goal 120-180.  Hypothyroidism - stable. Continue Synthroid.         Pertinent clinical, laboratory, radiographic, hemodynamic, echocardiographic, respiratory data, microbiologic data and chart were reviewed by myself and analyzed frequently throughout the course of the day and night by myself.    Plan discussed at length with the CTICU staff and Attending CT Surgeon.   Patient's status was discussed with patient and family at bedside.    Patient required critical care management.  45 minutes were spent evaluating, managing, providing, coordinating, and documenting care for this patient, exclusive of procedures from  0900AM to  1159PM.

## 2019-10-24 NOTE — CHART NOTE - NSCHARTNOTEFT_GEN_A_CORE
This pt is a 89yo F who had multiple abdominal surgies and hernia repairs in the past. Now presents with pain and persistent vomitting. Mesh from prior surgeries found to be eroding into her esophagus.. As per Nutritional eval and documentation, pt has severe malnutrition. Pt will remain NPO post operatively with tube feeds via J tube as only source of nutritional support. All oral intake for nutrition is completely contraindicated at this time due to high risk of esophageal leak, perforation and aspiration. There are no other alternatives. The need is absolute. Failure of pt to have tube feeds for nutrition can lead to several complications, prolonged healing, prolonged hospitalization and potentially death. Length of need will be at least 3 months but could be life long. Feeds must be given continuously on pump, cannot be bolus feeds due to jejunostomy tube.

## 2019-10-24 NOTE — PROGRESS NOTE ADULT - SUBJECTIVE AND OBJECTIVE BOX
Anesthesia Pain Management Service: Day _3_ of Epidural    SUBJECTIVE: Patient doing well with PCEA and no problems. 0/10 pain, just soreness on her back.  Pain Scale Score: 0/10  Refer to charted pain scores    THERAPY:  [x ] Epidural Bupivacaine 0.0625% and Hydromorphone  		[ X] 10 micrograms/mL	[ ] 5 micrograms/mL  [ ] Epidural Bupivacaine 0.0625% and Fentanyl - 2 micrograms/mL  [ ] Epidural Ropivacaine 0.1% plain – 1 mg/mL  [ ] Patient Controlled Regional Anesthesia (PCRA) Ropivacaine  		[ ] 0.2%			[ ] 0.1%    Demand dose __3_ lockout __15_ (minutes) Continuous Rate __6_ Total: _140.7___ ml used (in past 24 hours)      MEDICATIONS  (STANDING):  dexMEDEtomidine Infusion 0.5 MICROgram(s)/kG/Hr (5.563 mL/Hr) IV Continuous <Continuous>  heparin  Injectable 5000 Unit(s) SubCutaneous every 12 hours  hydromorphone (10 MICROgram(s)/mL) + bupivacaine 0.0625% in 0.9% Sodium Chloride PCEA 250 milliLiter(s) Epidural PCA Continuous  lactated ringers. 1000 milliLiter(s) (50 mL/Hr) IV Continuous <Continuous>  levothyroxine Injectable 68 MICROGram(s) IV Push at bedtime  pantoprazole  Injectable 40 milliGRAM(s) IV Push daily  phenylephrine    Infusion 0.5 MICROgram(s)/kG/Min (8.344 mL/Hr) IV Continuous <Continuous>  phytonadione  IVPB 5 milliGRAM(s) IV Intermittent once  sodium chloride 3%  Inhalation 3 milliLiter(s) Inhalation every 6 hours    MEDICATIONS  (PRN):  hydromorphone (10 MICROgram(s)/mL) + bupivacaine 0.0625% in 0.9% Sodium Chloride PCEA Rescue Clinician  Bolus 5 milliLiter(s) Epidural every 15 minutes PRN for Pain Score greater than 6  naloxone Injectable 0.1 milliGRAM(s) IV Push every 3 minutes PRN For ANY of the following changes in patient status:  A. RR LESS THAN 10 breaths per minute, B. Oxygen saturation LESS THAN 90%, C. Sedation score of 6  ondansetron Injectable 4 milliGRAM(s) IV Push every 6 hours PRN Nausea      OBJECTIVE:  Patient is sitting up in chair, NPO with NGT and CT.    Assessment of Catheter Site:	[ ] Left	[ ] Right  [x ] Epidural 	[ ] Femoral	      [ ] Saphenous   [ ] Supraclavicular   [ ] Other:    [x ] Dressing intact	[x ] Site non-tender	[ x] Site without erythema, discharge, edema  [x ] Epidural tubing and connection checked	[x] Gross neurological exam within normal limits  [ ] Catheter removed – tip intact		[ ] Afebrile  	[ ] Febrile: ___   [ X] see Temp under VS below)    PT/INR - ( 24 Oct 2019 03:20 )   PT: 14.6 SEC;   INR: 1.27          PTT - ( 24 Oct 2019 03:20 )  PTT:33.2 SEC                      7.9    13.05 )-----------( 122      ( 24 Oct 2019 03:20 )             25.0     Vital Signs Last 24 Hrs  T(C): 36.4 (10-24-19 @ 08:00), Max: 37.7 (10-24-19 @ 05:00)  T(F): 97.6 (10-24-19 @ 08:00), Max: 99.9 (10-24-19 @ 05:00)  HR: 69 (10-24-19 @ 09:20) (54 - 83)  BP: 113/49 (10-24-19 @ 09:20) (85/43 - 131/63)  BP(mean): 66 (10-24-19 @ 09:20) (53 - 81)  RR: 15 (10-24-19 @ 09:20) (11 - 21)  SpO2: 100% (10-24-19 @ 09:20) (96% - 100%)      Sedation Score:	[x ] Alert	[ ] Drowsy	[ ] Arousable	[ ] Asleep	[ ] Unresponsive    Side Effects:	[x ] None	[ ] Nausea	[ ] Vomiting	[ ] Pruritus  		[ ] Weakness		[ ] Numbness	[ ] Other:    ASSESSMENT/ PLAN:    Therapy to  be:	[x ] Continue   [ ] Discontinued   [ ] Change to prn Analgesics    Documentation and Verification of current medications:  [ X ] Done	[ ] Not done, not eligible, reason:    Comments: Doing OK with epidural and may continue.  PT/INR increased, discussed patient with CTICU intensivist and they ordered IV Vitamin K 5mg x1.

## 2019-10-24 NOTE — PROGRESS NOTE ADULT - SUBJECTIVE AND OBJECTIVE BOX
CLIFFORD DOLAN            [id number]         opioid-like analgesics (Other)                 HPI:  89 y/o female w/ PMH of HTN, achalasia s/p dilation x2, HH s/p repair w/ mesh, emergency Volvulus surgical repair 4/2014, s/p EGD CRE balloon dilation w/ botox injection 8/2015, s/p EGD dilation 3/2016, s/p EGD lap heller myotomy 2016 and recent Barium swallow on 6/2019 for dysphagia which ended up showing decreased motility with EGD on 10/8/19 showing what appeared to be erosion of mesh into esophagus. Pt is now s/p CT abdomen done yesterday and persistent vomiting since then unable to tolerate PO food. She was seen in the Thoracic surgery office today by Dr. clemente and was admitted.       At bedside pt seen with daughter. She is a thin female and is stable in NAD. At this time she denies sob, cp.  She does admit to vomiting 4x yesterday into this morning after eating. She has not been vomiting since she stopped eating. (15 Oct 2019 17:36)      Procedure: Exploratory laparotomy, lysis of adhesions, partial esophagogastrectomy, feeding jejunostomy, pyloromyotomy, left chest tube placement 10/22/2019      Issues:              Gastric cancer              Postop pain  Achalasia  Chronic atrial fibrillation  Hypothyroidism  Anemia                   PAST MEDICAL & SURGICAL HISTORY:  Atrial fibrillation  Achalasia  Hypertension  Hypothyroidism  History of repair of hiatal hernia  H/O colectomy  S/P appendectomy  S/P hysterectomy        ICU Vital Signs Last 24 Hrs  T(C): 36.4 (24 Oct 2019 03:00), Max: 36.6 (23 Oct 2019 08:00)  T(F): 97.5 (24 Oct 2019 03:00), Max: 97.8 (23 Oct 2019 08:00)  HR: 82 (24 Oct 2019 05:00) (54 - 83)  BP: 131/63 (24 Oct 2019 05:00) (85/43 - 131/63)  BP(mean): 77 (24 Oct 2019 05:00) (53 - 81)  ABP: 133/53 (24 Oct 2019 03:00) (92/39 - 148/59)  ABP(mean): 82 (24 Oct 2019 03:00) (57 - 91)  RR: 21 (24 Oct 2019 05:00) (11 - 21)  SpO2: 100% (24 Oct 2019 05:00) (96% - 100%)    I&O's Detail    22 Oct 2019 07:01  -  23 Oct 2019 07:00  --------------------------------------------------------  IN:    dexmedetomidine Infusion: 8.8 mL    Enteral Tube Flush: 90 mL    Enteral Tube Flush: 90 mL    IV PiggyBack: 700 mL    lactated ringers.: 825 mL  Total IN: 1713.8 mL    OUT:    Bulb: 275 mL    Chest Tube: 410 mL    Estimated Blood Loss: 800 mL    Indwelling Catheter - Urethral: 220 mL    Nasoenteral Tube: 50 mL  Total OUT: 1755 mL    Total NET: -41.2 mL      23 Oct 2019 07:01  -  24 Oct 2019 05:23  --------------------------------------------------------  IN:    Enteral Tube Flush: 180 mL    Enteral Tube Flush: 180 mL    IV PiggyBack: 950 mL    lactated ringers.: 1575 mL    phenylephrine   Infusion: 11.9 mL  Total IN: 2896.9 mL    OUT:    Bulb: 110 mL    Chest Tube: 270 mL    Indwelling Catheter - Urethral: 465 mL    Jejunostomy Tube: 140 mL    Nasoenteral Tube: 100 mL  Total OUT: 1085 mL    Total NET: 1811.9 mL        CAPILLARY BLOOD GLUCOSE          Home Medications:  Synthroid 137 mcg (0.137 mg) oral tablet: 1 tab(s) orally once a day (27 Aug 2019 05:19)      MEDICATIONS  (STANDING):  dexMEDEtomidine Infusion 0.5 MICROgram(s)/kG/Hr (5.563 mL/Hr) IV Continuous <Continuous>  heparin  Injectable 5000 Unit(s) SubCutaneous every 12 hours  hydromorphone (10 MICROgram(s)/mL) + bupivacaine 0.0625% in 0.9% Sodium Chloride PCEA 250 milliLiter(s) Epidural PCA Continuous  lactated ringers. 1000 milliLiter(s) (75 mL/Hr) IV Continuous <Continuous>  levothyroxine Injectable 68 MICROGram(s) IV Push at bedtime  magnesium sulfate  IVPB 1 Gram(s) IV Intermittent once  pantoprazole  Injectable 40 milliGRAM(s) IV Push daily  phenylephrine    Infusion 0.5 MICROgram(s)/kG/Min (8.344 mL/Hr) IV Continuous <Continuous>  piperacillin/tazobactam IVPB.. 3.375 Gram(s) IV Intermittent every 8 hours  sodium chloride 3%  Inhalation 3 milliLiter(s) Inhalation every 6 hours  vancomycin  IVPB 750 milliGRAM(s) IV Intermittent every 24 hours    MEDICATIONS  (PRN):  hydromorphone (10 MICROgram(s)/mL) + bupivacaine 0.0625% in 0.9% Sodium Chloride PCEA Rescue Clinician  Bolus 5 milliLiter(s) Epidural every 15 minutes PRN for Pain Score greater than 6  naloxone Injectable 0.1 milliGRAM(s) IV Push every 3 minutes PRN For ANY of the following changes in patient status:  A. RR LESS THAN 10 breaths per minute, B. Oxygen saturation LESS THAN 90%, C. Sedation score of 6  ondansetron Injectable 4 milliGRAM(s) IV Push every 6 hours PRN Nausea          Physical exam:                             General:               Pt is awake, alert, not in any distress                                                  Neuro:                  Nonfocal                             Cardiovascular:   S1 & S2, regular                           Respiratory:         Air entry is fair and equal on both sides, has bilateral conducted sounds                           GI:                          Soft, nondistended and nontender, Bowel sounds absent                            Ext:                        No cyanosis or edema     Labs:                                                                           7.9    13.05 )-----------( 122      ( 24 Oct 2019 03:20 )             25.0             10-24    137  |  100  |  19  ----------------------------<  78  4.2   |  23  |  0.77    Ca    8.1<L>      24 Oct 2019 03:20  Phos  4.2     10-24  Mg     1.9     10-24    TPro  5.2<L>  /  Alb  3.1<L>  /  TBili  0.6  /  DBili  x   /  AST  43<H>  /  ALT  17  /  AlkPhos  59  10-22                  PT/INR - ( 24 Oct 2019 03:20 )   PT: 14.6 SEC;   INR: 1.27          PTT - ( 24 Oct 2019 03:20 )  PTT:33.2 SEC  LIVER FUNCTIONS - ( 22 Oct 2019 20:40 )  Alb: 3.1 g/dL / Pro: 5.2 g/dL / ALK PHOS: 59 u/L / ALT: 17 u/L / AST: 43 u/L / GGT: x               CXR:    < from: Xray Chest 1 View- PORTABLE-Routine (10.23.19 @ 06:27) >  There is an endotracheal, enteric and left-sided chest tube. Lungs are   clear. Severe dextroscoliosis. No focal consolidations or pneumothorax.    Skin staples run down the midline of the abdomen. Brain in the region of   the stomach. No evidence of pneumoperitoneum.      COMPARISON:  October 15      IMPRESSION:  Status post esophagectomy with clear lungs and tubes in   position.        Plan:    General: 89yFemale s/p Exploratory laparotomy, lysis of adhesions, partial esophagogastrectomy, feeding jejunostomy, pyloromyotomy, left chest tube placement 10/22/2019.                             Neuro:                                         Pain control with PCEA / IV Tylenol                            Cardiovascular:                                          Continue hemodynamic monitoring.                                        Continue IVF LR 75cc/hr                                                                  Respiratory:                                         Pt is on 2L  nasal canula,                                           Appears to be in pain but not in distress.                                         Encourage incentive spirometry                                                               Continue bronchodilators, pulmonary toilet                            GI                                         NPO                                         Continue GI prophylaxis with Protonix                                         Continue Zofran / Reglan for nausea - PRN                                         NGT to low continuous wall suction & J-tube to gravity                                         Flush both NGT and J-tube with 20cc of sterile water Q 4hrs.     May start J-tube feeds today @10cc/hr  	                                                                 Renal:                                         Continue LR 75cc/hr                                         Monitor I/Os and electrolytes                                         Rossi                                                  Hem/ Onc:                                         Anemia - most likely dilutional. Repeat Hct                                         Monitor for signs of bleeding.                                          Follow CBC in AM                           Infectious disease:      Intra-abdominal infection - Continue Antibiotics Zosyn / Vancomycin                                          Monitor for fever / leukocytosis.                                          All surgical incision / chest tube  sites look clean                            Endocrine      Hypothyroidism: On Synthroid                                          Continue Accu-Checks with coverage    Pt is on SQ Heparin and Venodyne boots for DVT prophylaxis.     Pertinent clinical, laboratory, radiographic, hemodynamic, echocardiographic, respiratory data, microbiologic data and chart were reviewed and analyzed frequently throughout the course of the day and night  Patient seen, examined and plan discussed with CT Surgeon Dr. Clemente / CTICU team during rounds.    Pt's status discussed with family at bedside, updated status          Thuan Horn MD CLIFFORD DOLAN            [id number]         opioid-like analgesics (Other)                 HPI:  89 y/o female w/ PMH of HTN, achalasia s/p dilation x2, HH s/p repair w/ mesh, emergency Volvulus surgical repair 4/2014, s/p EGD CRE balloon dilation w/ botox injection 8/2015, s/p EGD dilation 3/2016, s/p EGD lap heller myotomy 2016 and recent Barium swallow on 6/2019 for dysphagia which ended up showing decreased motility with EGD on 10/8/19 showing what appeared to be erosion of mesh into esophagus. Pt is now s/p CT abdomen done yesterday and persistent vomiting since then unable to tolerate PO food. She was seen in the Thoracic surgery office today by Dr. clemente and was admitted.       At bedside pt seen with daughter. She is a thin female and is stable in NAD. At this time she denies sob, cp.  She does admit to vomiting 4x yesterday into this morning after eating. She has not been vomiting since she stopped eating. (15 Oct 2019 17:36)      Procedure: Exploratory laparotomy, lysis of adhesions, partial esophagogastrectomy, feeding jejunostomy, pyloromyotomy, left chest tube placement 10/22/2019      Issues:              Hypotension  Gastric cancer              Postop pain  Achalasia  Chronic atrial fibrillation  Hypothyroidism  Anemia               Drips:  Elie    PAST MEDICAL & SURGICAL HISTORY:  Atrial fibrillation  Achalasia  Hypertension  Hypothyroidism  History of repair of hiatal hernia  H/O colectomy  S/P appendectomy  S/P hysterectomy        ICU Vital Signs Last 24 Hrs  T(C): 36.4 (24 Oct 2019 03:00), Max: 36.6 (23 Oct 2019 08:00)  T(F): 97.5 (24 Oct 2019 03:00), Max: 97.8 (23 Oct 2019 08:00)  HR: 82 (24 Oct 2019 05:00) (54 - 83)  BP: 131/63 (24 Oct 2019 05:00) (85/43 - 131/63)  BP(mean): 77 (24 Oct 2019 05:00) (53 - 81)  ABP: 133/53 (24 Oct 2019 03:00) (92/39 - 148/59)  ABP(mean): 82 (24 Oct 2019 03:00) (57 - 91)  RR: 21 (24 Oct 2019 05:00) (11 - 21)  SpO2: 100% (24 Oct 2019 05:00) (96% - 100%)    I&O's Detail    22 Oct 2019 07:01  -  23 Oct 2019 07:00  --------------------------------------------------------  IN:    dexmedetomidine Infusion: 8.8 mL    Enteral Tube Flush: 90 mL    Enteral Tube Flush: 90 mL    IV PiggyBack: 700 mL    lactated ringers.: 825 mL  Total IN: 1713.8 mL    OUT:    Bulb: 275 mL    Chest Tube: 410 mL    Estimated Blood Loss: 800 mL    Indwelling Catheter - Urethral: 220 mL    Nasoenteral Tube: 50 mL  Total OUT: 1755 mL    Total NET: -41.2 mL      23 Oct 2019 07:01  -  24 Oct 2019 05:23  --------------------------------------------------------  IN:    Enteral Tube Flush: 180 mL    Enteral Tube Flush: 180 mL    IV PiggyBack: 950 mL    lactated ringers.: 1575 mL    phenylephrine   Infusion: 11.9 mL  Total IN: 2896.9 mL    OUT:    Bulb: 110 mL    Chest Tube: 270 mL    Indwelling Catheter - Urethral: 465 mL    Jejunostomy Tube: 140 mL    Nasoenteral Tube: 100 mL  Total OUT: 1085 mL    Total NET: 1811.9 mL        CAPILLARY BLOOD GLUCOSE          Home Medications:  Synthroid 137 mcg (0.137 mg) oral tablet: 1 tab(s) orally once a day (27 Aug 2019 05:19)      MEDICATIONS  (STANDING):  dexMEDEtomidine Infusion 0.5 MICROgram(s)/kG/Hr (5.563 mL/Hr) IV Continuous <Continuous>  heparin  Injectable 5000 Unit(s) SubCutaneous every 12 hours  hydromorphone (10 MICROgram(s)/mL) + bupivacaine 0.0625% in 0.9% Sodium Chloride PCEA 250 milliLiter(s) Epidural PCA Continuous  lactated ringers. 1000 milliLiter(s) (75 mL/Hr) IV Continuous <Continuous>  levothyroxine Injectable 68 MICROGram(s) IV Push at bedtime  magnesium sulfate  IVPB 1 Gram(s) IV Intermittent once  pantoprazole  Injectable 40 milliGRAM(s) IV Push daily  phenylephrine    Infusion 0.5 MICROgram(s)/kG/Min (8.344 mL/Hr) IV Continuous <Continuous>  piperacillin/tazobactam IVPB.. 3.375 Gram(s) IV Intermittent every 8 hours  sodium chloride 3%  Inhalation 3 milliLiter(s) Inhalation every 6 hours  vancomycin  IVPB 750 milliGRAM(s) IV Intermittent every 24 hours    MEDICATIONS  (PRN):  hydromorphone (10 MICROgram(s)/mL) + bupivacaine 0.0625% in 0.9% Sodium Chloride PCEA Rescue Clinician  Bolus 5 milliLiter(s) Epidural every 15 minutes PRN for Pain Score greater than 6  naloxone Injectable 0.1 milliGRAM(s) IV Push every 3 minutes PRN For ANY of the following changes in patient status:  A. RR LESS THAN 10 breaths per minute, B. Oxygen saturation LESS THAN 90%, C. Sedation score of 6  ondansetron Injectable 4 milliGRAM(s) IV Push every 6 hours PRN Nausea          Physical exam:                             General:               Pt is awake, alert, not in any distress                                                  Neuro:                  Nonfocal                             Cardiovascular:   S1 & S2, regular                           Respiratory:         Air entry is fair and equal on both sides, has bilateral conducted sounds                           GI:                          Soft, nondistended and nontender, Bowel sounds absent                            Ext:                        No cyanosis or edema     Labs:                                                                           7.9    13.05 )-----------( 122      ( 24 Oct 2019 03:20 )             25.0             10-24    137  |  100  |  19  ----------------------------<  78  4.2   |  23  |  0.77    Ca    8.1<L>      24 Oct 2019 03:20  Phos  4.2     10-24  Mg     1.9     10-24    TPro  5.2<L>  /  Alb  3.1<L>  /  TBili  0.6  /  DBili  x   /  AST  43<H>  /  ALT  17  /  AlkPhos  59  10-22                  PT/INR - ( 24 Oct 2019 03:20 )   PT: 14.6 SEC;   INR: 1.27          PTT - ( 24 Oct 2019 03:20 )  PTT:33.2 SEC  LIVER FUNCTIONS - ( 22 Oct 2019 20:40 )  Alb: 3.1 g/dL / Pro: 5.2 g/dL / ALK PHOS: 59 u/L / ALT: 17 u/L / AST: 43 u/L / GGT: x               CXR:    < from: Xray Chest 1 View- PORTABLE-Routine (10.23.19 @ 06:27) >  There is an endotracheal, enteric and left-sided chest tube. Lungs are   clear. Severe dextroscoliosis. No focal consolidations or pneumothorax.    Skin staples run down the midline of the abdomen. Brain in the region of   the stomach. No evidence of pneumoperitoneum.      COMPARISON:  October 15      IMPRESSION:  Status post esophagectomy with clear lungs and tubes in   position.        Plan:    General: 89yFemale s/p Exploratory laparotomy, lysis of adhesions, partial esophagogastrectomy, feeding jejunostomy, pyloromyotomy, left chest tube placement 10/22/2019.                             Neuro:                                         Pain control with PCEA / IV Tylenol                            Cardiovascular:                                          Hypotension: Secondary to vasoplegia from PCEA and hypovolemia    Continue hemodynamic monitoring.                                        Continue IVF LR 75cc/hr                                                                  Respiratory:                                         Pt is on 2L  nasal canula,                                           Appears to be in pain but not in distress.                                         Encourage incentive spirometry                                                               Continue bronchodilators, pulmonary toilet                            GI                                         NPO                                         Continue GI prophylaxis with Protonix                                         Continue Zofran / Reglan for nausea - PRN                                         NGT to low continuous wall suction & J-tube to gravity                                         Flush both NGT and J-tube with 20cc of sterile water Q 4hrs.     May start J-tube feeds today @10cc/hr  	                                                                 Renal:                                         Continue LR 75cc/hr                                         Monitor I/Os and electrolytes                                         Rossi                                                  Hem/ Onc:                                         Anemia - most likely dilutional. Repeat Hct. Transfuse if continues to be hypotensive                                         Monitor for signs of bleeding.                                          Follow CBC in AM                           Infectious disease:      Intra-abdominal infection - Continue Antibiotics Zosyn / Vancomycin                                          Monitor for fever / leukocytosis.                                          All surgical incision / chest tube  sites look clean                            Endocrine      Hypothyroidism: On Synthroid                                          Continue Accu-Checks with coverage    Pt is on SQ Heparin and Venodyne boots for DVT prophylaxis.     Pertinent clinical, laboratory, radiographic, hemodynamic, echocardiographic, respiratory data, microbiologic data and chart were reviewed and analyzed frequently throughout the course of the day and night  Patient seen, examined and plan discussed with CT Surgeon Dr. Clemente / CTICU team during rounds.    Pt's status discussed with family at bedside, updated status    I have spent 45 minutes of critical care with this patient from 00am and 9am      Thuan Horn MD CLIFFORD DOLAN            [id number]         opioid-like analgesics (Other)                 HPI:  89 y/o female w/ PMH of HTN, achalasia s/p dilation x2, HH s/p repair w/ mesh, emergency Volvulus surgical repair 4/2014, s/p EGD CRE balloon dilation w/ botox injection 8/2015, s/p EGD dilation 3/2016, s/p EGD lap heller myotomy 2016 and recent Barium swallow on 6/2019 for dysphagia which ended up showing decreased motility with EGD on 10/8/19 showing what appeared to be erosion of mesh into esophagus. Pt is now s/p CT abdomen done yesterday and persistent vomiting since then unable to tolerate PO food. She was seen in the Thoracic surgery office today by Dr. clemente and was admitted.       At bedside pt seen with daughter. She is a thin female and is stable in NAD. At this time she denies sob, cp.  She does admit to vomiting 4x yesterday into this morning after eating. She has not been vomiting since she stopped eating. (15 Oct 2019 17:36)      Procedure: Exploratory laparotomy, lysis of adhesions, partial esophagogastrectomy, feeding jejunostomy, pyloromyotomy, left chest tube placement 10/22/2019      Issues:              Hypotension              Postop pain  Achalasia  Chronic atrial fibrillation  Hypothyroidism  Anemia               Drips:  Elie    PAST MEDICAL & SURGICAL HISTORY:  Atrial fibrillation  Achalasia  Hypertension  Hypothyroidism  History of repair of hiatal hernia  H/O colectomy  S/P appendectomy  S/P hysterectomy        ICU Vital Signs Last 24 Hrs  T(C): 36.4 (24 Oct 2019 03:00), Max: 36.6 (23 Oct 2019 08:00)  T(F): 97.5 (24 Oct 2019 03:00), Max: 97.8 (23 Oct 2019 08:00)  HR: 82 (24 Oct 2019 05:00) (54 - 83)  BP: 131/63 (24 Oct 2019 05:00) (85/43 - 131/63)  BP(mean): 77 (24 Oct 2019 05:00) (53 - 81)  ABP: 133/53 (24 Oct 2019 03:00) (92/39 - 148/59)  ABP(mean): 82 (24 Oct 2019 03:00) (57 - 91)  RR: 21 (24 Oct 2019 05:00) (11 - 21)  SpO2: 100% (24 Oct 2019 05:00) (96% - 100%)    I&O's Detail    22 Oct 2019 07:01  -  23 Oct 2019 07:00  --------------------------------------------------------  IN:    dexmedetomidine Infusion: 8.8 mL    Enteral Tube Flush: 90 mL    Enteral Tube Flush: 90 mL    IV PiggyBack: 700 mL    lactated ringers.: 825 mL  Total IN: 1713.8 mL    OUT:    Bulb: 275 mL    Chest Tube: 410 mL    Estimated Blood Loss: 800 mL    Indwelling Catheter - Urethral: 220 mL    Nasoenteral Tube: 50 mL  Total OUT: 1755 mL    Total NET: -41.2 mL      23 Oct 2019 07:01  -  24 Oct 2019 05:23  --------------------------------------------------------  IN:    Enteral Tube Flush: 180 mL    Enteral Tube Flush: 180 mL    IV PiggyBack: 950 mL    lactated ringers.: 1575 mL    phenylephrine   Infusion: 11.9 mL  Total IN: 2896.9 mL    OUT:    Bulb: 110 mL    Chest Tube: 270 mL    Indwelling Catheter - Urethral: 465 mL    Jejunostomy Tube: 140 mL    Nasoenteral Tube: 100 mL  Total OUT: 1085 mL    Total NET: 1811.9 mL        CAPILLARY BLOOD GLUCOSE          Home Medications:  Synthroid 137 mcg (0.137 mg) oral tablet: 1 tab(s) orally once a day (27 Aug 2019 05:19)      MEDICATIONS  (STANDING):  dexMEDEtomidine Infusion 0.5 MICROgram(s)/kG/Hr (5.563 mL/Hr) IV Continuous <Continuous>  heparin  Injectable 5000 Unit(s) SubCutaneous every 12 hours  hydromorphone (10 MICROgram(s)/mL) + bupivacaine 0.0625% in 0.9% Sodium Chloride PCEA 250 milliLiter(s) Epidural PCA Continuous  lactated ringers. 1000 milliLiter(s) (75 mL/Hr) IV Continuous <Continuous>  levothyroxine Injectable 68 MICROGram(s) IV Push at bedtime  magnesium sulfate  IVPB 1 Gram(s) IV Intermittent once  pantoprazole  Injectable 40 milliGRAM(s) IV Push daily  phenylephrine    Infusion 0.5 MICROgram(s)/kG/Min (8.344 mL/Hr) IV Continuous <Continuous>  piperacillin/tazobactam IVPB.. 3.375 Gram(s) IV Intermittent every 8 hours  sodium chloride 3%  Inhalation 3 milliLiter(s) Inhalation every 6 hours  vancomycin  IVPB 750 milliGRAM(s) IV Intermittent every 24 hours    MEDICATIONS  (PRN):  hydromorphone (10 MICROgram(s)/mL) + bupivacaine 0.0625% in 0.9% Sodium Chloride PCEA Rescue Clinician  Bolus 5 milliLiter(s) Epidural every 15 minutes PRN for Pain Score greater than 6  naloxone Injectable 0.1 milliGRAM(s) IV Push every 3 minutes PRN For ANY of the following changes in patient status:  A. RR LESS THAN 10 breaths per minute, B. Oxygen saturation LESS THAN 90%, C. Sedation score of 6  ondansetron Injectable 4 milliGRAM(s) IV Push every 6 hours PRN Nausea          Physical exam:                             General:               Pt is awake, alert, not in any distress                                                  Neuro:                  Nonfocal                             Cardiovascular:   S1 & S2, regular                           Respiratory:         Air entry is fair and equal on both sides, has bilateral conducted sounds                           GI:                          Soft, nondistended and nontender, Bowel sounds absent                            Ext:                        No cyanosis or edema     Labs:                                                                           7.9    13.05 )-----------( 122      ( 24 Oct 2019 03:20 )             25.0             10-24    137  |  100  |  19  ----------------------------<  78  4.2   |  23  |  0.77    Ca    8.1<L>      24 Oct 2019 03:20  Phos  4.2     10-24  Mg     1.9     10-24    TPro  5.2<L>  /  Alb  3.1<L>  /  TBili  0.6  /  DBili  x   /  AST  43<H>  /  ALT  17  /  AlkPhos  59  10-22                  PT/INR - ( 24 Oct 2019 03:20 )   PT: 14.6 SEC;   INR: 1.27          PTT - ( 24 Oct 2019 03:20 )  PTT:33.2 SEC  LIVER FUNCTIONS - ( 22 Oct 2019 20:40 )  Alb: 3.1 g/dL / Pro: 5.2 g/dL / ALK PHOS: 59 u/L / ALT: 17 u/L / AST: 43 u/L / GGT: x               CXR:    < from: Xray Chest 1 View- PORTABLE-Routine (10.23.19 @ 06:27) >  There is an endotracheal, enteric and left-sided chest tube. Lungs are   clear. Severe dextroscoliosis. No focal consolidations or pneumothorax.    Skin staples run down the midline of the abdomen. Brain in the region of   the stomach. No evidence of pneumoperitoneum.      COMPARISON:  October 15      IMPRESSION:  Status post esophagectomy with clear lungs and tubes in   position.        Plan:    General: 89yFemale s/p Exploratory laparotomy, lysis of adhesions, partial esophagogastrectomy, feeding jejunostomy, pyloromyotomy, left chest tube placement 10/22/2019.                             Neuro:                                         Pain control with PCEA / IV Tylenol                            Cardiovascular:                                          Hypotension: Secondary to vasoplegia from PCEA and hypovolemia    Continue hemodynamic monitoring.                                        Continue IVF LR 75cc/hr                                                                  Respiratory:                                         Pt is on 2L  nasal canula,                                           Appears to be in pain but not in distress.                                         Encourage incentive spirometry                                                               Continue bronchodilators, pulmonary toilet                            GI                                         NPO                                         Continue GI prophylaxis with Protonix                                         Continue Zofran / Reglan for nausea - PRN                                         NGT to low continuous wall suction & J-tube to gravity                                         Flush both NGT and J-tube with 20cc of sterile water Q 4hrs.     May start J-tube feeds today @10cc/hr  	                                                                 Renal:                                         Continue LR 75cc/hr                                         Monitor I/Os and electrolytes                                         Rossi                                                  Hem/ Onc:                                         Anemia - most likely dilutional. Repeat Hct. Transfuse if continues to be hypotensive                                         Monitor for signs of bleeding.                                          Follow CBC in AM                           Infectious disease:      Intra-abdominal infection - Continue Antibiotics Zosyn / Vancomycin                                          Monitor for fever / leukocytosis.                                          All surgical incision / chest tube  sites look clean                            Endocrine      Hypothyroidism: On Synthroid                                          Continue Accu-Checks with coverage    Pt is on SQ Heparin and Venodyne boots for DVT prophylaxis.     Pertinent clinical, laboratory, radiographic, hemodynamic, echocardiographic, respiratory data, microbiologic data and chart were reviewed and analyzed frequently throughout the course of the day and night  Patient seen, examined and plan discussed with CT Surgeon Dr. Clemente / CTICU team during rounds.    Pt's status discussed with family at bedside, updated status    I have spent 45 minutes of critical care with this patient from 00am and 9am      Thuan Horn MD

## 2019-10-24 NOTE — PROGRESS NOTE ADULT - SUBJECTIVE AND OBJECTIVE BOX
Anesthesia Pain Management Service    SUBJECTIVE: Pt doing well with PCEA without problems reported.    Therapy:	  [ ] IV PCA	   [ X] Epidural           [ ] s/p Spinal Opoid              [ ] Postpartum infusion	  [ ] Patient controlled regional anesthesia (PCRA)    [ ] prn Analgesics    Allergies    opioid-like analgesics (Other)    Intolerances      MEDICATIONS  (STANDING):  heparin  Injectable 5000 Unit(s) SubCutaneous every 12 hours  hydromorphone (10 MICROgram(s)/mL) + bupivacaine 0.0625% in 0.9% Sodium Chloride PCEA 250 milliLiter(s) Epidural PCA Continuous  lactated ringers. 1000 milliLiter(s) (50 mL/Hr) IV Continuous <Continuous>  levothyroxine Injectable 68 MICROGram(s) IV Push at bedtime  pantoprazole  Injectable 40 milliGRAM(s) IV Push daily  phenylephrine    Infusion 0.5 MICROgram(s)/kG/Min (8.344 mL/Hr) IV Continuous <Continuous>  sodium chloride 3%  Inhalation 3 milliLiter(s) Inhalation every 6 hours    MEDICATIONS  (PRN):  hydromorphone (10 MICROgram(s)/mL) + bupivacaine 0.0625% in 0.9% Sodium Chloride PCEA Rescue Clinician  Bolus 5 milliLiter(s) Epidural every 15 minutes PRN for Pain Score greater than 6  naloxone Injectable 0.1 milliGRAM(s) IV Push every 3 minutes PRN For ANY of the following changes in patient status:  A. RR LESS THAN 10 breaths per minute, B. Oxygen saturation LESS THAN 90%, C. Sedation score of 6  ondansetron Injectable 4 milliGRAM(s) IV Push every 6 hours PRN Nausea      OBJECTIVE:   [X] No new signs     [ ] Other:    Side Effects:  [X ] None			[ ] Other:    Assessment of Catheter Site:		[ X] Intact		[ ] Other:    ASSESSMENT/PLAN  [ X] Continue current therapy    [ ] Therapy changed to:    [ ] IV PCA       [ ] Epidural     [ ] prn Analgesics     Comments: Continue current PCEA settings. Recommend non-opioid adjuvant analgesics to be used when possible and when allowed by primary surgical team.    Progress Note written now but Patient was seen earlier.

## 2019-10-25 ENCOUNTER — TRANSCRIPTION ENCOUNTER (OUTPATIENT)
Age: 84
End: 2019-10-25

## 2019-10-25 LAB
ANION GAP SERPL CALC-SCNC: 13 MMO/L — SIGNIFICANT CHANGE UP (ref 7–14)
APTT BLD: 30.1 SEC — SIGNIFICANT CHANGE UP (ref 27.5–36.3)
BLD GP AB SCN SERPL QL: NEGATIVE — SIGNIFICANT CHANGE UP
BUN SERPL-MCNC: 19 MG/DL — SIGNIFICANT CHANGE UP (ref 7–23)
CALCIUM SERPL-MCNC: 8.2 MG/DL — LOW (ref 8.4–10.5)
CHLORIDE SERPL-SCNC: 98 MMOL/L — SIGNIFICANT CHANGE UP (ref 98–107)
CO2 SERPL-SCNC: 23 MMOL/L — SIGNIFICANT CHANGE UP (ref 22–31)
CREAT SERPL-MCNC: 0.78 MG/DL — SIGNIFICANT CHANGE UP (ref 0.5–1.3)
GLUCOSE SERPL-MCNC: 85 MG/DL — SIGNIFICANT CHANGE UP (ref 70–99)
HCT VFR BLD CALC: 28.9 % — LOW (ref 34.5–45)
HEPARIN CF II AG PPP-ACNC: 0.45 — HIGH (ref 0–0.39)
HGB BLD-MCNC: 9.6 G/DL — LOW (ref 11.5–15.5)
INR BLD: 1.08 — SIGNIFICANT CHANGE UP (ref 0.88–1.17)
MAGNESIUM SERPL-MCNC: 2 MG/DL — SIGNIFICANT CHANGE UP (ref 1.6–2.6)
MCHC RBC-ENTMCNC: 29.5 PG — SIGNIFICANT CHANGE UP (ref 27–34)
MCHC RBC-ENTMCNC: 33.2 % — SIGNIFICANT CHANGE UP (ref 32–36)
MCV RBC AUTO: 88.9 FL — SIGNIFICANT CHANGE UP (ref 80–100)
NRBC # FLD: 0 K/UL — SIGNIFICANT CHANGE UP (ref 0–0)
PHOSPHATE SERPL-MCNC: 3.8 MG/DL — SIGNIFICANT CHANGE UP (ref 2.5–4.5)
PLATELET # BLD AUTO: 158 K/UL — SIGNIFICANT CHANGE UP (ref 150–400)
PMV BLD: 11.1 FL — SIGNIFICANT CHANGE UP (ref 7–13)
POTASSIUM SERPL-MCNC: 3.6 MMOL/L — SIGNIFICANT CHANGE UP (ref 3.5–5.3)
POTASSIUM SERPL-SCNC: 3.6 MMOL/L — SIGNIFICANT CHANGE UP (ref 3.5–5.3)
PROTHROM AB SERPL-ACNC: 12 SEC — SIGNIFICANT CHANGE UP (ref 9.8–13.1)
RBC # BLD: 3.25 M/UL — LOW (ref 3.8–5.2)
RBC # FLD: 15.9 % — HIGH (ref 10.3–14.5)
RH IG SCN BLD-IMP: POSITIVE — SIGNIFICANT CHANGE UP
SODIUM SERPL-SCNC: 134 MMOL/L — LOW (ref 135–145)
WBC # BLD: 12.68 K/UL — HIGH (ref 3.8–10.5)
WBC # FLD AUTO: 12.68 K/UL — HIGH (ref 3.8–10.5)

## 2019-10-25 PROCEDURE — 99291 CRITICAL CARE FIRST HOUR: CPT

## 2019-10-25 PROCEDURE — 71045 X-RAY EXAM CHEST 1 VIEW: CPT | Mod: 26

## 2019-10-25 PROCEDURE — 71045 X-RAY EXAM CHEST 1 VIEW: CPT | Mod: 26,77

## 2019-10-25 RX ORDER — FUROSEMIDE 40 MG
20 TABLET ORAL ONCE
Refills: 0 | Status: COMPLETED | OUTPATIENT
Start: 2019-10-25 | End: 2019-10-25

## 2019-10-25 RX ORDER — DEXMEDETOMIDINE HYDROCHLORIDE IN 0.9% SODIUM CHLORIDE 4 UG/ML
0.5 INJECTION INTRAVENOUS
Qty: 200 | Refills: 0 | Status: DISCONTINUED | OUTPATIENT
Start: 2019-10-25 | End: 2019-10-25

## 2019-10-25 RX ORDER — SODIUM CHLORIDE 9 MG/ML
500 INJECTION, SOLUTION INTRAVENOUS
Refills: 0 | Status: DISCONTINUED | OUTPATIENT
Start: 2019-10-25 | End: 2019-10-25

## 2019-10-25 RX ORDER — METOPROLOL TARTRATE 50 MG
2.5 TABLET ORAL EVERY 6 HOURS
Refills: 0 | Status: DISCONTINUED | OUTPATIENT
Start: 2019-10-25 | End: 2019-10-26

## 2019-10-25 RX ORDER — METOPROLOL TARTRATE 50 MG
2.5 TABLET ORAL EVERY 6 HOURS
Refills: 0 | Status: DISCONTINUED | OUTPATIENT
Start: 2019-10-25 | End: 2019-10-25

## 2019-10-25 RX ORDER — HYDROMORPHONE HYDROCHLORIDE 2 MG/ML
0.25 INJECTION INTRAMUSCULAR; INTRAVENOUS; SUBCUTANEOUS
Refills: 0 | Status: DISCONTINUED | OUTPATIENT
Start: 2019-10-25 | End: 2019-10-29

## 2019-10-25 RX ORDER — HEPARIN SODIUM 5000 [USP'U]/ML
5000 INJECTION INTRAVENOUS; SUBCUTANEOUS EVERY 12 HOURS
Refills: 0 | Status: DISCONTINUED | OUTPATIENT
Start: 2019-10-25 | End: 2019-11-07

## 2019-10-25 RX ORDER — POTASSIUM CHLORIDE 20 MEQ
40 PACKET (EA) ORAL ONCE
Refills: 0 | Status: COMPLETED | OUTPATIENT
Start: 2019-10-25 | End: 2019-10-25

## 2019-10-25 RX ORDER — POTASSIUM CHLORIDE 20 MEQ
10 PACKET (EA) ORAL
Refills: 0 | Status: COMPLETED | OUTPATIENT
Start: 2019-10-25 | End: 2019-10-25

## 2019-10-25 RX ADMIN — Medication 100 MILLIEQUIVALENT(S): at 06:31

## 2019-10-25 RX ADMIN — SODIUM CHLORIDE 3 MILLILITER(S): 9 INJECTION INTRAMUSCULAR; INTRAVENOUS; SUBCUTANEOUS at 03:31

## 2019-10-25 RX ADMIN — Medication 100 MILLIEQUIVALENT(S): at 08:46

## 2019-10-25 RX ADMIN — PANTOPRAZOLE SODIUM 40 MILLIGRAM(S): 20 TABLET, DELAYED RELEASE ORAL at 11:29

## 2019-10-25 RX ADMIN — Medication 40 MILLIEQUIVALENT(S): at 06:31

## 2019-10-25 RX ADMIN — HEPARIN SODIUM 5000 UNIT(S): 5000 INJECTION INTRAVENOUS; SUBCUTANEOUS at 17:21

## 2019-10-25 RX ADMIN — SODIUM CHLORIDE 3 MILLILITER(S): 9 INJECTION INTRAMUSCULAR; INTRAVENOUS; SUBCUTANEOUS at 15:50

## 2019-10-25 RX ADMIN — Medication 2.5 MILLIGRAM(S): at 17:21

## 2019-10-25 RX ADMIN — HEPARIN SODIUM 5000 UNIT(S): 5000 INJECTION INTRAVENOUS; SUBCUTANEOUS at 06:39

## 2019-10-25 RX ADMIN — SODIUM CHLORIDE 3 MILLILITER(S): 9 INJECTION INTRAMUSCULAR; INTRAVENOUS; SUBCUTANEOUS at 10:28

## 2019-10-25 RX ADMIN — SODIUM CHLORIDE 10 MILLILITER(S): 9 INJECTION, SOLUTION INTRAVENOUS at 07:29

## 2019-10-25 RX ADMIN — PHENYLEPHRINE HYDROCHLORIDE 8.34 MICROGRAM(S)/KG/MIN: 10 INJECTION INTRAVENOUS at 02:32

## 2019-10-25 RX ADMIN — Medication 20 MILLIGRAM(S): at 19:57

## 2019-10-25 RX ADMIN — Medication 68 MICROGRAM(S): at 22:33

## 2019-10-25 RX ADMIN — Medication 20 MILLIGRAM(S): at 08:47

## 2019-10-25 RX ADMIN — Medication 2.5 MILLIGRAM(S): at 23:03

## 2019-10-25 RX ADMIN — SODIUM CHLORIDE 3 MILLILITER(S): 9 INJECTION INTRAMUSCULAR; INTRAVENOUS; SUBCUTANEOUS at 22:19

## 2019-10-25 NOTE — PROVIDER CONTACT NOTE (OTHER) - BACKGROUND
POD 3 s/p ex-lap, SHERRY, esophagogastrectomy, Heller myotomy, J-tube and left chest tube placement. Pt. with history of intermittent confusion and atrial fibrillation on Eliquis at home

## 2019-10-25 NOTE — DISCHARGE NOTE NURSING/CASE MANAGEMENT/SOCIAL WORK - PATIENT PORTAL LINK FT
You can access the FollowMyHealth Patient Portal offered by [facility] by registering at the following website: [email or web address]. By joining [facility]’s FollowMyHealth portal, you will also be able to view your health information using other applications (apps) compatible with our system.

## 2019-10-25 NOTE — PROGRESS NOTE ADULT - SUBJECTIVE AND OBJECTIVE BOX
CLIFFORD DOLAN  [id number]  _________________________  VITAL SIGNS:  Vital Signs Last 24 Hrs  T(C): 36.7 (25 Oct 2019 05:00), Max: 36.7 (25 Oct 2019 05:00)  T(F): 98 (25 Oct 2019 05:00), Max: 98 (25 Oct 2019 05:00)  HR: 57 (25 Oct 2019 06:00) (54 - 121)  BP: 107/58 (25 Oct 2019 06:00) (78/46 - 144/50)  BP(mean): 70 (25 Oct 2019 06:00) (51 - 84)  RR: 16 (25 Oct 2019 06:00) (11 - 24)  SpO2: 100% (25 Oct 2019 06:00) (94% - 100%)  I/Os:   I&O's Detail    23 Oct 2019 07:01  -  24 Oct 2019 07:00  --------------------------------------------------------  IN:    Enteral Tube Flush: 180 mL    Enteral Tube Flush: 180 mL    IV PiggyBack: 1150 mL    lactated ringers.: 1800 mL    phenylephrine   Infusion: 21.9 mL  Total IN: 3331.9 mL    OUT:    Bulb: 120 mL    Chest Tube: 340 mL    Indwelling Catheter - Urethral: 615 mL    Jejunostomy Tube: 160 mL    Nasoenteral Tube: 125 mL  Total OUT: 1360 mL    Total NET: 1971.9 mL      24 Oct 2019 07:01  -  25 Oct 2019 06:58  --------------------------------------------------------  IN:    Enteral Tube Flush: 210 mL    Enteral Tube Flush: 210 mL    IV PiggyBack: 150 mL    lactated ringers.: 75 mL    lactated ringers.: 1150 mL    Packed Red Blood Cells: 273 mL    phenylephrine   Infusion: 97.2 mL    Pivot 1.5: 170 mL  Total IN: 2335.2 mL    OUT:    Bulb: 34 mL    Chest Tube: 130 mL    Indwelling Catheter - Urethral: 1710 mL    Jejunostomy Tube: 25 mL    Nasoenteral Tube: 425 mL  Total OUT: 2324 mL    Total NET: 11.2 mL              MEDICATIONS:  MEDICATIONS  (STANDING):  dexMEDEtomidine Infusion 0.5 MICROgram(s)/kG/Hr (5.563 mL/Hr) IV Continuous <Continuous>  furosemide   Injectable 20 milliGRAM(s) IV Push once  heparin  Injectable 5000 Unit(s) SubCutaneous every 12 hours  hydromorphone (10 MICROgram(s)/mL) + bupivacaine 0.0625% in 0.9% Sodium Chloride PCEA 250 milliLiter(s) Epidural PCA Continuous  lactated ringers. 500 milliLiter(s) (10 mL/Hr) IV Continuous <Continuous>  levothyroxine Injectable 68 MICROGram(s) IV Push at bedtime  pantoprazole  Injectable 40 milliGRAM(s) IV Push daily  phenylephrine    Infusion 0.5 MICROgram(s)/kG/Min (8.344 mL/Hr) IV Continuous <Continuous>  potassium chloride  10 mEq/100 mL IVPB 10 milliEquivalent(s) IV Intermittent every 1 hour  sodium chloride 3%  Inhalation 3 milliLiter(s) Inhalation every 6 hours    MEDICATIONS  (PRN):  hydromorphone (10 MICROgram(s)/mL) + bupivacaine 0.0625% in 0.9% Sodium Chloride PCEA Rescue Clinician  Bolus 5 milliLiter(s) Epidural every 15 minutes PRN for Pain Score greater than 6  naloxone Injectable 0.1 milliGRAM(s) IV Push every 3 minutes PRN For ANY of the following changes in patient status:  A. RR LESS THAN 10 breaths per minute, B. Oxygen saturation LESS THAN 90%, C. Sedation score of 6  ondansetron Injectable 4 milliGRAM(s) IV Push every 6 hours PRN Nausea      LABS:                        9.6    12.68 )-----------( 158      ( 25 Oct 2019 04:00 )             28.9     10-25    134<L>  |  98  |  19  ----------------------------<  85  3.6   |  23  |  0.78    Ca    8.2<L>      25 Oct 2019 04:00  Phos  3.8     10-25  Mg     2.0     10-25        PT/INR - ( 25 Oct 2019 04:00 )   PT: 12.0 SEC;   INR: 1.08          PTT - ( 25 Oct 2019 04:00 )  PTT:30.1 SEC      _________________________          PROCEDURE: Exploratory laparotomy, lysis of adhesions, partial esophagogastrectomy, feeding jejunustomy, pyloromyotomy, left chest tube placement  22-Oct-2019       ISSUES:   Hypotension requiring IV pressors  ROSSY  Achalasia  CHF  Atrial fibrillation  Hypertension  Hypothyroidism  History of repair of hiatal hernia  H/O colectomy  S/P appendectomy  S/P hysterectomy  Post op pain  Chest tube in place    INTERVAL EVENTS:   Lasix given 20mg IV with diuresis.  Phenylephrine increased to aid in diuresis in setting of epidural and vasodilation.  Patient required precedex overnight due to agitation. Weaned off this AM.    HISTORY:   Patient reports mild pain at abdominal incision sites which is worse with coughing and deep breathing without associated fever or dyspnea. Pain is improved with use of pain meds.     PHYSICAL EXAM:   Gen: Comfortable, No acute distress  Eyes: Sclera white, Conjunctiva normal, Eyelids normal, Pupils symmetrical   ENT: Mucous membranes moist, NG tube in place,  ,    Neck: Trachea midline,  ,  ,  ,    CV: Rate regular, Rhythm irregular,    Resp: Breath sounds clear, No accessory muscles use, L chest tube in place,  ,    Abd: Soft, Non-distended, Moderately tender, Bowel sounds hypoactive, feeding tube in place, drain in place  Skin: Warm, 2+ bilateral edema    : Rossi  Neuro: Moving all 4 extremities  Psych: A&Ox3      ASSESSMENT AND PLAN:     NEURO:  Post-operative Pain - Pain control with PCEA and Tylenol IV PRN.            RESPIRATORY:  Hypoxia - Wean nasal cannula for goal O2sat above 92. Obtain CXR. Incentive spirometry. Chest PT and frequent suctioning. Continue bronchodilators. OOB to chair & ambulate w/ assistance. Continuous pulse oximetry for support & to prevent decompensation.    Chest tube – Pleurevac regulated suctioning. Monitor chest tube output.                 CARDIOVASCULAR:  Hypotension requiring IV pressors -- Distributive shock in setting of epidural, mild fluid overload. Wean pressors for MAP goal 65.  CHF with mild systolic dysfunction. Diuresis with pressor support as needed.  HTN - stable. Hold antihypertensives medications.  Afib - stable.  Hold anticoagulation      RENAL:  ROSSY with oliguria in setting off fluid overload – Lasix 20mg IV PRN. Maintain MAP 65. Monitor for hyperkalemia. Renally dose medications.  LR IVF 10mL/hr. Monitor IOs and electrolytes.          GASTROINTESTINAL:  NG tube to suction.  J tube to gravity drainage  GI prophylaxis with Protonix  Zofran and Reglan IV PRN for nausea  NPO until swallow study  Tube feeds through J tube.           HEMATOLOGIC:  No signs of active bleeding. Monitor Hgb in CBC in AM  DVT prophylaxis with heparin subQ and SCDs.  Thrombocytopenia improving - f/u HIT panel but unlikely HIT given recovering platelets      INFECTIOUS DISEASE:  All surgical sites appear clean. No signs of active infection. Will monitor for fever and leukocytosis.                 ENDOCRINE:  Stable – Monitor glucose fingersticks for goal 120-180.  Hypothyroidism - stable. Continue Synthroid.         Pertinent clinical, laboratory, radiographic, hemodynamic, echocardiographic, respiratory data, microbiologic data and chart were reviewed by myself and analyzed frequently throughout the course of the day and night by myself.    Plan discussed at length with the CTICU staff and Attending CT Surgeon.   Patient's status was discussed with patient and family at bedside.    Patient required critical care management.  45 minutes were spent evaluating, managing, providing, coordinating, and documenting care for this patient, exclusive of procedures from  1201AM to  07AM.

## 2019-10-25 NOTE — PROGRESS NOTE ADULT - SUBJECTIVE AND OBJECTIVE BOX
Anesthesia Pain Management Service: Day _4  of Epidural    SUBJECTIVE: Patient doing well with PCEA and no problems.  Pain Scale Score: 0/10   Refer to charted pain scores    THERAPY:  [x ] Epidural Bupivacaine 0.0625% and Hydromorphone  		[X ] 10 micrograms/mL	[ ] 5 micrograms/mL  [ ] Epidural Bupivacaine 0.0625% and Fentanyl - 2 micrograms/mL  [ ] Epidural Ropivacaine 0.1% plain – 1 mg/mL  [ ] Patient Controlled Regional Anesthesia (PCRA) Ropivacaine  		[ ] 0.2%			[ ] 0.1%    Demand dose __3_ lockout __15_ (minutes) Continuous Rate _6__ Total: 149.6___ Daily      MEDICATIONS  (STANDING):  heparin  Injectable 5000 Unit(s) SubCutaneous every 12 hours  levothyroxine Injectable 68 MICROGram(s) IV Push at bedtime  pantoprazole  Injectable 40 milliGRAM(s) IV Push daily  phenylephrine    Infusion 0.5 MICROgram(s)/kG/Min (8.344 mL/Hr) IV Continuous <Continuous>  sodium chloride 3%  Inhalation 3 milliLiter(s) Inhalation every 6 hours    MEDICATIONS  (PRN):  HYDROmorphone  Injectable 0.25 milliGRAM(s) IV Push every 3 hours PRN Severe Pain (7 - 10)  naloxone Injectable 0.1 milliGRAM(s) IV Push every 3 minutes PRN For ANY of the following changes in patient status:  A. RR LESS THAN 10 breaths per minute, B. Oxygen saturation LESS THAN 90%, C. Sedation score of 6  ondansetron Injectable 4 milliGRAM(s) IV Push every 6 hours PRN Nausea      OBJECTIVE:  Patient sitting up in chair.    Assessment of Catheter Site:	[ ] Left	[ ] Right  [x ] Epidural 	[ ] Femoral	      [ ] Saphenous   [ ] Supraclavicular   [ ] Other:    [x ] Dressing intact	[x ] Site non-tender	[ x] Site without erythema, discharge, edema  [x ] Epidural tubing and connection checked	[x] Gross neurological exam within normal limits  [X ] Catheter removed – tip intact		[ ] Afebrile	  [ ] Febrile: ___       [ X] see Temp under VS below)  some bleeding noted after removal, pressure placed for 5-10 minutes.  PT/INR - ( 25 Oct 2019 04:00 )   PT: 12.0 SEC;   INR: 1.08          PTT - ( 25 Oct 2019 04:00 )  PTT:30.1 SEC                      9.6    12.68 )-----------( 158      ( 25 Oct 2019 04:00 )             28.9     Vital Signs Last 24 Hrs  T(C): 36.4 (10-25-19 @ 08:00), Max: 36.7 (10-25-19 @ 05:00)  T(F): 97.5 (10-25-19 @ 08:00), Max: 98 (10-25-19 @ 05:00)  HR: 67 (10-25-19 @ 12:00) (54 - 121)  BP: 135/67 (10-25-19 @ 12:00) (78/46 - 145/59)  BP(mean): 84 (10-25-19 @ 12:00) (51 - 84)  RR: 13 (10-25-19 @ 12:00) (11 - 24)  SpO2: 100% (10-25-19 @ 12:00) (94% - 100%)      Sedation Score:	[x ] Alert	[ ] Drowsy	[ ] Arousable	[ ] Asleep	[ ] Unresponsive    Side Effects:	[x ] None	[ ] Nausea	[ ] Vomiting	[ ] Pruritus  		[ ] Weakness		[ ] Numbness	[ ] Other:    ASSESSMENT/ PLAN:    Therapy to  be:	[ ] Continue   [ X] Discontinued   [ X] Change to prn Analgesics    Documentation and Verification of current medications:  [ X ] Done	[ ] Not done, not eligible, reason:    Comments: Changed to IV/oral opioid and/or non-opioid Adjuvant analgesics to be used at this point.

## 2019-10-25 NOTE — PROGRESS NOTE ADULT - SUBJECTIVE AND OBJECTIVE BOX
Anesthesia Pain Management Service    SUBJECTIVE: Pt doing well with PCEA removed & no problems reported.    Therapy:	  [ ] IV PCA	   [ X] Epidural stopped          [ ] s/p Spinal Opoid              [ ] Postpartum infusion	  [ ] Patient controlled regional anesthesia (PCRA)    [ ] prn Analgesics    Allergies    opioid-like analgesics (Other)    Intolerances      MEDICATIONS  (STANDING):  heparin  Injectable 5000 Unit(s) SubCutaneous every 12 hours  levothyroxine Injectable 68 MICROGram(s) IV Push at bedtime  metoprolol tartrate Injectable 2.5 milliGRAM(s) IV Push every 6 hours  pantoprazole  Injectable 40 milliGRAM(s) IV Push daily  phenylephrine    Infusion 0.5 MICROgram(s)/kG/Min (8.344 mL/Hr) IV Continuous <Continuous>  sodium chloride 3%  Inhalation 3 milliLiter(s) Inhalation every 6 hours    MEDICATIONS  (PRN):  HYDROmorphone  Injectable 0.25 milliGRAM(s) IV Push every 3 hours PRN Severe Pain (7 - 10)  naloxone Injectable 0.1 milliGRAM(s) IV Push every 3 minutes PRN For ANY of the following changes in patient status:  A. RR LESS THAN 10 breaths per minute, B. Oxygen saturation LESS THAN 90%, C. Sedation score of 6  ondansetron Injectable 4 milliGRAM(s) IV Push every 6 hours PRN Nausea      OBJECTIVE:   [X] No new signs     [ ] Other:    Side Effects:  [X ] None			[ ] Other:    Assessment of Catheter Site:		[ ] Intact		[ ] Other:    ASSESSMENT/PLAN  [ ] Continue current therapy    [X ] Therapy changed to:    [ ] IV PCA       [ ] Epidural     [ X] prn Analgesics     Comments: Epidural stopped & now to go on oral/IV opioids & adjuvant medication as needed.     Progress Note written now but Patient was seen earlier.

## 2019-10-26 LAB
ALBUMIN SERPL ELPH-MCNC: 3 G/DL — LOW (ref 3.3–5)
ALP SERPL-CCNC: 63 U/L — SIGNIFICANT CHANGE UP (ref 40–120)
ALT FLD-CCNC: 12 U/L — SIGNIFICANT CHANGE UP (ref 4–33)
ANION GAP SERPL CALC-SCNC: 16 MMO/L — HIGH (ref 7–14)
ANION GAP SERPL CALC-SCNC: 16 MMO/L — HIGH (ref 7–14)
AST SERPL-CCNC: 20 U/L — SIGNIFICANT CHANGE UP (ref 4–32)
BILIRUB SERPL-MCNC: 0.7 MG/DL — SIGNIFICANT CHANGE UP (ref 0.2–1.2)
BUN SERPL-MCNC: 19 MG/DL — SIGNIFICANT CHANGE UP (ref 7–23)
BUN SERPL-MCNC: 19 MG/DL — SIGNIFICANT CHANGE UP (ref 7–23)
CALCIUM SERPL-MCNC: 8.7 MG/DL — SIGNIFICANT CHANGE UP (ref 8.4–10.5)
CALCIUM SERPL-MCNC: 8.9 MG/DL — SIGNIFICANT CHANGE UP (ref 8.4–10.5)
CHLORIDE SERPL-SCNC: 96 MMOL/L — LOW (ref 98–107)
CHLORIDE SERPL-SCNC: 97 MMOL/L — LOW (ref 98–107)
CO2 SERPL-SCNC: 23 MMOL/L — SIGNIFICANT CHANGE UP (ref 22–31)
CO2 SERPL-SCNC: 25 MMOL/L — SIGNIFICANT CHANGE UP (ref 22–31)
CREAT SERPL-MCNC: 0.59 MG/DL — SIGNIFICANT CHANGE UP (ref 0.5–1.3)
CREAT SERPL-MCNC: 0.63 MG/DL — SIGNIFICANT CHANGE UP (ref 0.5–1.3)
GLUCOSE SERPL-MCNC: 140 MG/DL — HIGH (ref 70–99)
GLUCOSE SERPL-MCNC: 92 MG/DL — SIGNIFICANT CHANGE UP (ref 70–99)
HCT VFR BLD CALC: 33.1 % — LOW (ref 34.5–45)
HCT VFR BLD CALC: 38.8 % — SIGNIFICANT CHANGE UP (ref 34.5–45)
HGB BLD-MCNC: 11 G/DL — LOW (ref 11.5–15.5)
HGB BLD-MCNC: 12.9 G/DL — SIGNIFICANT CHANGE UP (ref 11.5–15.5)
MAGNESIUM SERPL-MCNC: 1.8 MG/DL — SIGNIFICANT CHANGE UP (ref 1.6–2.6)
MCHC RBC-ENTMCNC: 29.4 PG — SIGNIFICANT CHANGE UP (ref 27–34)
MCHC RBC-ENTMCNC: 29.9 PG — SIGNIFICANT CHANGE UP (ref 27–34)
MCHC RBC-ENTMCNC: 33.2 % — SIGNIFICANT CHANGE UP (ref 32–36)
MCHC RBC-ENTMCNC: 33.2 % — SIGNIFICANT CHANGE UP (ref 32–36)
MCV RBC AUTO: 88.5 FL — SIGNIFICANT CHANGE UP (ref 80–100)
MCV RBC AUTO: 90 FL — SIGNIFICANT CHANGE UP (ref 80–100)
NRBC # FLD: 0 K/UL — SIGNIFICANT CHANGE UP (ref 0–0)
NRBC # FLD: 0 K/UL — SIGNIFICANT CHANGE UP (ref 0–0)
PHOSPHATE SERPL-MCNC: 3 MG/DL — SIGNIFICANT CHANGE UP (ref 2.5–4.5)
PLATELET # BLD AUTO: 170 K/UL — SIGNIFICANT CHANGE UP (ref 150–400)
PLATELET # BLD AUTO: 198 K/UL — SIGNIFICANT CHANGE UP (ref 150–400)
PMV BLD: 10.3 FL — SIGNIFICANT CHANGE UP (ref 7–13)
PMV BLD: 10.4 FL — SIGNIFICANT CHANGE UP (ref 7–13)
POTASSIUM SERPL-MCNC: 3.4 MMOL/L — LOW (ref 3.5–5.3)
POTASSIUM SERPL-MCNC: 4.4 MMOL/L — SIGNIFICANT CHANGE UP (ref 3.5–5.3)
POTASSIUM SERPL-SCNC: 3.4 MMOL/L — LOW (ref 3.5–5.3)
POTASSIUM SERPL-SCNC: 4.4 MMOL/L — SIGNIFICANT CHANGE UP (ref 3.5–5.3)
PROT SERPL-MCNC: 6.1 G/DL — SIGNIFICANT CHANGE UP (ref 6–8.3)
RBC # BLD: 3.74 M/UL — LOW (ref 3.8–5.2)
RBC # BLD: 4.31 M/UL — SIGNIFICANT CHANGE UP (ref 3.8–5.2)
RBC # FLD: 15.2 % — HIGH (ref 10.3–14.5)
RBC # FLD: 15.3 % — HIGH (ref 10.3–14.5)
SODIUM SERPL-SCNC: 135 MMOL/L — SIGNIFICANT CHANGE UP (ref 135–145)
SODIUM SERPL-SCNC: 138 MMOL/L — SIGNIFICANT CHANGE UP (ref 135–145)
WBC # BLD: 10.05 K/UL — SIGNIFICANT CHANGE UP (ref 3.8–10.5)
WBC # BLD: 11.11 K/UL — HIGH (ref 3.8–10.5)
WBC # FLD AUTO: 10.05 K/UL — SIGNIFICANT CHANGE UP (ref 3.8–10.5)
WBC # FLD AUTO: 11.11 K/UL — HIGH (ref 3.8–10.5)

## 2019-10-26 PROCEDURE — 71045 X-RAY EXAM CHEST 1 VIEW: CPT | Mod: 26

## 2019-10-26 PROCEDURE — 99233 SBSQ HOSP IP/OBS HIGH 50: CPT

## 2019-10-26 PROCEDURE — 99291 CRITICAL CARE FIRST HOUR: CPT

## 2019-10-26 RX ORDER — POTASSIUM CHLORIDE 20 MEQ
10 PACKET (EA) ORAL
Refills: 0 | Status: COMPLETED | OUTPATIENT
Start: 2019-10-26 | End: 2019-10-26

## 2019-10-26 RX ORDER — METOPROLOL TARTRATE 50 MG
5 TABLET ORAL ONCE
Refills: 0 | Status: COMPLETED | OUTPATIENT
Start: 2019-10-26 | End: 2019-10-26

## 2019-10-26 RX ORDER — HALOPERIDOL DECANOATE 100 MG/ML
2.5 INJECTION INTRAMUSCULAR ONCE
Refills: 0 | Status: COMPLETED | OUTPATIENT
Start: 2019-10-26 | End: 2019-10-26

## 2019-10-26 RX ORDER — DEXMEDETOMIDINE HYDROCHLORIDE IN 0.9% SODIUM CHLORIDE 4 UG/ML
0.05 INJECTION INTRAVENOUS
Qty: 200 | Refills: 0 | Status: DISCONTINUED | OUTPATIENT
Start: 2019-10-26 | End: 2019-10-28

## 2019-10-26 RX ORDER — AMIODARONE HYDROCHLORIDE 400 MG/1
150 TABLET ORAL ONCE
Refills: 0 | Status: COMPLETED | OUTPATIENT
Start: 2019-10-26 | End: 2019-10-26

## 2019-10-26 RX ORDER — MAGNESIUM SULFATE 500 MG/ML
2 VIAL (ML) INJECTION ONCE
Refills: 0 | Status: COMPLETED | OUTPATIENT
Start: 2019-10-26 | End: 2019-10-26

## 2019-10-26 RX ORDER — METOPROLOL TARTRATE 50 MG
5 TABLET ORAL EVERY 6 HOURS
Refills: 0 | Status: DISCONTINUED | OUTPATIENT
Start: 2019-10-26 | End: 2019-10-29

## 2019-10-26 RX ORDER — DILTIAZEM HCL 120 MG
5 CAPSULE, EXT RELEASE 24 HR ORAL ONCE
Refills: 0 | Status: COMPLETED | OUTPATIENT
Start: 2019-10-26 | End: 2019-10-26

## 2019-10-26 RX ADMIN — Medication 2.5 MILLIGRAM(S): at 04:39

## 2019-10-26 RX ADMIN — HALOPERIDOL DECANOATE 2.5 MILLIGRAM(S): 100 INJECTION INTRAMUSCULAR at 03:30

## 2019-10-26 RX ADMIN — Medication 100 MILLIEQUIVALENT(S): at 04:00

## 2019-10-26 RX ADMIN — Medication 5 MILLIGRAM(S): at 05:30

## 2019-10-26 RX ADMIN — PANTOPRAZOLE SODIUM 40 MILLIGRAM(S): 20 TABLET, DELAYED RELEASE ORAL at 11:48

## 2019-10-26 RX ADMIN — AMIODARONE HYDROCHLORIDE 618 MILLIGRAM(S): 400 TABLET ORAL at 06:51

## 2019-10-26 RX ADMIN — Medication 5 MILLIGRAM(S): at 19:30

## 2019-10-26 RX ADMIN — HEPARIN SODIUM 5000 UNIT(S): 5000 INJECTION INTRAVENOUS; SUBCUTANEOUS at 07:00

## 2019-10-26 RX ADMIN — Medication 2.5 MILLIGRAM(S): at 11:48

## 2019-10-26 RX ADMIN — Medication 68 MICROGRAM(S): at 22:30

## 2019-10-26 RX ADMIN — Medication 100 MILLIEQUIVALENT(S): at 09:19

## 2019-10-26 RX ADMIN — HEPARIN SODIUM 5000 UNIT(S): 5000 INJECTION INTRAVENOUS; SUBCUTANEOUS at 17:37

## 2019-10-26 RX ADMIN — Medication 2.5 MILLIGRAM(S): at 17:37

## 2019-10-26 RX ADMIN — Medication 100 MILLIEQUIVALENT(S): at 06:53

## 2019-10-26 RX ADMIN — SODIUM CHLORIDE 3 MILLILITER(S): 9 INJECTION INTRAMUSCULAR; INTRAVENOUS; SUBCUTANEOUS at 04:08

## 2019-10-26 RX ADMIN — Medication 50 GRAM(S): at 07:01

## 2019-10-26 NOTE — PROGRESS NOTE ADULT - SUBJECTIVE AND OBJECTIVE BOX
CLIFFORD DOLAN  [id number]  _________________________  VITAL SIGNS:  Vital Signs Last 24 Hrs  T(C): 36.3 (26 Oct 2019 16:00), Max: 36.6 (26 Oct 2019 04:00)  T(F): 97.4 (26 Oct 2019 16:00), Max: 97.9 (26 Oct 2019 04:00)  HR: 59 (26 Oct 2019 20:00) (59 - 135)  BP: 100/73 (26 Oct 2019 20:00) (99/69 - 174/90)  BP(mean): 80 (26 Oct 2019 20:00) (75 - 111)  RR: 18 (26 Oct 2019 20:00) (15 - 21)  SpO2: 93% (26 Oct 2019 20:00) (84% - 100%)  I/Os:   I&O's Detail    25 Oct 2019 07:01  -  26 Oct 2019 07:00  --------------------------------------------------------  IN:    Enteral Tube Flush: 210 mL    Enteral Tube Flush: 210 mL    IV PiggyBack: 450 mL    lactated ringers.: 20 mL    Pivot 1.5: 160 mL  Total IN: 1050 mL    OUT:    Bulb: 50 mL    Incontinent per Collection Ba mL    Indwelling Catheter - Urethral: 675 mL    Nasoenteral Tube: 350 mL  Total OUT: 1575 mL    Total NET: -525 mL      26 Oct 2019 07:01  -  26 Oct 2019 22:33  --------------------------------------------------------  IN:    dexmedetomidine Infusion: 7.7 mL    Enteral Tube Flush: 90 mL    Enteral Tube Flush: 90 mL    phenylephrine   Infusion: 3.3 mL    Pivot 1.5: 200 mL  Total IN: 391 mL    OUT:    Incontinent per Collection Ba mL    Nasoenteral Tube: 25 mL  Total OUT: 75 mL    Total NET: 316 mL              MEDICATIONS:  MEDICATIONS  (STANDING):  dexMEDEtomidine Infusion 0.05 MICROgram(s)/kG/Hr (0.556 mL/Hr) IV Continuous <Continuous>  heparin  Injectable 5000 Unit(s) SubCutaneous every 12 hours  levothyroxine Injectable 68 MICROGram(s) IV Push at bedtime  metoprolol tartrate Injectable 5 milliGRAM(s) IV Push every 6 hours  pantoprazole  Injectable 40 milliGRAM(s) IV Push daily  phenylephrine    Infusion 0.5 MICROgram(s)/kG/Min (8.344 mL/Hr) IV Continuous <Continuous>    MEDICATIONS  (PRN):  HYDROmorphone  Injectable 0.25 milliGRAM(s) IV Push every 3 hours PRN Severe Pain (7 - 10)  naloxone Injectable 0.1 milliGRAM(s) IV Push every 3 minutes PRN For ANY of the following changes in patient status:  A. RR LESS THAN 10 breaths per minute, B. Oxygen saturation LESS THAN 90%, C. Sedation score of 6  ondansetron Injectable 4 milliGRAM(s) IV Push every 6 hours PRN Nausea      LABS:                        12.9   11.11 )-----------( 198      ( 26 Oct 2019 12:00 )             38.8     10    135  |  96<L>  |  19  ----------------------------<  140<H>  4.4   |  23  |  0.59    Ca    8.9      26 Oct 2019 12:00  Phos  3.0     10-26  Mg     1.8     10-26    TPro  6.1  /  Alb  3.0<L>  /  TBili  0.7  /  DBili  x   /  AST  20  /  ALT  12  /  AlkPhos  63  10-26    LIVER FUNCTIONS - ( 26 Oct 2019 12:00 )  Alb: 3.0 g/dL / Pro: 6.1 g/dL / ALK PHOS: 63 u/L / ALT: 12 u/L / AST: 20 u/L / GGT: x           PT/INR - ( 25 Oct 2019 04:00 )   PT: 12.0 SEC;   INR: 1.08          PTT - ( 25 Oct 2019 04:00 )  PTT:30.1 SEC      _________________________        PROCEDURE: Exploratory laparotomy, lysis of adhesions, partial esophagogastrectomy, feeding jejunustomy, pyloromyotomy, left chest tube placement  22-Oct-2019       ISSUES:   Hypotension requiring IV pressors  ROSSY  Achalasia  CHF  Atrial fibrillation  Hypertension  Hypothyroidism  History of repair of hiatal hernia  H/O colectomy  S/P appendectomy  S/P hysterectomy  Post op pain  Chest tube in place    INTERVAL EVENTS:   This evening went into rapid afib with hypotension.  Resumed on phenylephrine to maintain MAP 65.  Metoprolol IV push given and maintenance dose increased.  Renal function continues to improve.  Delirium with attempts to remove NG tube and IV lines. Placed on constant observation. Started on precedex infusion.    HISTORY:   Patient reports mild pain at abdominal incision sites which is worse with coughing and deep breathing without associated fever or dyspnea. Pain is improved with use of pain meds.     PHYSICAL EXAM:   Gen: Comfortable, No acute distress  Eyes: Sclera white, Conjunctiva normal, Eyelids normal, Pupils symmetrical   ENT: Mucous membranes moist, NG tube in place,  ,    Neck: Trachea midline,  ,  ,  ,    CV: Rate regular, Rhythm irregular,    Resp: Breath sounds clear, No accessory muscles use    Abd: Soft, Non-distended, Mildly tender, Bowel sounds hypoactive, feeding tube in place, drain in place  Skin: Warm, 1+ bilateral edema    : No lopez  Neuro: Moving all 4 extremities  Psych: A&Ox2      ASSESSMENT AND PLAN:     NEURO:  Post-operative Pain - Pain control with dilaudid IV and Tylenol IV PRN.            RESPIRATORY:  Hypoxia - Wean nasal cannula for goal O2sat above 92. Obtain CXR. Incentive spirometry. Chest PT and frequent suctioning. Continue bronchodilators. OOB to chair & ambulate w/ assistance. Continuous pulse oximetry for support & to prevent decompensation.                 CARDIOVASCULAR:  Hypotension requiring IV pressors -- Distributive shock in setting of epidural, mild fluid overload. Wean pressors for MAP goal 65.  CHF with mild systolic dysfunction - now euvolemic.  HTN - stable. Hold antihypertensives medications.  Afib - metoprolol IV 5mg IV q6h.  Hold anticoagulation for now.      RENAL:  ROSSY with oliguria - improved. Renally dose medications.   Monitor IOs and electrolytes.          GASTROINTESTINAL:  NG tube to suction.  J tube to gravity drainage  GI prophylaxis with Protonix  Zofran and Reglan IV PRN for nausea  NPO until swallow study  Tube feeds through J tube.           HEMATOLOGIC:  No signs of active bleeding. Monitor Hgb in CBC in AM  DVT prophylaxis with heparin subQ and SCDs.  Thrombocytopenia improving - f/u HIT panel but unlikely HIT given recovering platelets      INFECTIOUS DISEASE:  All surgical sites appear clean. No signs of active infection. Will monitor for fever and leukocytosis.                 ENDOCRINE:  Stable – Monitor glucose fingersticks for goal 120-180.  Hypothyroidism - stable. Continue Synthroid.         Pertinent clinical, laboratory, radiographic, hemodynamic, echocardiographic, respiratory data, microbiologic data and chart were reviewed by myself and analyzed frequently throughout the course of the day and night by myself.    Plan discussed at length with the CTICU staff and Attending CT Surgeon.   Patient's status was discussed with patient and family at bedside.    Patient required critical care management.  60 minutes were spent evaluating, managing, providing, coordinating, and documenting care for this patient, exclusive of procedures from  0800AM to  1159PM.

## 2019-10-26 NOTE — PROGRESS NOTE ADULT - SUBJECTIVE AND OBJECTIVE BOX
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 Per history the patient has had multiple esophageal procedures for achalasia including CRE balloon dilation w/ botox injection 8/2015, EGD dilation 3/2016, EGD lap Heller myotomy 2016 and recent Barium swallow on 6/2019 for dysphagia which ended up showing decreased motility with EGD on 10/8/19 showing what appeared to be erosion of mesh into esophagus. She is now s/p CT abdomen and persistent vomiting and since then unable to tolerate PO food. She is now s/p esophagectomy with pyloromyotomy and feeding jejunostomy. Overnight she was agitated requiring Haldol and 1:1 observation and went into afib.     PAST MEDICAL & SURGICAL HISTORY:  Atrial fibrillation  Achalasia  Hypertension  Hypothyroidism  History of repair of hiatal hernia  H/O colectomy  S/P appendectomy  S/P hysterectomy    FAMILY HISTORY:  Unremarkable    Social History:  Denies smoking, illicit drug use or frequent alcohol consumption    Allergies  opioid-like analgesics (Other)    MEDICATIONS  (STANDING):  heparin  Injectable 5000 Unit(s) SubCutaneous every 12 hours  levothyroxine Injectable 68 MICROGram(s) IV Push at bedtime  metoprolol tartrate Injectable 2.5 milliGRAM(s) IV Push every 6 hours  pantoprazole  Injectable 40 milliGRAM(s) IV Push daily  phenylephrine    Infusion 0.5 MICROgram(s)/kG/Min (8.344 mL/Hr) IV Continuous <Continuous>  potassium chloride  10 mEq/100 mL IVPB 10 milliEquivalent(s) IV Intermittent every 1 hour    MEDICATIONS  (PRN):  HYDROmorphone  Injectable 0.25 milliGRAM(s) IV Push every 3 hours PRN Severe Pain (7 - 10)  naloxone Injectable 0.1 milliGRAM(s) IV Push every 3 minutes PRN For ANY of the following changes in patient status:  A. RR LESS THAN 10 breaths per minute, B. Oxygen saturation LESS THAN 90%, C. Sedation score of 6  ondansetron Injectable 4 milliGRAM(s) IV Push every 6 hours PRN Nausea    Review of Systems:  Constitutional:  Negative for weight change, fever, malaise  HEENT:  Negative for sinus pain, hoarseness, sore throat, dysphagia, vision changes  Cardiovascular:  Negative for chest pain, palpitations, dizziness  Respiratory:  Negative for cough, wheezing, dyspnea  Gastrointestinal:  Negative for nausea, vomiting, diarrhea, melena  Musculoskeletal:  Negative for pain, swelling, stiffness   Neuro:  Negative for weakness, numbness, headache  Psych:  Negative for anxiety, depression  Endocrine:  Negative for polyuria, polydipsia, temperature Intolerance    All other systems negative unless otherwise stated    ICU Vital Signs Last 24 Hrs  T(C): 36.4 (26 Oct 2019 00:00), Max: 36.6 (25 Oct 2019 20:00)  T(F): 97.6 (26 Oct 2019 00:00), Max: 97.8 (25 Oct 2019 20:00)  HR: 119 (26 Oct 2019 07:00) (57 - 134)  BP: 116/76 (26 Oct 2019 07:00) (116/76 - 174/90)  BP(mean): 86 (26 Oct 2019 07:00) (77 - 111)  ABP: --  ABP(mean): --  RR: 16 (26 Oct 2019 07:00) (12 - 24)  SpO2: 99% (26 Oct 2019 07:00) (84% - 100%)    Daily     Daily   I&O's Summary    25 Oct 2019 07:01  -  26 Oct 2019 07:00  --------------------------------------------------------  IN: 1050 mL / OUT: 1575 mL / NET: -525 mL    Physical Exam:  Gen: Alert, no apparent distress  CV: Tachycardic, irregularly irregular rhythm, no murmurs, rubs or gallops  Pulm: Clear to auscultation bilaterally, no rales, rhonchi or wheezes  Chest: Chest tubes/drains in place with dressings clean, dry and intact  GI: Abd is soft, non-tender and non-distended with +BS  Ext: No clubbing or cyanosis; 2 bilateral lower extremity pitting edema  Neuro: A+Ox3, follows commands and moves all extremities    Labs:                          11.0   10.05 )-----------( 170      ( 26 Oct 2019 02:28 )             33.1       10-26    138  |  97<L>  |  19  ----------------------------<  92  3.4<L>   |  25  |  0.63    Ca    8.7      26 Oct 2019 02:28  Phos  3.0     10-26  Mg     1.8     10-26    PT/INR - ( 25 Oct 2019 04:00 )   PT: 12.0 SEC;   INR: 1.08     PTT - ( 25 Oct 2019 04:00 )  PTT:30.1 SEC    Assessment/Plan: 89 y/o female w/ PMH of hypertension, achalasia s/p Botox injection and dilation x2 with Heller myotomy, hiatal hernia s/p repair w/ mesh, emergency volvulus with surgical repair 4/2014 was 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    Neuro:   Pain control with Oxycodone / Tylenol IV   PCEA removed  Haldol for agitation                                          Cardiovascular:    Afib with RVR overnight but blood pressure is stable - tot on any pressors and on Lopressor 2.5 IV q6h for hypertension  Afib is refractory to Lopressor, Cardizem and Amiodarone - likely driven by agitation  Would start PO Amio load  Continue hemodynamic monitoring    Respiratory:  Pt is comfortable on nasal cannula  Encourage incentive spirometry    GI:  NPO on trophic feeds  Continue Protonix  Continue Zofran for nausea - PRN	          Renal:  CHF history, edema on exam, diuresing with Lasix   Monitor I/Os and electrolytes    Hematologic / Oncology:  No signs of active bleeding, H/H stable  HSQ for DVT ppl    Infectious disease:  All surgical incision / chest tube sites look clean  No fever or other signs of infection     Endocrine:  Continue Accuchecks with coverage  Continue Synthroid    All clinical, lab, hemodynamic and radiographic data were reviewed and the plan was discussed with CTICU team.     Axel Pena MD

## 2019-10-27 LAB
ANION GAP SERPL CALC-SCNC: 11 MMO/L — SIGNIFICANT CHANGE UP (ref 7–14)
BUN SERPL-MCNC: 22 MG/DL — SIGNIFICANT CHANGE UP (ref 7–23)
CALCIUM SERPL-MCNC: 8.6 MG/DL — SIGNIFICANT CHANGE UP (ref 8.4–10.5)
CHLORIDE SERPL-SCNC: 98 MMOL/L — SIGNIFICANT CHANGE UP (ref 98–107)
CO2 SERPL-SCNC: 25 MMOL/L — SIGNIFICANT CHANGE UP (ref 22–31)
CREAT SERPL-MCNC: 0.56 MG/DL — SIGNIFICANT CHANGE UP (ref 0.5–1.3)
GLUCOSE SERPL-MCNC: 145 MG/DL — HIGH (ref 70–99)
HCT VFR BLD CALC: 30 % — LOW (ref 34.5–45)
HGB BLD-MCNC: 10.1 G/DL — LOW (ref 11.5–15.5)
MAGNESIUM SERPL-MCNC: 2.2 MG/DL — SIGNIFICANT CHANGE UP (ref 1.6–2.6)
MCHC RBC-ENTMCNC: 29.7 PG — SIGNIFICANT CHANGE UP (ref 27–34)
MCHC RBC-ENTMCNC: 33.7 % — SIGNIFICANT CHANGE UP (ref 32–36)
MCV RBC AUTO: 88.2 FL — SIGNIFICANT CHANGE UP (ref 80–100)
NRBC # FLD: 0 K/UL — SIGNIFICANT CHANGE UP (ref 0–0)
PHOSPHATE SERPL-MCNC: 2.8 MG/DL — SIGNIFICANT CHANGE UP (ref 2.5–4.5)
PLATELET # BLD AUTO: 188 K/UL — SIGNIFICANT CHANGE UP (ref 150–400)
PMV BLD: 10.3 FL — SIGNIFICANT CHANGE UP (ref 7–13)
POTASSIUM SERPL-MCNC: 3.6 MMOL/L — SIGNIFICANT CHANGE UP (ref 3.5–5.3)
POTASSIUM SERPL-SCNC: 3.6 MMOL/L — SIGNIFICANT CHANGE UP (ref 3.5–5.3)
RBC # BLD: 3.4 M/UL — LOW (ref 3.8–5.2)
RBC # FLD: 15 % — HIGH (ref 10.3–14.5)
SODIUM SERPL-SCNC: 134 MMOL/L — LOW (ref 135–145)
WBC # BLD: 8.84 K/UL — SIGNIFICANT CHANGE UP (ref 3.8–10.5)
WBC # FLD AUTO: 8.84 K/UL — SIGNIFICANT CHANGE UP (ref 3.8–10.5)

## 2019-10-27 PROCEDURE — 71045 X-RAY EXAM CHEST 1 VIEW: CPT | Mod: 26

## 2019-10-27 PROCEDURE — 99233 SBSQ HOSP IP/OBS HIGH 50: CPT

## 2019-10-27 RX ORDER — OLANZAPINE 15 MG/1
2.5 TABLET, FILM COATED ORAL AT BEDTIME
Refills: 0 | Status: DISCONTINUED | OUTPATIENT
Start: 2019-10-27 | End: 2019-10-28

## 2019-10-27 RX ORDER — POTASSIUM CHLORIDE 20 MEQ
10 PACKET (EA) ORAL
Refills: 0 | Status: COMPLETED | OUTPATIENT
Start: 2019-10-27 | End: 2019-10-27

## 2019-10-27 RX ORDER — HALOPERIDOL DECANOATE 100 MG/ML
5 INJECTION INTRAMUSCULAR ONCE
Refills: 0 | Status: COMPLETED | OUTPATIENT
Start: 2019-10-27 | End: 2019-10-27

## 2019-10-27 RX ADMIN — Medication 100 MILLIEQUIVALENT(S): at 09:33

## 2019-10-27 RX ADMIN — Medication 100 MILLIEQUIVALENT(S): at 11:56

## 2019-10-27 RX ADMIN — HALOPERIDOL DECANOATE 5 MILLIGRAM(S): 100 INJECTION INTRAMUSCULAR at 12:24

## 2019-10-27 RX ADMIN — PANTOPRAZOLE SODIUM 40 MILLIGRAM(S): 20 TABLET, DELAYED RELEASE ORAL at 12:24

## 2019-10-27 RX ADMIN — Medication 100 MILLIEQUIVALENT(S): at 07:00

## 2019-10-27 RX ADMIN — Medication 68 MICROGRAM(S): at 21:03

## 2019-10-27 RX ADMIN — Medication 5 MILLIGRAM(S): at 12:24

## 2019-10-27 RX ADMIN — HEPARIN SODIUM 5000 UNIT(S): 5000 INJECTION INTRAVENOUS; SUBCUTANEOUS at 05:31

## 2019-10-27 RX ADMIN — Medication 5 MILLIGRAM(S): at 18:06

## 2019-10-27 RX ADMIN — OLANZAPINE 2.5 MILLIGRAM(S): 15 TABLET, FILM COATED ORAL at 10:16

## 2019-10-27 RX ADMIN — HEPARIN SODIUM 5000 UNIT(S): 5000 INJECTION INTRAVENOUS; SUBCUTANEOUS at 18:06

## 2019-10-27 NOTE — PROGRESS NOTE ADULT - SUBJECTIVE AND OBJECTIVE BOX
CLIFFORD DOLAN  MRN-[id number]  _________________________  VITAL SIGNS:  Vital Signs Last 24 Hrs  T(C): 35.2 (27 Oct 2019 04:00), Max: 36.6 (26 Oct 2019 08:00)  T(F): 95.4 (27 Oct 2019 04:00), Max: 97.9 (26 Oct 2019 08:00)  HR: 56 (27 Oct 2019 07:00) (44 - 135)  BP: 142/62 (27 Oct 2019 07:00) (99/69 - 168/73)  BP(mean): 80 (27 Oct 2019 07:00) (71 - 106)  RR: 16 (27 Oct 2019 07:00) (14 - 21)  SpO2: 100% (27 Oct 2019 07:00) (93% - 100%)  I/Os:   I&O's Detail    26 Oct 2019 07:01  -  27 Oct 2019 07:00  --------------------------------------------------------  IN:    dexmedetomidine Infusion: 38.5 mL    Enteral Tube Flush: 180 mL    Enteral Tube Flush: 180 mL    IV PiggyBack: 100 mL    phenylephrine   Infusion: 19.3 mL    Pivot 1.5: 400 mL  Total IN: 917.8 mL    OUT:    Bulb: 30 mL    Incontinent per Collection Ba mL    Nasoenteral Tube: 225 mL  Total OUT: 505 mL    Total NET: 412.8 mL              MEDICATIONS:  MEDICATIONS  (STANDING):  dexMEDEtomidine Infusion 0.05 MICROgram(s)/kG/Hr (0.556 mL/Hr) IV Continuous <Continuous>  heparin  Injectable 5000 Unit(s) SubCutaneous every 12 hours  levothyroxine Injectable 68 MICROGram(s) IV Push at bedtime  metoprolol tartrate Injectable 5 milliGRAM(s) IV Push every 6 hours  pantoprazole  Injectable 40 milliGRAM(s) IV Push daily  phenylephrine    Infusion 0.5 MICROgram(s)/kG/Min (8.344 mL/Hr) IV Continuous <Continuous>  potassium chloride  10 mEq/100 mL IVPB 10 milliEquivalent(s) IV Intermittent every 1 hour    MEDICATIONS  (PRN):  HYDROmorphone  Injectable 0.25 milliGRAM(s) IV Push every 3 hours PRN Severe Pain (7 - 10)  naloxone Injectable 0.1 milliGRAM(s) IV Push every 3 minutes PRN For ANY of the following changes in patient status:  A. RR LESS THAN 10 breaths per minute, B. Oxygen saturation LESS THAN 90%, C. Sedation score of 6  ondansetron Injectable 4 milliGRAM(s) IV Push every 6 hours PRN Nausea      LABS:                        10.1   8.84  )-----------( 188      ( 27 Oct 2019 02:20 )             30.0     10-    134<L>  |  98  |  22  ----------------------------<  145<H>  3.6   |  25  |  0.56    Ca    8.6      27 Oct 2019 02:20  Phos  2.8     10-27  Mg     2.2     10-27    TPro  6.1  /  Alb  3.0<L>  /  TBili  0.7  /  DBili  x   /  AST  20  /  ALT  12  /  AlkPhos  63  10-    LIVER FUNCTIONS - ( 26 Oct 2019 12:00 )  Alb: 3.0 g/dL / Pro: 6.1 g/dL / ALK PHOS: 63 u/L / ALT: 12 u/L / AST: 20 u/L / GGT: x                 _________________________        PROCEDURE: Exploratory laparotomy, lysis of adhesions, partial esophagogastrectomy, feeding jejunustomy, pyloromyotomy, left chest tube placement  22-Oct-2019       ISSUES:   Hypotension requiring IV pressors  ROSSY  Achalasia  CHF  Atrial fibrillation  Hypertension  Hypothyroidism  History of repair of hiatal hernia  H/O colectomy  S/P appendectomy  S/P hysterectomy  Post op pain  Chest tube in place    INTERVAL EVENTS:   Precedex weaned off.  Metoprolol 5mg resolved the RVR.  Phenylephrine weaned off overnight.    HISTORY:   Patient reports mild pain at abdominal incision sites which is worse with coughing and deep breathing without associated fever or dyspnea. Pain is improved with use of pain meds.     PHYSICAL EXAM:   Gen: Comfortable, No acute distress  Eyes: Sclera white, Conjunctiva normal, Eyelids normal, Pupils symmetrical   ENT: Mucous membranes moist, NG tube in place,  ,    Neck: Trachea midline,  ,  ,  ,    CV: Rate regular, Rhythm irregular,    Resp: Breath sounds clear, No accessory muscles use    Abd: Soft, Non-distended, Mildly tender, Bowel sounds hypoactive, feeding tube in place, drain in place  Skin: Warm, 1+ bilateral edema    : No lopez  Neuro: Moving all 4 extremities  Psych: A&Ox2      ASSESSMENT AND PLAN:     NEURO:  Post-operative Pain - Pain control with dilaudid IV and Tylenol IV PRN.            RESPIRATORY:  Hypoxia - Wean nasal cannula for goal O2sat above 92. Obtain CXR. Incentive spirometry. Chest PT and frequent suctioning. Continue bronchodilators. OOB to chair & ambulate w/ assistance. Continuous pulse oximetry for support & to prevent decompensation.                 CARDIOVASCULAR:  Hypotension requiring IV pressors -- Distributive shock in setting of epidural, mild fluid overload. Wean pressors for MAP goal 65.  CHF with mild systolic dysfunction - now euvolemic.  HTN - stable. Hold antihypertensives medications.  Afib - metoprolol IV 5mg IV q6h.  Hold anticoagulation for now.      RENAL:  ROSSY with oliguria - improved. Renally dose medications.   Monitor IOs and electrolytes.          GASTROINTESTINAL:  NG tube to suction.  J tube to gravity drainage  GI prophylaxis with Protonix  Zofran and Reglan IV PRN for nausea  NPO until swallow study  Tube feeds through J tube.           HEMATOLOGIC:  No signs of active bleeding. Monitor Hgb in CBC in AM  DVT prophylaxis with heparin subQ and SCDs.  Thrombocytopenia improving - f/u HIT panel but unlikely HIT given recovering platelets      INFECTIOUS DISEASE:  All surgical sites appear clean. No signs of active infection. Will monitor for fever and leukocytosis.                 ENDOCRINE:  Stable – Monitor glucose fingersticks for goal 120-180.  Hypothyroidism - stable. Continue Synthroid.         Pertinent clinical, laboratory, radiographic, hemodynamic, echocardiographic, respiratory data, microbiologic data and chart were reviewed by myself and analyzed frequently throughout the course of the day and night by myself.    Plan discussed at length with the CTICU staff and Attending CT Surgeon.   Patient's status was discussed with patient and family at bedside.    Patient required critical care management.  60 minutes were spent evaluating, managing, providing, coordinating, and documenting care for this patient, exclusive of procedures from  1201AM to  0800AM.

## 2019-10-28 LAB
ANION GAP SERPL CALC-SCNC: 11 MMO/L — SIGNIFICANT CHANGE UP (ref 7–14)
BUN SERPL-MCNC: 18 MG/DL — SIGNIFICANT CHANGE UP (ref 7–23)
CALCIUM SERPL-MCNC: 8.5 MG/DL — SIGNIFICANT CHANGE UP (ref 8.4–10.5)
CHLORIDE SERPL-SCNC: 101 MMOL/L — SIGNIFICANT CHANGE UP (ref 98–107)
CO2 SERPL-SCNC: 26 MMOL/L — SIGNIFICANT CHANGE UP (ref 22–31)
CREAT SERPL-MCNC: 0.49 MG/DL — LOW (ref 0.5–1.3)
GLUCOSE SERPL-MCNC: 131 MG/DL — HIGH (ref 70–99)
HCT VFR BLD CALC: 28.5 % — LOW (ref 34.5–45)
HGB BLD-MCNC: 9.3 G/DL — LOW (ref 11.5–15.5)
MAGNESIUM SERPL-MCNC: 2.1 MG/DL — SIGNIFICANT CHANGE UP (ref 1.6–2.6)
MCHC RBC-ENTMCNC: 29.2 PG — SIGNIFICANT CHANGE UP (ref 27–34)
MCHC RBC-ENTMCNC: 32.6 % — SIGNIFICANT CHANGE UP (ref 32–36)
MCV RBC AUTO: 89.3 FL — SIGNIFICANT CHANGE UP (ref 80–100)
NRBC # FLD: 0 K/UL — SIGNIFICANT CHANGE UP (ref 0–0)
PHOSPHATE SERPL-MCNC: 2.5 MG/DL — SIGNIFICANT CHANGE UP (ref 2.5–4.5)
PLATELET # BLD AUTO: 184 K/UL — SIGNIFICANT CHANGE UP (ref 150–400)
PMV BLD: 10.1 FL — SIGNIFICANT CHANGE UP (ref 7–13)
POTASSIUM SERPL-MCNC: 3.9 MMOL/L — SIGNIFICANT CHANGE UP (ref 3.5–5.3)
POTASSIUM SERPL-SCNC: 3.9 MMOL/L — SIGNIFICANT CHANGE UP (ref 3.5–5.3)
RBC # BLD: 3.19 M/UL — LOW (ref 3.8–5.2)
RBC # FLD: 14.8 % — HIGH (ref 10.3–14.5)
SODIUM SERPL-SCNC: 138 MMOL/L — SIGNIFICANT CHANGE UP (ref 135–145)
WBC # BLD: 6.56 K/UL — SIGNIFICANT CHANGE UP (ref 3.8–10.5)
WBC # FLD AUTO: 6.56 K/UL — SIGNIFICANT CHANGE UP (ref 3.8–10.5)

## 2019-10-28 PROCEDURE — 99233 SBSQ HOSP IP/OBS HIGH 50: CPT

## 2019-10-28 PROCEDURE — 71045 X-RAY EXAM CHEST 1 VIEW: CPT | Mod: 26

## 2019-10-28 PROCEDURE — 74220 X-RAY XM ESOPHAGUS 1CNTRST: CPT | Mod: 26

## 2019-10-28 RX ORDER — HYDRALAZINE HCL 50 MG
10 TABLET ORAL ONCE
Refills: 0 | Status: DISCONTINUED | OUTPATIENT
Start: 2019-10-28 | End: 2019-10-28

## 2019-10-28 RX ORDER — ALBUMIN HUMAN 25 %
250 VIAL (ML) INTRAVENOUS ONCE
Refills: 0 | Status: DISCONTINUED | OUTPATIENT
Start: 2019-10-28 | End: 2019-10-29

## 2019-10-28 RX ADMIN — Medication 68 MICROGRAM(S): at 21:08

## 2019-10-28 RX ADMIN — HEPARIN SODIUM 5000 UNIT(S): 5000 INJECTION INTRAVENOUS; SUBCUTANEOUS at 17:37

## 2019-10-28 RX ADMIN — Medication 5 MILLIGRAM(S): at 17:37

## 2019-10-28 RX ADMIN — HEPARIN SODIUM 5000 UNIT(S): 5000 INJECTION INTRAVENOUS; SUBCUTANEOUS at 05:10

## 2019-10-28 RX ADMIN — PANTOPRAZOLE SODIUM 40 MILLIGRAM(S): 20 TABLET, DELAYED RELEASE ORAL at 13:02

## 2019-10-28 RX ADMIN — Medication 5 MILLIGRAM(S): at 13:02

## 2019-10-28 RX ADMIN — Medication 5 MILLIGRAM(S): at 23:17

## 2019-10-28 NOTE — CHART NOTE - NSCHARTNOTEFT_GEN_A_CORE
Source: RN and EMC reviewed    Enteral /Parenteral Nutrition: Diet, NPO with Tube Feed:   Tube Feeding Modality: Jejunostomy  Jevity 1.2 Tyler (JEVITY1.2RTH)  Total Volume for 24 Hours (mL): 720  Continuous  Starting Tube Feed Rate {mL per Hour}: 30  Increase Tube Feed Rate by (mL): 0  Until Goal Tube Feed Rate (mL per Hour): 30  Tube Feed Duration (in Hours): 24  Tube Feed Start Time: 08:00 (10-28-19 @ 07:56)    Current Weight: 10/28 49 kg    Pt now with Jejunostomy, receiving and tolerating enteral feeding of Jevity 1.2 @ 30 ml/hr. Earlier nutrition recommendations were to slowly increase feeding to 50 ml/hr x 24 hrs as pt is at severe risk for malnutrition. Current rate is inadequate to meet patient's estimated nutrition needs. Suggested goal rate of 50 ml/hr x 24 hrs would provide 1440 calories and 66.6 gms protein/day. Continue to monitor electrolytes for refeeding syndrome as rate is increased. Also suggest MVI for micronutrient coverage. Physical findings remain with evidence of severe subcutaneous fat depletion and severe muscle mass wasting. Pt also now presents with 2+ L/R edema of feet, which likely contributes to wt gain noted.    __________________ Pertinent Medications__________________   MEDICATIONS  (STANDING):  heparin  Injectable 5000 Unit(s) SubCutaneous every 12 hours  levothyroxine Injectable 68 MICROGram(s) IV Push at bedtime  metoprolol tartrate Injectable 5 milliGRAM(s) IV Push every 6 hours  pantoprazole  Injectable 40 milliGRAM(s) IV Push daily  phenylephrine    Infusion 0.5 MICROgram(s)/kG/Min (8.344 mL/Hr) IV Continuous <Continuous>    MEDICATIONS  (PRN):  HYDROmorphone  Injectable 0.25 milliGRAM(s) IV Push every 3 hours PRN Severe Pain (7 - 10)  naloxone Injectable 0.1 milliGRAM(s) IV Push every 3 minutes PRN For ANY of the following changes in patient status:  A. RR LESS THAN 10 breaths per minute, B. Oxygen saturation LESS THAN 90%, C. Sedation score of 6  ondansetron Injectable 4 milliGRAM(s) IV Push every 6 hours PRN Nausea      __________________ Pertinent Labs__________________   10-28 Na138 mmol/L Glu 131 mg/dL<H> K+ 3.9 mmol/L Cr  0.49 mg/dL<L> BUN 18 mg/dL 10-28 Phos 2.5 mg/dL 10-26 Alb 3.0 g/dL<L> 10-15 PAB 10 mg/dL<L> 10-15 [id number] 5.2 %    Skin: Intact    Estimated Needs:   [x ] no change since previous assessment    Previous Nutrition Diagnosis:     Severe Malnutrition     Nutrition Diagnosis is [x ] ongoing      Recommend:    1). Please increase enteral feeding of Jevity 1.2 to 50 ml/hr x 24 hrs to meet pt's estimated nutrition needs.    2). Monitor electrolytes for refeeding syndrome.    3). Suggest MVI for micronutrient coverage.    4). Monitor weights, labs, BM's, skin integrity and edema.    5) Follow pt as per protocol.

## 2019-10-29 LAB
ANION GAP SERPL CALC-SCNC: 11 MMO/L — SIGNIFICANT CHANGE UP (ref 7–14)
BUN SERPL-MCNC: 13 MG/DL — SIGNIFICANT CHANGE UP (ref 7–23)
CA-I BLD-SCNC: 0.93 MMOL/L — LOW (ref 1.03–1.23)
CALCIUM SERPL-MCNC: 8.3 MG/DL — LOW (ref 8.4–10.5)
CHLORIDE SERPL-SCNC: 99 MMOL/L — SIGNIFICANT CHANGE UP (ref 98–107)
CO2 SERPL-SCNC: 27 MMOL/L — SIGNIFICANT CHANGE UP (ref 22–31)
CREAT SERPL-MCNC: 0.43 MG/DL — LOW (ref 0.5–1.3)
GLUCOSE SERPL-MCNC: 140 MG/DL — HIGH (ref 70–99)
HCT VFR BLD CALC: 31.3 % — LOW (ref 34.5–45)
HGB BLD-MCNC: 10.2 G/DL — LOW (ref 11.5–15.5)
MAGNESIUM SERPL-MCNC: 1.9 MG/DL — SIGNIFICANT CHANGE UP (ref 1.6–2.6)
MCHC RBC-ENTMCNC: 28.9 PG — SIGNIFICANT CHANGE UP (ref 27–34)
MCHC RBC-ENTMCNC: 32.6 % — SIGNIFICANT CHANGE UP (ref 32–36)
MCV RBC AUTO: 88.7 FL — SIGNIFICANT CHANGE UP (ref 80–100)
NRBC # FLD: 0 K/UL — SIGNIFICANT CHANGE UP (ref 0–0)
PHOSPHATE SERPL-MCNC: 2.6 MG/DL — SIGNIFICANT CHANGE UP (ref 2.5–4.5)
PLATELET # BLD AUTO: 222 K/UL — SIGNIFICANT CHANGE UP (ref 150–400)
PMV BLD: 9.8 FL — SIGNIFICANT CHANGE UP (ref 7–13)
POTASSIUM SERPL-MCNC: 3.6 MMOL/L — SIGNIFICANT CHANGE UP (ref 3.5–5.3)
POTASSIUM SERPL-SCNC: 3.6 MMOL/L — SIGNIFICANT CHANGE UP (ref 3.5–5.3)
RBC # BLD: 3.53 M/UL — LOW (ref 3.8–5.2)
RBC # FLD: 14.8 % — HIGH (ref 10.3–14.5)
SODIUM SERPL-SCNC: 137 MMOL/L — SIGNIFICANT CHANGE UP (ref 135–145)
WBC # BLD: 9.17 K/UL — SIGNIFICANT CHANGE UP (ref 3.8–10.5)
WBC # FLD AUTO: 9.17 K/UL — SIGNIFICANT CHANGE UP (ref 3.8–10.5)

## 2019-10-29 PROCEDURE — 93970 EXTREMITY STUDY: CPT | Mod: 26

## 2019-10-29 PROCEDURE — 99233 SBSQ HOSP IP/OBS HIGH 50: CPT

## 2019-10-29 PROCEDURE — 71045 X-RAY EXAM CHEST 1 VIEW: CPT | Mod: 26

## 2019-10-29 RX ORDER — PANTOPRAZOLE SODIUM 20 MG/1
40 TABLET, DELAYED RELEASE ORAL
Refills: 0 | Status: DISCONTINUED | OUTPATIENT
Start: 2019-10-29 | End: 2019-11-07

## 2019-10-29 RX ORDER — MAGNESIUM SULFATE 500 MG/ML
1 VIAL (ML) INJECTION ONCE
Refills: 0 | Status: COMPLETED | OUTPATIENT
Start: 2019-10-29 | End: 2019-10-29

## 2019-10-29 RX ORDER — POTASSIUM CHLORIDE 20 MEQ
40 PACKET (EA) ORAL ONCE
Refills: 0 | Status: COMPLETED | OUTPATIENT
Start: 2019-10-29 | End: 2019-10-29

## 2019-10-29 RX ORDER — METOPROLOL TARTRATE 50 MG
5 TABLET ORAL EVERY 4 HOURS
Refills: 0 | Status: DISCONTINUED | OUTPATIENT
Start: 2019-10-29 | End: 2019-10-29

## 2019-10-29 RX ORDER — METOPROLOL TARTRATE 50 MG
25 TABLET ORAL EVERY 8 HOURS
Refills: 0 | Status: DISCONTINUED | OUTPATIENT
Start: 2019-10-29 | End: 2019-11-07

## 2019-10-29 RX ORDER — ACETAMINOPHEN 500 MG
650 TABLET ORAL EVERY 6 HOURS
Refills: 0 | Status: DISCONTINUED | OUTPATIENT
Start: 2019-10-29 | End: 2019-11-07

## 2019-10-29 RX ORDER — LEVOTHYROXINE SODIUM 125 MCG
137 TABLET ORAL DAILY
Refills: 0 | Status: DISCONTINUED | OUTPATIENT
Start: 2019-10-29 | End: 2019-11-03

## 2019-10-29 RX ORDER — TRAMADOL HYDROCHLORIDE 50 MG/1
25 TABLET ORAL EVERY 4 HOURS
Refills: 0 | Status: DISCONTINUED | OUTPATIENT
Start: 2019-10-29 | End: 2019-10-29

## 2019-10-29 RX ADMIN — Medication 40 MILLIEQUIVALENT(S): at 07:53

## 2019-10-29 RX ADMIN — Medication 5 MILLIGRAM(S): at 13:41

## 2019-10-29 RX ADMIN — Medication 5 MILLIGRAM(S): at 05:15

## 2019-10-29 RX ADMIN — Medication 100 GRAM(S): at 07:53

## 2019-10-29 RX ADMIN — Medication 10 MILLIGRAM(S): at 10:18

## 2019-10-29 RX ADMIN — HEPARIN SODIUM 5000 UNIT(S): 5000 INJECTION INTRAVENOUS; SUBCUTANEOUS at 17:26

## 2019-10-29 RX ADMIN — Medication 5 MILLIGRAM(S): at 10:42

## 2019-10-29 RX ADMIN — PANTOPRAZOLE SODIUM 40 MILLIGRAM(S): 20 TABLET, DELAYED RELEASE ORAL at 13:40

## 2019-10-29 RX ADMIN — HEPARIN SODIUM 5000 UNIT(S): 5000 INJECTION INTRAVENOUS; SUBCUTANEOUS at 05:15

## 2019-10-29 NOTE — PROGRESS NOTE ADULT - SUBJECTIVE AND OBJECTIVE BOX
CLIFFORD DOLAN            MRN-[id number]         opioid-like analgesics (Other)                 89 y/o female w/ PMH of HTN, achalasia s/p dilation x2, HH s/p repair w/ mesh, emergency Volvulus surgical repair 4/2014, s/p EGD CRE balloon dilation w/ botox injection 8/2015, s/p EGD dilation 3/2016, s/p EGD lap heller myotomy 2016 and recent Barium swallow on 6/2019 for dysphagia which ended up showing decreased motility with EGD on 10/8/19 showing what appeared to be erosion of mesh into esophagus. Pt is now s/p CT abdomen done yesterday and persistent vomiting since then unable to tolerate PO food. She was seen in the Thoracic surgery office today by Dr. clemente and was admitted.       At bedside pt seen with daughter. She is a thin female and is stable in NAD. At this time she denies sob, cp.  She does admit to vomiting 4x yesterday into this morning after eating. She has not been vomiting since she stopped eating. (15 Oct 2019 17:36)      Procedure: Exploratory laparotomy, lysis of adhesions, partial esophagogastrectomy, feeding jejunostomy, pyloromyotomy, left chest tube placement 10/22/2019      Issues:              Hypertension  Delirium              Postop pain  Achalasia  Chronic atrial fibrillation  Hypothyroidism  Anemia                            Home Medications:  Eliquis 5 mg oral tablet: 1 tab(s) orally 2 times a day (24 Oct 2019 08:50)  Synthroid 137 mcg (0.137 mg) oral tablet: 1 tab(s) orally once a day (24 Oct 2019 08:50)      PAST MEDICAL & SURGICAL HISTORY:  Atrial fibrillation  Achalasia  Hypertension  Hypothyroidism  History of repair of hiatal hernia  H/O colectomy  S/P appendectomy  S/P hysterectomy        ICU Vital Signs Last 24 Hrs  T(C): 36.5 (29 Oct 2019 04:00), Max: 36.8 (28 Oct 2019 12:00)  T(F): 97.7 (29 Oct 2019 04:00), Max: 98.2 (28 Oct 2019 12:00)  HR: 59 (29 Oct 2019 04:00) (50 - 67)  BP: 154/52 (29 Oct 2019 04:00) (141/65 - 180/89)  BP(mean): 77 (29 Oct 2019 04:00) (76 - 111)  ABP: --  ABP(mean): --  RR: 19 (29 Oct 2019 04:00) (11 - 22)  SpO2: 95% (29 Oct 2019 04:00) (95% - 100%)    I&O's Detail    27 Oct 2019 07:01  -  28 Oct 2019 07:00  --------------------------------------------------------  IN:    dexmedetomidine Infusion: 32.3 mL    Enteral Tube Flush: 180 mL    Enteral Tube Flush: 180 mL    Pivot 1.5: 480 mL  Total IN: 872.3 mL    OUT:    Bulb: 15 mL    Indwelling Catheter - Urethral: 555 mL    Intermittent Catheterization - Urethral: 850 mL    Nasoenteral Tube: 150 mL  Total OUT: 1570 mL    Total NET: -697.7 mL      28 Oct 2019 07:01  -  29 Oct 2019 05:50  --------------------------------------------------------  IN:    Enteral Tube Flush: 180 mL    Enteral Tube Flush: 30 mL    Pivot 1.5: 740 mL  Total IN: 950 mL    OUT:    Bulb: 15 mL    Indwelling Catheter - Urethral: 980 mL  Total OUT: 995 mL    Total NET: -45 mL        CAPILLARY BLOOD GLUCOSE          Home Medications:  Eliquis 5 mg oral tablet: 1 tab(s) orally 2 times a day (24 Oct 2019 08:50)  Synthroid 137 mcg (0.137 mg) oral tablet: 1 tab(s) orally once a day (24 Oct 2019 08:50)      MEDICATIONS  (STANDING):  albumin human  5% IVPB 250 milliLiter(s) IV Intermittent once  heparin  Injectable 5000 Unit(s) SubCutaneous every 12 hours  levothyroxine Injectable 68 MICROGram(s) IV Push at bedtime  metoprolol tartrate Injectable 5 milliGRAM(s) IV Push every 6 hours  pantoprazole  Injectable 40 milliGRAM(s) IV Push daily  phenylephrine    Infusion 0.5 MICROgram(s)/kG/Min (8.344 mL/Hr) IV Continuous <Continuous>    MEDICATIONS  (PRN):  HYDROmorphone  Injectable 0.25 milliGRAM(s) IV Push every 3 hours PRN Severe Pain (7 - 10)  naloxone Injectable 0.1 milliGRAM(s) IV Push every 3 minutes PRN For ANY of the following changes in patient status:  A. RR LESS THAN 10 breaths per minute, B. Oxygen saturation LESS THAN 90%, C. Sedation score of 6  ondansetron Injectable 4 milliGRAM(s) IV Push every 6 hours PRN Nausea          Physical exam:                             General:               Pt is awake, alert, not in any distress                                                  Neuro:                  Nonfocal                             Cardiovascular:   S1 & S2, regular                           Respiratory:         Air entry is fair and equal on both sides, has bilateral conducted sounds                           GI:                          Soft, nondistended and nontender, Bowel sounds hypoactive                           Ext:                        No cyanosis or edema     Labs:                                                                           10.2   9.17  )-----------( 222      ( 29 Oct 2019 04:40 )             31.3             10-29    137  |  99  |  13  ----------------------------<  140<H>  3.6   |  27  |  0.43<L>    Ca    8.3<L>      29 Oct 2019 04:40  Phos  2.6     10-29  Mg     1.9     10-29                    CXR:  < from: Xray Esophagram (10.28.19 @ 15:12) >  FINDINGS:  Preliminary  radiograph of the chest is unremarkable.    The patient swallowedbarium without difficulty.     Multiple tertiary contractions are visualized, likely secondary to   patient's operative state earlier this morning.    Guerrero drain is present overlying the mid abdomen  Surgical staples are seen overlying the midabdomen.  Multilevel degenerative changes of the spine.      IMPRESSION:   No leak is visualized.    < from: Xray Chest 1 View- PORTABLE-Routine (10.28.19 @ 05:50) >  Enteric tube unchanged. Hazy left lung base consistent with postop small   effusion and atelectasis. Remainder of the lungs are clear. No   pneumothorax although patient's chin obscures the superior mediastinum.      COMPARISON:  October 27      IMPRESSION:  Status post esophagectomy.            Plan:    General: 89yFemale s/p Exploratory laparotomy, lysis of adhesions, partial esophagogastrectomy, feeding jejunostomy, pyloromyotomy, left chest tube placement 10/22/2019.                             Neuro:                                         Pain control with  IV Tylenol. Avoid narcotics    Delirium: Currently off Precedex. Continue 1:1 observation                            Cardiovascular:                                          HTN: Restart Norvasc. Switch to PO lopressor    Continue hemodynamic monitoring.                                        PAF: Currently in NSR. No Eliquis as she is prone to falling and risk of bleeding.                                                                   Respiratory:                                         Pt is on RA                                         Appears to be in pain but not in distress.                                         Encourage incentive spirometry                                                               Continue bronchodilators, pulmonary toilet                            GI                                         On clears                                         Continue GI prophylaxis with Protonix                                         Continue Zofran / Reglan for nausea - PRN                                         Flush  J-tube with 20cc of sterile water Q 4hrs.     Continue J-tube feeds  @50cc/hr  	                                                                 Renal:                                         Monitor I/Os and electrolytes                                         Rossi                                                  Hem/ Onc:                                         Monitor for signs of bleeding.                                          Follow CBC in AM                           Infectious disease:                                          Monitor for fever / leukocytosis.                                          All surgical incision / chest tube  sites look clean                            Endocrine      Hypothyroidism: On Synthroid                                          Continue Accu-Checks with coverage    Pt is on SQ Heparin and Venodyne boots for DVT prophylaxis.     Pertinent clinical, laboratory, radiographic, hemodynamic, echocardiographic, respiratory data, microbiologic data and chart were reviewed and analyzed frequently throughout the course of the day and night  Patient seen, examined and plan discussed with CT Surgeon Dr. Clemente / CTICU team during rounds.    Pt's status discussed with family at bedside, updated status.             Thuan Horn MD

## 2019-10-30 ENCOUNTER — TRANSCRIPTION ENCOUNTER (OUTPATIENT)
Age: 84
End: 2019-10-30

## 2019-10-30 LAB
ANION GAP SERPL CALC-SCNC: 13 MMO/L — SIGNIFICANT CHANGE UP (ref 7–14)
BUN SERPL-MCNC: 11 MG/DL — SIGNIFICANT CHANGE UP (ref 7–23)
CA-I BLD-SCNC: 1.1 MMOL/L — SIGNIFICANT CHANGE UP (ref 1.03–1.23)
CALCIUM SERPL-MCNC: 8.5 MG/DL — SIGNIFICANT CHANGE UP (ref 8.4–10.5)
CHLORIDE SERPL-SCNC: 96 MMOL/L — LOW (ref 98–107)
CO2 SERPL-SCNC: 28 MMOL/L — SIGNIFICANT CHANGE UP (ref 22–31)
CREAT SERPL-MCNC: 0.42 MG/DL — LOW (ref 0.5–1.3)
GLUCOSE SERPL-MCNC: 155 MG/DL — HIGH (ref 70–99)
HCT VFR BLD CALC: 35.8 % — SIGNIFICANT CHANGE UP (ref 34.5–45)
HGB BLD-MCNC: 11.8 G/DL — SIGNIFICANT CHANGE UP (ref 11.5–15.5)
MAGNESIUM SERPL-MCNC: 2 MG/DL — SIGNIFICANT CHANGE UP (ref 1.6–2.6)
MCHC RBC-ENTMCNC: 29.6 PG — SIGNIFICANT CHANGE UP (ref 27–34)
MCHC RBC-ENTMCNC: 33 % — SIGNIFICANT CHANGE UP (ref 32–36)
MCV RBC AUTO: 89.9 FL — SIGNIFICANT CHANGE UP (ref 80–100)
NRBC # FLD: 0 K/UL — SIGNIFICANT CHANGE UP (ref 0–0)
PHOSPHATE SERPL-MCNC: 2.4 MG/DL — LOW (ref 2.5–4.5)
PLATELET # BLD AUTO: 228 K/UL — SIGNIFICANT CHANGE UP (ref 150–400)
PMV BLD: 10.2 FL — SIGNIFICANT CHANGE UP (ref 7–13)
POTASSIUM SERPL-MCNC: 3.8 MMOL/L — SIGNIFICANT CHANGE UP (ref 3.5–5.3)
POTASSIUM SERPL-SCNC: 3.8 MMOL/L — SIGNIFICANT CHANGE UP (ref 3.5–5.3)
RBC # BLD: 3.98 M/UL — SIGNIFICANT CHANGE UP (ref 3.8–5.2)
RBC # FLD: 14.6 % — HIGH (ref 10.3–14.5)
SODIUM SERPL-SCNC: 137 MMOL/L — SIGNIFICANT CHANGE UP (ref 135–145)
SRA INTERP SER-IMP: SIGNIFICANT CHANGE UP
SRA INTERP SER-IMP: SIGNIFICANT CHANGE UP
WBC # BLD: 9.67 K/UL — SIGNIFICANT CHANGE UP (ref 3.8–10.5)
WBC # FLD AUTO: 9.67 K/UL — SIGNIFICANT CHANGE UP (ref 3.8–10.5)

## 2019-10-30 PROCEDURE — 71045 X-RAY EXAM CHEST 1 VIEW: CPT | Mod: 26

## 2019-10-30 PROCEDURE — 99232 SBSQ HOSP IP/OBS MODERATE 35: CPT

## 2019-10-30 RX ADMIN — Medication 137 MICROGRAM(S): at 05:17

## 2019-10-30 RX ADMIN — Medication 25 MILLIGRAM(S): at 22:47

## 2019-10-30 RX ADMIN — HEPARIN SODIUM 5000 UNIT(S): 5000 INJECTION INTRAVENOUS; SUBCUTANEOUS at 05:17

## 2019-10-30 RX ADMIN — HEPARIN SODIUM 5000 UNIT(S): 5000 INJECTION INTRAVENOUS; SUBCUTANEOUS at 18:15

## 2019-10-30 RX ADMIN — Medication 25 MILLIGRAM(S): at 13:50

## 2019-10-30 NOTE — DISCHARGE NOTE PROVIDER - HOSPITAL COURSE
89 y/o female w/ PMH of HTN, achalasia s/p dilation x2, HH s/p repair w/ mesh, emergency Volvulus surgical repair 4/2014, s/p EGD CRE balloon dilation w/ botox injection 8/2015, s/p EGD dilation 3/2016, s/p EGD lap heller myotomy 2016 and recent Barium swallow on 6/2019 for dysphagia which ended up showing decreased motility with EGD on 10/8/19 showing what appeared to be erosion of mesh into esophagus. Pt is now s/p CT abdomen done yesterday and persistent vomiting since then unable to tolerate PO food. She was seen in the Thoracic surgery office by Dr. clemente and was admitted.     Patient s/p EGD, Exploratory laparotomy, Lysis of adhesion, partial esophagogastrectomy, pyloromyotomy, feeding jejunostomy on 10/22/19.  Patient had barium swallow and diet advanced.  Patient tolerating nocturnal feedings via J tube and tolerating full liquid diet.  Post op course patient required enhanced supervision for delirium which resolved.  Patient also with episodes of rapid A fib, followed by cardiology, recommend to hold off on anticoagulation due to fall risk. 89 y/o female w/ PMH of HTN, achalasia s/p dilation x2, HH s/p repair w/ mesh, emergency Volvulus surgical repair 4/2014, s/p EGD CRE balloon dilation w/ botox injection 8/2015, s/p EGD dilation 3/2016, s/p EGD lap heller myotomy 2016 and recent Barium swallow on 6/2019 for dysphagia which ended up showing decreased motility with EGD on 10/8/19 showing what appeared to be erosion of mesh into esophagus. Pt is now s/p CT abdomen done yesterday and persistent vomiting since then unable to tolerate PO food. She was seen in the Thoracic surgery office by Dr. clemente and was admitted.     Patient s/p EGD, Exploratory laparotomy, Lysis of adhesion, partial esophagogastrectomy, pyloromyotomy, feeding jejunostomy on 10/22/19.  Patient had barium swallow and diet advanced.  Patient tolerating nocturnal feedings via J tube and tolerating full liquid diet.  Post op course patient required enhanced supervision for delirium which resolved.  Patient also with episodes of rapid A fib, followed by cardiology, recommend to hold off on anticoagulation due to fall risk.  Endocrine was consulted and corrected hypothyroid and renal was consulted to correct hyponatremia. Pt stable for discharge rajiv to follow up as an outpatient.

## 2019-10-30 NOTE — DISCHARGE NOTE PROVIDER - CARE PROVIDER_API CALL
Asif Jang (MD)  Surgery; Thoracic Surgery  [id number] [place] [address]  Phone: [phone]  Fax: [phone]  Follow Up Time:

## 2019-10-30 NOTE — DISCHARGE NOTE PROVIDER - NSDCMRMEDTOKEN_GEN_ALL_CORE_FT
Eliquis 5 mg oral tablet: 1 tab(s) orally 2 times a day  Protonix 40 mg oral delayed release tablet: 1 tab(s) orally once a day   Synthroid 137 mcg (0.137 mg) oral tablet: 1 tab(s) orally once a day acetaminophen 325 mg oral tablet: 2 tab(s) orally every 6 hours, As needed, Mild Pain (1 - 3), Moderate Pain (4 - 6)  aspirin 81 mg oral delayed release tablet: 1 tab(s) orally once a day  bisacodyl 5 mg oral delayed release tablet: 1 tab(s) orally once a day (at bedtime), As needed, Constipation  CHEST XRAY: PA and lateral  Dx s/p esophagectomy  hydrALAZINE 25 mg oral tablet: 1 tab(s) orally once a day  melatonin 3 mg oral tablet: 1 tab(s) orally once a day (at bedtime)  metoprolol tartrate 25 mg oral tablet: 1 tab(s) orally every 8 hours  Protonix 40 mg oral delayed release tablet: 1 tab(s) orally once a day   Synthroid 137 mcg (0.137 mg) oral tablet: 1 tab(s) orally once a day  **MUST BE GIVEN ON EMTPY STOMACH FOR ADEQUATE ABSORPTION**

## 2019-10-30 NOTE — PROGRESS NOTE ADULT - SUBJECTIVE AND OBJECTIVE BOX
Subjective: pt more alert today, resting comfortably, no acute complaints    Vital Signs:  Vital Signs Last 24 Hrs  T(C): 36.7 (10-30-19 @ 12:51), Max: 36.7 (10-30-19 @ 12:51)  T(F): 98.1 (10-30-19 @ 12:51), Max: 98.1 (10-30-19 @ 12:51)  HR: 66 (10-30-19 @ 12:51) (57 - 74)  BP: 154/68 (10-30-19 @ 12:51) (143/82 - 174/76)  RR: 16 (10-30-19 @ 12:51) (16 - 20)  SpO2: 95% (10-30-19 @ 12:51) (95% - 100%) on (O2)    Telemetry/Alarms:  General: WN/WD NAD  Neurology: Awake, nonfocal, LOCK x 4  Eyes: Scleras clear, PERRLA/ EOMI, Gross vision intact  ENT:Gross hearing intact, grossly patent pharynx, no stridor  Neck: Neck supple, trachea midline, No JVD,   Respiratory: CTA B/L, No wheezing, rales, rhonchi  CV: RRR, S1S2, no murmurs, rubs or gallops  Abdominal: Soft, NT, ND +BS,   Extremities: No edema, + peripheral pulses  Skin: No Rashes, Hematoma, Ecchymosis  Lymphatic: No Neck, axilla, groin LAD  Psych: Oriented x 3, normal affect  Incisions: c,d,i  Tubes J tube intact  Relevant labs, radiology and Medications reviewed                        11.8   9.67  )-----------( 228      ( 30 Oct 2019 05:45 )             35.8     10-30    137  |  96<L>  |  11  ----------------------------<  155<H>  3.8   |  28  |  0.42<L>    Ca    8.5      30 Oct 2019 05:45  Phos  2.4     10-30  Mg     2.0     10-30        MEDICATIONS  (STANDING):  heparin  Injectable 5000 Unit(s) SubCutaneous every 12 hours  levothyroxine 137 MICROGram(s) Oral daily  metoprolol tartrate 25 milliGRAM(s) Oral every 8 hours  pantoprazole    Tablet 40 milliGRAM(s) Oral before breakfast    MEDICATIONS  (PRN):  acetaminophen   Tablet .. 650 milliGRAM(s) Oral every 6 hours PRN Mild Pain (1 - 3), Moderate Pain (4 - 6)  bisacodyl 5 milliGRAM(s) Oral at bedtime PRN Constipation  traMADol 25 milliGRAM(s) Oral every 4 hours PRN Severe Pain (7 - 10)    Pertinent Physical Exam  I&O's Summary    29 Oct 2019 07:01  -  30 Oct 2019 07:00  --------------------------------------------------------  IN: 1600 mL / OUT: 400 mL / NET: 1200 mL        Assessment  89y Female  w/ PAST MEDICAL & SURGICAL HISTORY:  Atrial fibrillation  Achalasia  Hypertension  Hypothyroidism  History of repair of hiatal hernia  H/O colectomy  S/P appendectomy  S/P hysterectomy  admitted with complaints of Patient is a 89y old  Female who presents with a chief complaint of Dysphagia and Vomiting (30 Oct 2019 07:41)  .  89 y/o female w/ PMH of HTN, achalasia s/p dilation x2, HH s/p repair w/ mesh, emergency Volvulus surgical repair 4/2014, s/p EGD CRE balloon dilation w/ botox injection 8/2015, s/p EGD dilation 3/2016, s/p EGD lap heller myotomy 2016 and recent Barium swallow on 6/2019 for dysphagia which ended up showing decreased motility with EGD on 10/8/19 showing what appeared to be erosion of mesh into esophagus.    Procedure: Exploratory laparotomy, lysis of adhesions, partial esophagogastrectomy, feeding jejunostomy, pyloromyotomy, left chest tube placement 10/22/2019    PLAN  Neuro: Pain management  Pulm: Encourage coughing, deep breathing and use of incentive spirometry. Wean off supplemental oxygen as able. Daily CXR.   Cardio: Monitor telemetry/alarms  GI: Tolerating diet. Continue stool softeners.  Renal: monitor urine output, supplement electrolytes as needed  Vasc: Heparin SC/SCDs for DVT prophylaxis  Heme: Stable H/H. .   ID: Off antibiotics. Stable.  Therapy: OOB/ambulate  Tubes: Monitor Chest tube output  Disposition: Aim to D/C to home on  Discussed with Cardiothoracic Team at AM rounds. Subjective: pt more alert today, resting comfortably, no acute complaints    Vital Signs:  Vital Signs Last 24 Hrs  T(C): 36.7 (10-30-19 @ 12:51), Max: 36.7 (10-30-19 @ 12:51)  T(F): 98.1 (10-30-19 @ 12:51), Max: 98.1 (10-30-19 @ 12:51)  HR: 66 (10-30-19 @ 12:51) (57 - 74)  BP: 154/68 (10-30-19 @ 12:51) (143/82 - 174/76)  RR: 16 (10-30-19 @ 12:51) (16 - 20)  SpO2: 95% (10-30-19 @ 12:51) (95% - 100%) on (O2)    Telemetry/Alarms:  General: WN/WD NAD  Neurology: Awake, nonfocal, LOCK x 4  Eyes: Scleras clear, PERRLA/ EOMI, Gross vision intact  ENT:Gross hearing intact, grossly patent pharynx, no stridor  Neck: Neck supple, trachea midline, No JVD,   Respiratory: CTA B/L, No wheezing, rales, rhonchi  CV: RRR, S1S2, no murmurs, rubs or gallops  Abdominal: Soft, NT, ND +BS,   Extremities: No edema, + peripheral pulses  Skin: No Rashes, Hematoma, Ecchymosis  Lymphatic: No Neck, axilla, groin LAD  Psych: Oriented x 3, normal affect  Incisions: c,d,i  Tubes J tube intact  Relevant labs, radiology and Medications reviewed                        11.8   9.67  )-----------( 228      ( 30 Oct 2019 05:45 )             35.8     10-30    137  |  96<L>  |  11  ----------------------------<  155<H>  3.8   |  28  |  0.42<L>    Ca    8.5      30 Oct 2019 05:45  Phos  2.4     10-30  Mg     2.0     10-30        MEDICATIONS  (STANDING):  heparin  Injectable 5000 Unit(s) SubCutaneous every 12 hours  levothyroxine 137 MICROGram(s) Oral daily  metoprolol tartrate 25 milliGRAM(s) Oral every 8 hours  pantoprazole    Tablet 40 milliGRAM(s) Oral before breakfast    MEDICATIONS  (PRN):  acetaminophen   Tablet .. 650 milliGRAM(s) Oral every 6 hours PRN Mild Pain (1 - 3), Moderate Pain (4 - 6)  bisacodyl 5 milliGRAM(s) Oral at bedtime PRN Constipation  traMADol 25 milliGRAM(s) Oral every 4 hours PRN Severe Pain (7 - 10)    Pertinent Physical Exam  I&O's Summary    29 Oct 2019 07:01  -  30 Oct 2019 07:00  --------------------------------------------------------  IN: 1600 mL / OUT: 400 mL / NET: 1200 mL        Assessment  89y Female  w/ PAST MEDICAL & SURGICAL HISTORY:  Atrial fibrillation  Achalasia  Hypertension  Hypothyroidism  History of repair of hiatal hernia  H/O colectomy  S/P appendectomy  S/P hysterectomy  admitted with complaints of Patient is a 89y old  Female who presents with a chief complaint of Dysphagia and Vomiting (30 Oct 2019 07:41)  .  89 y/o female w/ PMH of HTN, achalasia s/p dilation x2, HH s/p repair w/ mesh, emergency Volvulus surgical repair 4/2014, s/p EGD CRE balloon dilation w/ botox injection 8/2015, s/p EGD dilation 3/2016, s/p EGD lap heller myotomy 2016 and recent Barium swallow on 6/2019 for dysphagia which ended up showing decreased motility with EGD on 10/8/19 showing what appeared to be erosion of mesh into esophagus.    Procedure: Exploratory laparotomy, lysis of adhesions, partial esophagogastrectomy, feeding jejunostomy, pyloromyotomy, left chest tube placement 10/22/2019    PLAN  Neuro: Pain management  Pulm: Encourage coughing, deep breathing and use of incentive spirometry. Wean off supplemental oxygen as able. Daily CXR.   Cardio: Monitor telemetry/alarms  GI: continue tube feeds; feeds approved by insurance  Renal: monitor urine output, supplement electrolytes as needed  Vasc: Heparin SC/SCDs for DVT prophylaxis; no eliquis as per cardiology Dr Brown secondary to fall risk  Heme: Stable H/H. .   ID: Off antibiotics. Stable.  Therapy: OOB/ambulate; MOBILIZATION    Disposition: Aim to D/C to home once mobilized and better oriented  Discussed with Cardiothoracic Team at AM rounds.

## 2019-10-30 NOTE — PROGRESS NOTE ADULT - SUBJECTIVE AND OBJECTIVE BOX
Cardiology Attending Progress Note      Patient is s/p Exploratory laparotomy, lysis of adhesions, partial esophagogastrectomy, feeding jejunostomy, pyloromyotomy, left chest tube placement 10/22/2019    Vital Signs Last 24 Hrs  T(C): 36.3 (30 Oct 2019 05:00), Max: 36.5 (29 Oct 2019 23:46)  T(F): 97.4 (30 Oct 2019 05:00), Max: 97.7 (29 Oct 2019 23:46)  HR: 59 (30 Oct 2019 05:00) (57 - 73)  BP: 148/74 (30 Oct 2019 05:00) (148/74 - 191/79)  BP(mean): 102 (29 Oct 2019 10:00) (100 - 102)  RR: 16 (30 Oct 2019 05:00) (16 - 21)  SpO2: 100% (30 Oct 2019 05:00) (98% - 100%)  I&O's Detail    27 Oct 2019 07:01  -  28 Oct 2019 07:00  --------------------------------------------------------  IN:    dexmedetomidine Infusion: 32.3 mL    Enteral Tube Flush: 180 mL    Enteral Tube Flush: 180 mL    Pivot 1.5: 480 mL  Total IN: 872.3 mL    OUT:    Bulb: 15 mL    Indwelling Catheter - Urethral: 555 mL    Intermittent Catheterization - Urethral: 850 mL    Nasoenteral Tube: 150 mL  Total OUT: 1570 mL    Total NET: -697.7 mL      28 Oct 2019 07:01  -  29 Oct 2019 05:50  --------------------------------------------------------  IN:    Enteral Tube Flush: 180 mL    Enteral Tube Flush: 30 mL    Pivot 1.5: 740 mL  Total IN: 950 mL    OUT:    Bulb: 15 mL    Indwelling Catheter - Urethral: 980 mL  Total OUT: 995 mL    Total NET: -45 mL        CAPILLARY BLOOD GLUCOSE          Home Medications:  Eliquis 5 mg oral tablet: 1 tab(s) orally 2 times a day (24 Oct 2019 08:50)  Synthroid 137 mcg (0.137 mg) oral tablet: 1 tab(s) orally once a day (24 Oct 2019 08:50)      MEDICATIONS  (STANDING):  albumin human  5% IVPB 250 milliLiter(s) IV Intermittent once  heparin  Injectable 5000 Unit(s) SubCutaneous every 12 hours  levothyroxine Injectable 68 MICROGram(s) IV Push at bedtime  metoprolol tartrate Injectable 5 milliGRAM(s) IV Push every 6 hours  pantoprazole  Injectable 40 milliGRAM(s) IV Push daily  phenylephrine    Infusion 0.5 MICROgram(s)/kG/Min (8.344 mL/Hr) IV Continuous <Continuous>    MEDICATIONS  (PRN):  HYDROmorphone  Injectable 0.25 milliGRAM(s) IV Push every 3 hours PRN Severe Pain (7 - 10)  naloxone Injectable 0.1 milliGRAM(s) IV Push every 3 minutes PRN For ANY of the following changes in patient status:  A. RR LESS THAN 10 breaths per minute, B. Oxygen saturation LESS THAN 90%, C. Sedation score of 6  ondansetron Injectable 4 milliGRAM(s) IV Push every 6 hours PRN Nausea          Physical exam:                             General:               Pt is awake, alert, not in any distress                                                  Neuro:                  Nonfocal                             Cardiovascular:   S1 & S2, regular                           Respiratory:         Air entry is fair and equal on both sides, has bilateral conducted sounds                           GI:                          Soft, nondistended and nontender, Bowel sounds hypoactive                           Ext:                        No cyanosis or edema     Labs:                                                                           10.2   9.17  )-----------( 222      ( 29 Oct 2019 04:40 )             31.3             10-29    137  |  99  |  13  ----------------------------<  140<H>  3.6   |  27  |  0.43<L>    Ca    8.3<L>      29 Oct 2019 04:40  Phos  2.6     10-29  Mg     1.9     10-29                    CXR:  < from: Xray Esophagram (10.28.19 @ 15:12) >  FINDINGS:  Preliminary  radiograph of the chest is unremarkable.    The patient swallowedbarium without difficulty.     Multiple tertiary contractions are visualized, likely secondary to   patient's operative state earlier this morning.    Guerrero drain is present overlying the mid abdomen  Surgical staples are seen overlying the midabdomen.  Multilevel degenerative changes of the spine.      IMPRESSION:   No leak is visualized.    < from: Xray Chest 1 View- PORTABLE-Routine (10.28.19 @ 05:50) >  Enteric tube unchanged. Hazy left lung base consistent with postop small   effusion and atelectasis. Remainder of the lungs are clear. No   pneumothorax although patient's chin obscures the superior mediastinum.      COMPARISON:  October 27      IMPRESSION:  Status post esophagectomy.            Plan:    General: 89yFemale s/p Exploratory laparotomy, lysis of adhesions, partial esophagogastrectomy, feeding jejunostomy, pyloromyotomy, left chest tube placement 10/22/2019.                             Neuro:                                         Pain control with  IV Tylenol. Avoid narcotics    Delirium: Currently off Precedex. Continue 1:1 observation                            Cardiovascular:                                          HTN: Restart Norvasc. Switch to PO lopressor    Continue hemodynamic monitoring.                                        PAF: Currently in NSR. No Eliquis as she is prone to falling and risk of bleeding.                                                                   Respiratory:                                         Pt is on RA                                         Appears to be in pain but not in distress.                                         Encourage incentive spirometry                                                               Continue bronchodilators, pulmonary toilet                            GI                                         On clears                                         Continue GI prophylaxis with Protonix                                         Continue Zofran / Reglan for nausea - PRN                                         Flush  J-tube with 20cc of sterile water Q 4hrs.     Continue J-tube feeds  @50cc/hr  	                                                                 Renal:                                         Monitor I/Os and electrolytes                                         Rossi                                                  Hem/ Onc:                                         Monitor for signs of bleeding.                                          Follow CBC in AM                           Infectious disease:                                          Monitor for fever / leukocytosis.                                          All surgical incision / chest tube  sites look clean                            Endocrine      Hypothyroidism: On Synthroid                                          Continue Accu-Checks with coverage    Pt is on SQ Heparin and Venodyne boots for DVT prophylaxis.     Pertinent clinical, laboratory, radiographic, hemodynamic, echocardiographic, respiratory data, microbiologic data and chart were reviewed and analyzed frequently throughout the course of the day and night  Patient seen, examined and plan discussed with CT Surgeon Dr. Jang / CTICU team during rounds.    Pt's status discussed with family at bedside, updated status.             Thuan Horn MD Cardiology Attending Progress Note    Chart reviewed. Events noted.    Patient is s/p exploratory laparotomy, lysis of adhesions, partial esophagogastrectomy, feeding jejunostomy, pyloromyotomy, left chest tube placement 10/22/2019    Telemetry: sinus rhythm, no recent evidence of AFib on telemetry on my review    Vital Signs Last 24 Hrs  T(C): 36.3 (30 Oct 2019 05:00), Max: 36.5 (29 Oct 2019 23:46)  T(F): 97.4 (30 Oct 2019 05:00), Max: 97.7 (29 Oct 2019 23:46)  HR: 59 (30 Oct 2019 05:00) (57 - 73)  BP: 148/74 (30 Oct 2019 05:00) (148/74 - 191/79)  BP(mean): 102 (29 Oct 2019 10:00) (100 - 102)  RR: 16 (30 Oct 2019 05:00) (16 - 21)  SpO2: 100% (30 Oct 2019 05:00) (98% - 100%)    I&O's Detail    29 Oct 2019 07:01  -  30 Oct 2019 07:00                        11.8   9.67  )-----------( 228      ( 30 Oct 2019 05:45 )             35.8   --------------------------------------------------------  IN:    Enteral Tube Flush: 430 mL    IV PiggyBack: 100 mL    ns in tub fed  [id number]: 750 mL    Oral Fluid: 240 mL    Pivot 1.5: 80 mL  Total IN: 1600 mL    OUT:    Indwelling Catheter - Urethral: 100 mL    Voided: 300 mL  Total OUT: 400 mL    Total NET: 1200 mL      NAD, elderly woman, laying flat in bed  Reg S1S2 2/6 SM  Decreased breath sounds bilateral bases  Soft abdomen  Min edema LE     MEDICATIONS  (STANDING):  heparin  Injectable 5000 Unit(s) SubCutaneous every 12 hours  levothyroxine 137 MICROGram(s) Oral daily  metoprolol tartrate 25 milliGRAM(s) Oral every 8 hours  pantoprazole    Tablet 40 milliGRAM(s) Oral before breakfast    MEDICATIONS  (PRN):  acetaminophen   Tablet .. 650 milliGRAM(s) Oral every 6 hours PRN Mild Pain (1 - 3), Moderate Pain (4 - 6)  bisacodyl 5 milliGRAM(s) Oral at bedtime PRN Constipation  traMADol 25 milliGRAM(s) Oral every 4 hours PRN Severe Pain (7 - 10)                          11.8   9.67  )-----------( 228      ( 30 Oct 2019 05:45 )             35.8     10-30    137  |  96<L>  |  11  ----------------------------<  155<H>  3.8   |  28  |  0.42<L>    Ca    8.5      30 Oct 2019 05:45  Phos  2.4     10-30  Mg     2.0     10-30      < from: Transthoracic Echocardiogram (10.16.19 @ 12:59) >    Patient name: CLIFFORD DOLAN  YOB: 1927   Age: 89 (F)   MR#: [id number]  Study Date: 10/16/2019  Location: [id number]: [facility]  Study quality: Technically good  Referring Physician: Asif Jang MD / Rajesh Brown MD  Blood Pressure: 147/82 mmHg  Height: 167 cm  Weight: 44 kg  BSA: 1.5 m2  ------------------------------------------------------------------------  PROCEDURE: Transthoracic echocardiogram with 2-D, M-Mode  and complete spectral and color flow Doppler.  INDICATION: Unspecified atrial flutter (I48.92)  ------------------------------------------------------------------------  DIMENSIONS:  Dimensions:     Normal Values:  LA:     4.8 cm    2.0 - 4.0 cm  Ao:     3.3 cm    2.0 - 3.8 cm  SEPTUM: 0.8 cm    0.6 - 1.2 cm  PWT:    0.8 cm    0.6 - 1.1 cm  LVIDd:  4.3 cm    3.0 - 5.6 cm  LVIDs:  3.2 cm    1.8 - 4.0 cm  Derived Variables:  LVMI: 71 g/m2  RWT: 0.37  Fractional short: 26 %  Ejection Fraction (Teicholtz): 51 %  ------------------------------------------------------------------------  OBSERVATIONS:  Mitral Valve: Mitral annular calcification, otherwise  normal mitral valve. Mild mitral regurgitation.  Aortic Root: Normal aortic root.  Aortic Valve: Calcified trileaflet aortic valve with normal  opening. Mild aortic regurgitation.  Left Atrium: Severely dilated left atrium.  LA volume index  = 60 cc/m2.  Left Ventricle: Mild segmental left ventricular systolic  dysfunction.  Hypokinesis of the basal inferior wall and  basal inferolateral wall. Normal left ventricular internal  dimensions and wall thicknesses. Mild diastolic dysfunction  (Stage I).  Right Heart: Normal right atrium. Normal right ventricular  size and function. Normal tricuspid valve.   Mild-moderate  tricuspid regurgitation. Normal pulmonic valve.  Mild  pulmonic regurgitation.  Pericardium/PleuraNormal pericardium with no pericardial  effusion.  Hemodynamic: Estimated right ventricular systolic pressure  equals 45 mm Hg, assuming right atrial pressure equals 10  mm Hg, consistent with mild pulmonary hypertension.  ------------------------------------------------------------------------  CONCLUSIONS:  1. Mitral annular calcification, otherwise normal mitral  valve. Mild mitral regurgitation.  2. Calcified trileaflet aortic valve with normal opening.  Mild aortic regurgitation.  3. Severely dilated left atrium.  LA volume index = 60  cc/m2.  4. Normal left ventricular internal dimensions and wall  thicknesses.  5. Mild segmental left ventricular systolicdysfunction.  Hypokinesis of the basal inferior wall and basal  inferolateral wall.  6. Mild diastolic dysfunction (Stage I).  7. Normal right ventricular size and function.  8. Estimated right ventricular systolic pressure equals 45  mm Hg, assumingright atrial pressure equals 10 mm Hg,  consistent with mild pulmonary hypertension.  ------------------------------------------------------------------------  Confirmed on  10/16/2019 - 14:23:02 by Yong Suresh M.D.  ------------------------------------------------------------------------    < end of copied text >    < from: US Duplex Venous Lower Ext Complete, Bilateral (10.29.19 @ 16:15) >    EXAM:  US DPLX LWR EXT VEINS COMPL BI        PROCEDURE DATE:  Oct 29 2019         INTERPRETATION:  CLINICAL INFORMATION: Bilateral bilateral lower   extremity pain. Status post esophagectomy.    COMPARISON: None available.    TECHNIQUE: Duplex sonography of the BILATERAL LOWER extremity veins with   color and spectral Doppler, with and without compression.      FINDINGS:    There is normal compressibility of the bilateral common femoral, femoral   and popliteal veins.     Doppler examination shows normal spontaneous and phasic flow.    No right calf vein thrombosis is detected. The left calf veins were not   visualized.    IMPRESSION:     No evidence of deep venous thrombosis in either lower extremity.      NIKOLAI BROWN M.D., RADIOLOGY RESIDENT  This document has been electronically signed.  DAVION JANG M.D., ATTENDING RADIOLOGIST  This document has been electronically signed. Oct 29 2019  4:48PM      < from: Xray Chest 1 View- PORTABLE-Routine (10.30.19 @ 08:20) >  EXAM:  XR CHEST PORTABLE ROUTINE 1V        PROCEDURE DATE:  Oct 30 2019         INTERPRETATION:  CLINICAL INFORMATION: Chest pain. Status post   esophagectomy    TECHNIQUE: Frontal view of the chest.    COMPARISON: Chest radiograph from 10/29/2019.    IMPRESSION:    Left upper quadrant surgical clips and mid abdominal cutaneous surgical   staples. Left pleural effusion, unchanged. No focal pulmonary   consolidation. No acute soft tissue or bony pathology.    LEV JANG M.D., RADIOLOGY RESIDENT  This document has been electronically signed.  JOHN PRIETO M.D. ATTENDING RADIOLOGIST  This document has been electronically signed. Oct 30 2019  2:43PM                  < end of copied text >              < end of copied text >

## 2019-10-30 NOTE — DISCHARGE NOTE PROVIDER - NSDCCPTREATMENT_GEN_ALL_CORE_FT
PRINCIPAL PROCEDURE  Procedure: Exploratory laparotomy  Findings and Treatment: Exploratory laparotomy, lysis of adhesions, partial esophagogastrectomy, feeding jejunustomy, pyloromyotomy, left chest tube placement

## 2019-10-30 NOTE — DISCHARGE NOTE PROVIDER - NSDCFUADDAPPT_GEN_ALL_CORE_FT
Follow up with Dr. Jang in 1-2 weeks [phone]   Chest x-ray 1-2 days prior to appointment with Dr. Jang  Please bring copy of x-ray to appointment with Dr. Jang   Follow up with primary care provider in one week

## 2019-10-30 NOTE — PROGRESS NOTE ADULT - ASSESSMENT
89 y/o woman with HTN, achalasia s/p dilation x2, HH s/p repair w/ mesh, emergency Volvulus surgical repair 4/2014, s/p EGD CRE balloon dilation w/ botox injection 8/2015, s/p EGD dilation 3/2016, s/p EGD lap heller myotomy 2016 and recent Barium swallow on 6/2019 for dysphagia which ended up showing decreased motility with EGD on 10/8/19 showing what appeared to be erosion of mesh into esophagus.    Presented to office and was seen by Dr. Jang for persistent vomiting since then unable to tolerate PO food.     She is now s/p Exploratory laparotomy, lysis of adhesions, partial esophagogastrectomy, feeding jejunostomy, pyloromyotomy, left chest tube placement 10/22/2019.     Patient also has a history of paroxysmal atrial fibrillation.  Her CHADSVASC score is 4, which means a 4% per year risk of stroke when not on aspirin.  Currently in SR with PVCs on telemetry.  She was previously on Eliquis 2.5 mg BID. However, with her increased debility, advanced age, inability to partake in a complex medical regimen that includes anticoagulation, and prone to falls, continuing her on anticoagulation may pose greater risks of bleeding than benefits of thromboembolic prophylaxis.  This recommendation should be reviewed with her family and R/B/A should be discussed.  She can be started on low dose daily aspirin instead.

## 2019-10-30 NOTE — DISCHARGE NOTE PROVIDER - INSTRUCTIONS
Full liquid diet  No carbonated beverage  Jevity 1.2 @67cc/hr x 18hrs Full liquid diet  No carbonated beverage  Jevity 1.2 @67cc/hr x 18hrs    ***synthroid must be given on empty stomach*** Full liquid diet  No carbonated beverage  Jevity 1.2 @67cc/hr x 18hrs    FLUSH J-TUBE WITH 150cc NORMAL SALINE EVERY 6 HOURS (hx of hyponatremia)    ***synthroid must be given on empty stomach***

## 2019-10-30 NOTE — DISCHARGE NOTE PROVIDER - NSDCACTIVITY_GEN_ALL_CORE
Stairs allowed/Showering allowed/Walking - Outdoors allowed/Do not drive or operate machinery/Do not make important decisions/Walking - Indoors allowed/No heavy lifting/straining

## 2019-10-31 PROCEDURE — 71045 X-RAY EXAM CHEST 1 VIEW: CPT | Mod: 26

## 2019-10-31 RX ORDER — LANOLIN ALCOHOL/MO/W.PET/CERES
3 CREAM (GRAM) TOPICAL AT BEDTIME
Refills: 0 | Status: DISCONTINUED | OUTPATIENT
Start: 2019-10-31 | End: 2019-11-07

## 2019-10-31 RX ORDER — ASPIRIN/CALCIUM CARB/MAGNESIUM 324 MG
81 TABLET ORAL DAILY
Refills: 0 | Status: DISCONTINUED | OUTPATIENT
Start: 2019-10-31 | End: 2019-11-07

## 2019-10-31 RX ADMIN — Medication 81 MILLIGRAM(S): at 12:39

## 2019-10-31 RX ADMIN — Medication 25 MILLIGRAM(S): at 04:34

## 2019-10-31 RX ADMIN — HEPARIN SODIUM 5000 UNIT(S): 5000 INJECTION INTRAVENOUS; SUBCUTANEOUS at 04:34

## 2019-10-31 RX ADMIN — HEPARIN SODIUM 5000 UNIT(S): 5000 INJECTION INTRAVENOUS; SUBCUTANEOUS at 17:27

## 2019-10-31 RX ADMIN — Medication 25 MILLIGRAM(S): at 12:39

## 2019-10-31 RX ADMIN — Medication 137 MICROGRAM(S): at 04:33

## 2019-10-31 RX ADMIN — Medication 25 MILLIGRAM(S): at 21:15

## 2019-10-31 RX ADMIN — PANTOPRAZOLE SODIUM 40 MILLIGRAM(S): 20 TABLET, DELAYED RELEASE ORAL at 04:34

## 2019-10-31 NOTE — PROGRESS NOTE ADULT - ASSESSMENT
HPI:  89 y/o female w/ PMH of HTN, achalasia s/p dilation x2, HH s/p repair w/ mesh, emergency Volvulus surgical repair 4/2014, s/p EGD CRE balloon dilation w/ botox injection 8/2015, s/p EGD dilation 3/2016, s/p EGD lap heller myotomy 2016 and recent Barium swallow on 6/2019 for dysphagia which ended up showing decreased motility with EGD on 10/8/19 showing what appeared to be erosion of mesh into esophagus. Pt is now s/p CT abdomen done yesterday and persistent vomiting since then unable to tolerate PO food. She was seen in the Thoracic surgery office today by Dr. clemente and was admitted. 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 Underwent partial esophagogastrectomy, pyloromyotomy and feeding jej on 10/22/2019. Pt passes swallow study, tolerating tube feeds and is three person assist for ambulation. Met with pt's daughter and had d/w case management. Family has agreed to Rehab with [facility], case management working on it.

## 2019-10-31 NOTE — PROGRESS NOTE ADULT - PROBLEM SELECTOR PLAN 1
partial esophagogastrectomy, pyloromyotomy and feeding jej on 10/22/2019, on tube feeds, awaitng rehab placement. Cont GI/DVT prophylaxis, TF's, pulm toilet

## 2019-10-31 NOTE — PROGRESS NOTE ADULT - SUBJECTIVE AND OBJECTIVE BOX
HPI:  89 y/o female w/ PMH of HTN, achalasia s/p dilation x2, HH s/p repair w/ mesh, emergency Volvulus surgical repair 4/2014, s/p EGD CRE balloon dilation w/ botox injection 8/2015, s/p EGD dilation 3/2016, s/p EGD lap heller myotomy 2016 and recent Barium swallow on 6/2019 for dysphagia which ended up showing decreased motility with EGD on 10/8/19 showing what appeared to be erosion of mesh into esophagus. Pt is now s/p CT abdomen done yesterday and persistent vomiting since then unable to tolerate PO food. She was seen in the Thoracic surgery office today by Dr. clemente and was admitted. 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 Underwent partial esophagogastrectomy, pyloromyotomy and feeding jej on 10/22/2019. Pt passes swallow study, tolerating tube feeds and is three person assist for ambulation. Met with pt's daughter and had d/w case management. Family has agreed to Rehab with [facility], case management working on it.     Vital Signs Last 24 Hrs  T(C): 36.7 (31 Oct 2019 12:27), Max: 36.8 (30 Oct 2019 15:33)  T(F): 98 (31 Oct 2019 12:27), Max: 98.2 (30 Oct 2019 15:33)  HR: 99 (31 Oct 2019 12:27) (64 - 99)  BP: 136/67 (31 Oct 2019 12:27) (116/60 - 152/65)  BP(mean): --  RR: 15 (31 Oct 2019 12:27) (15 - 18)  SpO2: 95% (31 Oct 2019 12:27) (95% - 100%)    MEDICATIONS  (STANDING):  aspirin enteric coated 81 milliGRAM(s) Oral daily  heparin  Injectable 5000 Unit(s) SubCutaneous every 12 hours  levothyroxine 137 MICROGram(s) Oral daily  metoprolol tartrate 25 milliGRAM(s) Oral every 8 hours  pantoprazole    Tablet 40 milliGRAM(s) Oral before breakfast    MEDICATIONS  (PRN):  acetaminophen   Tablet .. 650 milliGRAM(s) Oral every 6 hours PRN Mild Pain (1 - 3), Moderate Pain (4 - 6)  bisacodyl 5 milliGRAM(s) Oral at bedtime PRN Constipation  traMADol 25 milliGRAM(s) Oral every 4 hours PRN Severe Pain (7 - 10)                            11.8   9.67  )-----------( 228      ( 30 Oct 2019 05:45 )             35.8   10-30    137  |  96<L>  |  11  ----------------------------<  155<H>  3.8   |  28  |  0.42<L>    Ca    8.5      30 Oct 2019 05:45  Phos  2.4     10-30  Mg     2.0     10-30      CXR stable small left basilar effusion, otherwise clear.      General: WN/WD NAD  Neurology: A&Ox3, nonfocal, LOCK x 4  Eyes: PERRLA/ EOMI, Gross vision intact  ENT/Neck: Neck supple, trachea midline, No JVD, Gross hearing intact  Respiratory: CTA B/L, No wheezing, rales, rhonchi  CV: RRR, S1S2, no murmurs, rubs or gallops  Abdominal: Soft, NT, ND +BS,   Extremities: No edema, + peripheral pulses  Skin: No Rashes, Hematoma, Ecchymosis

## 2019-11-01 DIAGNOSIS — R33.9 RETENTION OF URINE, UNSPECIFIED: ICD-10-CM

## 2019-11-01 LAB
ANION GAP SERPL CALC-SCNC: 9 MMO/L — SIGNIFICANT CHANGE UP (ref 7–14)
ANION GAP SERPL CALC-SCNC: 9 MMO/L — SIGNIFICANT CHANGE UP (ref 7–14)
APPEARANCE UR: CLEAR — SIGNIFICANT CHANGE UP
BACTERIA # UR AUTO: NEGATIVE — SIGNIFICANT CHANGE UP
BILIRUB UR-MCNC: NEGATIVE — SIGNIFICANT CHANGE UP
BLOOD UR QL VISUAL: NEGATIVE — SIGNIFICANT CHANGE UP
BUN SERPL-MCNC: 14 MG/DL — SIGNIFICANT CHANGE UP (ref 7–23)
BUN SERPL-MCNC: 14 MG/DL — SIGNIFICANT CHANGE UP (ref 7–23)
CALCIUM SERPL-MCNC: 7.9 MG/DL — LOW (ref 8.4–10.5)
CALCIUM SERPL-MCNC: 8 MG/DL — LOW (ref 8.4–10.5)
CHLORIDE SERPL-SCNC: 89 MMOL/L — LOW (ref 98–107)
CHLORIDE SERPL-SCNC: 92 MMOL/L — LOW (ref 98–107)
CO2 SERPL-SCNC: 27 MMOL/L — SIGNIFICANT CHANGE UP (ref 22–31)
CO2 SERPL-SCNC: 30 MMOL/L — SIGNIFICANT CHANGE UP (ref 22–31)
COLOR SPEC: YELLOW — SIGNIFICANT CHANGE UP
CREAT SERPL-MCNC: 0.38 MG/DL — LOW (ref 0.5–1.3)
CREAT SERPL-MCNC: 0.44 MG/DL — LOW (ref 0.5–1.3)
GLUCOSE SERPL-MCNC: 148 MG/DL — HIGH (ref 70–99)
GLUCOSE SERPL-MCNC: 158 MG/DL — HIGH (ref 70–99)
GLUCOSE UR-MCNC: NEGATIVE — SIGNIFICANT CHANGE UP
HCT VFR BLD CALC: 29.6 % — LOW (ref 34.5–45)
HGB BLD-MCNC: 10 G/DL — LOW (ref 11.5–15.5)
HYALINE CASTS # UR AUTO: NEGATIVE — SIGNIFICANT CHANGE UP
KETONES UR-MCNC: NEGATIVE — SIGNIFICANT CHANGE UP
LEUKOCYTE ESTERASE UR-ACNC: NEGATIVE — SIGNIFICANT CHANGE UP
MCHC RBC-ENTMCNC: 29.9 PG — SIGNIFICANT CHANGE UP (ref 27–34)
MCHC RBC-ENTMCNC: 33.8 % — SIGNIFICANT CHANGE UP (ref 32–36)
MCV RBC AUTO: 88.6 FL — SIGNIFICANT CHANGE UP (ref 80–100)
NITRITE UR-MCNC: NEGATIVE — SIGNIFICANT CHANGE UP
NRBC # FLD: 0 K/UL — SIGNIFICANT CHANGE UP (ref 0–0)
PH UR: 7.5 — SIGNIFICANT CHANGE UP (ref 5–8)
PLATELET # BLD AUTO: 262 K/UL — SIGNIFICANT CHANGE UP (ref 150–400)
PMV BLD: 10.3 FL — SIGNIFICANT CHANGE UP (ref 7–13)
POTASSIUM SERPL-MCNC: 4 MMOL/L — SIGNIFICANT CHANGE UP (ref 3.5–5.3)
POTASSIUM SERPL-MCNC: 4.2 MMOL/L — SIGNIFICANT CHANGE UP (ref 3.5–5.3)
POTASSIUM SERPL-SCNC: 4 MMOL/L — SIGNIFICANT CHANGE UP (ref 3.5–5.3)
POTASSIUM SERPL-SCNC: 4.2 MMOL/L — SIGNIFICANT CHANGE UP (ref 3.5–5.3)
PROT UR-MCNC: 20 — SIGNIFICANT CHANGE UP
RBC # BLD: 3.34 M/UL — LOW (ref 3.8–5.2)
RBC # FLD: 14.4 % — SIGNIFICANT CHANGE UP (ref 10.3–14.5)
RBC CASTS # UR COMP ASSIST: HIGH (ref 0–?)
SODIUM SERPL-SCNC: 128 MMOL/L — LOW (ref 135–145)
SODIUM SERPL-SCNC: 128 MMOL/L — LOW (ref 135–145)
SP GR SPEC: 1.02 — SIGNIFICANT CHANGE UP (ref 1–1.04)
SQUAMOUS # UR AUTO: SIGNIFICANT CHANGE UP
UROBILINOGEN FLD QL: NORMAL — SIGNIFICANT CHANGE UP
WBC # BLD: 7.89 K/UL — SIGNIFICANT CHANGE UP (ref 3.8–10.5)
WBC # FLD AUTO: 7.89 K/UL — SIGNIFICANT CHANGE UP (ref 3.8–10.5)
WBC UR QL: SIGNIFICANT CHANGE UP (ref 0–?)

## 2019-11-01 PROCEDURE — 71045 X-RAY EXAM CHEST 1 VIEW: CPT | Mod: 26

## 2019-11-01 PROCEDURE — 99024 POSTOP FOLLOW-UP VISIT: CPT

## 2019-11-01 PROCEDURE — 99222 1ST HOSP IP/OBS MODERATE 55: CPT

## 2019-11-01 RX ADMIN — Medication 25 MILLIGRAM(S): at 14:11

## 2019-11-01 RX ADMIN — Medication 3 MILLIGRAM(S): at 22:33

## 2019-11-01 RX ADMIN — PANTOPRAZOLE SODIUM 40 MILLIGRAM(S): 20 TABLET, DELAYED RELEASE ORAL at 06:19

## 2019-11-01 RX ADMIN — Medication 25 MILLIGRAM(S): at 22:33

## 2019-11-01 RX ADMIN — Medication 81 MILLIGRAM(S): at 14:11

## 2019-11-01 RX ADMIN — Medication 25 MILLIGRAM(S): at 06:19

## 2019-11-01 RX ADMIN — HEPARIN SODIUM 5000 UNIT(S): 5000 INJECTION INTRAVENOUS; SUBCUTANEOUS at 17:17

## 2019-11-01 RX ADMIN — Medication 137 MICROGRAM(S): at 06:19

## 2019-11-01 RX ADMIN — HEPARIN SODIUM 5000 UNIT(S): 5000 INJECTION INTRAVENOUS; SUBCUTANEOUS at 06:19

## 2019-11-01 NOTE — CONSULT NOTE ADULT - PROBLEM SELECTOR RECOMMENDATION 9
Would obtain UA and Urine to r/o UTI   Pt should be discharged to Rehab with foely and continue lopez until back to baseline level of functioning.  Follow up as outpatient with Dr. Morejon for further work up and possible [phone]  ([facility])

## 2019-11-01 NOTE — PROGRESS NOTE ADULT - SUBJECTIVE AND OBJECTIVE BOX
Subjective: Patient denies any chest pains, SOB, or dizziness at this time.    Vital Signs:  Vital Signs Last 24 Hrs  T(C): 36.4 (11-01-19 @ 12:00), Max: 37.1 (11-01-19 @ 06:16)  T(F): 97.6 (11-01-19 @ 12:00), Max: 98.7 (11-01-19 @ 06:16)  HR: 64 (11-01-19 @ 12:00) (60 - 84)  BP: 142/74 (11-01-19 @ 12:00) (142/74 - 168/64)  RR: 18 (11-01-19 @ 12:00) (17 - 18)  SpO2: 98% (11-01-19 @ 12:00) (96% - 99%) on (O2)    General: WN/WD NAD  Neurology: Awake, alert, somewhat confused  Eyes: Scleras clear, PERRLA/ EOMI, Gross vision intact  ENT: Gross hearing intact, grossly patent pharynx, no stridor  Neck: Neck supple, trachea midline, No JVD  Respiratory: CTA B/L, No wheezing, rales, rhonchi  CV: RRR, S1S2, no murmurs, rubs or gallops  Abdominal: Soft, NT, ND  Extremities: No edema, + peripheral pulses  Skin: No Rashes, Hematoma, Ecchymosis  Lymphatic: No Neck, axilla, groin LAD  Psych: Oriented x 3, normal affect  Incisions: abd incision C/D/I with staples    Relevant labs, radiology and Medications reviewed                        10.0   7.89  )-----------( 262      ( 01 Nov 2019 05:45 )             29.6     11-01    128<L>  |  89<L>  |  14  ----------------------------<  148<H>  4.2   |  30  |  0.44<L>    Ca    7.9<L>      01 Nov 2019 11:30      MEDICATIONS  (STANDING):  aspirin enteric coated 81 milliGRAM(s) Oral daily  heparin  Injectable 5000 Unit(s) SubCutaneous every 12 hours  levothyroxine 137 MICROGram(s) Oral daily  melatonin 3 milliGRAM(s) Oral at bedtime  metoprolol tartrate 25 milliGRAM(s) Oral every 8 hours  pantoprazole    Tablet 40 milliGRAM(s) Oral before breakfast    MEDICATIONS  (PRN):  acetaminophen   Tablet .. 650 milliGRAM(s) Oral every 6 hours PRN Mild Pain (1 - 3), Moderate Pain (4 - 6)  bisacodyl 5 milliGRAM(s) Oral at bedtime PRN Constipation  traMADol 25 milliGRAM(s) Oral every 4 hours PRN Severe Pain (7 - 10)      Assessment  89y Female  w/ PAST MEDICAL & SURGICAL HISTORY:  Atrial fibrillation  Achalasia  Hypertension  Hypothyroidism  History of repair of hiatal hernia  H/O colectomy  S/P appendectomy  S/P hysterectomy    s/p EGD, Ex Lap, Lysis of Adhesions, Partial Esophagogastrectomy, pyloromyotomy, Feeding Jejunostomy on 10/22/19    PLAN  Na 128 today (repeated); Free water decreased from 100ml q4hrs to q6hrs  GI: j-tube feeds  Neuro: Pain management  Pulm: Encourage coughing, deep breathing and use of incentive spirometry. Wean off supplemental oxygen as able. Daily CXR.   Cardio: Monitor telemetry/alarms; No eliquis or full anticoagulation for AFib as per Cardiology  Renal: Patient had lopez placed yesterday for the second time postop; Urology consulted today - plan is to keep lopez and follow-up with the [facility] as an outpatient [phone]  Vasc: Heparin SC/SCDs for DVT prophylaxis  Heme: Stable H/H.  ID: Off antibiotics. Stable.  Therapy: OOB/ambulate  Tubes: Monitor Chest tube output  Disposition: Aim to D/C to rehab    Seen and discussed at bedside with Dr. Saad Downey PAKashifC  Thoracic Surgery   #[id number] Subjective: Patient denies any chest pains, SOB, or dizziness at this time.    Vital Signs:  Vital Signs Last 24 Hrs  T(C): 36.4 (11-01-19 @ 12:00), Max: 37.1 (11-01-19 @ 06:16)  T(F): 97.6 (11-01-19 @ 12:00), Max: 98.7 (11-01-19 @ 06:16)  HR: 64 (11-01-19 @ 12:00) (60 - 84)  BP: 142/74 (11-01-19 @ 12:00) (142/74 - 168/64)  RR: 18 (11-01-19 @ 12:00) (17 - 18)  SpO2: 98% (11-01-19 @ 12:00) (96% - 99%) on (O2)    General: WN/WD NAD  Neurology: Awake, alert, somewhat confused  Eyes: Scleras clear, PERRLA/ EOMI, Gross vision intact  ENT: Gross hearing intact, grossly patent pharynx, no stridor  Neck: Neck supple, trachea midline, No JVD  Respiratory: CTA B/L, No wheezing, rales, rhonchi  CV: RRR, S1S2, no murmurs, rubs or gallops  Abdominal: Soft, NT, ND  Extremities: No edema, + peripheral pulses  Skin: No Rashes, Hematoma, Ecchymosis  Lymphatic: No Neck, axilla, groin LAD  Psych: Oriented x 3, normal affect  Incisions: abd incision C/D/I with staples    Relevant labs, radiology and Medications reviewed                        10.0   7.89  )-----------( 262      ( 01 Nov 2019 05:45 )             29.6     11-01    128<L>  |  89<L>  |  14  ----------------------------<  148<H>  4.2   |  30  |  0.44<L>    Ca    7.9<L>      01 Nov 2019 11:30      MEDICATIONS  (STANDING):  aspirin enteric coated 81 milliGRAM(s) Oral daily  heparin  Injectable 5000 Unit(s) SubCutaneous every 12 hours  levothyroxine 137 MICROGram(s) Oral daily  melatonin 3 milliGRAM(s) Oral at bedtime  metoprolol tartrate 25 milliGRAM(s) Oral every 8 hours  pantoprazole    Tablet 40 milliGRAM(s) Oral before breakfast    MEDICATIONS  (PRN):  acetaminophen   Tablet .. 650 milliGRAM(s) Oral every 6 hours PRN Mild Pain (1 - 3), Moderate Pain (4 - 6)  bisacodyl 5 milliGRAM(s) Oral at bedtime PRN Constipation  traMADol 25 milliGRAM(s) Oral every 4 hours PRN Severe Pain (7 - 10)      Assessment  89y Female  w/ PAST MEDICAL & SURGICAL HISTORY:  Atrial fibrillation  Achalasia  Hypertension  Hypothyroidism  History of repair of hiatal hernia  H/O colectomy  S/P appendectomy  S/P hysterectomy    s/p EGD, Ex Lap, Lysis of Adhesions, Partial Esophagogastrectomy, pyloromyotomy, Feeding Jejunostomy on 10/22/19    PLAN  Na 128 today (repeated); Free water decreased from 100ml q4hrs to q6hrs  GI: j-tube feeds  Neuro: Pain management  Pulm: Encourage coughing, deep breathing and use of incentive spirometry. Wean off supplemental oxygen as able. Daily CXR.   Cardio: Monitor telemetry/alarms; No eliquis or full anticoagulation for AFib as per Cardiology  Renal: Patient had lopez placed yesterday for the second time postop; Urology consulted today - plan is to keep lopez and follow-up with the [facility] as an outpatient [phone]  Vasc: Heparin SC/SCDs for DVT prophylaxis  Heme: Stable H/H.  ID: Off antibiotics. Stable.  Therapy: OOB/ambulate  Disposition: Aim to D/C to rehab    Seen and discussed at bedside with Dr. Saad Downey PA-C  Thoracic Surgery   #[id number]

## 2019-11-01 NOTE — CONSULT NOTE ADULT - SUBJECTIVE AND OBJECTIVE BOX
HPI:  89 y/o female w/ PMH of HTN, achalasia s/p dilation x2, HH s/p repair w/ mesh, emergency Volvulus surgical repair 4/2014, s/p EGD CRE balloon dilation w/ botox injection 8/2015, s/p EGD dilation 3/2016, s/p EGD lap heller myotomy 2016 and recent Barium swallow on 6/2019 for dysphagia which ended up showing decreased motility with EGD on 10/8/19 showing what appeared to be erosion of mesh into esophagus. Admitted for persistent vomiting and inability to tolerate PO.    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 Underwent partial esophagogastrectomy, pyloromyotomy and feeding jej on 10/22/2019. Pt passed swallow study, tolerating tube feeds and is three person assist for ambulation.  Pt has had a lopez in place s/p surgery and has failed TOV x 2, (most recently on 10/31).    Pt is being evaluated for rehab placement.         PAST MEDICAL & SURGICAL HISTORY:  Atrial fibrillation  Achalasia  Hypertension  Hypothyroidism  History of repair of hiatal hernia  H/O colectomy  S/P appendectomy  S/P hysterectomy      MEDICATIONS  (STANDING):  aspirin enteric coated 81 milliGRAM(s) Oral daily  heparin  Injectable 5000 Unit(s) SubCutaneous every 12 hours  levothyroxine 137 MICROGram(s) Oral daily  melatonin 3 milliGRAM(s) Oral at bedtime  metoprolol tartrate 25 milliGRAM(s) Oral every 8 hours  pantoprazole    Tablet 40 milliGRAM(s) Oral before breakfast    MEDICATIONS  (PRN):  acetaminophen   Tablet .. 650 milliGRAM(s) Oral every 6 hours PRN Mild Pain (1 - 3), Moderate Pain (4 - 6)  bisacodyl 5 milliGRAM(s) Oral at bedtime PRN Constipation  traMADol 25 milliGRAM(s) Oral every 4 hours PRN Severe Pain (7 - 10)      FAMILY HISTORY:  Non Contributory    Allergies    opioid-like analgesics (Other)          SOCIAL HISTORY:  Lives with: Daughter  Occupation: Retired  Tobacco use: Denies  Alcohol use: Denies  Illicit drug use: Denies    REVIEW OF SYSTEMS: Otherwise negative as stated in HPI      Vital Signs Last 24 Hrs  T(C): 36.4 (01 Nov 2019 12:00), Max: 37.1 (01 Nov 2019 06:16)  T(F): 97.6 (01 Nov 2019 12:00), Max: 98.7 (01 Nov 2019 06:16)  HR: 64 (01 Nov 2019 12:00) (60 - 84)  BP: 142/74 (01 Nov 2019 12:00) (135/72 - 168/64)  BP(mean): --  RR: 18 (01 Nov 2019 12:00) (17 - 18)  SpO2: 98% (01 Nov 2019 12:00) (96% - 99%)    PHYSICAL EXAM:    General:	[x ] Awake and Alert in no acute distress    Respiratory and Thorax: [ x ] no resp distress   	  Cardiovascular: [x ] Reg Rate    Gastrointestinal: [x]soft  [x ] non tender,  CVAT [ ]Y  [x ]N     Genitourinary: ext genitalia WNL.  Lopez in place draining well  	          LABS:                        10.0   7.89  )-----------( 262      ( 01 Nov 2019 05:45 )             29.6     11-01    128<L>  |  89<L>  |  14  ----------------------------<  148<H>  4.2   |  30  |  0.44<L>    Ca    7.9<L>      01 Nov 2019 11:30          URINE:  UA - pend  UR cx -pend

## 2019-11-02 LAB
ANION GAP SERPL CALC-SCNC: 11 MMO/L — SIGNIFICANT CHANGE UP (ref 7–14)
BUN SERPL-MCNC: 13 MG/DL — SIGNIFICANT CHANGE UP (ref 7–23)
CALCIUM SERPL-MCNC: 7.8 MG/DL — LOW (ref 8.4–10.5)
CHLORIDE SERPL-SCNC: 88 MMOL/L — LOW (ref 98–107)
CO2 SERPL-SCNC: 25 MMOL/L — SIGNIFICANT CHANGE UP (ref 22–31)
CREAT SERPL-MCNC: 0.34 MG/DL — LOW (ref 0.5–1.3)
GLUCOSE SERPL-MCNC: 173 MG/DL — HIGH (ref 70–99)
HCT VFR BLD CALC: 32.4 % — LOW (ref 34.5–45)
HGB BLD-MCNC: 10.5 G/DL — LOW (ref 11.5–15.5)
MCHC RBC-ENTMCNC: 28.9 PG — SIGNIFICANT CHANGE UP (ref 27–34)
MCHC RBC-ENTMCNC: 32.4 % — SIGNIFICANT CHANGE UP (ref 32–36)
MCV RBC AUTO: 89.3 FL — SIGNIFICANT CHANGE UP (ref 80–100)
NRBC # FLD: 0 K/UL — SIGNIFICANT CHANGE UP (ref 0–0)
PLATELET # BLD AUTO: 298 K/UL — SIGNIFICANT CHANGE UP (ref 150–400)
PMV BLD: 10.8 FL — SIGNIFICANT CHANGE UP (ref 7–13)
POTASSIUM SERPL-MCNC: 4 MMOL/L — SIGNIFICANT CHANGE UP (ref 3.5–5.3)
POTASSIUM SERPL-SCNC: 4 MMOL/L — SIGNIFICANT CHANGE UP (ref 3.5–5.3)
RBC # BLD: 3.63 M/UL — LOW (ref 3.8–5.2)
RBC # FLD: 14 % — SIGNIFICANT CHANGE UP (ref 10.3–14.5)
SODIUM SERPL-SCNC: 124 MMOL/L — LOW (ref 135–145)
WBC # BLD: 9.2 K/UL — SIGNIFICANT CHANGE UP (ref 3.8–10.5)
WBC # FLD AUTO: 9.2 K/UL — SIGNIFICANT CHANGE UP (ref 3.8–10.5)

## 2019-11-02 PROCEDURE — 71045 X-RAY EXAM CHEST 1 VIEW: CPT | Mod: 26

## 2019-11-02 RX ORDER — SODIUM CHLORIDE 9 MG/ML
1 INJECTION INTRAMUSCULAR; INTRAVENOUS; SUBCUTANEOUS
Refills: 0 | Status: DISCONTINUED | OUTPATIENT
Start: 2019-11-02 | End: 2019-11-03

## 2019-11-02 RX ADMIN — Medication 3 MILLIGRAM(S): at 21:02

## 2019-11-02 RX ADMIN — HEPARIN SODIUM 5000 UNIT(S): 5000 INJECTION INTRAVENOUS; SUBCUTANEOUS at 05:45

## 2019-11-02 RX ADMIN — Medication 650 MILLIGRAM(S): at 06:03

## 2019-11-02 RX ADMIN — Medication 650 MILLIGRAM(S): at 06:33

## 2019-11-02 RX ADMIN — Medication 25 MILLIGRAM(S): at 05:45

## 2019-11-02 RX ADMIN — SODIUM CHLORIDE 1 GRAM(S): 9 INJECTION INTRAMUSCULAR; INTRAVENOUS; SUBCUTANEOUS at 12:05

## 2019-11-02 RX ADMIN — Medication 25 MILLIGRAM(S): at 21:02

## 2019-11-02 RX ADMIN — PANTOPRAZOLE SODIUM 40 MILLIGRAM(S): 20 TABLET, DELAYED RELEASE ORAL at 05:45

## 2019-11-02 RX ADMIN — HEPARIN SODIUM 5000 UNIT(S): 5000 INJECTION INTRAVENOUS; SUBCUTANEOUS at 17:45

## 2019-11-02 RX ADMIN — Medication 81 MILLIGRAM(S): at 12:05

## 2019-11-02 RX ADMIN — Medication 137 MICROGRAM(S): at 05:45

## 2019-11-02 RX ADMIN — Medication 25 MILLIGRAM(S): at 12:05

## 2019-11-02 RX ADMIN — SODIUM CHLORIDE 1 GRAM(S): 9 INJECTION INTRAMUSCULAR; INTRAVENOUS; SUBCUTANEOUS at 17:45

## 2019-11-02 NOTE — PROVIDER CONTACT NOTE (OTHER) - ASSESSMENT
A&Ox2 VSS safety measures in place. Patient denies cp. sob, palpitations, headache or dizziness. patient received po lopressor.

## 2019-11-02 NOTE — PROVIDER CONTACT NOTE (OTHER) - ACTION/TREATMENT ORDERED:
Rossi cath inserted as ordered
No intervention at this time. Continue to monitor patient.
Recheck BP in one hours. Continue to monitor. Safety maintained.
Start Precedex gtt. Continue to monitor and reassess.

## 2019-11-02 NOTE — PROGRESS NOTE ADULT - SUBJECTIVE AND OBJECTIVE BOX
Subjective: "I feel ok, I think i'm getting better each day" Pt A+Ox3 and appropriate but can be impulsive. Needs reminding to ask for help. Pt denies CP or SOB. OOB w 2-3 assist to chair. Poor mobility.     Vital Signs:  Vital Signs Last 24 Hrs  T(C): 36.4 (11-02-19 @ 05:42), Max: 36.7 (11-02-19 @ 01:18)  T(F): 97.6 (11-02-19 @ 05:42), Max: 98 (11-02-19 @ 01:18)  HR: 62 (11-02-19 @ 05:42) (56 - 64)  BP: 170/67 (11-02-19 @ 07:00) (142/74 - 174/71)  RR: 17 (11-02-19 @ 05:42) (17 - 18)  SpO2: 96% (11-02-19 @ 05:42) (96% - 100%) on (O2)    Telemetry/Alarms: Afib tele, rate controlled  General: WN/WD NAD  Neurology: Awake, nonfocal, LOCK x 4  Eyes: Scleras clear, PERRLA/ EOMI, Gross vision intact  ENT:Gross hearing intact, grossly patent pharynx, no stridor  Neck: Neck supple, trachea midline, No JVD,   Respiratory: CTA B/L, No wheezing, rales, rhonchi. Dec BS to bilat LL  CV: RRR, S1S2, no murmurs, rubs or gallops  Abdominal: Soft, NT, ND +BS, +BM this am. Tolerating TF at goal nocturnal. : Continue Rossi for urinary retention.   Extremities: No edema, + peripheral pulses  Skin: No Rashes, Hematoma, Scattered  Ecchymosis throughout extremities.   Lymphatic: No Neck, axilla, groin LAD  Psych: Oriented x 3, normal affect. Impulsive at times.   Incisions: Abd incision c/d/i w staples. MARTIN  Tubes: J tube site c/d/i.   Relevant labs, radiology and Medications reviewed           CXR stable.              10.5   9.20  )-----------( 298      ( 02 Nov 2019 06:15 )             32.4     11-02    124<L>  |  88<L>  |  13  ----------------------------<  173<H>  4.0   |  25  |  0.34<L>    Ca    7.8<L>      02 Nov 2019 06:15        MEDICATIONS  (STANDING):  aspirin enteric coated 81 milliGRAM(s) Oral daily  heparin  Injectable 5000 Unit(s) SubCutaneous every 12 hours  levothyroxine 137 MICROGram(s) Oral daily  melatonin 3 milliGRAM(s) Oral at bedtime  metoprolol tartrate 25 milliGRAM(s) Oral every 8 hours  pantoprazole    Tablet 40 milliGRAM(s) Oral before breakfast  sodium chloride 1 Gram(s) Oral two times a day    MEDICATIONS  (PRN):  acetaminophen   Tablet .. 650 milliGRAM(s) Oral every 6 hours PRN Mild Pain (1 - 3), Moderate Pain (4 - 6)  bisacodyl 5 milliGRAM(s) Oral at bedtime PRN Constipation  traMADol 25 milliGRAM(s) Oral every 4 hours PRN Severe Pain (7 - 10)    Pertinent Physical Exam  I&O's Summary    01 Nov 2019 07:01  -  02 Nov 2019 07:00  --------------------------------------------------------  IN: 1217 mL / OUT: 1850 mL / NET: -633 mL        Assessment  89y Female  w/ PAST MEDICAL & SURGICAL HISTORY:  Atrial fibrillation  Achalasia  Hypertension  Hypothyroidism  History of repair of hiatal hernia  H/O colectomy  S/P appendectomy  S/P hysterectomy  admitted with complaints of Patient is a 89y old  Female who presents with a chief complaint of s/p EGD, Ex Lap, Lysis of Adhesions, Partial Esophagogastrectomy, pyloromyotomy, Feeding Jejunostomy (01 Nov 2019 16:32)    89 y/o female w/ PMH of HTN, achalasia s/p dilation x2, HH s/p repair w/ mesh, emergency Volvulus surgical repair 4/2014, s/p EGD CRE balloon dilation w/ botox injection 8/2015, s/p EGD dilation 3/2016, s/p EGD lap heller myotomy 2016 and recent Barium swallow on 6/2019 for dysphagia which ended up showing decreased motility with EGD on 10/8/19 showing what appeared to be erosion of mesh into esophagus. Pt is now s/p CT abdomen done yesterday and persistent vomiting since then unable to tolerate PO food. She was seen in the Thoracic surgery office today by Dr. clemente and was admitted. 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 Underwent partial esophagogastrectomy, pyloromyotomy and feeding jej on 10/22/2019. Pt passes swallow study, tolerating tube feeds and is three person assist for ambulation. Met with pt's daughter and had d/w case management. Family has agreed to Rehab with [facility], case management working on it. 10/31-Rossi replaced again for urinary retention, will keep for discharge w URO fu as outpt. 11/1-More oriented, supervision as needed. Awaiting rehab approval. Hyponatremia, free water decreased.     PLAN  Neuro: Pain management. No confusion this am, pt encouraged to ask for assistance   Pulm: Encourage coughing, deep breathing and use of incentive spirometry. Daily CXR.   Cardio: Monitor telemetry/alarms. Tele Afib, Rate controlled. Cont current meds. Pt too high risk for full A/c.   GI: Continue TF at goal. : Continue Rossi  Renal: monitor urine output, supplement electrolytes as needed. Hyponatremia worsening, will add Salt tabs x 2 days.   Vasc: Heparin SC/SCDs for DVT prophylaxis. Cont Heparin for DVT proph, no full A/c  Heme: Stable H/H. .   ID: Off antibiotics. Stable.  Therapy: OOB/ambulate. Continue OOB w assist. Rehab planning.   Disposition: Aim to D/C to home once bed available.   Discussed with Cardiothoracic Team at AM rounds.

## 2019-11-03 LAB
ANION GAP SERPL CALC-SCNC: 12 MMO/L — SIGNIFICANT CHANGE UP (ref 7–14)
BUN SERPL-MCNC: 13 MG/DL — SIGNIFICANT CHANGE UP (ref 7–23)
CALCIUM SERPL-MCNC: 7.9 MG/DL — LOW (ref 8.4–10.5)
CHLORIDE SERPL-SCNC: 84 MMOL/L — LOW (ref 98–107)
CHLORIDE UR-SCNC: 90 MMOL/L — SIGNIFICANT CHANGE UP
CO2 SERPL-SCNC: 25 MMOL/L — SIGNIFICANT CHANGE UP (ref 22–31)
CREAT SERPL-MCNC: 0.33 MG/DL — LOW (ref 0.5–1.3)
GLUCOSE SERPL-MCNC: 152 MG/DL — HIGH (ref 70–99)
OSMOLALITY SERPL: 255 MOSMO/KG — LOW (ref 275–295)
OSMOLALITY UR: 459 MOSMO/KG — SIGNIFICANT CHANGE UP (ref 50–1200)
POTASSIUM SERPL-MCNC: 3.9 MMOL/L — SIGNIFICANT CHANGE UP (ref 3.5–5.3)
POTASSIUM SERPL-SCNC: 3.9 MMOL/L — SIGNIFICANT CHANGE UP (ref 3.5–5.3)
POTASSIUM UR-SCNC: 51 MMOL/L — SIGNIFICANT CHANGE UP
SODIUM SERPL-SCNC: 121 MMOL/L — LOW (ref 135–145)
SODIUM UR-SCNC: 104 MMOL/L — SIGNIFICANT CHANGE UP
TSH SERPL-MCNC: 21.19 UIU/ML — HIGH (ref 0.27–4.2)
URATE SERPL-MCNC: 1.6 MG/DL — LOW (ref 2.5–7)

## 2019-11-03 RX ORDER — BUMETANIDE 0.25 MG/ML
1 INJECTION INTRAMUSCULAR; INTRAVENOUS DAILY
Refills: 0 | Status: DISCONTINUED | OUTPATIENT
Start: 2019-11-04 | End: 2019-11-05

## 2019-11-03 RX ORDER — LEVOTHYROXINE SODIUM 125 MCG
68.5 TABLET ORAL AT BEDTIME
Refills: 0 | Status: DISCONTINUED | OUTPATIENT
Start: 2019-11-03 | End: 2019-11-04

## 2019-11-03 RX ORDER — TOLVAPTAN 15 MG/1
15 TABLET ORAL ONCE
Refills: 0 | Status: COMPLETED | OUTPATIENT
Start: 2019-11-03 | End: 2019-11-03

## 2019-11-03 RX ORDER — TOLVAPTAN 15 MG/1
15 TABLET ORAL ONCE
Refills: 0 | Status: DISCONTINUED | OUTPATIENT
Start: 2019-11-03 | End: 2019-11-03

## 2019-11-03 RX ORDER — SODIUM CHLORIDE 9 MG/ML
500 INJECTION INTRAMUSCULAR; INTRAVENOUS; SUBCUTANEOUS ONCE
Refills: 0 | Status: COMPLETED | OUTPATIENT
Start: 2019-11-03 | End: 2019-11-03

## 2019-11-03 RX ORDER — HYDRALAZINE HCL 50 MG
25 TABLET ORAL DAILY
Refills: 0 | Status: DISCONTINUED | OUTPATIENT
Start: 2019-11-03 | End: 2019-11-07

## 2019-11-03 RX ADMIN — HEPARIN SODIUM 5000 UNIT(S): 5000 INJECTION INTRAVENOUS; SUBCUTANEOUS at 05:30

## 2019-11-03 RX ADMIN — Medication 3 MILLIGRAM(S): at 21:08

## 2019-11-03 RX ADMIN — Medication 25 MILLIGRAM(S): at 05:30

## 2019-11-03 RX ADMIN — Medication 25 MILLIGRAM(S): at 17:18

## 2019-11-03 RX ADMIN — HEPARIN SODIUM 5000 UNIT(S): 5000 INJECTION INTRAVENOUS; SUBCUTANEOUS at 17:18

## 2019-11-03 RX ADMIN — Medication 81 MILLIGRAM(S): at 12:14

## 2019-11-03 RX ADMIN — Medication 137 MICROGRAM(S): at 05:30

## 2019-11-03 RX ADMIN — PANTOPRAZOLE SODIUM 40 MILLIGRAM(S): 20 TABLET, DELAYED RELEASE ORAL at 05:30

## 2019-11-03 RX ADMIN — Medication 68.5 MICROGRAM(S): at 21:08

## 2019-11-03 RX ADMIN — TOLVAPTAN 15 MILLIGRAM(S): 15 TABLET ORAL at 17:56

## 2019-11-03 RX ADMIN — SODIUM CHLORIDE 1 GRAM(S): 9 INJECTION INTRAMUSCULAR; INTRAVENOUS; SUBCUTANEOUS at 06:04

## 2019-11-03 RX ADMIN — Medication 25 MILLIGRAM(S): at 12:14

## 2019-11-03 RX ADMIN — SODIUM CHLORIDE 500 MILLILITER(S): 9 INJECTION INTRAMUSCULAR; INTRAVENOUS; SUBCUTANEOUS at 14:35

## 2019-11-03 NOTE — PROVIDER CONTACT NOTE (MEDICATION) - SITUATION
Pt with hyponatremia 121. Ordered for NS bolus 500mL. IV infiltrate after initial rate. Questioning to lower rate of drip

## 2019-11-03 NOTE — PROVIDER CONTACT NOTE (MEDICATION) - ASSESSMENT
NAD. right FA #[id number] infiltrate noted after beginning of NS bolus. IV d/c and new access achieved via left FA #[id number]

## 2019-11-03 NOTE — PROGRESS NOTE ADULT - SUBJECTIVE AND OBJECTIVE BOX
Subjective: pt agitated this AM, concerned about lack of bowel movement  as per nursign notes, pt had 2 BMs yesterday    Vital Signs:  Vital Signs Last 24 Hrs  T(C): 36.9 (11-03-19 @ 07:55), Max: 36.9 (11-03-19 @ 07:55)  T(F): 98.5 (11-03-19 @ 07:55), Max: 98.5 (11-03-19 @ 07:55)  HR: 58 (11-03-19 @ 07:55) (53 - 66)  BP: 183/74 (11-03-19 @ 07:55) (143/82 - 183/74)  RR: 16 (11-03-19 @ 07:55) (16 - 18)  SpO2: 97% (11-03-19 @ 07:55) (96% - 98%) on (O2)    Telemetry/Alarms:  General: WN/WD NAD  Neurology: Awake, nonfocal, LOCK x 4  Eyes: Scleras clear, PERRLA/ EOMI, Gross vision intact  ENT:Gross hearing intact, grossly patent pharynx, no stridor  Neck: Neck supple, trachea midline, No JVD,   Respiratory: CTA B/L, No wheezing, rales, rhonchi  CV: RRR, S1S2, no murmurs, rubs or gallops  Abdominal: Soft, NT, distended as baseline, +BS,   Extremities: No edema, + peripheral pulses  Skin: No Rashes, Hematoma, Ecchymosis  Lymphatic: No Neck, axilla, groin LAD  Psych: Oriented x 3, normal affect  Incisions: c,d,i    Relevant labs, radiology and Medications reviewed                        10.5   9.20  )-----------( 298      ( 02 Nov 2019 06:15 )             32.4     11-02    124<L>  |  88<L>  |  13  ----------------------------<  173<H>  4.0   |  25  |  0.34<L>    Ca    7.8<L>      02 Nov 2019 06:15        MEDICATIONS  (STANDING):  aspirin enteric coated 81 milliGRAM(s) Oral daily  heparin  Injectable 5000 Unit(s) SubCutaneous every 12 hours  levothyroxine 137 MICROGram(s) Oral daily  melatonin 3 milliGRAM(s) Oral at bedtime  metoprolol tartrate 25 milliGRAM(s) Oral every 8 hours  pantoprazole    Tablet 40 milliGRAM(s) Oral before breakfast  sodium chloride 1 Gram(s) Oral two times a day    MEDICATIONS  (PRN):  acetaminophen   Tablet .. 650 milliGRAM(s) Oral every 6 hours PRN Mild Pain (1 - 3), Moderate Pain (4 - 6)  bisacodyl 5 milliGRAM(s) Oral at bedtime PRN Constipation  traMADol 25 milliGRAM(s) Oral every 4 hours PRN Severe Pain (7 - 10)    Pertinent Physical Exam  I&O's Summary    02 Nov 2019 08:01  -  03 Nov 2019 07:00  --------------------------------------------------------  IN: 1439 mL / OUT: 1650 mL / NET: -211 mL    03 Nov 2019 07:01  -  03 Nov 2019 08:53  --------------------------------------------------------  IN: 167 mL / OUT: 200 mL / NET: -33 mL        Assessment  89y Female  w/ PAST MEDICAL & SURGICAL HISTORY:  Atrial fibrillation  Achalasia  Hypertension  Hypothyroidism  History of repair of hiatal hernia  H/O colectomy  S/P appendectomy  S/P hysterectomy  admitted with complaints of Patient is a 89y old  Female who presents with a chief complaint of s/p EGD, Ex Lap, Lysis of Adhesions, Partial Esophagogastrectomy, pyloromyotomy, Feeding Jejunostomy (01 Nov 2019 16:32)    89 y/o female w/ PMH of HTN, achalasia s/p dilation x2, HH s/p repair w/ mesh, emergency Volvulus surgical repair 4/2014, s/p EGD CRE balloon dilation w/ botox injection 8/2015, s/p EGD dilation 3/2016, s/p EGD lap heller myotomy 2016 and recent Barium swallow on 6/2019 for dysphagia which ended up showing decreased motility with EGD on 10/8/19 showing what appeared to be erosion of mesh into esophagus. Pt is now s/p CT abdomen done yesterday and persistent vomiting since then unable to tolerate PO food. She was seen in the Thoracic surgery office today by Dr. clemente and was admitted. 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 Underwent partial esophagogastrectomy, pyloromyotomy and feeding jej on 10/22/2019. Pt passes swallow study, tolerating tube feeds and is three person assist for ambulation. Met with pt's daughter and had d/w case management. Family has agreed to Rehab with [facility], case management working on it. 10/31-Rossi replaced again for urinary retention, will keep for discharge w URO fu as outpt. 11/1-More oriented, supervision as needed. Awaiting rehab approval. Hyponatremia, free water decreased.     PLAN  Neuro: Pain management. No confusion this am, pt encouraged to ask for assistance   Pulm: Encourage coughing, deep breathing and use of incentive spirometry. Daily CXR.   Cardio: Monitor telemetry/alarms. Tele Afib, Rate controlled. Cont current meds. Pt too high risk for full A/c.   GI: Continue TF at goal. : Continue Rossi  Renal: monitor urine output, supplement electrolytes as needed. Hyponatremia worsening, will add Salt tabs x 2 days. 11/3 electrolytes pending - will follow  Vasc: Heparin SC/SCDs for DVT prophylaxis. Cont Heparin for DVT proph, no full A/c  Heme: Stable H/H. .   ID: Off antibiotics. Stable.  Therapy: OOB/ambulate. Continue OOB w assist. Rehab planning.   Disposition: Aim to D/C to home once bed available.   Discussed with Cardiothoracic Team at AM rounds.

## 2019-11-03 NOTE — CONSULT NOTE ADULT - SUBJECTIVE AND OBJECTIVE BOX
HPI:  89 y/o female w/ PMH of HTN, achalasia s/p dilation x2, HH s/p repair w/ mesh, emergency Volvulus surgical repair 2014, s/p EGD CRE balloon dilation w/ botox injection 2015, s/p EGD dilation 3/2016, s/p EGD lap heller myotomy  and recent Barium swallow on 2019 for dysphagia which ended up showing decreased motility with EGD on 10/8/19 showing what appeared to be erosion of mesh into esophagus.     Nephrology consulted for hyponatremia  The patient said that her nausea and vomiting has improved. Denied excessive water intake. Does not take HCTZ or SSRIs      PAST MEDICAL & SURGICAL HISTORY:  Atrial fibrillation  Achalasia  Hypertension  Hypothyroidism  History of repair of hiatal hernia  H/O colectomy  S/P appendectomy  S/P hysterectomy      MEDICATIONS  (STANDING):  aspirin enteric coated 81 milliGRAM(s) Oral daily  heparin  Injectable 5000 Unit(s) SubCutaneous every 12 hours  levothyroxine Injectable 68.5 MICROGram(s) IV Push at bedtime  melatonin 3 milliGRAM(s) Oral at bedtime  metoprolol tartrate 25 milliGRAM(s) Oral every 8 hours  pantoprazole    Tablet 40 milliGRAM(s) Oral before breakfast  sodium chloride 1 Gram(s) Oral two times a day  sodium chloride 0.9% Bolus 500 milliLiter(s) IV Bolus once      Allergies    opioid-like analgesics (Other)    Intolerances        SOCIAL HISTORY:  Denies ETOh,Smoking,     FAMILY HISTORY:      REVIEW OF SYSTEMS:    CONSTITUTIONAL: No weakness, fevers or chills  EYES/ENT: No visual changes;  No vertigo or throat pain   NECK: No pain or stiffness  RESPIRATORY: No cough, wheezing, hemoptysis; No shortness of breath  CARDIOVASCULAR: No chest pain or palpitations  GASTROINTESTINAL: No abdominal or epigastric pain. No nausea, vomiting, or hematemesis; No diarrhea or constipation. No melena or hematochezia.  GENITOURINARY: No dysuria, frequency or hematuria  NEUROLOGICAL: No numbness or weakness  SKIN: No itching, burning, rashes, or lesions   All other review of systems is negative unless indicated above.    VITAL:  T(C): , Max: 36.9 (19 @ 07:55)  T(F): , Max: 98.5 (19 @ 07:55)  HR: 60 (19 @ 12:09)  BP: 169/70 (19 @ 12:09)  BP(mean): 93 (19 @ 12:09)  RR: 20 (19 @ 12:09)  SpO2: 97% (19 @ 12:09)  Wt(kg): --    I and O's:     @ 08:01  -   @ 07:00  --------------------------------------------------------  IN: 1439 mL / OUT: 1650 mL / NET: -211 mL     @ 07:01  -   @ 14:13  --------------------------------------------------------  IN: 334 mL / OUT: 300 mL / NET: 34 mL          PHYSICAL EXAM:    Constitutional: NAD  HEENT: PERRLA, EOMI,  MMM  Neck: No LAD, No JVD  Respiratory: CTAB  Cardiovascular: S1 and S2  Gastrointestinal: BS+, soft, NT/ND  Extremities: No peripheral edema  Neurological: A/O x 3, no focal deficits  Psychiatric: Normal mood, normal affect    LABS:                        10.5   9.20  )-----------( 298      ( 2019 06:15 )             32.4         121<L>  |  84<L>  |  13  ----------------------------<  152<H>  3.9   |  25  |  0.33<L>    Ca    7.9<L>      2019 05:45        Urine Studies:  Urinalysis Basic - ( 2019 18:36 )    Color: YELLOW / Appearance: CLEAR / S.018 / pH: 7.5  Gluc: NEGATIVE / Ketone: NEGATIVE  / Bili: NEGATIVE / Urobili: NORMAL   Blood: NEGATIVE / Protein: 20 / Nitrite: NEGATIVE   Leuk Esterase: NEGATIVE / RBC: 6-10 / WBC 3-5   Sq Epi: OCC / Non Sq Epi: x / Bacteria: NEGATIVE      Sodium, Random Urine: 104 mmol/L ( @ 10:03)  Potassium, Random Urine: 51.0 mmol/L ( @ 10:03)  Chloride, Random Urine: 90 mmol/L ( @ 10:03)  Osmolality, Random Urine: 459 mosmo/kg ( @ 10:03)        RADIOLOGY & ADDITIONAL STUDIES:        ASSESSMENT:    89 y/o female w/ PMH of HTN, achalasia s/p dilation x2, HH s/p repair w/ mesh, emergency Volvulus surgical repair 2014, s/p EGD CRE balloon dilation w/ botox injection 2015, s/p EGD dilation 3/2016, s/p EGD lap heller myotomy  and recent Barium swallow on 2019 for dysphagia which ended up showing decreased motility with EGD on 10/8/19 showing what appeared to be erosion of mesh into esophagus. 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 Lung CA  - BMP in AM       Thank you for the courtesy of the referral    Gail Parham MD  [facility]   Cell: [phone] HPI:  89 y/o female w/ PMH of HTN, achalasia s/p dilation x2, HH s/p repair w/ mesh, emergency Volvulus surgical repair 2014, s/p EGD CRE balloon dilation w/ botox injection 2015, s/p EGD dilation 3/2016, s/p EGD lap heller myotomy  and recent Barium swallow on 2019 for dysphagia which ended up showing decreased motility with EGD on 10/8/19 showing what appeared to be erosion of mesh into esophagus.     Nephrology consulted for hyponatremia  The patient said that her nausea and vomiting has improved. Denied excessive water intake. Does not take HCTZ or SSRIs      PAST MEDICAL & SURGICAL HISTORY:  Atrial fibrillation  Achalasia  Hypertension  Hypothyroidism  History of repair of hiatal hernia  H/O colectomy  S/P appendectomy  S/P hysterectomy      MEDICATIONS  (STANDING):  aspirin enteric coated 81 milliGRAM(s) Oral daily  heparin  Injectable 5000 Unit(s) SubCutaneous every 12 hours  levothyroxine Injectable 68.5 MICROGram(s) IV Push at bedtime  melatonin 3 milliGRAM(s) Oral at bedtime  metoprolol tartrate 25 milliGRAM(s) Oral every 8 hours  pantoprazole    Tablet 40 milliGRAM(s) Oral before breakfast  sodium chloride 1 Gram(s) Oral two times a day  sodium chloride 0.9% Bolus 500 milliLiter(s) IV Bolus once      Allergies    opioid-like analgesics (Other)    Intolerances        SOCIAL HISTORY:  Denies ETOh,Smoking,     FAMILY HISTORY:      REVIEW OF SYSTEMS:    CONSTITUTIONAL: No weakness, fevers or chills  EYES/ENT: No visual changes;  No vertigo or throat pain   NECK: No pain or stiffness  RESPIRATORY: No cough, wheezing, hemoptysis; No shortness of breath  CARDIOVASCULAR: No chest pain or palpitations  GASTROINTESTINAL: No abdominal or epigastric pain. No nausea, vomiting, or hematemesis; No diarrhea or constipation. No melena or hematochezia.  GENITOURINARY: No dysuria, frequency or hematuria  NEUROLOGICAL: No numbness or weakness  SKIN: No itching, burning, rashes, or lesions   All other review of systems is negative unless indicated above.    VITAL:  T(C): , Max: 36.9 (19 @ 07:55)  T(F): , Max: 98.5 (19 @ 07:55)  HR: 60 (19 @ 12:09)  BP: 169/70 (19 @ 12:09)  BP(mean): 93 (19 @ 12:09)  RR: 20 (19 @ 12:09)  SpO2: 97% (19 @ 12:09)  Wt(kg): --    I and O's:     @ 08:01  -   @ 07:00  --------------------------------------------------------  IN: 1439 mL / OUT: 1650 mL / NET: -211 mL     @ 07:01  -   @ 14:13  --------------------------------------------------------  IN: 334 mL / OUT: 300 mL / NET: 34 mL          PHYSICAL EXAM:    Constitutional: NAD  HEENT: PERRLA, EOMI,  MMM  Neck: No LAD, No JVD  Respiratory: CTAB  Cardiovascular: S1 and S2  Gastrointestinal: BS+, soft, NT/ND  Extremities: No peripheral edema  Neurological: A/O x 3, no focal deficits  Psychiatric: Normal mood, normal affect    LABS:                        10.5   9.20  )-----------( 298      ( 2019 06:15 )             32.4         121<L>  |  84<L>  |  13  ----------------------------<  152<H>  3.9   |  25  |  0.33<L>    Ca    7.9<L>      2019 05:45        Urine Studies:  Urinalysis Basic - ( 2019 18:36 )    Color: YELLOW / Appearance: CLEAR / S.018 / pH: 7.5  Gluc: NEGATIVE / Ketone: NEGATIVE  / Bili: NEGATIVE / Urobili: NORMAL   Blood: NEGATIVE / Protein: 20 / Nitrite: NEGATIVE   Leuk Esterase: NEGATIVE / RBC: 6-10 / WBC 3-5   Sq Epi: OCC / Non Sq Epi: x / Bacteria: NEGATIVE      Sodium, Random Urine: 104 mmol/L ( @ 10:03)  Potassium, Random Urine: 51.0 mmol/L ( @ 10:03)  Chloride, Random Urine: 90 mmol/L ( @ 10:03)  Osmolality, Random Urine: 459 mosmo/kg ( @ 10:03)        RADIOLOGY & ADDITIONAL STUDIES:        ASSESSMENT:    89 y/o female w/ PMH of HTN, achalasia s/p dilation x2, HH s/p repair w/ mesh, emergency Volvulus surgical repair 2014, s/p EGD CRE balloon dilation w/ botox injection 2015, s/p EGD dilation 3/2016, s/p EGD lap heller myotomy  and recent Barium swallow on 2019 for dysphagia which ended up showing decreased motility with EGD on 10/8/19 showing what appeared to be erosion of mesh into esophagus. 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   Cell: [phone]

## 2019-11-04 DIAGNOSIS — E87.1 HYPO-OSMOLALITY AND HYPONATREMIA: ICD-10-CM

## 2019-11-04 DIAGNOSIS — E89.0 POSTPROCEDURAL HYPOTHYROIDISM: ICD-10-CM

## 2019-11-04 LAB
ANION GAP SERPL CALC-SCNC: 11 MMO/L — SIGNIFICANT CHANGE UP (ref 7–14)
BUN SERPL-MCNC: 14 MG/DL — SIGNIFICANT CHANGE UP (ref 7–23)
CALCIUM SERPL-MCNC: 8 MG/DL — LOW (ref 8.4–10.5)
CHLORIDE SERPL-SCNC: 88 MMOL/L — LOW (ref 98–107)
CO2 SERPL-SCNC: 26 MMOL/L — SIGNIFICANT CHANGE UP (ref 22–31)
CREAT SERPL-MCNC: 0.39 MG/DL — LOW (ref 0.5–1.3)
GLUCOSE SERPL-MCNC: 161 MG/DL — HIGH (ref 70–99)
HCT VFR BLD CALC: 31.6 % — LOW (ref 34.5–45)
HGB BLD-MCNC: 10.6 G/DL — LOW (ref 11.5–15.5)
MCHC RBC-ENTMCNC: 29.4 PG — SIGNIFICANT CHANGE UP (ref 27–34)
MCHC RBC-ENTMCNC: 33.5 % — SIGNIFICANT CHANGE UP (ref 32–36)
MCV RBC AUTO: 87.5 FL — SIGNIFICANT CHANGE UP (ref 80–100)
NRBC # FLD: 0 K/UL — SIGNIFICANT CHANGE UP (ref 0–0)
PLATELET # BLD AUTO: 365 K/UL — SIGNIFICANT CHANGE UP (ref 150–400)
PMV BLD: 10.3 FL — SIGNIFICANT CHANGE UP (ref 7–13)
POTASSIUM SERPL-MCNC: 4 MMOL/L — SIGNIFICANT CHANGE UP (ref 3.5–5.3)
POTASSIUM SERPL-SCNC: 4 MMOL/L — SIGNIFICANT CHANGE UP (ref 3.5–5.3)
RBC # BLD: 3.61 M/UL — LOW (ref 3.8–5.2)
RBC # FLD: 14.4 % — SIGNIFICANT CHANGE UP (ref 10.3–14.5)
SODIUM SERPL-SCNC: 125 MMOL/L — LOW (ref 135–145)
T3 SERPL-MCNC: 42.1 NG/DL — LOW (ref 80–200)
T4 AB SER-ACNC: 6.79 UG/DL — SIGNIFICANT CHANGE UP (ref 5.1–13)
TSH SERPL-MCNC: 21.17 UIU/ML — HIGH (ref 0.27–4.2)
WBC # BLD: 8.6 K/UL — SIGNIFICANT CHANGE UP (ref 3.8–10.5)
WBC # FLD AUTO: 8.6 K/UL — SIGNIFICANT CHANGE UP (ref 3.8–10.5)

## 2019-11-04 PROCEDURE — 99223 1ST HOSP IP/OBS HIGH 75: CPT | Mod: GC

## 2019-11-04 RX ORDER — LEVOTHYROXINE SODIUM 125 MCG
50 TABLET ORAL AT BEDTIME
Refills: 0 | Status: DISCONTINUED | OUTPATIENT
Start: 2019-11-04 | End: 2019-11-07

## 2019-11-04 RX ORDER — TOLVAPTAN 15 MG/1
30 TABLET ORAL ONCE
Refills: 0 | Status: COMPLETED | OUTPATIENT
Start: 2019-11-04 | End: 2019-11-05

## 2019-11-04 RX ADMIN — PANTOPRAZOLE SODIUM 40 MILLIGRAM(S): 20 TABLET, DELAYED RELEASE ORAL at 06:00

## 2019-11-04 RX ADMIN — HEPARIN SODIUM 5000 UNIT(S): 5000 INJECTION INTRAVENOUS; SUBCUTANEOUS at 06:00

## 2019-11-04 RX ADMIN — Medication 50 MICROGRAM(S): at 22:17

## 2019-11-04 RX ADMIN — Medication 81 MILLIGRAM(S): at 13:34

## 2019-11-04 RX ADMIN — HEPARIN SODIUM 5000 UNIT(S): 5000 INJECTION INTRAVENOUS; SUBCUTANEOUS at 17:01

## 2019-11-04 RX ADMIN — Medication 3 MILLIGRAM(S): at 22:17

## 2019-11-04 RX ADMIN — Medication 25 MILLIGRAM(S): at 06:00

## 2019-11-04 RX ADMIN — Medication 25 MILLIGRAM(S): at 13:34

## 2019-11-04 NOTE — CONSULT NOTE ADULT - SUBJECTIVE AND OBJECTIVE BOX
HPI:  89 y/o female w/ PMH of HTN, achalasia s/p dilation x2, HH s/p repair w/ mesh, emergency Volvulus surgical repair 4/2014, s/p EGD CRE balloon dilation w/ botox injection 8/2015, s/p EGD dilation 3/2016, s/p EGD lap heller myotomy 2016 and recent Barium swallow on 6/2019 for dysphagia which ended up showing decreased motility with EGD on 10/8/19 showing what appeared to be erosion of mesh into esophagus. Pt is now s/p CT abdomen done yesterday and persistent vomiting since then unable to tolerate PO food. She was seen in the Thoracic surgery office today by Dr. clemente and was admitted.       At bedside pt seen with daughter. She is a thin female and is stable in NAD. At this time she denies sob, cp.  She does admit to vomiting 4x yesterday into this morning after eating. She has not been vomiting since she stopped eating. (15 Oct 2019 17:36)      Patient evaluated in room, sitting up in chair, with son at bedside and daughter Emmy (primary caretaker) on phone. Patient has history of hypothyroidism for many years, does not recall time of diagnosis. Per patient's daughter, patient is s/p thyroidectomy in the 1990s and has been on Levothyroxine for >20 years until last August. Prior to August 2018, patient had been on a stable dose of Levothyroxine for several years (patient's daughter does not recall exact dose but thinks it was ~115 mcg). In August 2018, patient had been hospitalized for small bowel obstruction at an outside hospital, during which she time she was unable to get Levothyroxine while on bowel rest as that outside hospital did not have the IV formulation. Her TSH has been less controlled since then, requiring titration of her Levothyroxine dose by her PCP Dr. Mohinder Staton who has been managing her hypothyroidism. Patient's daughter reports that patient has been on 137mcg of Levothyroxine for the most part since her August 2018 admission, with a brief period where she was on a higher dose of 150mcg before being brought down to 137mcg again as the 150mcg dose was too high. However, in August of this year, patient's PCP Dr. Staton had reportedly told them patient's TSH was "off" and told them to go for repeat bloodwork. However, before that could happen, patient was admitted here.    Of note, on chart review, patient with recent TSH level of 4.32 on 10/5/19.      PAST MEDICAL & SURGICAL HISTORY:  Atrial fibrillation  Achalasia  Hypertension  Hypothyroidism  History of repair of hiatal hernia  H/O colectomy  S/P appendectomy  S/P hysterectomy      FAMILY HISTORY:      Social History:    Outpatient Medications:    MEDICATIONS  (STANDING):  aspirin enteric coated 81 milliGRAM(s) Oral daily  buMETAnide 1 milliGRAM(s) Oral daily  heparin  Injectable 5000 Unit(s) SubCutaneous every 12 hours  hydrALAZINE 25 milliGRAM(s) Oral daily  levothyroxine Injectable 68.5 MICROGram(s) IV Push at bedtime  melatonin 3 milliGRAM(s) Oral at bedtime  metoprolol tartrate 25 milliGRAM(s) Oral every 8 hours  pantoprazole    Tablet 40 milliGRAM(s) Oral before breakfast    MEDICATIONS  (PRN):  acetaminophen   Tablet .. 650 milliGRAM(s) Oral every 6 hours PRN Mild Pain (1 - 3), Moderate Pain (4 - 6)  bisacodyl 5 milliGRAM(s) Oral at bedtime PRN Constipation  traMADol 25 milliGRAM(s) Oral every 4 hours PRN Severe Pain (7 - 10)      Allergies    opioid-like analgesics (Other)    Intolerances      Review of Systems:  Constitutional: No fever  Eyes: No blurry vision  Neuro: No tremors  HEENT: No pain  Cardiovascular: No chest pain, palpitations  Respiratory: No SOB, no cough  GI: No nausea, vomiting, abdominal pain  : No dysuria  Skin: no rash  Psych: no depression  Endocrine: no polyuria, polydipsia  Hem/lymph: no swelling  Osteoporosis: no fractures    ALL OTHER SYSTEMS REVIEWED AND NEGATIVE    UNABLE TO OBTAIN    PHYSICAL EXAM:  VITALS: T(C): 36.6 (11-04-19 @ 12:01)  T(F): 97.8 (11-04-19 @ 12:01), Max: 98.1 (11-03-19 @ 16:54)  HR: 65 (11-04-19 @ 12:01) (57 - 71)  BP: 139/63 (11-04-19 @ 12:01) (130/61 - 180/76)  RR:  (17 - 20)  SpO2:  (96% - 97%)  Wt(kg): --  GENERAL: NAD, well-groomed, well-developed  EYES: No proptosis, no lid lag, anicteric  HEENT:  Atraumatic, Normocephalic, moist mucous membranes  THYROID: Normal size, no palpable nodules  RESPIRATORY: Clear to auscultation bilaterally; No rales, rhonchi, wheezing  CARDIOVASCULAR: Regular rate and rhythm; No murmurs; no peripheral edema  GI: Soft, nontender, non distended, normal bowel sounds  SKIN: Dry, intact, No rashes or lesions  MUSCULOSKELETAL: Full range of motion, normal strength  NEURO: sensation intact, extraocular movements intact, no tremor  PSYCH: Alert and oriented x 3, normal affect, normal mood  CUSHING'S SIGNS: no striae      CAPILLARY BLOOD GLUCOSE                                10.6   8.60  )-----------( 365      ( 04 Nov 2019 06:25 )             31.6       11-04    125<L>  |  88<L>  |  14  ----------------------------<  161<H>  4.0   |  26  |  0.39<L>    EGFR if : 106  EGFR if non : 92    Ca    8.0<L>      11-04        Thyroid Function Tests:  11-04 @ 06:25 TSH 21.17 FreeT4 -- T3 42.1 Anti TPO -- Anti Thyroglobulin Ab -- TSI --  11-03 @ 05:45 TSH 21.19 FreeT4 -- T3 -- Anti TPO -- Anti Thyroglobulin Ab -- TSI --      Hemoglobin A1C, Whole Blood: 5.2 % [4.0 - 5.6] (10-15-19 @ 18:37)          Radiology: HPI:  89 y/o female w/ PMH of HTN, achalasia s/p dilation x2, HH s/p repair w/ mesh, emergency Volvulus surgical repair 4/2014, s/p EGD CRE balloon dilation w/ botox injection 8/2015, s/p EGD dilation 3/2016, s/p EGD lap heller myotomy 2016 and recent Barium swallow on 6/2019 for dysphagia which ended up showing decreased motility with EGD on 10/8/19 showing what appeared to be erosion of mesh into esophagus. Pt is now s/p CT abdomen done yesterday and persistent vomiting since then unable to tolerate PO food. She was seen in the Thoracic surgery office today by Dr. clemente and was admitted.       At bedside pt seen with daughter. She is a thin female and is stable in NAD. At this time she denies sob, cp.  She does admit to vomiting 4x yesterday into this morning after eating. She has not been vomiting since she stopped eating. (15 Oct 2019 17:36)      Patient evaluated in room, sitting up in chair, with son at bedside and daughter Emmy (primary caretaker) on phone. Patient has history of hypothyroidism for many years, does not recall time of diagnosis. 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 Per patient's daughter these nodules turned out not to be malignant, and patient has been stable on Levothyroxine since then for >20 years until last August. Prior to August 2018, patient had been on a stable dose of Levothyroxine for several years (patient's daughter does not recall exact dose but thinks it was ~115 mcg). In August 2018, patient had been hospitalized for small bowel obstruction at an outside hospital, during which she time she was unable to get Levothyroxine while on bowel rest as that outside hospital did not have the IV formulation. Her TSH has been less controlled since then, requiring titration of her Levothyroxine dose by her PCP Dr. Mohinder Staton who has been managing her hypothyroidism. Patient's daughter reports that patient has been on 137mcg of Levothyroxine for the most part since her August 2018 admission, with a brief period where she was on a higher dose of 150mcg before being brought down to 137mcg again as the 150mcg dose was too high. However, in August of this year, patient's PCP Dr. Staton had reportedly told them patient's TSH was "off" and told them to go for repeat bloodwork. However, before that could happen, patient was admitted here.    Of note, on chart review, patient with recent TSH level of 4.32 on 10/5/19.      PAST MEDICAL & SURGICAL HISTORY:  Atrial fibrillation  Achalasia  Hypertension  Hypothyroidism  History of repair of hiatal hernia  H/O colectomy  S/P appendectomy  S/P hysterectomy      FAMILY HISTORY:      Social History:    Outpatient Medications:    MEDICATIONS  (STANDING):  aspirin enteric coated 81 milliGRAM(s) Oral daily  buMETAnide 1 milliGRAM(s) Oral daily  heparin  Injectable 5000 Unit(s) SubCutaneous every 12 hours  hydrALAZINE 25 milliGRAM(s) Oral daily  levothyroxine Injectable 68.5 MICROGram(s) IV Push at bedtime  melatonin 3 milliGRAM(s) Oral at bedtime  metoprolol tartrate 25 milliGRAM(s) Oral every 8 hours  pantoprazole    Tablet 40 milliGRAM(s) Oral before breakfast    MEDICATIONS  (PRN):  acetaminophen   Tablet .. 650 milliGRAM(s) Oral every 6 hours PRN Mild Pain (1 - 3), Moderate Pain (4 - 6)  bisacodyl 5 milliGRAM(s) Oral at bedtime PRN Constipation  traMADol 25 milliGRAM(s) Oral every 4 hours PRN Severe Pain (7 - 10)      Allergies    opioid-like analgesics (Other)    Intolerances      Review of Systems:  Constitutional: No fever. +Fatigue.  Eyes: No blurry vision  Neuro: No tremors  HEENT: No pain  Cardiovascular: No chest pain, palpitations  Respiratory: No SOB, no cough  GI: No nausea, vomiting, abdominal pain  : No dysuria  Skin: no rash  Psych: no depression  Endocrine: no polyuria, polydipsia  Hem/lymph: no swelling  Osteoporosis: no fractures    ALL OTHER SYSTEMS REVIEWED AND NEGATIVE    UNABLE TO OBTAIN    PHYSICAL EXAM:  VITALS: T(C): 36.6 (11-04-19 @ 12:01)  T(F): 97.8 (11-04-19 @ 12:01), Max: 98.1 (11-03-19 @ 16:54)  HR: 65 (11-04-19 @ 12:01) (57 - 71)  BP: 139/63 (11-04-19 @ 12:01) (130/61 - 180/76)  RR:  (17 - 20)  SpO2:  (96% - 97%)  Wt(kg): --  GENERAL: NAD, thin elderly woman sitting in chair  EYES: No proptosis, no lid lag, anicteric  HEENT:  Atraumatic, Normocephalic, dry mucous membranes  THYROID: no palpable masses in neck (pt s/p thyroidectomy)  RESPIRATORY: Clear to auscultation bilaterally; No rales, rhonchi, wheezing  CARDIOVASCULAR: Regular rate and rhythm; No murmurs; no peripheral edema  GI: Soft, nontender, non distended, normal bowel sounds. Surgical sites with staples, eedges well-approximated, appear clean with no discharge. Jejunostomy tube site intact, dry.  SKIN: Appears very dry, scattered ecchymoses on extremities.  MUSCULOSKELETAL: Full range of motion, normal strength  NEURO: sensation intact, extraocular movements intact, no tremor  PSYCH: Alert and oriented x 2 (self/"hospital")  CUSHING'S SIGNS: no striae      CAPILLARY BLOOD GLUCOSE                                10.6   8.60  )-----------( 365      ( 04 Nov 2019 06:25 )             31.6       11-04    125<L>  |  88<L>  |  14  ----------------------------<  161<H>  4.0   |  26  |  0.39<L>    EGFR if : 106  EGFR if non : 92    Ca    8.0<L>      11-04        Thyroid Function Tests:  11-04 @ 06:25 TSH 21.17 FreeT4 -- T3 42.1 Anti TPO -- Anti Thyroglobulin Ab -- TSI --  11-03 @ 05:45 TSH 21.19 FreeT4 -- T3 -- Anti TPO -- Anti Thyroglobulin Ab -- TSI --      Hemoglobin A1C, Whole Blood: 5.2 % [4.0 - 5.6] (10-15-19 @ 18:37)          Radiology: HPI:  89 y/o female w/ PMH of HTN, achalasia s/p dilation x2, HH s/p repair w/ mesh, emergency Volvulus surgical repair 4/2014, s/p EGD CRE balloon dilation w/ botox injection 8/2015, s/p EGD dilation 3/2016, s/p EGD lap heller myotomy 2016 and recent Barium swallow on 6/2019 for dysphagia which ended up showing decreased motility with EGD on 10/8/19 showing what appeared to be erosion of mesh into esophagus. Pt is now s/p CT abdomen done yesterday and persistent vomiting since then unable to tolerate PO food. She was seen in the Thoracic surgery office today by Dr. clemente and was admitted.       At bedside pt seen with daughter. She is a thin female and is stable in NAD. At this time she denies sob, cp.  She does admit to vomiting 4x yesterday into this morning after eating. She has not been vomiting since she stopped eating. (15 Oct 2019 17:36)      Patient evaluated in room, sitting up in chair, with son at bedside and daughter Emmy (primary caretaker) on phone. Patient has history of hypothyroidism for many years, does not recall time of diagnosis. 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 Per patient's daughter these nodules turned out not to be malignant, and patient has been stable on Levothyroxine since then for >20 years until last August. Prior to August 2018, patient had been on a stable dose of Levothyroxine for several years (patient's daughter does not recall exact dose but thinks it was ~115 mcg). In August 2018, patient had been hospitalized for small bowel obstruction at an outside hospital, during which she time she was unable to get Levothyroxine while on bowel rest as that outside hospital did not have the IV formulation. Her TSH has been less controlled since then, requiring titration of her Levothyroxine dose by her PCP Dr. Mohinder Staton who has been managing her hypothyroidism. Patient's daughter reports that patient has been on 137mcg of Levothyroxine for the most part since her August 2018 admission, with a brief period where she was on a higher dose of 150mcg before being brought down to 137mcg again as the 150mcg dose was too high. However, in August of this year, patient's PCP Dr. Staton had reportedly told them patient's TSH was "off" and told them to go for repeat bloodwork. However, before that could happen, patient was admitted here.    Of note, on chart review, patient with recent TSH level of 4.32 on 10/5/19.      PAST MEDICAL & SURGICAL HISTORY:  Atrial fibrillation  Achalasia  Hypertension  Hypothyroidism  History of repair of hiatal hernia  H/O colectomy  S/P appendectomy  S/P hysterectomy      FAMILY HISTORY: unable to obtain      Social History: unable to obtain    Outpatient Medications: Levothyroxine 137 mcg po daily    MEDICATIONS  (STANDING):  aspirin enteric coated 81 milliGRAM(s) Oral daily  buMETAnide 1 milliGRAM(s) Oral daily  heparin  Injectable 5000 Unit(s) SubCutaneous every 12 hours  hydrALAZINE 25 milliGRAM(s) Oral daily  levothyroxine Injectable 68.5 MICROGram(s) IV Push at bedtime  melatonin 3 milliGRAM(s) Oral at bedtime  metoprolol tartrate 25 milliGRAM(s) Oral every 8 hours  pantoprazole    Tablet 40 milliGRAM(s) Oral before breakfast    MEDICATIONS  (PRN):  acetaminophen   Tablet .. 650 milliGRAM(s) Oral every 6 hours PRN Mild Pain (1 - 3), Moderate Pain (4 - 6)  bisacodyl 5 milliGRAM(s) Oral at bedtime PRN Constipation  traMADol 25 milliGRAM(s) Oral every 4 hours PRN Severe Pain (7 - 10)      Allergies    opioid-like analgesics (Other)    Intolerances      Review of Systems:  Constitutional: No fever. +Fatigue.  Eyes: No blurry vision  Neuro: No tremors  HEENT: No pain  Cardiovascular: No chest pain, palpitations  Respiratory: No SOB, no cough  GI: No nausea, vomiting, abdominal pain  : No dysuria  Skin: no rash  Psych: no depression  Endocrine: no polyuria, polydipsia  Hem/lymph: no swelling  Osteoporosis: no fractures    ALL OTHER SYSTEMS REVIEWED AND NEGATIVE      PHYSICAL EXAM:  VITALS: T(C): 36.6 (11-04-19 @ 12:01)  T(F): 97.8 (11-04-19 @ 12:01), Max: 98.1 (11-03-19 @ 16:54)  HR: 65 (11-04-19 @ 12:01) (57 - 71)  BP: 139/63 (11-04-19 @ 12:01) (130/61 - 180/76)  RR:  (17 - 20)  SpO2:  (96% - 97%)  Wt(kg): --  GENERAL: NAD, thin elderly woman sitting in chair  EYES: No proptosis, no lid lag, anicteric  HEENT:  Atraumatic, Normocephalic, dry mucous membranes  THYROID: no palpable masses in neck (pt s/p thyroidectomy)  RESPIRATORY: Clear to auscultation bilaterally; No rales, rhonchi, wheezing  CARDIOVASCULAR: Regular rate and rhythm; No murmurs; no peripheral edema  GI: Soft, nontender, non distended, normal bowel sounds. Surgical sites with staples, edges well-approximated, appear clean with no discharge. Jejunostomy tube site intact, dry.  SKIN: Appears very dry, scattered ecchymoses on extremities.  MUSCULOSKELETAL: Full range of motion, normal strength  NEURO: sensation intact, extraocular movements intact, no tremor  PSYCH: Alert and oriented x 2 (self/"hospital")  CUSHING'S SIGNS: no striae      CAPILLARY BLOOD GLUCOSE                                10.6   8.60  )-----------( 365      ( 04 Nov 2019 06:25 )             31.6       11-04    125<L>  |  88<L>  |  14  ----------------------------<  161<H>  4.0   |  26  |  0.39<L>    EGFR if : 106  EGFR if non : 92    Ca    8.0<L>      11-04        Thyroid Function Tests:  11-04 @ 06:25 TSH 21.17 FreeT4 -- T3 42.1 Anti TPO -- Anti Thyroglobulin Ab -- TSI --  11-03 @ 05:45 TSH 21.19 FreeT4 -- T3 -- Anti TPO -- Anti Thyroglobulin Ab -- TSI --      Hemoglobin A1C, Whole Blood: 5.2 % [4.0 - 5.6] (10-15-19 @ 18:37)          Radiology:

## 2019-11-04 NOTE — CONSULT NOTE ADULT - ATTENDING COMMENTS
patient was seen and examined, above noted. Discussed plan with patient and family at bedside. Patient states that she is going to rehab. Recommend to send to rehab with indwelling Rossi and trial of void at rehab or as outpatient.
Patient with history of thyroidectomy, on long term replacement with levothyroxine, now with uncontrolled hypothyroidism in the setting of multiple GI issues, suspected poor absorption and in the setting of receiving levothyroxine with enteral feeds (not given on empty stomach). Also noted is hyponatremia which may be in part due to hypothyroidism. Proceed with IV levothyroxine but would lower dose to 50 mcg IV daily which is 75% of weight based oral dose calculation. Check baseline Free T4. Will follow.

## 2019-11-04 NOTE — PROGRESS NOTE ADULT - SUBJECTIVE AND OBJECTIVE BOX
Subjective: "I feel so weak sometimes and I get an upset stomach" Pt needs assist for OOB to chair. No obvious confusion, daughter at bedside. Pt. denies CP orSOB.     Vital Signs:  Vital Signs Last 24 Hrs  T(C): 36.6 (11-04-19 @ 12:01), Max: 36.7 (11-03-19 @ 16:54)  T(F): 97.8 (11-04-19 @ 12:01), Max: 98.1 (11-03-19 @ 16:54)  HR: 65 (11-04-19 @ 12:01) (57 - 71)  BP: 139/63 (11-04-19 @ 12:01) (130/61 - 180/76)  RR: 19 (11-04-19 @ 12:01) (17 - 20)  SpO2: 97% (11-04-19 @ 12:01) (96% - 97%) on (O2)    Telemetry/Alarms: Tele Afib.   General: WN/WD NAD + cachexia  Neurology: Awake, nonfocal, LOCK x 4  Eyes: Scleras clear, PERRLA/ EOMI, Gross vision intact  ENT:Gross hearing intact, grossly patent pharynx, no stridor  Neck: Neck supple, trachea midline, No JVD,   Respiratory: CTA B/L, No wheezing, rales, rhonchi  CV: Irreg, S1S2, no murmurs, rubs or gallops  Abdominal: Soft, NT, ND +BS, +BM yesterday. Tolerating TF at goal nocturnal.   Extremities: No edema, + peripheral pulses  Skin: No Rashes, Hematoma, + Ecchymosis to bilat UE  Lymphatic: No Neck, axilla, groin LAD  Psych: Oriented x 3, normal affect  Incisions: Abd incision c/d/i w staples  Tubes: J tube site c/d/i  Relevant labs, radiology and Medications reviewed           CXR stable             10.6   8.60  )-----------( 365      ( 04 Nov 2019 06:25 )             31.6     11-04    125<L>  |  88<L>  |  14  ----------------------------<  161<H>  4.0   |  26  |  0.39<L>    Ca    8.0<L>      04 Nov 2019 06:25        MEDICATIONS  (STANDING):  aspirin enteric coated 81 milliGRAM(s) Oral daily  buMETAnide 1 milliGRAM(s) Oral daily  heparin  Injectable 5000 Unit(s) SubCutaneous every 12 hours  hydrALAZINE 25 milliGRAM(s) Oral daily  levothyroxine Injectable 68.5 MICROGram(s) IV Push at bedtime  melatonin 3 milliGRAM(s) Oral at bedtime  metoprolol tartrate 25 milliGRAM(s) Oral every 8 hours  pantoprazole    Tablet 40 milliGRAM(s) Oral before breakfast    MEDICATIONS  (PRN):  acetaminophen   Tablet .. 650 milliGRAM(s) Oral every 6 hours PRN Mild Pain (1 - 3), Moderate Pain (4 - 6)  bisacodyl 5 milliGRAM(s) Oral at bedtime PRN Constipation  traMADol 25 milliGRAM(s) Oral every 4 hours PRN Severe Pain (7 - 10)    Pertinent Physical Exam  I&O's Summary    03 Nov 2019 07:01  -  04 Nov 2019 07:00  --------------------------------------------------------  IN: 1972 mL / OUT: 1850 mL / NET: 122 mL        Assessment  89y Female  w/ PAST MEDICAL & SURGICAL HISTORY:  Atrial fibrillation  Achalasia  Hypertension  Hypothyroidism  History of repair of hiatal hernia  H/O colectomy  S/P appendectomy  S/P hysterectomy  admitted with complaints of Patient is a 89y old  Female who presents with a chief complaint of Dysphagia and Vomiting (03 Nov 2019 14:13)  s/p EGD, Ex Lap, Lysis of Adhesions, Partial Esophagogastrectomy, pyloromyotomy, Feeding Jejunostomy (01 Nov 2019 16:32)    89 y/o female w/ PMH of HTN, achalasia s/p dilation x2, HH s/p repair w/ mesh, emergency Volvulus surgical repair 4/2014, s/p EGD CRE balloon dilation w/ botox injection 8/2015, s/p EGD dilation 3/2016, s/p EGD lap heller myotomy 2016 and recent Barium swallow on 6/2019 for dysphagia which ended up showing decreased motility with EGD on 10/8/19 showing what appeared to be erosion of mesh into esophagus. Pt is now s/p CT abdomen done yesterday and persistent vomiting since then unable to tolerate PO food. She was seen in the Thoracic surgery office today by Dr. clemente and was admitted. 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 Underwent partial esophagogastrectomy, pyloromyotomy and feeding je on 10/22/2019. Pt passes swallow study, tolerating tube feeds and is three person assist for ambulation. Met with pt's daughter and had d/w case management. Family has agreed to Rehab with [facility], case management working on it. 10/31-Rossi replaced again for urinary retention, will keep for discharge w URO fu as outpt. 11/1-More oriented, supervision as needed. Awaiting rehab approval. Hyponatremia, free water decreased. 11/2-Salt tabs ordered. 11/3-Hyponatremia worsening, Nephrology called. +SIADH. TSH noted to be 21, changed to IV synthroid    PLAN  Neuro: Pain management. No confusion this am, pt encouraged to ask for assistance   Pulm: Encourage coughing, deep breathing and use of incentive spirometry. Daily CXR.   Cardio: Monitor telemetry/alarms. Tele Afib, Rate controlled. Cont current meds. Pt too high risk for full A/c.   GI: Continue TF at goal. : Continue Rossi  Renal: monitor urine output, supplement electrolytes as needed. Hyponatremia now improved. Cont treatment per Renal. Free water via J tube stopped to keep fluid restriction to 1.2L daily. MOnitor for dehydration.   Endo: Synthroid now IV. Unclear is malabsorption issue. Will consult Endocrine.   Vasc: Heparin SC/SCDs for DVT prophylaxis. Cont Heparin for DVT proph, no full A/c  Heme: Stable H/H. .   ID: Off antibiotics. Stable.  Therapy: OOB/ambulate. Continue OOB w assist. Rehab planning.   Disposition: Aim to D/C to home once medically stable. Will d/c [facility] today. .   Discussed with Cardiothoracic Team at AM rounds.

## 2019-11-04 NOTE — CONSULT NOTE ADULT - ASSESSMENT
89F with PMHx of HTN, s/p thyroidectomy, hypothyroidism, HTN, achalasia s/p dilation x 2, hiatal hernia s/p repair w/ mesh, multiple abdominal surgeries with recurrent SBOs, who presented from outpatient thoracic surgery Dr. Jang's office after being found to have mesh eroding into esophagus, now s/p partial esophagogastrectomy, pyloromyotomy, and feeding jejunostomy on 10/22/2019. Endocrinology consulted for hypothyroidism.    #Hypothyroidism  Patient with long history of hypothyroidism s/p remote thyroidectomy, and on Levothyroxine 137mcg for the past year. 89F with PMHx of HTN, s/p thyroidectomy, hypothyroidism, HTN, achalasia s/p dilation x 2, hiatal hernia s/p repair w/ mesh, multiple abdominal surgeries with recurrent SBOs, who presented from outpatient thoracic surgery Dr. Jang's office after being found to have mesh eroding into esophagus, now s/p partial esophagogastrectomy, pyloromyotomy, and feeding jejunostomy on 10/22/2019. Endocrinology consulted for hypothyroidism.    #Hypothyroidism  Patient with long history of hypothyroidism following thyroidectomy in the 1990s, and with long-time use of Levothyroxine. More recently, patient's TSH levels have been less controlled, in the setting of multiple abdominal surgeries, recurrent small bowel obstructions, and a period of time where patient was off thyroid medication. Patient's TSH level this morning was elevated at 21.17, markedly increased from earlier this month. Factors that may be contributing to elevation of TSH: acute illness, poor absorption in setting of multiple abdominal surgeries and patient appeared to have been getting Protonix at same time in the morning as levothyroxine dose per eMAR.  -Time Levothyroxine dose to be given 30min before Protonix    ***This is a preliminary note. Plan to be discussed with attending. 89F with PMHx of HTN, s/p thyroidectomy, hypothyroidism, HTN, achalasia s/p dilation x 2, hiatal hernia s/p repair w/ mesh, multiple abdominal surgeries with recurrent SBOs, who presented from outpatient thoracic surgery Dr. Jang's office after being found to have mesh eroding into esophagus, now s/p partial esophagogastrectomy, pyloromyotomy, and feeding jejunostomy on 10/22/2019. Endocrinology consulted for hypothyroidism.    Recommendations:    #Hypothyroidism  Patient with long history of hypothyroidism following thyroidectomy in the 1990s, and with long-time use of Levothyroxine. Patient's TSH level this morning was elevated at 21.17, markedly increased from earlier this month when it was 4.32 (10/5/19) and within acceptable range given patient's age. Factors that may be contributing to acute elevation of TSH: acute illness, poor absorption in setting of multiple abdominal surgeries and patient appeared to have been getting PO Levothyroxine at same time as Protonix in morning per eMAR and while scheduled for tube feeds. Patient now on IV Levothyroxine 68.5mcg IVP qhs.  -Would decrease IV Levothyroxine dose to 50mcg based on weight-based dosing  -Re-check serum TSH in 2-3 days. Plan to transition from IV Levothyroxine to PO Levothyroxine at that time, dose to be determined. Note when transitioning to PO Levothyroxine, patient should be scheduled to receive Levothyroxine on empty stomach, i.e. at a time of day when off tube feeds.  -Check free T4 level 89F with PMHx of HTN, s/p thyroidectomy, hypothyroidism, HTN, achalasia s/p dilation x 2, hiatal hernia s/p repair w/ mesh, multiple abdominal surgeries with recurrent SBOs, who presented from outpatient thoracic surgery Dr. Jang's office after being found to have mesh eroding into esophagus, now s/p partial esophagogastrectomy, pyloromyotomy, and feeding jejunostomy on 10/22/2019. Endocrinology consulted for hypothyroidism.    Recommendations:    #Hypothyroidism  Patient with long history of hypothyroidism following thyroidectomy in the 1990s, and with long-time use of Levothyroxine. Patient's TSH level this morning was elevated at 21.17, markedly increased from earlier this month when it was 4.32 (10/5/19) and was within acceptable range given patient's age. Factors that may be contributing to acute elevation of TSH: acute illness, poor absorption in setting of multiple abdominal surgeries and patient appeared to have been getting PO Levothyroxine at same time as Protonix in morning per eMAR and while scheduled for tube feeds. Patient now on IV Levothyroxine 68.5mcg IVP qhs.  -Would decrease IV Levothyroxine dose to 50mcg based on weight-based dosing  -Check Free T4 in AM for baseline. Re-check serum free T4 in 2-3 days. Plan to transition from IV Levothyroxine to PO Levothyroxine at that time, dose to be determined. Note when transitioning to PO Levothyroxine, patient should be scheduled to receive Levothyroxine on empty stomach, i.e. at a time of day when off tube feeds.

## 2019-11-04 NOTE — PROGRESS NOTE ADULT - SUBJECTIVE AND OBJECTIVE BOX
NEPHROLOGY-[facility] [phone]        Patient seen and examined         MEDICATIONS  (STANDING):  aspirin enteric coated 81 milliGRAM(s) Oral daily  buMETAnide 1 milliGRAM(s) Oral daily  heparin  Injectable 5000 Unit(s) SubCutaneous every 12 hours  hydrALAZINE 25 milliGRAM(s) Oral daily  levothyroxine Injectable 50 MICROGram(s) IV Push at bedtime  melatonin 3 milliGRAM(s) Oral at bedtime  metoprolol tartrate 25 milliGRAM(s) Oral every 8 hours  pantoprazole    Tablet 40 milliGRAM(s) Oral before breakfast      VITAL:  T(C): , Max: 36.7 (11-03-19 @ 23:44)  T(F): , Max: 98.1 (11-03-19 @ 23:44)  HR: 54 (11-04-19 @ 16:30)  BP: 152/71 (11-04-19 @ 16:30)  BP(mean): 78 (11-04-19 @ 09:05)  RR: 18 (11-04-19 @ 16:30)  SpO2: 98% (11-04-19 @ 16:30)  Wt(kg): --    I and O's:    11-03 @ 07:01  -  11-04 @ 07:00  --------------------------------------------------------  IN: 1972 mL / OUT: 1850 mL / NET: 122 mL    11-04 @ 07:01  -  11-04 @ 17:45  --------------------------------------------------------  IN: 0 mL / OUT: 500 mL / NET: -500 mL          PHYSICAL EXAM:    Constitutional: NAD  Neck:  No JVD  Respiratory: CTAB/L  Cardiovascular: S1 and S2  Gastrointestinal: BS+, soft, NT/ND  Extremities: No peripheral edema  Neurological: A/O x 3, no focal deficits  Psychiatric: Normal mood, normal affect  : No Rossi  Skin: No rashes  Access: Not applicable    LABS:                        10.6   8.60  )-----------( 365      ( 04 Nov 2019 06:25 )             31.6     11-04    125<L>  |  88<L>  |  14  ----------------------------<  161<H>  4.0   |  26  |  0.39<L>    Ca    8.0<L>      04 Nov 2019 06:25            Urine Studies:    Sodium, Random Urine: 104 mmol/L (11-03 @ 10:03)  Potassium, Random Urine: 51.0 mmol/L (11-03 @ 10:03)  Chloride, Random Urine: 90 mmol/L (11-03 @ 10:03)  Osmolality, Random Urine: 459 mosmo/kg (11-03 @ 10:03)        RADIOLOGY & ADDITIONAL STUDIES: [facility]OLOGY-[facility] [phone]        Patient seen and examined in bed.  She was about the same         MEDICATIONS  (STANDING):  aspirin enteric coated 81 milliGRAM(s) Oral daily  buMETAnide 1 milliGRAM(s) Oral daily  heparin  Injectable 5000 Unit(s) SubCutaneous every 12 hours  hydrALAZINE 25 milliGRAM(s) Oral daily  levothyroxine Injectable 50 MICROGram(s) IV Push at bedtime  melatonin 3 milliGRAM(s) Oral at bedtime  metoprolol tartrate 25 milliGRAM(s) Oral every 8 hours  pantoprazole    Tablet 40 milliGRAM(s) Oral before breakfast      VITAL:  T(C): , Max: 36.7 (11-03-19 @ 23:44)  T(F): , Max: 98.1 (11-03-19 @ 23:44)  HR: 54 (11-04-19 @ 16:30)  BP: 152/71 (11-04-19 @ 16:30)  BP(mean): 78 (11-04-19 @ 09:05)  RR: 18 (11-04-19 @ 16:30)  SpO2: 98% (11-04-19 @ 16:30)  Wt(kg): --    I and O's:    11-03 @ 07:01  -  11-04 @ 07:00  --------------------------------------------------------  IN: 1972 mL / OUT: 1850 mL / NET: 122 mL    11-04 @ 07:01  -  11-04 @ 17:45  --------------------------------------------------------  IN: 0 mL / OUT: 500 mL / NET: -500 mL          PHYSICAL EXAM:    Constitutional: NAD;  looks stated age   Neck:  No JVD  Respiratory: CTAB/L  Cardiovascular: S1 and S2  Gastrointestinal: BS+, soft, NT/ND  Extremities: No peripheral edema  Neurological: A/O x 3, no focal deficits  Psychiatric: Normal mood, normal affect  : + Rossi  Skin: No rashes  Access: Not applicable    LABS:                        10.6   8.60  )-----------( 365      ( 04 Nov 2019 06:25 )             31.6     11-04    125<L>  |  88<L>  |  14  ----------------------------<  161<H>  4.0   |  26  |  0.39<L>    Ca    8.0<L>      04 Nov 2019 06:25            Urine Studies:    Sodium, Random Urine: 104 mmol/L (11-03 @ 10:03)  Potassium, Random Urine: 51.0 mmol/L (11-03 @ 10:03)  Chloride, Random Urine: 90 mmol/L (11-03 @ 10:03)  Osmolality, Random Urine: 459 mosmo/kg (11-03 @ 10:03)        RADIOLOGY & ADDITIONAL STUDIES:

## 2019-11-04 NOTE — CONSULT NOTE ADULT - REASON FOR ADMISSION
Dysphagia and Vomiting
Dysphagia
Dysphagia and Vomiting

## 2019-11-04 NOTE — PROGRESS NOTE ADULT - ASSESSMENT
89 y/o female w/ PMH of HTN, achalasia s/p dilation x2, HH s/p repair w/ mesh, emergency Volvulus surgical repair 4/2014, s/p EGD CRE balloon dilation w/ botox injection 8/2015, s/p EGD dilation 3/2016, s/p EGD lap heller myotomy 2016 and recent Barium swallow on 6/2019 for dysphagia which ended up showing decreased motility with EGD on 10/8/19 showing what appeared to be erosion of mesh into esophagus. 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-   - Hypothyroidism         PLAN:   - Samsca 30  mg po x 1 again today   - IV synthroid   - BMP in AM   -If the serum remains low in am then will change feeds to 2 anais which is more concentrated 89 y/o female w/ PMH of HTN, achalasia s/p dilation x2, HH s/p repair w/ mesh, emergency Volvulus surgical repair 4/2014, s/p EGD CRE balloon dilation w/ botox injection 8/2015, s/p EGD dilation 3/2016, s/p EGD lap heller myotomy 2016 and recent Barium swallow on 6/2019 for dysphagia which ended up showing decreased motility with EGD on 10/8/19 showing what appeared to be erosion of mesh into esophagus. 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

## 2019-11-05 LAB
ANION GAP SERPL CALC-SCNC: 11 MMO/L — SIGNIFICANT CHANGE UP (ref 7–14)
BUN SERPL-MCNC: 16 MG/DL — SIGNIFICANT CHANGE UP (ref 7–23)
CALCIUM SERPL-MCNC: 8.2 MG/DL — LOW (ref 8.4–10.5)
CHLORIDE SERPL-SCNC: 89 MMOL/L — LOW (ref 98–107)
CO2 SERPL-SCNC: 26 MMOL/L — SIGNIFICANT CHANGE UP (ref 22–31)
CORTIS SERPL-MCNC: 15.5 UG/DL — SIGNIFICANT CHANGE UP (ref 2.7–18.4)
CREAT SERPL-MCNC: 0.4 MG/DL — LOW (ref 0.5–1.3)
GLUCOSE SERPL-MCNC: 167 MG/DL — HIGH (ref 70–99)
POTASSIUM SERPL-MCNC: 3.9 MMOL/L — SIGNIFICANT CHANGE UP (ref 3.5–5.3)
POTASSIUM SERPL-SCNC: 3.9 MMOL/L — SIGNIFICANT CHANGE UP (ref 3.5–5.3)
SODIUM SERPL-SCNC: 126 MMOL/L — LOW (ref 135–145)
T4 FREE SERPL-MCNC: 1.12 NG/DL — SIGNIFICANT CHANGE UP (ref 0.9–1.8)

## 2019-11-05 PROCEDURE — 99232 SBSQ HOSP IP/OBS MODERATE 35: CPT

## 2019-11-05 RX ADMIN — TOLVAPTAN 30 MILLIGRAM(S): 15 TABLET ORAL at 17:53

## 2019-11-05 RX ADMIN — Medication 50 MICROGRAM(S): at 21:09

## 2019-11-05 RX ADMIN — PANTOPRAZOLE SODIUM 40 MILLIGRAM(S): 20 TABLET, DELAYED RELEASE ORAL at 05:25

## 2019-11-05 RX ADMIN — Medication 25 MILLIGRAM(S): at 05:25

## 2019-11-05 RX ADMIN — Medication 81 MILLIGRAM(S): at 11:25

## 2019-11-05 RX ADMIN — Medication 3 MILLIGRAM(S): at 21:10

## 2019-11-05 RX ADMIN — HEPARIN SODIUM 5000 UNIT(S): 5000 INJECTION INTRAVENOUS; SUBCUTANEOUS at 17:53

## 2019-11-05 RX ADMIN — HEPARIN SODIUM 5000 UNIT(S): 5000 INJECTION INTRAVENOUS; SUBCUTANEOUS at 05:25

## 2019-11-05 RX ADMIN — BUMETANIDE 1 MILLIGRAM(S): 0.25 INJECTION INTRAMUSCULAR; INTRAVENOUS at 05:25

## 2019-11-05 NOTE — PROGRESS NOTE ADULT - ASSESSMENT
89F with PMHx of HTN, s/p thyroidectomy, hypothyroidism, HTN, achalasia s/p dilation x 2, hiatal hernia s/p repair w/ mesh, multiple abdominal surgeries with recurrent SBOs, who presented from outpatient thoracic surgery Dr. Jang's office after being found to have mesh eroding into esophagus, now s/p partial esophagogastrectomy, pyloromyotomy, and feeding jejunostomy on 10/22/2019. Endocrinology consulted for hypothyroidism.    Recommendations:    #Hypothyroidism  Patient with long history of hypothyroidism following thyroidectomy in the 1990s, and with long-time use of Levothyroxine. Patient's TSH level elevated at 21.17, markedly increased from earlier this month when it was 4.32 (10/5/19) and was within acceptable range given patient's age. Factors that may be contributing to acute elevation of TSH: acute illness, poor absorption in setting of multiple abdominal surgeries and patient appeared to have been getting PO Levothyroxine at same time as Protonix in morning per eMAR and while scheduled for tube feeds.  -Serum free T4 1.12 this morning  -Continue with IV Levothyroxine 50mcg based on weight-based dosing  -Re-check serum free T4 in 2-3 days. Plan to transition from IV Levothyroxine to PO Levothyroxine at that time, dose to be determined. Note when transitioning to PO Levothyroxine, patient should be scheduled to receive Levothyroxine on empty stomach, i.e. at a time of day when off tube feeds. 89F with PMHx of HTN, s/p thyroidectomy, hypothyroidism, HTN, achalasia s/p dilation x 2, hiatal hernia s/p repair w/ mesh, multiple abdominal surgeries with recurrent SBOs, who presented from outpatient thoracic surgery Dr. Jang's office after being found to have mesh eroding into esophagus, now s/p partial esophagogastrectomy, pyloromyotomy, and feeding jejunostomy on 10/22/2019. Endocrinology consulted for hypothyroidism.    Recommendations:    #Hypothyroidism  Patient with long history of hypothyroidism following thyroidectomy in the 1990s, and with long-time use of Levothyroxine. Patient's TSH level elevated at 21.17, markedly increased from earlier this month when it was 4.32 (10/5/19) and was within acceptable range given patient's age. Factors that may be contributing to acute elevation of TSH: acute illness, poor absorption in setting of multiple abdominal surgeries and patient appeared to have been getting PO Levothyroxine at same time as Protonix in morning per eMAR and while scheduled for tube feeds.  -Serum free T4 1.12 this morning  -For discharge, transition to PO Levothyroxine at home dose 137mcg, with instructions to schedule dose for when patient is on empty stomach, i.e. at a time of day when off tube feeds.    *** Preliminary note. 89F with PMHx of HTN, s/p thyroidectomy, hypothyroidism, HTN, achalasia s/p dilation x 2, hiatal hernia s/p repair w/ mesh, multiple abdominal surgeries with recurrent SBOs, who presented from outpatient thoracic surgery Dr. Jang's office after being found to have mesh eroding into esophagus, now s/p partial esophagogastrectomy, pyloromyotomy, and feeding jejunostomy on 10/22/2019. Endocrinology consulted for hypothyroidism.    Recommendations:    #Hypothyroidism  Patient with long history of hypothyroidism following thyroidectomy in the 1990s, and with long-time use of Levothyroxine. Patient's TSH level elevated at 21.17, markedly increased from earlier this month when it was 4.32 (10/5/19) and was within acceptable range given patient's age. Factors that may be contributing to acute elevation of TSH: acute illness, poor absorption in setting of multiple abdominal surgeries and patient appeared to have been getting PO Levothyroxine at same time as Protonix in morning per eMAR and while scheduled for tube feeds.  -Serum free T4 1.12 this morning  -If patient to remain admitted c/w weight-based dose of IV Levothyroxine 50mcg. Plan for transition to PO Levothyroxine prior to discharge, likely at home dose as patient was stable on it prior, with instructions to take on empty stomach.    *** Preliminary note, to be discussed with attending. 89F with PMHx of HTN, s/p thyroidectomy, hypothyroidism, HTN, achalasia s/p dilation x 2, hiatal hernia s/p repair w/ mesh, multiple abdominal surgeries with recurrent SBOs, who presented from outpatient thoracic surgery Dr. aJng's office after being found to have mesh eroding into esophagus, now s/p partial esophagogastrectomy, pyloromyotomy, and feeding jejunostomy on 10/22/2019. Endocrinology consulted for hypothyroidism.    Recommendations:    #Hypothyroidism  Patient with long history of hypothyroidism following thyroidectomy in the 1990s, and with long-time use of Levothyroxine. Patient's TSH level elevated at 21.17, markedly increased from earlier this month when it was 4.32 (10/5/19) and was within acceptable range given patient's age. Factors that may be contributing to acute elevation of TSH: acute illness, poor absorption in setting of multiple abdominal surgeries and patient appeared to have been getting PO Levothyroxine at same time as Protonix in morning per eMAR and while scheduled for tube feeds. Per patient's daughter, patient was taking home Levothyroxine appropriately.  -Serum free T4 1.12 this morning  -Continue weight-based dose of IV Levothyroxine 50mcg.  -Recheck serum T4 in 2-3 days  -Plan for transition to PO Levothyroxine prior to discharge, likely at home dose as patient was stable on it prior to admission. Timing of scheduled daily dose to be based on feeds regimen as patient should receive PO Levothyroxine on empty stomach for optimal absorption.    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  -Management of hypothyroidism as above  -Monitor serum Na and other management as per primary team 89F with PMHx of HTN, s/p thyroidectomy, hypothyroidism, HTN, achalasia s/p dilation x 2, hiatal hernia s/p repair w/ mesh, multiple abdominal surgeries with recurrent SBOs, who presented from outpatient thoracic surgery Dr. Jang's office after being found to have mesh eroding into esophagus, now s/p partial esophagogastrectomy, pyloromyotomy, and feeding jejunostomy on 10/22/2019. Endocrinology consulted for hypothyroidism.    Recommendations:    #Hypothyroidism  Patient with long history of hypothyroidism following thyroidectomy in the 1990s, and with long-time use of Levothyroxine. Patient's TSH level elevated at 21.17, markedly increased from earlier this month when it was 4.32 (10/5/19) and was within acceptable range given patient's age. Factors that may be contributing to acute elevation of TSH: acute illness, poor absorption in setting of multiple abdominal surgeries and patient appeared to have been getting PO Levothyroxine at same time as Protonix in morning per eMAR and while scheduled for tube feeds. Per patient's daughter, patient was taking home Levothyroxine appropriately.  -Serum free T4 1.12 this morning  -Continue weight-based dose of IV Levothyroxine 50mcg.  -Recheck serum Free T4 in 2-3 days  -Plan for transition to PO Levothyroxine prior to discharge, likely at home dose as patient was stable on it prior to admission. Timing of scheduled daily dose to be based on feeds regimen as patient should receive PO Levothyroxine on empty stomach for optimal absorption.    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  -Management of hypothyroidism as above  -Monitor serum Na and other management as per primary team  -AM cortisol within normal limits

## 2019-11-05 NOTE — PROGRESS NOTE ADULT - SUBJECTIVE AND OBJECTIVE BOX
Patient seen and examined in bed. NAD. Samsca was not given yesterday. Rossi catheter removed, +voiding without issues.      MEDICATIONS  (STANDING):  aspirin enteric coated 81 milliGRAM(s) Oral daily  buMETAnide 1 milliGRAM(s) Oral daily  heparin  Injectable 5000 Unit(s) SubCutaneous every 12 hours  hydrALAZINE 25 milliGRAM(s) Oral daily  levothyroxine Injectable 50 MICROGram(s) IV Push at bedtime  melatonin 3 milliGRAM(s) Oral at bedtime  metoprolol tartrate 25 milliGRAM(s) Oral every 8 hours  pantoprazole    Tablet 40 milliGRAM(s) Oral before breakfast  tolvaptan 30 milliGRAM(s) Oral once      VITAL:  T(C): , Max: 37 (11-05-19 @ 01:00)  T(F): , Max: 98.6 (11-05-19 @ 01:00)  HR: 104 (11-05-19 @ 11:59)  BP: 102/82 (11-05-19 @ 13:19)  RR: 20 (11-05-19 @ 11:59)  SpO2: 98% (11-05-19 @ 11:59)    I and O's:    11-04 @ 07:01  -  11-05 @ 07:00  --------------------------------------------------------  IN: 1065 mL / OUT: 1000 mL / NET: 65 mL    11-05 @ 07:01  -  11-05 @ 16:03  --------------------------------------------------------  IN: 164 mL / OUT: 550 mL / NET: -386 mL          PHYSICAL EXAM:    Constitutional: NAD  Neck:  No JVD  Respiratory: CTAB/L  Cardiovascular: S1 and S2  Gastrointestinal: BS+, soft, NT/ND, +PEJ  Extremities: No peripheral edema  : No Rossi  Skin: No rashes    LABS:                        10.6   8.60  )-----------( 365      ( 04 Nov 2019 06:25 )             31.6     11-05    126<L>  |  89<L>  |  16  ----------------------------<  167<H>  3.9   |  26  |  0.40<L>    Ca    8.2<L>      05 Nov 2019 06:27      ASSESSMENT:  89 y/o female w/ PMH of HTN, achalasia s/p dilation x2, HH s/p repair w/ mesh, emergency Volvulus surgical repair 4/2014, s/p EGD CRE balloon dilation w/ botox injection 8/2015, s/p EGD dilation 3/2016, s/p EGD lap heller myotomy 2016 and recent Barium swallow on 6/2019 for dysphagia which ended up showing decreased motility with EGD on 10/8/19 showing what appeared to be erosion of mesh into esophagus. She has hyponatremia.     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 NAD. Samsca was not given yesterday. Rossi catheter removed, +voiding without issues.      MEDICATIONS  (STANDING):  aspirin enteric coated 81 milliGRAM(s) Oral daily  buMETAnide 1 milliGRAM(s) Oral daily  heparin  Injectable 5000 Unit(s) SubCutaneous every 12 hours  hydrALAZINE 25 milliGRAM(s) Oral daily  levothyroxine Injectable 50 MICROGram(s) IV Push at bedtime  melatonin 3 milliGRAM(s) Oral at bedtime  metoprolol tartrate 25 milliGRAM(s) Oral every 8 hours  pantoprazole    Tablet 40 milliGRAM(s) Oral before breakfast  tolvaptan 30 milliGRAM(s) Oral once      VITAL:  T(C): , Max: 37 (11-05-19 @ 01:00)  T(F): , Max: 98.6 (11-05-19 @ 01:00)  HR: 104 (11-05-19 @ 11:59)  BP: 102/82 (11-05-19 @ 13:19)  RR: 20 (11-05-19 @ 11:59)  SpO2: 98% (11-05-19 @ 11:59)    I and O's:    11-04 @ 07:01  -  11-05 @ 07:00  --------------------------------------------------------  IN: 1065 mL / OUT: 1000 mL / NET: 65 mL    11-05 @ 07:01  -  11-05 @ 16:03  --------------------------------------------------------  IN: 164 mL / OUT: 550 mL / NET: -386 mL          PHYSICAL EXAM:    Constitutional: NAD  Neck:  No JVD  Respiratory: CTAB/L  Cardiovascular: S1 and S2  Gastrointestinal: BS+, soft, NT/ND, +PEJ  Extremities: No peripheral edema  : No Rossi  Skin: No rashes    LABS:                        10.6   8.60  )-----------( 365      ( 04 Nov 2019 06:25 )             31.6     11-05    126<L>  |  89<L>  |  16  ----------------------------<  167<H>  3.9   |  26  |  0.40<L>    Ca    8.2<L>      05 Nov 2019 06:27      AS

## 2019-11-05 NOTE — PROGRESS NOTE ADULT - SUBJECTIVE AND OBJECTIVE BOX
Chief Complaint:  Dysphagia and vomiting    History:    Patient evaluated at bedside. Feels tired when she wakes up. Denies pain. +flatus, reports has not had bowel movement yet today. Denies chest pain, SOB, nausea/vomiting.    MEDICATIONS  (STANDING):  aspirin enteric coated 81 milliGRAM(s) Oral daily  buMETAnide 1 milliGRAM(s) Oral daily  heparin  Injectable 5000 Unit(s) SubCutaneous every 12 hours  hydrALAZINE 25 milliGRAM(s) Oral daily  levothyroxine Injectable 50 MICROGram(s) IV Push at bedtime  melatonin 3 milliGRAM(s) Oral at bedtime  metoprolol tartrate 25 milliGRAM(s) Oral every 8 hours  pantoprazole    Tablet 40 milliGRAM(s) Oral before breakfast  tolvaptan 30 milliGRAM(s) Oral once    MEDICATIONS  (PRN):  acetaminophen   Tablet .. 650 milliGRAM(s) Oral every 6 hours PRN Mild Pain (1 - 3), Moderate Pain (4 - 6)  bisacodyl 5 milliGRAM(s) Oral at bedtime PRN Constipation  traMADol 25 milliGRAM(s) Oral every 4 hours PRN Severe Pain (7 - 10)      Allergies    opioid-like analgesics (Other)    Intolerances      Review of Systems:  Constitutional: No fever  Eyes: No blurry vision  Neuro: No tremors  HEENT: No pain  Cardiovascular: No chest pain, palpitations  Respiratory: No SOB, no cough  GI: No nausea, vomiting, abdominal pain  : No dysuria  Skin: no rash  Psych: no depression  Endocrine: no polyuria, polydipsia  Hem/lymph: no swelling  Osteoporosis: no fractures    ALL OTHER SYSTEMS REVIEWED AND NEGATIVE    UNABLE TO OBTAIN    PHYSICAL EXAM:  VITALS: T(C): 36.6 (11-05-19 @ 08:38)  T(F): 97.9 (11-05-19 @ 08:38), Max: 98.6 (11-05-19 @ 01:00)  HR: 64 (11-05-19 @ 08:38) (54 - 65)  BP: 135/55 (11-05-19 @ 08:38) (135/55 - 152/71)  RR:  (18 - 19)  SpO2:  (97% - 99%)  Wt(kg): --  GENERAL: NAD, thin elderly woman lying in bed, tube feeds running  EYES: No proptosis, no lid lag, anicteric  HEENT:  Atraumatic, Normocephalic, dry mucous membranes  THYROID: no palpable masses in neck (pt s/p thyroidectomy)  RESPIRATORY: Clear to auscultation bilaterally; No rales, rhonchi, wheezing  CARDIOVASCULAR: Regular rate and rhythm; No murmurs; no peripheral edema  GI: Soft, nontender, non distended, normal bowel sounds. Surgical sites with staples since removed, steri-strips, clean/dry/intact. Jejunostomy tube site intact, dry, tube feeds running.  SKIN: Appears very dry, scattered ecchymoses on extremities.  MUSCULOSKELETAL: Full range of motion, normal strength  NEURO: sensation intact, extraocular movements intact, no tremor  PSYCH: Alert and oriented x 2 (self/"hospital")      CAPILLARY BLOOD GLUCOSE          11-05    126<L>  |  89<L>  |  16  ----------------------------<  167<H>  3.9   |  26  |  0.40<L>    EGFR if : 106  EGFR if non : 91    Ca    8.2<L>      11-05            Thyroid Function Tests:  11-05 @ 06:27 TSH -- FreeT4 1.12 T3 -- Anti TPO -- Anti Thyroglobulin Ab -- TSI --  11-04 @ 06:25 TSH 21.17 FreeT4 -- T3 42.1 Anti TPO -- Anti Thyroglobulin Ab -- TSI --      Hemoglobin A1C, Whole Blood: 5.2 % [4.0 - 5.6] (10-15-19 @ 18:37) Chief Complaint:  Dysphagia and vomiting    History:    Patient evaluated at bedside. Feels tired when she wakes up. Denies pain. +flatus, reports has not had bowel movement yet today. Denies chest pain, SOB, nausea/vomiting.    Per daughter on phone, prior to coming to hospital, patient was very reliable in taking her home Synthroid when she got up in the mornings and then waiting half hour before having breakfast or other meds.    MEDICATIONS  (STANDING):  aspirin enteric coated 81 milliGRAM(s) Oral daily  buMETAnide 1 milliGRAM(s) Oral daily  heparin  Injectable 5000 Unit(s) SubCutaneous every 12 hours  hydrALAZINE 25 milliGRAM(s) Oral daily  levothyroxine Injectable 50 MICROGram(s) IV Push at bedtime  melatonin 3 milliGRAM(s) Oral at bedtime  metoprolol tartrate 25 milliGRAM(s) Oral every 8 hours  pantoprazole    Tablet 40 milliGRAM(s) Oral before breakfast  tolvaptan 30 milliGRAM(s) Oral once    MEDICATIONS  (PRN):  acetaminophen   Tablet .. 650 milliGRAM(s) Oral every 6 hours PRN Mild Pain (1 - 3), Moderate Pain (4 - 6)  bisacodyl 5 milliGRAM(s) Oral at bedtime PRN Constipation  traMADol 25 milliGRAM(s) Oral every 4 hours PRN Severe Pain (7 - 10)      Allergies    opioid-like analgesics (Other)    Intolerances      Review of Systems:  Constitutional: No fever  Eyes: No blurry vision  Neuro: No tremors  HEENT: No pain  Cardiovascular: No chest pain, palpitations  Respiratory: No SOB, no cough  GI: No nausea, vomiting, abdominal pain  : No dysuria  Skin: no rash  Psych: no depression  Endocrine: no polyuria, polydipsia  Hem/lymph: no swelling  Osteoporosis: no fractures    ALL OTHER SYSTEMS REVIEWED AND NEGATIVE    UNABLE TO OBTAIN    PHYSICAL EXAM:  VITALS: T(C): 36.6 (11-05-19 @ 08:38)  T(F): 97.9 (11-05-19 @ 08:38), Max: 98.6 (11-05-19 @ 01:00)  HR: 64 (11-05-19 @ 08:38) (54 - 65)  BP: 135/55 (11-05-19 @ 08:38) (135/55 - 152/71)  RR:  (18 - 19)  SpO2:  (97% - 99%)  Wt(kg): --  GENERAL: NAD, thin elderly woman lying in bed, tube feeds running  EYES: No proptosis, no lid lag, anicteric  HEENT:  Atraumatic, Normocephalic, dry mucous membranes  THYROID: no palpable masses in neck (pt s/p thyroidectomy)  RESPIRATORY: Clear to auscultation bilaterally; No rales, rhonchi, wheezing  CARDIOVASCULAR: Regular rate and rhythm; No murmurs; no peripheral edema  GI: Soft, nontender, non distended, normal bowel sounds. Surgical sites with staples since removed, steri-strips, clean/dry/intact. Jejunostomy tube site intact, dry, tube feeds running.  SKIN: Appears very dry, scattered ecchymoses on extremities.  MUSCULOSKELETAL: Full range of motion, normal strength  NEURO: sensation intact, extraocular movements intact, no tremor  PSYCH: Alert and oriented x 2 (self/"hospital")      CAPILLARY BLOOD GLUCOSE          11-05    126<L>  |  89<L>  |  16  ----------------------------<  167<H>  3.9   |  26  |  0.40<L>    EGFR if : 106  EGFR if non : 91    Ca    8.2<L>      11-05            Thyroid Function Tests:  11-05 @ 06:27 TSH -- FreeT4 1.12 T3 -- Anti TPO -- Anti Thyroglobulin Ab -- TSI --  11-04 @ 06:25 TSH 21.17 FreeT4 -- T3 42.1 Anti TPO -- Anti Thyroglobulin Ab -- TSI --      Hemoglobin A1C, Whole Blood: 5.2 % [4.0 - 5.6] (10-15-19 @ 18:37) Chief Complaint:  Dysphagia and vomiting    History:    Patient evaluated at bedside. Feels tired when she wakes up. Denies pain. +flatus, reports has not had bowel movement yet today. Denies chest pain, SOB, nausea/vomiting.    Per daughter on phone, prior to coming to hospital, patient was very reliable in taking her home Synthroid when she got up in the mornings and then waiting half hour before having breakfast or other meds.    MEDICATIONS  (STANDING):  aspirin enteric coated 81 milliGRAM(s) Oral daily  buMETAnide 1 milliGRAM(s) Oral daily  heparin  Injectable 5000 Unit(s) SubCutaneous every 12 hours  hydrALAZINE 25 milliGRAM(s) Oral daily  levothyroxine Injectable 50 MICROGram(s) IV Push at bedtime  melatonin 3 milliGRAM(s) Oral at bedtime  metoprolol tartrate 25 milliGRAM(s) Oral every 8 hours  pantoprazole    Tablet 40 milliGRAM(s) Oral before breakfast  tolvaptan 30 milliGRAM(s) Oral once    MEDICATIONS  (PRN):  acetaminophen   Tablet .. 650 milliGRAM(s) Oral every 6 hours PRN Mild Pain (1 - 3), Moderate Pain (4 - 6)  bisacodyl 5 milliGRAM(s) Oral at bedtime PRN Constipation  traMADol 25 milliGRAM(s) Oral every 4 hours PRN Severe Pain (7 - 10)      Allergies    opioid-like analgesics (Other)    Intolerances      Review of Systems:  Constitutional: No fever  Eyes: No blurry vision  Neuro: No tremors  HEENT: No pain  Cardiovascular: No chest pain, palpitations  Respiratory: No SOB, no cough  GI: No nausea, vomiting, abdominal pain  : No dysuria  Skin: no rash  Psych: no depression  Endocrine: no polyuria, polydipsia  Hem/lymph: no swelling  Osteoporosis: no fractures    ALL OTHER SYSTEMS REVIEWED AND NEGATIVE      PHYSICAL EXAM:  VITALS: T(C): 36.6 (11-05-19 @ 08:38)  T(F): 97.9 (11-05-19 @ 08:38), Max: 98.6 (11-05-19 @ 01:00)  HR: 64 (11-05-19 @ 08:38) (54 - 65)  BP: 135/55 (11-05-19 @ 08:38) (135/55 - 152/71)  RR:  (18 - 19)  SpO2:  (97% - 99%)  Wt(kg): --  GENERAL: NAD, thin elderly woman lying in bed, tube feeds running  EYES: No proptosis, no lid lag, anicteric  HEENT:  Atraumatic, Normocephalic, dry mucous membranes  THYROID: no palpable masses in neck (pt s/p thyroidectomy)  RESPIRATORY: Clear to auscultation bilaterally; No rales, rhonchi, wheezing  CARDIOVASCULAR: Regular rate and rhythm; No murmurs; no peripheral edema  GI: Soft, nontender, non distended, normal bowel sounds. Surgical sites with staples since removed, steri-strips, clean/dry/intact. Jejunostomy tube site intact, dry, tube feeds running.  SKIN: Appears very dry, scattered ecchymoses on extremities.  MUSCULOSKELETAL: Full range of motion, normal strength  NEURO: sensation intact, extraocular movements intact, no tremor  PSYCH: Alert and oriented x 2 (self/"hospital")      CAPILLARY BLOOD GLUCOSE          11-05    126<L>  |  89<L>  |  16  ----------------------------<  167<H>  3.9   |  26  |  0.40<L>    EGFR if : 106  EGFR if non : 91    Ca    8.2<L>      11-05            Thyroid Function Tests:  11-05 @ 06:27 TSH -- FreeT4 1.12 T3 -- Anti TPO -- Anti Thyroglobulin Ab -- TSI --  11-04 @ 06:25 TSH 21.17 FreeT4 -- T3 42.1 Anti TPO -- Anti Thyroglobulin Ab -- TSI --      Hemoglobin A1C, Whole Blood: 5.2 % [4.0 - 5.6] (10-15-19 @ 18:37)

## 2019-11-05 NOTE — PROGRESS NOTE ADULT - ASSESSMENT
ASSESSMENT:  89 y/o female w/ PMH of HTN, achalasia s/p dilation x2, HH s/p repair w/ mesh, emergency Volvulus surgical repair 4/2014, s/p EGD CRE balloon dilation w/ botox injection 8/2015, s/p EGD dilation 3/2016, s/p EGD lap heller myotomy 2016 and recent Barium swallow on 6/2019 for dysphagia which ended up showing decreased motility with EGD on 10/8/19 showing what appeared to be erosion of mesh into esophagus. She has hyponatremia.     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

## 2019-11-05 NOTE — PROGRESS NOTE ADULT - SUBJECTIVE AND OBJECTIVE BOX
Subjective: no acute complaints    Vital Signs:  Vital Signs Last 24 Hrs  T(C): 36.5 (11-05-19 @ 05:16), Max: 37 (11-05-19 @ 01:00)  T(F): 97.7 (11-05-19 @ 05:16), Max: 98.6 (11-05-19 @ 01:00)  HR: 62 (11-05-19 @ 05:16) (54 - 65)  BP: 145/61 (11-05-19 @ 05:16) (131/61 - 152/71)  RR: 18 (11-05-19 @ 05:16) (18 - 20)  SpO2: 99% (11-05-19 @ 05:16) (96% - 99%) on (O2)    Telemetry/Alarms:  General: WN/WD NAD  Neurology: Awake, nonfocal, LOCK x 4  Eyes: Scleras clear, PERRLA/ EOMI, Gross vision intact  ENT:Gross hearing intact, grossly patent pharynx, no stridor  Neck: Neck supple, trachea midline, No JVD,   Respiratory: CTA B/L, No wheezing, rales, rhonchi  CV: RRR, S1S2, no murmurs, rubs or gallops  Abdominal: Soft, NT, ND +BS,   Extremities: No edema, + peripheral pulses  Skin: No Rashes, Hematoma, Ecchymosis  Lymphatic: No Neck, axilla, groin LAD  Psych: Oriented x 3, normal affect  Incisions: c,d,i  Tubes: J tube intact  Relevant labs, radiology and Medications reviewed                        10.6   8.60  )-----------( 365      ( 04 Nov 2019 06:25 )             31.6     11-04    125<L>  |  88<L>  |  14  ----------------------------<  161<H>  4.0   |  26  |  0.39<L>    Ca    8.0<L>      04 Nov 2019 06:25        MEDICATIONS  (STANDING):  aspirin enteric coated 81 milliGRAM(s) Oral daily  buMETAnide 1 milliGRAM(s) Oral daily  heparin  Injectable 5000 Unit(s) SubCutaneous every 12 hours  hydrALAZINE 25 milliGRAM(s) Oral daily  levothyroxine Injectable 50 MICROGram(s) IV Push at bedtime  melatonin 3 milliGRAM(s) Oral at bedtime  metoprolol tartrate 25 milliGRAM(s) Oral every 8 hours  pantoprazole    Tablet 40 milliGRAM(s) Oral before breakfast  tolvaptan 30 milliGRAM(s) Oral once    MEDICATIONS  (PRN):  acetaminophen   Tablet .. 650 milliGRAM(s) Oral every 6 hours PRN Mild Pain (1 - 3), Moderate Pain (4 - 6)  bisacodyl 5 milliGRAM(s) Oral at bedtime PRN Constipation  traMADol 25 milliGRAM(s) Oral every 4 hours PRN Severe Pain (7 - 10)    Pertinent Physical Exam  I&O's Summary    04 Nov 2019 07:01  -  05 Nov 2019 07:00  --------------------------------------------------------  IN: 998 mL / OUT: 1000 mL / NET: -2 mL      Assessment  89y Female  w/ PAST MEDICAL & SURGICAL HISTORY:  Atrial fibrillation  Achalasia  Hypertension  Hypothyroidism  History of repair of hiatal hernia  H/O colectomy  S/P appendectomy  S/P hysterectomy  admitted with complaints of Patient is a 89y old  Female who presents with a chief complaint of Dysphagia and Vomiting (03 Nov 2019 14:13)  s/p EGD, Ex Lap, Lysis of Adhesions, Partial Esophagogastrectomy, pyloromyotomy, Feeding Jejunostomy (01 Nov 2019 16:32)    89 y/o female w/ PMH of HTN, achalasia s/p dilation x2, HH s/p repair w/ mesh, emergency Volvulus surgical repair 4/2014, s/p EGD CRE balloon dilation w/ botox injection 8/2015, s/p EGD dilation 3/2016, s/p EGD lap heller myotomy 2016 and recent Barium swallow on 6/2019 for dysphagia which ended up showing decreased motility with EGD on 10/8/19 showing what appeared to be erosion of mesh into esophagus. Pt is now s/p CT abdomen done yesterday and persistent vomiting since then unable to tolerate PO food. She was seen in the Thoracic surgery office today by Dr. clemente and was admitted. 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 Underwent partial esophagogastrectomy, pyloromyotomy and feeding jej on 10/22/2019. Pt passes swallow study, tolerating tube feeds and is three person assist for ambulation. Met with pt's daughter and had d/w case management. Family has agreed to Rehab with [facility], case management working on it. 10/31-Rossi replaced again for urinary retention, will keep for discharge w URO fu as outpt. 11/1-More oriented, supervision as needed. Awaiting rehab approval. Hyponatremia, free water decreased. 11/2-Salt tabs ordered. 11/3-Hyponatremia worsening, Nephrology called. +SIADH. TSH noted to be 21, changed to IV synthroid    PLAN  Neuro: Pain management. No confusion this am, pt encouraged to ask for assistance   Pulm: Encourage coughing, deep breathing and use of incentive spirometry. Daily CXR.   Cardio: Monitor telemetry/alarms. Tele Afib, Rate controlled. Cont current meds. Pt too high risk for full A/c.   GI: Continue TF at goal. : Continue Rossi  Renal: monitor urine output, supplement electrolytes as needed. Hyponatremia now improved. Cont treatment per Renal. Free water via J tube stopped to keep fluid restriction to 1.2L daily. MOnitor for dehydration.   Endo: Synthroid now IV. Unclear is malabsorption issue. Endocrine adjusted dose.   Vasc: Heparin SC/SCDs for DVT prophylaxis. Cont Heparin for DVT proph, no full A/c  Heme: Stable H/H. .   ID: Off antibiotics. Stable.  Therapy: OOB/ambulate. Continue OOB w assist. Rehab planning.   Disposition: Aim to D/C to home once medically stable.   Discussed with Cardiothoracic Team at AM rounds.

## 2019-11-06 LAB
ANION GAP SERPL CALC-SCNC: 10 MMO/L — SIGNIFICANT CHANGE UP (ref 7–14)
BUN SERPL-MCNC: 20 MG/DL — SIGNIFICANT CHANGE UP (ref 7–23)
CALCIUM SERPL-MCNC: 8.4 MG/DL — SIGNIFICANT CHANGE UP (ref 8.4–10.5)
CHLORIDE SERPL-SCNC: 91 MMOL/L — LOW (ref 98–107)
CO2 SERPL-SCNC: 29 MMOL/L — SIGNIFICANT CHANGE UP (ref 22–31)
CREAT SERPL-MCNC: 0.44 MG/DL — LOW (ref 0.5–1.3)
GLUCOSE SERPL-MCNC: 177 MG/DL — HIGH (ref 70–99)
HCT VFR BLD CALC: 25.7 % — LOW (ref 34.5–45)
HGB BLD-MCNC: 8.8 G/DL — LOW (ref 11.5–15.5)
MCHC RBC-ENTMCNC: 30.4 PG — SIGNIFICANT CHANGE UP (ref 27–34)
MCHC RBC-ENTMCNC: 34.2 % — SIGNIFICANT CHANGE UP (ref 32–36)
MCV RBC AUTO: 88.9 FL — SIGNIFICANT CHANGE UP (ref 80–100)
NRBC # FLD: 0 K/UL — SIGNIFICANT CHANGE UP (ref 0–0)
PLATELET # BLD AUTO: 395 K/UL — SIGNIFICANT CHANGE UP (ref 150–400)
PMV BLD: 10.7 FL — SIGNIFICANT CHANGE UP (ref 7–13)
POTASSIUM SERPL-MCNC: 3.5 MMOL/L — SIGNIFICANT CHANGE UP (ref 3.5–5.3)
POTASSIUM SERPL-SCNC: 3.5 MMOL/L — SIGNIFICANT CHANGE UP (ref 3.5–5.3)
RBC # BLD: 2.89 M/UL — LOW (ref 3.8–5.2)
RBC # FLD: 14.9 % — HIGH (ref 10.3–14.5)
SODIUM SERPL-SCNC: 130 MMOL/L — LOW (ref 135–145)
WBC # BLD: 6.29 K/UL — SIGNIFICANT CHANGE UP (ref 3.8–10.5)
WBC # FLD AUTO: 6.29 K/UL — SIGNIFICANT CHANGE UP (ref 3.8–10.5)

## 2019-11-06 PROCEDURE — 99232 SBSQ HOSP IP/OBS MODERATE 35: CPT | Mod: GC

## 2019-11-06 RX ADMIN — PANTOPRAZOLE SODIUM 40 MILLIGRAM(S): 20 TABLET, DELAYED RELEASE ORAL at 05:02

## 2019-11-06 RX ADMIN — HEPARIN SODIUM 5000 UNIT(S): 5000 INJECTION INTRAVENOUS; SUBCUTANEOUS at 18:48

## 2019-11-06 RX ADMIN — Medication 25 MILLIGRAM(S): at 14:08

## 2019-11-06 RX ADMIN — Medication 3 MILLIGRAM(S): at 21:16

## 2019-11-06 RX ADMIN — HEPARIN SODIUM 5000 UNIT(S): 5000 INJECTION INTRAVENOUS; SUBCUTANEOUS at 05:02

## 2019-11-06 RX ADMIN — Medication 50 MICROGRAM(S): at 21:16

## 2019-11-06 RX ADMIN — Medication 81 MILLIGRAM(S): at 14:08

## 2019-11-06 RX ADMIN — Medication 25 MILLIGRAM(S): at 05:02

## 2019-11-06 NOTE — PROGRESS NOTE ADULT - SUBJECTIVE AND OBJECTIVE BOX
Patient seen and examined in bed. s/p samsca yesterday afternoon.       MEDICATIONS  (STANDING):  aspirin enteric coated 81 milliGRAM(s) Oral daily  heparin  Injectable 5000 Unit(s) SubCutaneous every 12 hours  hydrALAZINE 25 milliGRAM(s) Oral daily  levothyroxine Injectable 50 MICROGram(s) IV Push at bedtime  melatonin 3 milliGRAM(s) Oral at bedtime  metoprolol tartrate 25 milliGRAM(s) Oral every 8 hours  pantoprazole    Tablet 40 milliGRAM(s) Oral before breakfast      VITAL:  T(C): , Max: 36.3 (11-05-19 @ 16:30)  T(F): , Max: 97.4 (11-05-19 @ 16:30)  HR: 65 (11-06-19 @ 08:18)  BP: 118/50 (11-06-19 @ 08:18)  RR: 18 (11-06-19 @ 08:18)  SpO2: 99% (11-06-19 @ 08:18)    I and O's:    11-05 @ 07:01  -  11-06 @ 07:00  --------------------------------------------------------  IN: 702 mL / OUT: 1450 mL / NET: -748 mL          PHYSICAL EXAM:    Constitutional: NAD  Neck:  No JVD  Respiratory: CTAB/L  Cardiovascular: S1 and S2  Gastrointestinal: BS+, soft, NT/ND, +PEJ  Extremities: No peripheral edema  : No Rossi  Skin: No rashes    LABS:                        8.8    6.29  )-----------( 395      ( 06 Nov 2019 06:22 )             25.7     11-06    130<L>  |  91<L>  |  20  ----------------------------<  177<H>  3.5   |  29  |  0.44<L>    Ca    8.4      06 Nov 2019 06:22

## 2019-11-06 NOTE — PROGRESS NOTE ADULT - SUBJECTIVE AND OBJECTIVE BOX
Subjective: "I think I'm doing better. I feel pretty good today" No CP or SOB. Pt seems less confused. Pt tolerating sml amounts of Full liquids. OOB with assist.     Vital Signs:  Vital Signs Last 24 Hrs  T(C): 36.4 (11-06-19 @ 13:11), Max: 36.4 (11-06-19 @ 13:11)  T(F): 97.5 (11-06-19 @ 13:11), Max: 97.5 (11-06-19 @ 13:11)  HR: 60 (11-06-19 @ 13:11) (56 - 65)  BP: 116/42 (11-06-19 @ 13:11) (116/42 - 129/63)  RR: 18 (11-06-19 @ 13:11) (18 - 18)  SpO2: 99% (11-06-19 @ 13:11) (96% - 100%) on (O2)    Telemetry/Alarms: tele in Afib   General: WN/WD NAD  Neurology: Awake, nonfocal, LOCK x 4  Eyes: Scleras clear, PERRLA/ EOMI, Gross vision intact  ENT:Gross hearing intact, grossly patent pharynx, no stridor  Neck: Neck supple, trachea midline, No JVD,   Respiratory: CTA B/L, No wheezing, rales, rhonchi  CV: RRR, S1S2, no murmurs, rubs or gallops  Abdominal: Soft, NT, ND +BS, + formed BM. Tolerating sml of amt of oral liquids. On TF at goal.   Extremities: No edema, + peripheral pulses  Skin: No Rashes, Hematoma, Ecchymosis  Lymphatic: No Neck, axilla, groin LAD  Psych: Oriented x 3, normal affect  Incisions: Abd incision c/d/i w steris  Tubes: J tube site c/d/i   Relevant labs, radiology and Medications reviewed                        8.8    6.29  )-----------( 395      ( 06 Nov 2019 06:22 )             25.7     11-06    130<L>  |  91<L>  |  20  ----------------------------<  177<H>  3.5   |  29  |  0.44<L>    Ca    8.4      06 Nov 2019 06:22        MEDICATIONS  (STANDING):  aspirin enteric coated 81 milliGRAM(s) Oral daily  heparin  Injectable 5000 Unit(s) SubCutaneous every 12 hours  hydrALAZINE 25 milliGRAM(s) Oral daily  levothyroxine Injectable 50 MICROGram(s) IV Push at bedtime  melatonin 3 milliGRAM(s) Oral at bedtime  metoprolol tartrate 25 milliGRAM(s) Oral every 8 hours  pantoprazole    Tablet 40 milliGRAM(s) Oral before breakfast    MEDICATIONS  (PRN):  acetaminophen   Tablet .. 650 milliGRAM(s) Oral every 6 hours PRN Mild Pain (1 - 3), Moderate Pain (4 - 6)  bisacodyl 5 milliGRAM(s) Oral at bedtime PRN Constipation    Pertinent Physical Exam  I&O's Summary    05 Nov 2019 07:01  -  06 Nov 2019 07:00  --------------------------------------------------------  IN: 702 mL / OUT: 1450 mL / NET: -748 mL    06 Nov 2019 07:01  -  06 Nov 2019 16:39  --------------------------------------------------------  IN: 0 mL / OUT: 50 mL / NET: -50 mL        Assessment  89y Female  w/ PAST MEDICAL & SURGICAL HISTORY:  Atrial fibrillation  Achalasia  Hypertension  Hypothyroidism  History of repair of hiatal hernia  H/O colectomy  S/P appendectomy  S/P hysterectomy  admitted with complaints of Patient is a 89y old  Female who presents with a chief complaint of Dysphagia and Vomiting (06 Nov 2019 13:13)  . 89y old  Female who presents with a chief complaint of Dysphagia and Vomiting (03 Nov 2019 14:13)  s/p EGD, Ex Lap, Lysis of Adhesions, Partial Esophagogastrectomy, pyloromyotomy, Feeding Jejunostomy (01 Nov 2019 16:32)    89 y/o female w/ PMH of HTN, achalasia s/p dilation x2, HH s/p repair w/ mesh, emergency Volvulus surgical repair 4/2014, s/p EGD CRE balloon dilation w/ botox injection 8/2015, s/p EGD dilation 3/2016, s/p EGD lap heller myotomy 2016 and recent Barium swallow on 6/2019 for dysphagia which ended up showing decreased motility with EGD on 10/8/19 showing what appeared to be erosion of mesh into esophagus. Pt is now s/p CT abdomen done yesterday and persistent vomiting since then unable to tolerate PO food. She was seen in the Thoracic surgery office today by Dr. jang and was admitted. 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 Underwent partial esophagogastrectomy, pyloromyotomy and feeding jej on 10/22/2019. Pt passes swallow study, tolerating tube feeds and is three person assist for ambulation. Met with pt's daughter and had d/w case management. Family has agreed to Rehab with [facility], case management working on it. 10/31-Rossi replaced again for urinary retention, will keep for discharge w URO fu as outpt. 11/1-More oriented, supervision as needed. Awaiting rehab approval. Hyponatremia, free water decreased. 11/2-Salt tabs ordered. 11/3-Hyponatremia worsening, Nephrology called. +SIADH. TSH noted to be 21, changed to IV synthroid 11/5-Samsca given. Na+ slowly improving. Rossi d/c'd- pt now voiding.         PLAN  Neuro: Pain management. No confusion. Pt compliant. More alert  Pulm: Encourage coughing, deep breathing and use of incentive spirometry.    Cardio: Monitor telemetry/alarms. Cont Cardiac meds. No a/c for Afib.   GI: Tolerating TF at goal. Do not change to 2 Tyler HN per Dr. Jang. Continue Jevity. Will advance to Pureed diet as tolerated.   Renal: monitor urine output, supplement electrolytes as needed. Hyponatremia improving. Cont treatment per nephrology. AS per Dr. Jang, Sodium Chloride flush via J tube of 150cc Q6hr ordered.   Endocrine: Will cont IV Synthroid. Will check TFTs tomorrow.   Vasc: Heparin SC/SCDs for DVT prophylaxis  Heme: Stable H/H. .   ID: Off antibiotics. Stable.  Therapy: OOB/ambulate  Disposition: Aim to D/C to Rehab tomorrow if cleared by Renal and Endocrine.   Discussed with Cardiothoracic Team at AM rounds.

## 2019-11-06 NOTE — PROGRESS NOTE ADULT - ASSESSMENT
ASSESSMENT:  89 y/o female w/ PMH of HTN, achalasia s/p dilation x2, HH s/p repair w/ mesh, emergency Volvulus surgical repair 4/2014, s/p EGD CRE balloon dilation w/ botox injection 8/2015, s/p EGD dilation 3/2016, s/p EGD lap heller myotomy 2016 and recent Barium swallow on 6/2019 for dysphagia which ended up showing decreased motility with EGD on 10/8/19 showing what appeared to be erosion of mesh into esophagus. She has hyponatremia.     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

## 2019-11-06 NOTE — PROGRESS NOTE ADULT - ASSESSMENT
89F with PMHx of HTN, s/p thyroidectomy, hypothyroidism, HTN, achalasia s/p dilation x 2, hiatal hernia s/p repair w/ mesh, multiple abdominal surgeries with recurrent SBOs, who presented from outpatient thoracic surgery Dr. Jang's office after being found to have mesh eroding into esophagus, now s/p partial esophagogastrectomy, pyloromyotomy, and feeding jejunostomy on 10/22/2019. Endocrinology consulted for hypothyroidism.    Recommendations:    #Hypothyroidism  Patient with long history of hypothyroidism following thyroidectomy in the 1990s, and with long-time use of Levothyroxine. Patient's TSH level elevated at 21.17, markedly increased from earlier this month when it was 4.32 (10/5/19) and was within acceptable range given patient's age. Factors that may be contributing to acute elevation of TSH: acute illness, poor absorption in setting of multiple abdominal surgeries and patient appeared to have been getting PO Levothyroxine at same time as Protonix in morning per eMAR and while scheduled for tube feeds. Per patient's daughter, patient was taking home Levothyroxine appropriately.  -Serum free T4 1.12  -Continue weight-based dose of IV Levothyroxine 50mcg.  -Recheck serum Free T4 in 2-3 days  -Plan for transition to PO Levothyroxine prior to discharge, likely at home dose as patient was stable on it prior to admission. Timing of scheduled daily dose to be based on feeds regimen as patient should receive PO Levothyroxine on empty stomach for optimal absorption.    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 Improving with fluid restriction, vasopressin antagonist.  -Management of hypothyroidism as above  -Monitor serum Na and other management as per primary team  -AM cortisol within normal limits    *** Preliminary note, to be discussed with attending. 89F with PMHx of HTN, s/p thyroidectomy, hypothyroidism, HTN, achalasia s/p dilation x 2, hiatal hernia s/p repair w/ mesh, multiple abdominal surgeries with recurrent SBOs, who presented from outpatient thoracic surgery Dr. Jang's office after being found to have mesh eroding into esophagus, now s/p partial esophagogastrectomy, pyloromyotomy, and feeding jejunostomy on 10/22/2019. Endocrinology consulted for hypothyroidism.    Recommendations:    #Hypothyroidism  Patient with long history of hypothyroidism following thyroidectomy in the 1990s, and with long-time use of Levothyroxine. Patient's TSH level elevated at 21.17, markedly increased from earlier this month when it was 4.32 (10/5/19) and was within acceptable range given patient's age. Factors that may be contributing to acute elevation of TSH: acute illness, poor absorption in setting of multiple abdominal surgeries and patient appeared to have been getting PO Levothyroxine at same time as Protonix in morning per eMAR and while scheduled for tube feeds. Per patient's daughter, patient was taking home Levothyroxine appropriately. Patient now advanced to pureed oral diet in addition to tube feeds.  -Serum free T4 1.12  -Continue weight-based dose of IV Levothyroxine 50mcg.  -Recheck serum Free T4 in 2-3 days  -Plan for transition to PO Levothyroxine at home dose of 137mcg as patient was stable on it prior to admission. Timing of scheduled daily dose to be based on feeds regimen as patient should receive PO Levothyroxine on empty stomach for optimal absorption. Consider adjusting feed regimen to 12 hour feed (at rate 100cc /hr for same total volume), timed for 5pm to 5am, so patient could be timed for PO Levothyroxine dose at 6am and then be able to have breakfast and other medications at least 30 minutes after.    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 Improving with fluid restriction, vasopressin antagonist.  -Management of hypothyroidism as above  -Monitor serum Na and other management as per primary team  -AM cortisol within normal limits    *** Preliminary note, to be discussed with attending. 89F with PMHx of HTN, s/p thyroidectomy, hypothyroidism, HTN, achalasia s/p dilation x 2, hiatal hernia s/p repair w/ mesh, multiple abdominal surgeries with recurrent SBOs, who presented from outpatient thoracic surgery Dr. Jang's office after being found to have mesh eroding into esophagus, now s/p partial esophagogastrectomy, pyloromyotomy, and feeding jejunostomy on 10/22/2019. Endocrinology consulted for hypothyroidism.    Recommendations:    #Hypothyroidism  Patient with long history of hypothyroidism following thyroidectomy in the 1990s, and with long-time use of Levothyroxine. Patient's TSH level elevated at 21.17, markedly increased from earlier this month when it was 4.32 (10/5/19) and was within acceptable range given patient's age. Factors that may be contributing to acute elevation of TSH: acute illness, poor absorption in setting of multiple abdominal surgeries and patient appeared to have been getting PO Levothyroxine at same time as Protonix in morning per eMAR and while scheduled for tube feeds. Per patient's daughter, patient was taking home Levothyroxine appropriately. Patient now advanced to pureed oral diet in addition to tube feeds.  -Serum free T4 1.12  -Continue weight-based dose of IV Levothyroxine 50mcg.  -Recheck serum Free T4 in 2-3 days  -Plan for transition to PO Levothyroxine at home dose of 137mcg as patient was stable on it prior to admission. Timing of scheduled daily dose to be based on feeds regimen as patient should receive PO Levothyroxine on empty stomach for optimal absorption. Consider adjusting feed regimen to 12 hour feed (at rate 100cc /hr for same total volume), timed for 5pm to 5am, so patient could be timed for PO Levothyroxine dose at 6am and then be able to have breakfast and other medications at least 30 minutes after.    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 Improving with fluid restriction, vasopressin antagonist.  -Management of hypothyroidism as above  -Monitor serum Na and other management as per primary team  -AM cortisol within normal limits

## 2019-11-06 NOTE — CHART NOTE - NSCHARTNOTEFT_GEN_A_CORE
Source: Patient, RN and E[facility] reviewed    Current Diet : Diet, Pureed:   No Carbonated Beverages  Tube Feeding Modality: Jejunostomy  Jevity 1.2 Tyler (JEVITY1.2RTH)  Total Volume for 24 Hours (mL): 1206  Continuous  Until Goal Tube Feed Rate (mL per Hour): 67  Tube Feed Duration (in Hours): 18  Tube Feed Start Time: 16:00 (11-06-19 @ 11:24)    Current Weight: 10/ 28 49 kg  11/2 53.2 kg    Pt was recently advanced from Full Fluid diet to Puree, but hasn't received a puree meal yet. RN states that pt ate some of Full Fluid meal this morning and that she is tolerating enteral feeding as ordered. Jevity 1.2 @ 67 ml/hr x 18 hrs is meeting pt's estimated nutrition needs and is providing pt with 1447 kcals, 67 gms protein, 973 ml free H2O in a total volume of 1206 ml/day. Pt states that she has been hungry even with enteral feeding meeting her needs. Reassured pt that a lunch tray was ordered and would be arriving soon. Wt has been nicely trending up even with resolution of extremity edema from 10/28 Flowsheet. Pt does remain at severe risk for malnutrition, though, as she is extremely thin and exhibits severe orbital, buccal and triceps region subcutaneous fat store depletion as well as severe muscle mass wasting of the temporal, clavicle, shoulder and calf regions.     __________________ Pertinent Medications__________________   MEDICATIONS  (STANDING):  aspirin enteric coated 81 milliGRAM(s) Oral daily  heparin  Injectable 5000 Unit(s) SubCutaneous every 12 hours  hydrALAZINE 25 milliGRAM(s) Oral daily  levothyroxine Injectable 50 MICROGram(s) IV Push at bedtime  melatonin 3 milliGRAM(s) Oral at bedtime  metoprolol tartrate 25 milliGRAM(s) Oral every 8 hours  pantoprazole    Tablet 40 milliGRAM(s) Oral before breakfast    MEDICATIONS  (PRN):  acetaminophen   Tablet .. 650 milliGRAM(s) Oral every 6 hours PRN Mild Pain (1 - 3), Moderate Pain (4 - 6)  bisacodyl 5 milliGRAM(s) Oral at bedtime PRN Constipation      __________________ Pertinent Labs__________________   11-06 Na130 mmol/L<L> Glu 177 mg/dL<H> K+ 3.5 mmol/L Cr  0.44 mg/dL<L> BUN 20 mg/dL 10-15 PAB 10 mg/dL<L> 10-15 [id number] 5.2 %    Skin: Intact    Estimated Needs:   [ x] no change since previous assessment    Previous Nutrition Diagnosis:     Severe Malnutrition     Nutrition Diagnosis is [x ] ongoing      Recommend:    1). Encourage and monitor tolerance to oral intake as diet has been recently advanced.    2). Monitor tolerance towards enteral feeding.    3). Monitor weights, labs, BM's, skin integrity and edema.    4). Follow pt as per protocol.

## 2019-11-06 NOTE — PROGRESS NOTE ADULT - SUBJECTIVE AND OBJECTIVE BOX
Chief Complaint: Nausea and vomiting    History:    Patient evaluated in room, sitting up in chair. Reports feeling okay. Feeling less tired. Denies nausea/vomiting. Asking about when she can eat.     MEDICATIONS  (STANDING):  aspirin enteric coated 81 milliGRAM(s) Oral daily  heparin  Injectable 5000 Unit(s) SubCutaneous every 12 hours  hydrALAZINE 25 milliGRAM(s) Oral daily  levothyroxine Injectable 50 MICROGram(s) IV Push at bedtime  melatonin 3 milliGRAM(s) Oral at bedtime  metoprolol tartrate 25 milliGRAM(s) Oral every 8 hours  pantoprazole    Tablet 40 milliGRAM(s) Oral before breakfast    MEDICATIONS  (PRN):  acetaminophen   Tablet .. 650 milliGRAM(s) Oral every 6 hours PRN Mild Pain (1 - 3), Moderate Pain (4 - 6)  bisacodyl 5 milliGRAM(s) Oral at bedtime PRN Constipation      Allergies    opioid-like analgesics (Other)    Intolerances      Review of Systems:  Constitutional: No fever  Eyes: No blurry vision  Neuro: No tremors  HEENT: No pain  Cardiovascular: No chest pain, palpitations  Respiratory: No SOB, no cough  GI: No nausea, vomiting, abdominal pain  : No dysuria  Skin: no rash  Psych: no depression  Endocrine: no polyuria, polydipsia  Hem/lymph: no swelling  Osteoporosis: no fractures    ALL OTHER SYSTEMS REVIEWED AND NEGATIVE    UNABLE TO OBTAIN    PHYSICAL EXAM:  VITALS: T(C): 36.4 (11-06-19 @ 13:11)  T(F): 97.5 (11-06-19 @ 13:11), Max: 97.5 (11-06-19 @ 13:11)  HR: 60 (11-06-19 @ 13:11) (56 - 65)  BP: 116/42 (11-06-19 @ 13:11) (102/82 - 131/52)  RR:  (18 - 19)  SpO2:  (96% - 100%)  Wt(kg): --  GENERAL: NAD, thin elderly woman lying in bed, tube feeds running  EYES: No proptosis, no lid lag, anicteric  HEENT:  Atraumatic, Normocephalic, dry mucous membranes  THYROID: no palpable masses in neck (pt s/p thyroidectomy)  RESPIRATORY: Clear to auscultation bilaterally; No rales, rhonchi, wheezing  CARDIOVASCULAR: Regular rate and rhythm; No murmurs; no peripheral edema  GI: Soft, nontender, non distended, normal bowel sounds. Surgical sites with staples since removed, steri-strips, clean/dry/intact. Jejunostomy tube site intact, dry, tube feeds running.  SKIN: Appears very dry, scattered ecchymoses on extremities.  MUSCULOSKELETAL: Full range of motion, normal strength  NEURO: sensation intact, extraocular movements intact, no tremor  PSYCH: Alert and oriented x 2 (self/"hospital")      CAPILLARY BLOOD GLUCOSE          11-06    130<L>  |  91<L>  |  20  ----------------------------<  177<H>  3.5   |  29  |  0.44<L>    EGFR if : 102  EGFR if non : 88    Ca    8.4      11-06            Thyroid Function Tests:  11-05 @ 06:27 TSH -- FreeT4 1.12 T3 -- Anti TPO -- Anti Thyroglobulin Ab -- TSI --  11-04 @ 06:25 TSH 21.17 FreeT4 -- T3 42.1 Anti TPO -- Anti Thyroglobulin Ab -- TSI --      Hemoglobin A1C, Whole Blood: 5.2 % [4.0 - 5.6] (10-15-19 @ 18:37)

## 2019-11-07 VITALS
HEART RATE: 56 BPM | RESPIRATION RATE: 16 BRPM | TEMPERATURE: 98 F | OXYGEN SATURATION: 99 % | SYSTOLIC BLOOD PRESSURE: 111 MMHG | DIASTOLIC BLOOD PRESSURE: 50 MMHG

## 2019-11-07 LAB
ANION GAP SERPL CALC-SCNC: 12 MMO/L — SIGNIFICANT CHANGE UP (ref 7–14)
BUN SERPL-MCNC: 25 MG/DL — HIGH (ref 7–23)
CALCIUM SERPL-MCNC: 8.5 MG/DL — SIGNIFICANT CHANGE UP (ref 8.4–10.5)
CHLORIDE SERPL-SCNC: 96 MMOL/L — LOW (ref 98–107)
CO2 SERPL-SCNC: 29 MMOL/L — SIGNIFICANT CHANGE UP (ref 22–31)
CREAT SERPL-MCNC: 0.56 MG/DL — SIGNIFICANT CHANGE UP (ref 0.5–1.3)
GLUCOSE SERPL-MCNC: 153 MG/DL — HIGH (ref 70–99)
HCT VFR BLD CALC: 23.7 % — LOW (ref 34.5–45)
HGB BLD-MCNC: 7.8 G/DL — LOW (ref 11.5–15.5)
MCHC RBC-ENTMCNC: 29.7 PG — SIGNIFICANT CHANGE UP (ref 27–34)
MCHC RBC-ENTMCNC: 32.9 % — SIGNIFICANT CHANGE UP (ref 32–36)
MCV RBC AUTO: 90.1 FL — SIGNIFICANT CHANGE UP (ref 80–100)
NRBC # FLD: 0 K/UL — SIGNIFICANT CHANGE UP (ref 0–0)
PLATELET # BLD AUTO: 403 K/UL — HIGH (ref 150–400)
PMV BLD: 10.6 FL — SIGNIFICANT CHANGE UP (ref 7–13)
POTASSIUM SERPL-MCNC: 3.7 MMOL/L — SIGNIFICANT CHANGE UP (ref 3.5–5.3)
POTASSIUM SERPL-SCNC: 3.7 MMOL/L — SIGNIFICANT CHANGE UP (ref 3.5–5.3)
RBC # BLD: 2.63 M/UL — LOW (ref 3.8–5.2)
RBC # FLD: 15 % — HIGH (ref 10.3–14.5)
SODIUM SERPL-SCNC: 137 MMOL/L — SIGNIFICANT CHANGE UP (ref 135–145)
T3 SERPL-MCNC: 36.3 NG/DL — LOW (ref 80–200)
T4 AB SER-ACNC: 6.6 UG/DL — SIGNIFICANT CHANGE UP (ref 5.1–13)
TSH SERPL-MCNC: 32.59 UIU/ML — HIGH (ref 0.27–4.2)
WBC # BLD: 5.43 K/UL — SIGNIFICANT CHANGE UP (ref 3.8–10.5)
WBC # FLD AUTO: 5.43 K/UL — SIGNIFICANT CHANGE UP (ref 3.8–10.5)

## 2019-11-07 RX ORDER — LEVOTHYROXINE SODIUM 125 MCG
1 TABLET ORAL
Qty: 0 | Refills: 0 | DISCHARGE

## 2019-11-07 RX ORDER — METOPROLOL TARTRATE 50 MG
1 TABLET ORAL
Qty: 0 | Refills: 0 | DISCHARGE
Start: 2019-11-07

## 2019-11-07 RX ORDER — ONDANSETRON 8 MG/1
4 TABLET, FILM COATED ORAL ONCE
Refills: 0 | Status: DISCONTINUED | OUTPATIENT
Start: 2019-11-07 | End: 2019-11-07

## 2019-11-07 RX ORDER — ASPIRIN/CALCIUM CARB/MAGNESIUM 324 MG
1 TABLET ORAL
Qty: 0 | Refills: 0 | DISCHARGE
Start: 2019-11-07

## 2019-11-07 RX ORDER — HYDRALAZINE HCL 50 MG
1 TABLET ORAL
Qty: 0 | Refills: 0 | DISCHARGE
Start: 2019-11-07

## 2019-11-07 RX ORDER — ACETAMINOPHEN 500 MG
2 TABLET ORAL
Qty: 0 | Refills: 0 | DISCHARGE
Start: 2019-11-07

## 2019-11-07 RX ORDER — METOPROLOL TARTRATE 50 MG
0.5 TABLET ORAL
Qty: 0 | Refills: 0 | DISCHARGE
Start: 2019-11-07

## 2019-11-07 RX ORDER — APIXABAN 2.5 MG/1
1 TABLET, FILM COATED ORAL
Qty: 0 | Refills: 0 | DISCHARGE

## 2019-11-07 RX ORDER — LANOLIN ALCOHOL/MO/W.PET/CERES
1 CREAM (GRAM) TOPICAL
Qty: 0 | Refills: 0 | DISCHARGE
Start: 2019-11-07

## 2019-11-07 RX ADMIN — Medication 81 MILLIGRAM(S): at 12:51

## 2019-11-07 RX ADMIN — HEPARIN SODIUM 5000 UNIT(S): 5000 INJECTION INTRAVENOUS; SUBCUTANEOUS at 05:22

## 2019-11-07 RX ADMIN — Medication 25 MILLIGRAM(S): at 05:21

## 2019-11-07 RX ADMIN — Medication 25 MILLIGRAM(S): at 12:51

## 2019-11-07 RX ADMIN — Medication 25 MILLIGRAM(S): at 05:22

## 2019-11-07 RX ADMIN — PANTOPRAZOLE SODIUM 40 MILLIGRAM(S): 20 TABLET, DELAYED RELEASE ORAL at 05:22

## 2019-11-07 NOTE — PROGRESS NOTE ADULT - REASON FOR ADMISSION
Dysphagia and Vomiting
Esophagectomy
Esophagectomy
s/p EGD, Ex Lap, Lysis of Adhesions, Partial Esophagogastrectomy, pyloromyotomy, Feeding Jejunostomy
Dysphagia and Vomiting

## 2019-11-07 NOTE — PROGRESS NOTE ADULT - SUBJECTIVE AND OBJECTIVE BOX
Patient seen and examined in bed. Having breakfast at present.       MEDICATIONS  (STANDING):  aspirin enteric coated 81 milliGRAM(s) Oral daily  heparin  Injectable 5000 Unit(s) SubCutaneous every 12 hours  hydrALAZINE 25 milliGRAM(s) Oral daily  levothyroxine Injectable 50 MICROGram(s) IV Push at bedtime  melatonin 3 milliGRAM(s) Oral at bedtime  metoprolol tartrate 25 milliGRAM(s) Oral every 8 hours  ondansetron Injectable 4 milliGRAM(s) IV Push once  pantoprazole    Tablet 40 milliGRAM(s) Oral before breakfast      VITAL:  T(C): , Max: 36.6 (11-07-19 @ 05:20)  T(F): , Max: 97.8 (11-07-19 @ 05:20)  HR: 57 (11-07-19 @ 08:43)  BP: 140/58 (11-07-19 @ 08:43)  RR: 18 (11-07-19 @ 08:43)  SpO2: 98% (11-07-19 @ 08:43)    I and O's:    11-06 @ 07:01  -  11-07 @ 07:00  --------------------------------------------------------  IN: 1104 mL / OUT: 250 mL / NET: 854 mL    11-07 @ 07:01  -  11-07 @ 08:46  --------------------------------------------------------  IN: 0 mL / OUT: 200 mL / NET: -200 mL          PHYSICAL EXAM:    Constitutional: NAD  Neck:  No JVD  Respiratory: CTAB/L  Cardiovascular: S1 and S2  Gastrointestinal: BS+, soft, NT/ND, +PEJ  Extremities: No peripheral edema  : No Rossi  Skin: No rashes    LABS:                        7.8    5.43  )-----------( 403      ( 07 Nov 2019 05:55 )             23.7     11-07    137  |  96<L>  |  25<H>  ----------------------------<  153<H>  3.7   |  29  |  0.56    Ca    8.5      07 Nov 2019 05:55 Patient seen and examined in bed. Having breakfast at present.       MEDICATIONS  (STANDING):  aspirin enteric coated 81 milliGRAM(s) Oral daily  heparin  Injectable 5000 Unit(s) SubCutaneous every 12 hours  hydrALAZINE 25 milliGRAM(s) Oral daily  levothyroxine Injectable 50 MICROGram(s) IV Push at bedtime  melatonin 3 milliGRAM(s) Oral at bedtime  metoprolol tartrate 25 milliGRAM(s) Oral every 8 hours  ondansetron Injectable 4 milliGRAM(s) IV Push once  pantoprazole    Tablet 40 milliGRAM(s) Oral before breakfast      VITAL:  T(C): , Max: 36.6 (11-07-19 @ 05:20)  T(F): , Max: 97.8 (11-07-19 @ 05:20)  HR: 57 (11-07-19 @ 08:43)  BP: 140/58 (11-07-19 @ 08:43)  RR: 18 (11-07-19 @ 08:43)  SpO2: 98% (11-07-19 @ 08:43)    I and O's:    11-06 @ 07:01  -  11-07 @ 07:00  --------------------------------------------------------  IN: 1104 mL / OUT: 250 mL / NET: 854 mL    11-07 @ 07:01  -  11-07 @ 08:46  --------------------------------------------------------  IN: 0 mL / OUT: 200 mL / NET: -200 mL          PHYSICAL EXAM:    Constitutional: NAD  Neck:  No JVD  Respiratory: CTAB/L  Cardiovascular: S1 and S2  Gastrointestinal: BS+, soft, NT/ND, +PEJ  Extremities: trace peripheral edema  : No Rossi  Skin: No rashes    LABS:                        7.8    5.43  )-----------( 403      ( 07 Nov 2019 05:55 )             23.7     11-07    137  |  96<L>  |  25<H>  ----------------------------<  153<H>  3.7   |  29  |  0.56    Ca    8.5      07 Nov 2019 05:55

## 2019-11-07 NOTE — PROGRESS NOTE ADULT - PROVIDER SPECIALTY LIST ADULT
Anesthesia
Anesthesia
CT Surgery
Cardiology
Critical Care
Endocrinology
Gastroenterology
Nephrology
Pain Medicine
Thoracic Surgery
Critical Care
Pain Medicine
Thoracic Surgery

## 2019-11-07 NOTE — PROGRESS NOTE ADULT - ASSESSMENT
ASSESSMENT:  89 y/o female w/ PMH of HTN, achalasia s/p dilation x2, HH s/p repair w/ mesh, emergency Volvulus surgical repair 4/2014, s/p EGD CRE balloon dilation w/ botox injection 8/2015, s/p EGD dilation 3/2016, s/p EGD lap heller myotomy 2016 and recent Barium swallow on 6/2019 for dysphagia which ended up showing decreased motility with EGD on 10/8/19 showing what appeared to be erosion of mesh into esophagus. She has hyponatremia.     - Hyponatremia - Labs are consistent with SIADH - Na improved  - Hypothyroidism - on IV synthroid    PLAN:   - IV synthroid   - BMP daily while admitted      Vonnie Brady NP-DEMETRIS  [facility]  [phone] ASSESSMENT:  89 y/o female w/ PMH of HTN, achalasia s/p dilation x2, HH s/p repair w/ mesh, emergency Volvulus surgical repair 4/2014, s/p EGD CRE balloon dilation w/ botox injection 8/2015, s/p EGD dilation 3/2016, s/p EGD lap heller myotomy 2016 and recent Barium swallow on 6/2019 for dysphagia which ended up showing decreased motility with EGD on 10/8/19 showing what appeared to be erosion of mesh into esophagus. She had hyponatremia.     - Hyponatremia - Labs are consistent with SIADH - Na improved  - Hypothyroidism - on IV synthroid    PLAN:   - IV synthroid   - BMP daily while admitted      Vonnie Brady NP-DEMETRIS  [facility]  [phone]

## 2019-11-07 NOTE — PROGRESS NOTE ADULT - ASSESSMENT
89F with PMHx of HTN, s/p thyroidectomy, hypothyroidism, HTN, achalasia s/p dilation x 2, hiatal hernia s/p repair w/ mesh, multiple abdominal surgeries with recurrent SBOs, who presented from outpatient thoracic surgery Dr. Jang's office after being found to have mesh eroding into esophagus, now s/p partial esophagogastrectomy, pyloromyotomy, and feeding jejunostomy on 10/22/2019. Endocrinology consulted for hypothyroidism.    Recommendations:    #Hypothyroidism  Patient with long history of hypothyroidism following thyroidectomy in the 1990s, and with long-time use of Levothyroxine. Patient's TSH level elevated at 21.17, markedly increased from earlier this month when it was 4.32 (10/5/19) and was within acceptable range given patient's age. Factors that may be contributing to acute elevation of TSH: acute illness, poor absorption in setting of multiple abdominal surgeries and patient appeared to have been getting PO Levothyroxine at same time as Protonix in morning per eMAR and while scheduled for tube feeds. Per patient's daughter, patient was taking home Levothyroxine appropriately. Patient now advanced to pureed oral diet in addition to tube feeds.  -Serum free T4 1.12  -Repeat TSH 32.59 this morning, but likely due to lag. Serum free T4 would be better marker for changes in acute setting.  -Currently on IV Levothyroxine 50mcg daily. Transition to PO Levothyroxine at home dose of 137mcg as patient was stable on it prior to admission. Timing of scheduled daily dose to be based on feeds regimen as patient should receive PO Levothyroxine on empty stomach for optimal absorption. Consider adjusting feed regimen to 12 hour feed (at rate 100cc /hr for same total volume), timed for 5pm to 5am, so patient could be timed for PO Levothyroxine dose at 6am and then be able to have breakfast and other medications at least 30 minutes after.    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 Improved with fluid restriction, vasopressin antagonist.  -Management of hypothyroidism as above  -Monitor serum Na and other management as per primary team  -AM cortisol within normal limits    *** Preliminary note, to be discussed with attending. 89F with PMHx of HTN, s/p thyroidectomy, hypothyroidism, HTN, achalasia s/p dilation x 2, hiatal hernia s/p repair w/ mesh, multiple abdominal surgeries with recurrent SBOs, who presented from outpatient thoracic surgery Dr. Jang's office after being found to have mesh eroding into esophagus, now s/p partial esophagogastrectomy, pyloromyotomy, and feeding jejunostomy on 10/22/2019. Endocrinology consulted for hypothyroidism.    Recommendations:    #Hypothyroidism  Patient with long history of hypothyroidism following thyroidectomy in the 1990s, and with long-time use of Levothyroxine. Patient's TSH level elevated at 21.17, markedly increased from earlier this month when it was 4.32 (10/5/19) and was within acceptable range given patient's age. Factors that may be contributing to acute elevation of TSH: acute illness, poor absorption in setting of multiple abdominal surgeries and patient appeared to have been getting PO Levothyroxine at same time as Protonix in morning per eMAR and while scheduled for tube feeds. Per patient's daughter, patient was taking home Levothyroxine appropriately. Patient now advanced to pureed oral diet in addition to tube feeds.  -Serum free T4 1.12  -Repeat TSH 32.59 this morning, but likely due to lag. Serum free T4 would be better marker for changes in acute setting.  -Currently on IV Levothyroxine 50mcg daily. Transition to PO Levothyroxine at home dose of 137mcg as patient was stable on it prior to admission. Timing of scheduled daily dose should be on empty stomach, timed away from tube feeds and from oral feeding. Discussed with primary team.    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 Improved with fluid restriction, vasopressin antagonist.  -Management of hypothyroidism as above  -Monitor serum Na and other management as per primary team  -AM cortisol within normal limits    *** Preliminary note, to be discussed with attending.

## 2019-11-07 NOTE — PROGRESS NOTE ADULT - SUBJECTIVE AND OBJECTIVE BOX
Chief Complaint: Nausea and vomiting    History:    Patient evaluated at bedside, sitting up in chair with tube feeds running, with son in room. Patient reports doing well.    MEDICATIONS  (STANDING):  aspirin enteric coated 81 milliGRAM(s) Oral daily  heparin  Injectable 5000 Unit(s) SubCutaneous every 12 hours  hydrALAZINE 25 milliGRAM(s) Oral daily  levothyroxine Injectable 50 MICROGram(s) IV Push at bedtime  melatonin 3 milliGRAM(s) Oral at bedtime  metoprolol tartrate 25 milliGRAM(s) Oral every 8 hours  ondansetron Injectable 4 milliGRAM(s) IV Push once  pantoprazole    Tablet 40 milliGRAM(s) Oral before breakfast    MEDICATIONS  (PRN):  acetaminophen   Tablet .. 650 milliGRAM(s) Oral every 6 hours PRN Mild Pain (1 - 3), Moderate Pain (4 - 6)  bisacodyl 5 milliGRAM(s) Oral at bedtime PRN Constipation      Allergies    opioid-like analgesics (Other)    Intolerances      Review of Systems:  Constitutional: No fever  Eyes: No blurry vision  Neuro: No tremors  HEENT: No pain  Cardiovascular: No chest pain, palpitations  Respiratory: No SOB, no cough  GI: No nausea, vomiting, abdominal pain  : No dysuria  Skin: no rash  Psych: no depression  Endocrine: no polyuria, polydipsia  Hem/lymph: no swelling  Osteoporosis: no fractures    ALL OTHER SYSTEMS REVIEWED AND NEGATIVE    UNABLE TO OBTAIN    PHYSICAL EXAM:  VITALS: T(C): 36.6 (11-07-19 @ 12:09)  T(F): 97.8 (11-07-19 @ 12:09), Max: 97.8 (11-07-19 @ 05:20)  HR: 56 (11-07-19 @ 12:09) (56 - 62)  BP: 111/50 (11-07-19 @ 12:09) (100/42 - 144/57)  RR:  (16 - 18)  SpO2:  (90% - 100%)  Wt(kg): --  GENERAL: NAD, well-groomed, well-developed  EYES: No proptosis, no lid lag, anicteric  HEENT:  Atraumatic, Normocephalic, moist mucous membranes  THYROID: Normal size, no palpable nodules  RESPIRATORY: Clear to auscultation bilaterally; No rales, rhonchi, wheezing  CARDIOVASCULAR: Regular rate and rhythm; No murmurs; no peripheral edema  GI: Soft, nontender, non distended  SKIN: Dry, intact, No rashes or lesions  MUSCULOSKELETAL: Full range of motion, normal strength  NEURO: extraocular movements intact, no tremor  PSYCH: Alert and oriented x 3, normal affect, normal mood      CAPILLARY BLOOD GLUCOSE          11-07    137  |  96<L>  |  25<H>  ----------------------------<  153<H>  3.7   |  29  |  0.56    EGFR if : 94  EGFR if non : 82    Ca    8.5      11-07            Thyroid Function Tests:  11-07 @ 05:55 TSH 32.59 FreeT4 -- T3 36.3 Anti TPO -- Anti Thyroglobulin Ab -- TSI --  11-05 @ 06:27 TSH -- FreeT4 1.12 T3 -- Anti TPO -- Anti Thyroglobulin Ab -- TSI --      Hemoglobin A1C, Whole Blood: 5.2 % [4.0 - 5.6] (10-15-19 @ 18:37)

## 2019-11-07 NOTE — PROGRESS NOTE ADULT - ATTENDING COMMENTS
Patient seen and examined with the GI fellow. I agree with the above assessment and plan. Thank you for allowing us to care for your patient.
Renal attd    The sodium has improved nicely  Hydralazine once a day is a strange dose.  At her age can allow the BP to rise   Cont feeds    35 minutes spent on total encounter and more then 50% of the visit was spent counseling and/or coordinating care by the attending physician
Renal Attd    -Samsca was not given yesterday and will be given today   -Tube feeds  -DC bumex
Renal attd    -Serum sodium improved  -Cont feeds   -Is it time to change the synthroid to peg and PO route now    35 minutes spent on total encounter and more then 50% of the visit was spent counseling and/or coordinating care by the attending physician
Prefer to continue IV levothyroxine few more days in case contributing to hyponatremia. Would ensure sodium improved prior to dc.  Clarify exact nutrition plan for dc and will give recommendation on optimal timing of levothyroxine. Will plan to resume prior home dose of 137 mcg PO (J tube) daily. While inpatient continue IV levothyroxine 50 mcg daily.

## 2019-11-07 NOTE — PROVIDER CONTACT NOTE (OTHER) - ASSESSMENT
Pt laying in bed with HOB elevated 45 degrees. Bed alarm sounded, went in to assess pt and noticed small amount of orange-tan vomit on her gown. Questioned pt and she said "yes I vomited." PT c/o a little bit of nausea. NP made aware. Will continue to monitor. Pt laying in bed with HOB elevated 45 degrees. Bed alarm sounded, went in to assess pt and noticed small amount of orange-tan vomit on her gown. J tube was flushed with NS 30 mins prior. Questioned pt and she said "yes I vomited." PT c/o a little bit of nausea. NP made aware. Will continue to monitor.

## 2019-11-20 ENCOUNTER — FORM ENCOUNTER (OUTPATIENT)
Age: 84
End: 2019-11-20

## 2019-11-21 ENCOUNTER — APPOINTMENT (OUTPATIENT)
Dept: THORACIC SURGERY | Facility: CLINIC | Age: 84
End: 2019-11-21
Payer: MEDICARE

## 2019-11-21 ENCOUNTER — OUTPATIENT (OUTPATIENT)
Dept: OUTPATIENT SERVICES | Facility: HOSPITAL | Age: 84
LOS: 1 days | End: 2019-11-21
Payer: MEDICARE

## 2019-11-21 ENCOUNTER — APPOINTMENT (OUTPATIENT)
Dept: RADIOLOGY | Facility: HOSPITAL | Age: 84
End: 2019-11-21

## 2019-11-21 VITALS
DIASTOLIC BLOOD PRESSURE: 58 MMHG | TEMPERATURE: 97.6 F | SYSTOLIC BLOOD PRESSURE: 114 MMHG | BODY MASS INDEX: 16.79 KG/M2 | HEIGHT: 67 IN | RESPIRATION RATE: 18 BRPM | WEIGHT: 107 LBS | OXYGEN SATURATION: 98 % | HEART RATE: 50 BPM

## 2019-11-21 DIAGNOSIS — Z90.49 ACQUIRED ABSENCE OF OTHER SPECIFIED PARTS OF DIGESTIVE TRACT: Chronic | ICD-10-CM

## 2019-11-21 DIAGNOSIS — Z98.890 OTHER SPECIFIED POSTPROCEDURAL STATES: Chronic | ICD-10-CM

## 2019-11-21 DIAGNOSIS — Z90.710 ACQUIRED ABSENCE OF BOTH CERVIX AND UTERUS: Chronic | ICD-10-CM

## 2019-11-21 DIAGNOSIS — K22.0 ACHALASIA OF CARDIA: ICD-10-CM

## 2019-11-21 PROCEDURE — 99024 POSTOP FOLLOW-UP VISIT: CPT

## 2019-11-21 PROCEDURE — 71046 X-RAY EXAM CHEST 2 VIEWS: CPT | Mod: 26

## 2019-11-24 ENCOUNTER — TRANSCRIPTION ENCOUNTER (OUTPATIENT)
Age: 84
End: 2019-11-24

## 2019-12-01 ENCOUNTER — INPATIENT (INPATIENT)
Facility: HOSPITAL | Age: 84
LOS: 3 days | Discharge: HOME | DRG: 535 | End: 2019-12-05
Attending: INTERNAL MEDICINE | Admitting: INTERNAL MEDICINE
Payer: MEDICARE

## 2019-12-01 VITALS
SYSTOLIC BLOOD PRESSURE: 122 MMHG | RESPIRATION RATE: 18 BRPM | DIASTOLIC BLOOD PRESSURE: 84 MMHG | HEART RATE: 74 BPM | WEIGHT: 106.04 LBS | OXYGEN SATURATION: 99 %

## 2019-12-01 DIAGNOSIS — K22.0 ACHALASIA OF CARDIA: ICD-10-CM

## 2019-12-01 DIAGNOSIS — E89.0 POSTPROCEDURAL HYPOTHYROIDISM: ICD-10-CM

## 2019-12-01 DIAGNOSIS — D50.8 OTHER IRON DEFICIENCY ANEMIAS: ICD-10-CM

## 2019-12-01 DIAGNOSIS — I48.0 PAROXYSMAL ATRIAL FIBRILLATION: ICD-10-CM
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
## 2019-12-01 DIAGNOSIS — Z29.9 ENCOUNTER FOR PROPHYLACTIC MEASURES, UNSPECIFIED: ICD-10-CM

## 2019-12-01 DIAGNOSIS — I10 ESSENTIAL (PRIMARY) HYPERTENSION: ICD-10-CM

## 2019-12-01 DIAGNOSIS — Z90.49 ACQUIRED ABSENCE OF OTHER SPECIFIED PARTS OF DIGESTIVE TRACT: Chronic | ICD-10-CM

## 2019-12-01 DIAGNOSIS — Z98.890 OTHER SPECIFIED POSTPROCEDURAL STATES: Chronic | ICD-10-CM

## 2019-12-01 DIAGNOSIS — Z90.710 ACQUIRED ABSENCE OF BOTH CERVIX AND UTERUS: Chronic | ICD-10-CM

## 2019-12-01 LAB
ALBUMIN SERPL ELPH-MCNC: 3.3 G/DL — SIGNIFICANT CHANGE UP (ref 3.3–5)
ALP SERPL-CCNC: 87 U/L — SIGNIFICANT CHANGE UP (ref 40–120)
ALT FLD-CCNC: 12 U/L — SIGNIFICANT CHANGE UP (ref 10–45)
ANION GAP SERPL CALC-SCNC: 16 MMOL/L — SIGNIFICANT CHANGE UP (ref 5–17)
APTT BLD: 29.1 SEC — SIGNIFICANT CHANGE UP (ref 27.5–36.3)
AST SERPL-CCNC: 26 U/L — SIGNIFICANT CHANGE UP (ref 10–40)
BASOPHILS # BLD AUTO: 0 K/UL — SIGNIFICANT CHANGE UP (ref 0–0.2)
BASOPHILS NFR BLD AUTO: 0 % — SIGNIFICANT CHANGE UP (ref 0–2)
BILIRUB SERPL-MCNC: 0.3 MG/DL — SIGNIFICANT CHANGE UP (ref 0.2–1.2)
BLD GP AB SCN SERPL QL: NEGATIVE — SIGNIFICANT CHANGE UP
BUN SERPL-MCNC: 24 MG/DL — HIGH (ref 7–23)
CALCIUM SERPL-MCNC: 8.8 MG/DL — SIGNIFICANT CHANGE UP (ref 8.4–10.5)
CHLORIDE SERPL-SCNC: 97 MMOL/L — SIGNIFICANT CHANGE UP (ref 96–108)
CO2 SERPL-SCNC: 24 MMOL/L — SIGNIFICANT CHANGE UP (ref 22–31)
CREAT SERPL-MCNC: 0.91 MG/DL — SIGNIFICANT CHANGE UP (ref 0.5–1.3)
EOSINOPHIL # BLD AUTO: 0 K/UL — SIGNIFICANT CHANGE UP (ref 0–0.5)
EOSINOPHIL NFR BLD AUTO: 0 % — SIGNIFICANT CHANGE UP (ref 0–6)
GLUCOSE SERPL-MCNC: 166 MG/DL — HIGH (ref 70–99)
HCT VFR BLD CALC: 20.5 % — CRITICAL LOW (ref 34.5–45)
HCT VFR BLD CALC: 20.7 % — CRITICAL LOW (ref 34.5–45)
HGB BLD-MCNC: 7 G/DL — CRITICAL LOW (ref 11.5–15.5)
HGB BLD-MCNC: 7.1 G/DL — LOW (ref 11.5–15.5)
INR BLD: 1.05 RATIO — SIGNIFICANT CHANGE UP (ref 0.88–1.16)
LYMPHOCYTES # BLD AUTO: 0.77 K/UL — LOW (ref 1–3.3)
LYMPHOCYTES # BLD AUTO: 11.5 % — LOW (ref 13–44)
MCHC RBC-ENTMCNC: 31 PG — SIGNIFICANT CHANGE UP (ref 27–34)
MCHC RBC-ENTMCNC: 32 PG — SIGNIFICANT CHANGE UP (ref 27–34)
MCHC RBC-ENTMCNC: 34.1 GM/DL — SIGNIFICANT CHANGE UP (ref 32–36)
MCHC RBC-ENTMCNC: 34.3 GM/DL — SIGNIFICANT CHANGE UP (ref 32–36)
MCV RBC AUTO: 90.7 FL — SIGNIFICANT CHANGE UP (ref 80–100)
MCV RBC AUTO: 93.2 FL — SIGNIFICANT CHANGE UP (ref 80–100)
MONOCYTES # BLD AUTO: 0.18 K/UL — SIGNIFICANT CHANGE UP (ref 0–0.9)
MONOCYTES NFR BLD AUTO: 2.7 % — SIGNIFICANT CHANGE UP (ref 2–14)
NEUTROPHILS # BLD AUTO: 5.76 K/UL — SIGNIFICANT CHANGE UP (ref 1.8–7.4)
NEUTROPHILS NFR BLD AUTO: 85.8 % — HIGH (ref 43–77)
NRBC # BLD: 0 /100 WBCS — SIGNIFICANT CHANGE UP (ref 0–0)
PLATELET # BLD AUTO: 136 K/UL — LOW (ref 150–400)
PLATELET # BLD AUTO: 155 K/UL — SIGNIFICANT CHANGE UP (ref 150–400)
POTASSIUM SERPL-MCNC: 4.2 MMOL/L — SIGNIFICANT CHANGE UP (ref 3.5–5.3)
POTASSIUM SERPL-SCNC: 4.2 MMOL/L — SIGNIFICANT CHANGE UP (ref 3.5–5.3)
PROT SERPL-MCNC: 5.9 G/DL — LOW (ref 6–8.3)
PROTHROM AB SERPL-ACNC: 12 SEC — SIGNIFICANT CHANGE UP (ref 10–12.9)
RBC # BLD: 2.22 M/UL — LOW (ref 3.8–5.2)
RBC # BLD: 2.26 M/UL — LOW (ref 3.8–5.2)
RBC # FLD: 16.5 % — HIGH (ref 10.3–14.5)
RBC # FLD: 16.8 % — HIGH (ref 10.3–14.5)
RH IG SCN BLD-IMP: POSITIVE — SIGNIFICANT CHANGE UP
RH IG SCN BLD-IMP: POSITIVE — SIGNIFICANT CHANGE UP
SODIUM SERPL-SCNC: 137 MMOL/L — SIGNIFICANT CHANGE UP (ref 135–145)
WBC # BLD: 5.93 K/UL — SIGNIFICANT CHANGE UP (ref 3.8–10.5)
WBC # BLD: 6.71 K/UL — SIGNIFICANT CHANGE UP (ref 3.8–10.5)
WBC # FLD AUTO: 5.93 K/UL — SIGNIFICANT CHANGE UP (ref 3.8–10.5)
WBC # FLD AUTO: 6.71 K/UL — SIGNIFICANT CHANGE UP (ref 3.8–10.5)

## 2019-12-01 PROCEDURE — 76377 3D RENDER W/INTRP POSTPROCES: CPT | Mod: 26

## 2019-12-01 PROCEDURE — 99285 EMERGENCY DEPT VISIT HI MDM: CPT | Mod: GC

## 2019-12-01 PROCEDURE — 72192 CT PELVIS W/O DYE: CPT | Mod: 26

## 2019-12-01 PROCEDURE — 72190 X-RAY EXAM OF PELVIS: CPT | Mod: 26

## 2019-12-01 PROCEDURE — 99223 1ST HOSP IP/OBS HIGH 75: CPT

## 2019-12-01 PROCEDURE — 72131 CT LUMBAR SPINE W/O DYE: CPT | Mod: 26

## 2019-12-01 RX ORDER — AMLODIPINE BESYLATE 2.5 MG/1
1 TABLET ORAL
Qty: 0 | Refills: 0 | DISCHARGE

## 2019-12-01 RX ORDER — AMLODIPINE BESYLATE 2.5 MG/1
5 TABLET ORAL DAILY
Refills: 0 | Status: DISCONTINUED | OUTPATIENT
Start: 2019-12-01 | End: 2019-12-05

## 2019-12-01 RX ORDER — METOPROLOL TARTRATE 50 MG
12.5 TABLET ORAL DAILY
Refills: 0 | Status: DISCONTINUED | OUTPATIENT
Start: 2019-12-01 | End: 2019-12-05

## 2019-12-01 RX ORDER — ACETAMINOPHEN 500 MG
650 TABLET ORAL EVERY 6 HOURS
Refills: 0 | Status: DISCONTINUED | OUTPATIENT
Start: 2019-12-01 | End: 2019-12-03

## 2019-12-01 RX ORDER — PANTOPRAZOLE SODIUM 20 MG/1
40 TABLET, DELAYED RELEASE ORAL
Refills: 0 | Status: DISCONTINUED | OUTPATIENT
Start: 2019-12-01 | End: 2019-12-02

## 2019-12-01 RX ORDER — LEVOTHYROXINE SODIUM 125 MCG
137 TABLET ORAL DAILY
Refills: 0 | Status: DISCONTINUED | OUTPATIENT
Start: 2019-12-01 | End: 2019-12-05

## 2019-12-01 RX ORDER — LEVOTHYROXINE SODIUM 125 MCG
1 TABLET ORAL
Qty: 0 | Refills: 0 | DISCHARGE

## 2019-12-01 RX ORDER — ACETAMINOPHEN 500 MG
975 TABLET ORAL ONCE
Refills: 0 | Status: COMPLETED | OUTPATIENT
Start: 2019-12-01 | End: 2019-12-01

## 2019-12-01 RX ADMIN — Medication 975 MILLIGRAM(S): at 12:37

## 2019-12-01 NOTE — ED PROVIDER NOTE - OBJECTIVE STATEMENT
89F with PMHx of HTN, s/p thyroidectomy, hypothyroidism, HTN, achalasia s/p dilation x 2, hiatal hernia s/p repair w/ mesh, multiple abdominal surgeries with recurrent SBOs, who presented from outpatient thoracic surgery Dr. Jang's office after being found to have mesh eroding into esophagus, now s/p partial esophagogastrectomy, pyloromyotomy, and feeding jejunostomy on 10/22/2019 presents to the ED s/p fall. Patient was getting up off of a chair last night when her walker caught on the rug and she fell. Patient fell on her left side and denies striking her head a or LOC. Patient had worsening pain this AM which prompted them to bring her to the ED.

## 2019-12-01 NOTE — ED ADULT TRIAGE NOTE - NSWEIGHTCALCTOOLDRUG_GEN_A_CORE
ascvd 10 yr risk 14% given smoker,  HDL 93 ldl 128  - C/W lipitor 40 mg po bedtime (high intensity statin) started inpatient  used

## 2019-12-01 NOTE — ED ADULT NURSE NOTE - OBJECTIVE STATEMENT
89 yo female presents to ED via EMS from home c/o L hip pain after falling last night. Per daughter, patient normally ambulates with walker. Last night, patient was getting out of chair when walker got stuck on carpet causing patient to fall on L side. Patient able to get up with assistance of family member, but has had increased pain and difficulty ambulating since. Patient denies dizzines, LOC, blurry vision, SOB, CP, nvd, fever/chills, sick contacts, travel. Patient A&OX3, breathing spontaneously, airway patent, bl clear lungs, abdomen nontender, +pulses, cap refill <2 seconds. Patient resting in bed, side rails up, plan of care explained. MD at bedside.

## 2019-12-01 NOTE — H&P ADULT - PROBLEM SELECTOR PLAN 5
-Chronic  -BP normal to borderline soft readings  -Will start home Metoprolol and Amlodipine with holding parameters  -Pt will benefit with discontinuing Amlodipine  -Continue to monitor vitals

## 2019-12-01 NOTE — ED PROVIDER NOTE - NS ED ROS FT
REVIEW OF SYSTEMS:    CONSTITUTIONAL: No weakness, fevers or chills  EYES/ENT: No visual changes;  No vertigo or throat pain   NECK: No pain or stiffness  RESPIRATORY: No cough, wheezing, hemoptysis; No shortness of breath  CARDIOVASCULAR: No chest pain or palpitations  GASTROINTESTINAL: = Nausea and diarrhea No abdominal or epigastric pain. No melena or hematochezia.  GENITOURINARY: No dysuria, frequency or hematuria  NEUROLOGICAL: No numbness or weakness  MSK: pain of sacroiliac and LLE   SKIN: No itching, rashes

## 2019-12-01 NOTE — ED ADULT NURSE NOTE - NSIMPLEMENTINTERV_GEN_ALL_ED
Implemented All Fall with Harm Risk Interventions:  [place] to call system. Call bell, personal items and telephone within reach. Instruct patient to call for assistance. Room bathroom lighting operational. Non-slip footwear when patient is off stretcher. Physically safe environment: no spills, clutter or unnecessary equipment. Stretcher in lowest position, wheels locked, appropriate side rails in place. Provide visual cue, wrist band, yellow gown, etc. Monitor gait and stability. Monitor for mental status changes and reorient to person, place, and time. Review medications for side effects contributing to fall risk. Reinforce activity limits and safety measures with patient and family. Provide visual clues: red socks.

## 2019-12-01 NOTE — H&P ADULT - HISTORY OF PRESENT ILLNESS
89yo female with hx of achalasia s/p dilation treatment x 2, hiatal hernia s/p repair with mesh, multiple abd surgeries with recurrent SBO, recent partial esophagogastrectomy mesh eroding esophagus, s/p J-tube placement, presented to the ED due to witnessed mechanical fall last night Pt accompanied by her daughter. Per pt and daughter, pt was attempting to get up from her couch with the assistance of her son and using a walker. Her walker got caught on the rug which caused her to loose her balance and she fell on her left side, on carpeted floor. Her son was unable to catch her by the team she fell. She denied trauma to her head or any other part of her body. She denied LOC. Her son picked her up right away. Pt did not complain of pain at the time but she was unable to bear weight on her left side and due to her surgery on her right pelvis, she was not able to walk. Her daughter spoke to her PMD who advised her to have an xray done of her hip to rule out fracture.   Pt denies cp, palpitations, sob, abd pain, N/V, fever, chills.

## 2019-12-01 NOTE — ED PROVIDER NOTE - ATTENDING CONTRIBUTION TO CARE
I have seen and evaluated this patient with the resident.   I agree with the findings  unless other wise stated.  I have made appropriate changes in documentations where needed, After my face to face bedside evaluation, I am further  notinF with PMHx of HTN, s/p thyroidectomy, hypothyroidism, HTN, achalasia s/p dilation x 2, hiatal hernia s/p repair w/ mesh, multiple abdominal surgeries with recurrent SBOs, who presented from outpatient thoracic surgery Dr. Jang's office after being found to have mesh eroding into esophagus, now s/p partial esophagogastrectomy, pyloromyotomy, and feeding jejunostomy on 10/22/2019 presents to the ED s/p fall. Patient was getting up off of a chair last night when her walker caught on the rug and she fell. Patient fell on her left side and denies striking her head a or LOC. Patient had worsening pain this AM which prompted them to bring her to the ED. Pt pale looking weak lethargic minimal active movement 2/2 weakness general Clear lungs heart regular advanced OA changes discomfort on passive left leg movement Labs and xrays obtained concern for anemia general weakness inability to stand d/w Dr Ivey will admit --Darren

## 2019-12-01 NOTE — H&P ADULT - ASSESSMENT
89yo female with hx of achalasia s/p dilation treatment x 2, hiatal hernia s/p repair with mesh, multiple abd surgeries with recurrent SBO, recent partial esophagogastrectomy mesh eroding esophagus, s/p J-tube placement, presented to the ED due to witnessed mechanical fall last night Pt accompanied by her daughter.

## 2019-12-01 NOTE — H&P ADULT - PROBLEM SELECTOR PLAN 4
-Chronic  -Rate controlled with Metoprolol. However considering pt's baseline HR to be <60, will place holding parameters when to give and when to hold Metoprolol  -Not on AC due to chronic falls and frailty  -Hold Asprin as well as pt with multiple ecchymosis and anemia

## 2019-12-01 NOTE — H&P ADULT - PROBLEM SELECTOR PLAN 2
-Chronic  -S/p multiple surgeries with complications afterwards.  -Currently wit J-tube in place. However, no tube feeding. Pt tolerating PO  -Dysphagia 3 diet with thin liquids + Ensure BID  -Flush feeding tube once a day

## 2019-12-01 NOTE — ED PROVIDER NOTE - PHYSICAL EXAMINATION
PHYSICAL EXAM:  GENERAL: NAD, well-developed  HEAD:  Atraumatic, Normocephalic  EYES: EOMI, PERRLA, conjunctiva and sclera clear  NECK: Supple, No JVD  CHEST/LUNG: Clear to auscultation bilaterally; No wheeze  HEART: Regular rate and rhythm; No murmurs, rubs, or gallops  ABDOMEN: Soft, Nontender, Nondistended; Bowel sounds present  EXTREMITIES:  2+ Peripheral Pulses. 2+ b/l LE pitting edema   PSYCH: AAOx3  NEUROLOGY: non-focal  MSK: TTP of the left hip   SKIN: No rashes or lesions

## 2019-12-01 NOTE — ED PROVIDER NOTE - CLINICAL SUMMARY MEDICAL DECISION MAKING FREE TEXT BOX
89F with PMHx of HTN, s/p thyroidectomy, hypothyroidism, HTN, achalasia s/p dilation x 2, hiatal hernia s/p repair w/ mesh, multiple abdominal surgeries with recurrent SBOs, who presented from outpatient thoracic surgery Dr. Jang's office after being found to have mesh eroding into esophagus, now s/p partial esophagogastrectomy, pyloromyotomy, and feeding jejunostomy on 10/22/2019 presents to the ED s/p fall. Will check labs and scan L-spine and hip

## 2019-12-01 NOTE — CONSULT NOTE ADULT - SUBJECTIVE AND OBJECTIVE BOX
89y Female w/ hx of achalasia s/p dilations x2, hiatal hernia s/p repair, multiple abdominal surgeries w/ recurrent SBO, recent J-tube placement, and L hip CRPP (does not remember when, done by someone in [place]) presented to [facility] today c/o L groin pain s/p mechanical fall at home on 11/30. Denies HS/LOC. Denies fever/chills. Denies pain/injury elsewhere. Community Ambulator w/ walker.     HEALTH ISSUES - PROBLEM Dx:  Prophylactic measure: Prophylactic measure  Postoperative hypothyroidism: Postoperative hypothyroidism  Essential hypertension: Essential hypertension  Paroxysmal atrial fibrillation: Paroxysmal atrial fibrillation  Other iron deficiency anemia: Other iron deficiency anemia  Achalasia: Achalasia  Fall, initial encounter: Fall, initial encounter    MEDICATIONS  (STANDING):  amLODIPine   Tablet 5 milliGRAM(s) Oral daily  levothyroxine 137 MICROGram(s) Oral daily  metoprolol succinate ER 12.5 milliGRAM(s) Oral daily  pantoprazole    Tablet 40 milliGRAM(s) Oral before breakfast    Allergies  opioid-like analgesics (Other)                        7.1    5.93  )-----------( 136      ( 01 Dec 2019 15:52 )             20.7     01 Dec 2019 11:26    137    |  97     |  24     ----------------------------<  166    4.2     |  24     |  0.91     Ca    8.8        01 Dec 2019 11:26    TPro  5.9    /  Alb  3.3    /  TBili  0.3    /  DBili  x      /  AST  26     /  ALT  12     /  AlkPhos  87     01 Dec 2019 11:26    PT/INR - ( 01 Dec 2019 11:37 )   PT: 12.0 sec;   INR: 1.05 ratio      PTT - ( 01 Dec 2019 11:37 )  PTT:29.1 sec    Vital Signs Last 24 Hrs  T(C): 36.2 (12-01-19 @ 19:51), Max: 36.7 (12-01-19 @ 13:22)  T(F): 97.2 (12-01-19 @ 19:51), Max: 98 (12-01-19 @ 13:22)  HR: 66 (12-01-19 @ 19:51) (57 - 74)  BP: 92/54 (12-01-19 @ 19:51) (92/54 - 122/84)  RR: 17 (12-01-19 @ 19:51) (17 - 18)  SpO2: 99% (12-01-19 @ 19:51) (96% - 100%)    Gen: NAD  LLE: Skin intact, +ttp groin, no bony ttp elsewhere, able to SLR, negative log roll, +ehl/fhl/ta/gs function, l2-s1 silt, dp/pt pulse intact, no calf ttp, compartments soft    Imaging: CT pelvis shows L superior and inferior pubic ramus fxs, non-displaced    A/P: 89y Female w L Sup/Inf Pubic Ramus Fractures    Pain control  FU XR AP pelvis, inlet/outlet views  DVT ppx  PT/OOBTC/WBAT with assistive devices as needed  FU labs/imaging  Medical management  No acute ortho surgical intervention  Follow up with Dr. Jefferson as outpatient in 7-10 days, call office for appointment  Ortho stable    Brian Lainez MD

## 2019-12-01 NOTE — H&P ADULT - NSHPSOCIALHISTORY_GEN_ALL_CORE
Lives at home with daughter   Has HHA from 9am-3pm M-F. Additional services were to start tomorrow for additional hrs  Uses a walker for ambulation  Denies smoking, etoh use

## 2019-12-01 NOTE — H&P ADULT - PROBLEM SELECTOR PLAN 3
-Chronic  -No signs of bleeding at this time  -Hb of 7.0  -Will hold off transfusion at this time as pt is hemodynamically stable  -Follow up with repeat CBC in AM to reassess

## 2019-12-01 NOTE — H&P ADULT - PROBLEM SELECTOR PLAN 1
-S/p witnessed mechanical fall without injury to head or LOC  -Complaints of pain in Left hip  -CT Spine negative for fracture  -Follow up with CT Pelvis  -Continue bed rest until evaluation by PT  -PT Consult  -Hold AC in the setting of anemia. SCDs at the time

## 2019-12-01 NOTE — CHART NOTE - NSCHARTNOTEFT_GEN_A_CORE
CC: CC: Pubic Rami Fracture    CT Pelvis showed acute fractures of the left superior and inferior pubic rami.  As per Dr. Denise, patient Orthopedics was contacted.  As per Ortho, patient should be weight-bearing as tolerated and pain control as needed.  Will continue to monitor patient's vitals.  Day team to follow in AM.  Dr. Denise to follow in AM.    Johnathan Oneil PA-C  Department of Medicine  #[id number]

## 2019-12-01 NOTE — H&P ADULT - OPHTHALMOLOGIC
Pt and wife brought to exam by [facility] and pt gowned. I had lab come to collect specimen to aide in timely process. After lab left, POC reviewed and questions answered. Pt states has not had palvix or ASA in a week, all other meds reviewed. Hx/assessment reviewed and noted. Will await lab results and notify flouro.
Wife, Gibson Childers, left for a little while and will return, #[phone].
negative

## 2019-12-02 ENCOUNTER — TRANSCRIPTION ENCOUNTER (OUTPATIENT)
Age: 84
End: 2019-12-02

## 2019-12-02 LAB
ANION GAP SERPL CALC-SCNC: 14 MMOL/L — SIGNIFICANT CHANGE UP (ref 5–17)
BASOPHILS # BLD AUTO: 0.02 K/UL — SIGNIFICANT CHANGE UP (ref 0–0.2)
BASOPHILS NFR BLD AUTO: 0.4 % — SIGNIFICANT CHANGE UP (ref 0–2)
BUN SERPL-MCNC: 28 MG/DL — HIGH (ref 7–23)
CALCIUM SERPL-MCNC: 8.7 MG/DL — SIGNIFICANT CHANGE UP (ref 8.4–10.5)
CHLORIDE SERPL-SCNC: 97 MMOL/L — SIGNIFICANT CHANGE UP (ref 96–108)
CO2 SERPL-SCNC: 28 MMOL/L — SIGNIFICANT CHANGE UP (ref 22–31)
CREAT SERPL-MCNC: 0.73 MG/DL — SIGNIFICANT CHANGE UP (ref 0.5–1.3)
EOSINOPHIL # BLD AUTO: 0.02 K/UL — SIGNIFICANT CHANGE UP (ref 0–0.5)
EOSINOPHIL NFR BLD AUTO: 0.4 % — SIGNIFICANT CHANGE UP (ref 0–6)
GLUCOSE SERPL-MCNC: 114 MG/DL — HIGH (ref 70–99)
HCT VFR BLD CALC: 19.6 % — CRITICAL LOW (ref 34.5–45)
HCT VFR BLD CALC: 26.1 % — LOW (ref 34.5–45)
HGB BLD-MCNC: 6.5 G/DL — CRITICAL LOW (ref 11.5–15.5)
HGB BLD-MCNC: 8.8 G/DL — LOW (ref 11.5–15.5)
IMM GRANULOCYTES NFR BLD AUTO: 0.4 % — SIGNIFICANT CHANGE UP (ref 0–1.5)
LYMPHOCYTES # BLD AUTO: 0.72 K/UL — LOW (ref 1–3.3)
LYMPHOCYTES # BLD AUTO: 15.8 % — SIGNIFICANT CHANGE UP (ref 13–44)
MCHC RBC-ENTMCNC: 30.7 PG — SIGNIFICANT CHANGE UP (ref 27–34)
MCHC RBC-ENTMCNC: 30.8 PG — SIGNIFICANT CHANGE UP (ref 27–34)
MCHC RBC-ENTMCNC: 33.2 GM/DL — SIGNIFICANT CHANGE UP (ref 32–36)
MCHC RBC-ENTMCNC: 33.7 GM/DL — SIGNIFICANT CHANGE UP (ref 32–36)
MCV RBC AUTO: 90.9 FL — SIGNIFICANT CHANGE UP (ref 80–100)
MCV RBC AUTO: 92.9 FL — SIGNIFICANT CHANGE UP (ref 80–100)
MONOCYTES # BLD AUTO: 0.26 K/UL — SIGNIFICANT CHANGE UP (ref 0–0.9)
MONOCYTES NFR BLD AUTO: 5.7 % — SIGNIFICANT CHANGE UP (ref 2–14)
NEUTROPHILS # BLD AUTO: 3.52 K/UL — SIGNIFICANT CHANGE UP (ref 1.8–7.4)
NEUTROPHILS NFR BLD AUTO: 77.3 % — HIGH (ref 43–77)
NRBC # BLD: 0 /100 WBCS — SIGNIFICANT CHANGE UP (ref 0–0)
PLATELET # BLD AUTO: 124 K/UL — LOW (ref 150–400)
PLATELET # BLD AUTO: 132 K/UL — LOW (ref 150–400)
POTASSIUM SERPL-MCNC: 3.5 MMOL/L — SIGNIFICANT CHANGE UP (ref 3.5–5.3)
POTASSIUM SERPL-SCNC: 3.5 MMOL/L — SIGNIFICANT CHANGE UP (ref 3.5–5.3)
RBC # BLD: 2.11 M/UL — LOW (ref 3.8–5.2)
RBC # BLD: 2.87 M/UL — LOW (ref 3.8–5.2)
RBC # FLD: 16 % — HIGH (ref 10.3–14.5)
RBC # FLD: 16.8 % — HIGH (ref 10.3–14.5)
SODIUM SERPL-SCNC: 139 MMOL/L — SIGNIFICANT CHANGE UP (ref 135–145)
WBC # BLD: 4.56 K/UL — SIGNIFICANT CHANGE UP (ref 3.8–10.5)
WBC # BLD: 5.73 K/UL — SIGNIFICANT CHANGE UP (ref 3.8–10.5)
WBC # FLD AUTO: 4.56 K/UL — SIGNIFICANT CHANGE UP (ref 3.8–10.5)
WBC # FLD AUTO: 5.73 K/UL — SIGNIFICANT CHANGE UP (ref 3.8–10.5)

## 2019-12-02 PROCEDURE — 99223 1ST HOSP IP/OBS HIGH 75: CPT

## 2019-12-02 PROCEDURE — 99223 1ST HOSP IP/OBS HIGH 75: CPT | Mod: GC

## 2019-12-02 PROCEDURE — 93010 ELECTROCARDIOGRAM REPORT: CPT

## 2019-12-02 RX ORDER — PANTOPRAZOLE SODIUM 20 MG/1
8 TABLET, DELAYED RELEASE ORAL
Qty: 80 | Refills: 0 | Status: DISCONTINUED | OUTPATIENT
Start: 2019-12-02 | End: 2019-12-02

## 2019-12-02 RX ORDER — PANTOPRAZOLE SODIUM 20 MG/1
40 TABLET, DELAYED RELEASE ORAL
Refills: 0 | Status: DISCONTINUED | OUTPATIENT
Start: 2019-12-02 | End: 2019-12-05

## 2019-12-02 RX ORDER — FUROSEMIDE 40 MG
40 TABLET ORAL ONCE
Refills: 0 | Status: COMPLETED | OUTPATIENT
Start: 2019-12-02 | End: 2019-12-02

## 2019-12-02 RX ADMIN — Medication 137 MICROGRAM(S): at 05:29

## 2019-12-02 RX ADMIN — PANTOPRAZOLE SODIUM 10 MG/HR: 20 TABLET, DELAYED RELEASE ORAL at 07:40

## 2019-12-02 RX ADMIN — Medication 12.5 MILLIGRAM(S): at 05:29

## 2019-12-02 RX ADMIN — PANTOPRAZOLE SODIUM 40 MILLIGRAM(S): 20 TABLET, DELAYED RELEASE ORAL at 17:03

## 2019-12-02 RX ADMIN — PANTOPRAZOLE SODIUM 40 MILLIGRAM(S): 20 TABLET, DELAYED RELEASE ORAL at 05:29

## 2019-12-02 RX ADMIN — Medication 40 MILLIGRAM(S): at 12:45

## 2019-12-02 NOTE — PROGRESS NOTE ADULT - SUBJECTIVE AND OBJECTIVE BOX
SUBJECTIVE / OVERNIGHT EVENTS: pt seen and examined   pt had episode of coffee ground emesis  earlier     MEDICATIONS  (STANDING):  amLODIPine   Tablet 5 milliGRAM(s) Oral daily  levothyroxine 137 MICROGram(s) Oral daily  metoprolol succinate ER 12.5 milliGRAM(s) Oral daily  pantoprazole  Injectable 40 milliGRAM(s) IV Push two times a day    MEDICATIONS  (PRN):  acetaminophen   Tablet .. 650 milliGRAM(s) Oral every 6 hours PRN Moderate Pain (4 - 6)  bisacodyl 5 milliGRAM(s) Oral at bedtime PRN Constipation    Vital Signs Last 24 Hrs  T(C): 36.4 (02 Dec 2019 19:51), Max: 36.4 (02 Dec 2019 09:15)  T(F): 97.5 (02 Dec 2019 19:51), Max: 97.5 (02 Dec 2019 09:15)  HR: 70 (02 Dec 2019 19:51) (62 - 74)  BP: 131/69 (02 Dec 2019 19:51) (109/65 - 146/66)  BP(mean): --  RR: 18 (02 Dec 2019 19:51) (18 - 18)  SpO2: 98% (02 Dec 2019 19:51) (98% - 100%)    CAPILLARY BLOOD GLUCOSE        I&O's Summary    01 Dec 2019 07:01  -  02 Dec 2019 07:00  --------------------------------------------------------  IN: 120 mL / OUT: 0 mL / NET: 120 mL    02 Dec 2019 07:01  -  02 Dec 2019 23:04  --------------------------------------------------------  IN: 1530 mL / OUT: 1200 mL / NET: 330 mL        Constitutional: No fever, fatigue  Skin: No rash.  Eyes: No recent vision problems or eye pain.  ENT: No congestion, ear pain, or sore throat.  Cardiovascular: No chest pain or palpation.  Respiratory: No cough, shortness of breath, congestion, or wheezing.  Gastrointestinal: No abdominal pain, nausea, vomiting, or diarrhea.  Genitourinary: No dysuria.  Musculoskeletal: No joint swelling.  Neurologic: No headache.    PHYSICAL EXAM:  GENERAL: NAD  EYES: EOMI, PERRLA  NECK: Supple, No JVD  CHEST/LUNG: dec breath sounds at bases   HEART:  S1 , S2 +  ABDOMEN: soft , bs+  EXTREMITIES:  trace edema+  NEUROLOGY: alert awake comfortable       LABS:                        6.5    4.56  )-----------( 124      ( 02 Dec 2019 06:33 )             19.6     12-02    139  |  97  |  28<H>  ----------------------------<  114<H>  3.5   |  28  |  0.73    Ca    8.7      02 Dec 2019 06:35    TPro  5.9<L>  /  Alb  3.3  /  TBili  0.3  /  DBili  x   /  AST  26  /  ALT  12  /  AlkPhos  87  12-01    PT/INR - ( 01 Dec 2019 11:37 )   PT: 12.0 sec;   INR: 1.05 ratio         PTT - ( 01 Dec 2019 11:37 )  PTT:29.1 sec          RADIOLOGY & ADDITIONAL TESTS:    Imaging Personally Reviewed:    Consultant(s) Notes Reviewed:      Care Discussed with Consultants/Other Providers:

## 2019-12-02 NOTE — DISCHARGE NOTE PROVIDER - NSDCMRMEDTOKEN_GEN_ALL_CORE_FT
acetaminophen 325 mg oral tablet: 2 tab(s) orally every 6 hours, As needed, Mild Pain (1 - 3), Moderate Pain (4 - 6)  amLODIPine 5 mg oral tablet: 1 tab(s) orally once a day  aspirin 81 mg oral delayed release tablet: 1 tab(s) orally once a day  bisacodyl 5 mg oral delayed release tablet: 1 tab(s) orally once a day (at bedtime), As needed, Constipation  metoprolol succinate 25 mg oral tablet, extended release: 0.5 tab(s) orally once a day  Protonix 40 mg oral delayed release tablet: 1 tab(s) orally once a day   Synthroid 137 mcg (0.137 mg) oral tablet: 1 tab(s) orally once a day  **MUST BE GIVEN ON EMTPY STOMACH FOR ADEQUATE ABSORPTION** acetaminophen 325 mg oral tablet: 2 tab(s) orally every 6 hours, As needed, Mild Pain (1 - 3), Moderate Pain (4 - 6)  amLODIPine 5 mg oral tablet: 1 tab(s) orally once a day  bisacodyl 5 mg oral delayed release tablet: 1 tab(s) orally once a day (at bedtime), As needed, Constipation  metoprolol succinate 25 mg oral tablet, extended release: 0.5 tab(s) orally once a day  Protonix 40 mg oral delayed release tablet: 1 tab(s) orally once a day   Synthroid 137 mcg (0.137 mg) oral tablet: 1 tab(s) orally once a day  **MUST BE GIVEN ON EMTPY STOMACH FOR ADEQUATE ABSORPTION**

## 2019-12-02 NOTE — CHART NOTE - NSCHARTNOTEFT_GEN_A_CORE
CC:      HPI:  Called by RN  Patient denied headache, dizziness, CP, SOB, abdominal  pain, N/V/D, numbness/tingling, extremity weakness, dysuria.         ROS:  CONSTITUTIONAL:  No fever, chills, rigors  CARDIOVASCULAR:  No chest pain or palpitations  RESPIRATORY:   No SOB, cough, dyspnea on exertion.  No wheezing  GASTROINTESTINAL:  No abd pain, N/V, diarrhea/constipation  EXTREMITIES:  No swelling or joint pain  GENITOURINARY:  No burning on urination, increased frequency or urgency.  No flank pain  NEUROLOGIC:  No HA, visual disturbances  SKIN: No rashes        PAST MEDICAL & SURGICAL HISTORY:  Unspecified fall, initial encounter  FH: heart disease  Handoff  MEWS Score  Atrial fibrillation  Achalasia  Hypertension  Hypothyroidism  Fall, initial encounter  Prophylactic measure  Postoperative hypothyroidism  Essential hypertension  Paroxysmal atrial fibrillation  Other iron deficiency anemia  Achalasia  Fall, initial encounter  History of repair of hiatal hernia  H/O colectomy  S/P appendectomy  S/P hysterectomy  HIP INJURY  RAD CDS  23  ROSSY (acute kidney injury)        Vital Signs Last 24 Hrs  T(C): 36.3 (02 Dec 2019 05:14), Max: 36.7 (01 Dec 2019 13:22)  T(F): 97.3 (02 Dec 2019 05:14), Max: 98 (01 Dec 2019 13:22)  HR: 62 (02 Dec 2019 05:14) (57 - 74)  BP: 109/65 (02 Dec 2019 05:14) (92/54 - 139/70)  BP(mean): --  RR: 18 (02 Dec 2019 05:14) (17 - 18)  SpO2: 100% (02 Dec 2019 05:14) (96% - 100%)      Physical Exam:  General: WN/WD NAD, AOx3, nontoxic appearing  Head:  NC/AT  CV: RRR, S1S2   Respiratory: CTA B/L, nonlabored  Abdominal: (+) bowel sounds x4. Soft, NT, ND, no palpable mass, no guarding, or rebound tenderness  Genitourinary: ? Rossi   MSK: No BLLE edema, + peripheral pulses, FROM all 4 extremity  Skin: (+) warm, dry   Psych: Appropriate affect       Labs:                        7.1    5.93  )-----------( 136      ( 01 Dec 2019 15:52 )             20.7     12-01    137  |  97  |  24<H>  ----------------------------<  166<H>  4.2   |  24  |  0.91    Ca    8.8      01 Dec 2019 11:26    TPro  5.9<L>  /  Alb  3.3  /  TBili  0.3  /  DBili  x   /  AST  26  /  ALT  12  /  AlkPhos  87  12-01              Radiology:          Assessment & Plan:  HPI:  89yo female with hx of achalasia s/p dilation treatment x 2, hiatal hernia s/p repair with mesh, multiple abd surgeries with recurrent SBO, recent partial esophagogastrectomy mesh eroding esophagus, s/p J-tube placement, presented to the ED due to witnessed mechanical fall last night Pt accompanied by her daughter. Per pt and daughter, pt was attempting to get up from her couch with the assistance of her son and using a walker. Her walker got caught on the rug which caused her to loose her balance and she fell on her left side, on carpeted floor. Her son was unable to catch her by the team she fell. She denied trauma to her head or any other part of her body. She denied LOC. Her son picked her up right away. Pt did not complain of pain at the time but she was unable to bear weight on her left side and due to her surgery on her right pelvis, she was not able to walk. Her daughter spoke to her PMD who advised her to have an xray done of her hip to rule out fracture.   Pt denies cp, palpitations, sob, abd pain, N/V, fever, chills. (01 Dec 2019 15:39)        -  -  -Will continue to closely monitor patient/vitals  -Primary Team to follow up in AM, attending to follow       Johnathan Oneil PA-C  Dept of Medicine CC: Hematemesis      HPI:  Called by RN that patient belched and had black sputum in her cup.  Patient was seen and examined at bedside.  Patient is AAO to person and place.    Patient denied headache, dizziness, CP, SOB, abdominal  pain, N/D, overt bleeding, numbness/tingling, extremity weakness, dysuria.           Vital Signs Last 24 Hrs  T(C): 36.3 (02 Dec 2019 05:14), Max: 36.7 (01 Dec 2019 13:22)  T(F): 97.3 (02 Dec 2019 05:14), Max: 98 (01 Dec 2019 13:22)  HR: 62 (02 Dec 2019 05:14) (57 - 74)  BP: 109/65 (02 Dec 2019 05:14) (92/54 - 139/70)  BP(mean): --  RR: 18 (02 Dec 2019 05:14) (17 - 18)  SpO2: 100% (02 Dec 2019 05:14) (96% - 100%)      Physical Exam:  General: WN/WD NAD, AOx3, nontoxic appearing  Head:  NC/AT  CV: RRR, S1S2   Respiratory: CTA B/L, nonlabored  Abdominal: (+) bowel sounds x4. Soft, NT, (+) J-tube  MSK: (+)BLLE edema, + peripheral pulses,   Skin: (+) warm, dry         Labs:                        7.1    5.93  )-----------( 136      ( 01 Dec 2019 15:52 )             20.7     12-01    137  |  97  |  24<H>  ----------------------------<  166<H>  4.2   |  24  |  0.91    Ca    8.8      01 Dec 2019 11:26    TPro  5.9<L>  /  Alb  3.3  /  TBili  0.3  /  DBili  x   /  AST  26  /  ALT  12  /  AlkPhos  87  12-01              Assessment & Plan:  HPI:  89yo female with hx of achalasia s/p dilation treatment x 2, hiatal hernia s/p repair with mesh, multiple abd surgeries with recurrent SBO, recent partial esophagogastrectomy mesh eroding esophagus, s/p J-tube placement, presented to the ED due to witnessed mechanical fall last night           1. Hematemesis  -STAT CBC was ordered and patient was made NPO.  Discussed case with Attending, Dr. Denise.  Recommended to give patient 1 unit PRBC, start patient on a protonix drip @8mg/hr.  Stat EKG was performed showing no significant change from previous EKG.  GI consult to be called by Day team.    -Will follow up with GI Recommendation  -Received a critical value with a Hb 6.5.  Patient is ordered to receive 1unit PRBC transfusion  -Will trend CBC  -Will continue to closely monitor patient/vitals  -Primary Team to follow up in AM, attending to follow       Johnathan Oneil PA-C  Dept of Medicine  #[id number]

## 2019-12-02 NOTE — CONSULT NOTE ADULT - SUBJECTIVE AND OBJECTIVE BOX
[facility] - Britni Kennedy, Faizan, Alan, Lizandro, Khadijah Harris  Office Number: [phone]    Initial Consult Note    CHIEF COMPLAINT: Patient is a 89y old  Female who presents with a chief complaint of Fall (01 Dec 2019 22:42)      HPI:  89yo female with hx of achalasia s/p dilation treatment x 2, hiatal hernia s/p repair with mesh, multiple abd surgeries with recurrent SBO, recent partial esophagogastrectomy mesh eroding esophagus, s/p J-tube placement, presented to the ED due to witnessed mechanical fall last night Pt accompanied by her daughter. Per pt and daughter, pt was attempting to get up from her couch with the assistance of her son and using a walker. Her walker got caught on the rug which caused her to loose her balance and she fell on her left side, on carpeted floor. Her son was unable to catch her by the team she fell. She denied trauma to her head or any other part of her body. She denied LOC. Her son picked her up right away. Pt did not complain of pain at the time but she was unable to bear weight on her left side and due to her surgery on her right pelvis, she was not able to walk. Her daughter spoke to her PMD who advised her to have an xray done of her hip to rule out fracture.   Pt denies cp, palpitations, sob, abd pain, N/V, fever, chills. (01 Dec 2019 15:39)      PAST MEDICAL & SURGICAL HISTORY:  Atrial fibrillation  Achalasia  Hypertension  Hypothyroidism  History of repair of hiatal hernia  H/O colectomy  S/P appendectomy  S/P hysterectomy      SOCIAL HISTORY:  No tobacco, ethanol, or drug abuse.    FAMILY HISTORY:  FH: heart disease  No family history of acute MI or sudden cardiac death.    MEDICATIONS  (STANDING):  amLODIPine   Tablet 5 milliGRAM(s) Oral daily  furosemide   Injectable 40 milliGRAM(s) IV Push once  levothyroxine 137 MICROGram(s) Oral daily  metoprolol succinate ER 12.5 milliGRAM(s) Oral daily  pantoprazole Infusion 8 mG/Hr (10 mL/Hr) IV Continuous <Continuous>    MEDICATIONS  (PRN):  acetaminophen   Tablet .. 650 milliGRAM(s) Oral every 6 hours PRN Moderate Pain (4 - 6)  bisacodyl 5 milliGRAM(s) Oral at bedtime PRN Constipation      Allergies    opioid-like analgesics (Other)    Intolerances        REVIEW OF SYSTEMS:    CONSTITUTIONAL: + weakness, no fevers or chills  EYES/ENT: No visual changes;  No vertigo or throat pain   NECK: No pain or stiffness  RESPIRATORY: No cough, wheezing, hemoptysis; No shortness of breath  CARDIOVASCULAR: No chest pain or palpitations  GASTROINTESTINAL: No abdominal pain. No nausea, vomiting, or hematemesis; No diarrhea or constipation. No melena or hematochezia.  GENITOURINARY: No dysuria, frequency or hematuria  NEUROLOGICAL: No numbness or weakness  SKIN: No itching or rash  All other review of systems is negative unless indicated above    VITAL SIGNS:   Vital Signs Last 24 Hrs  T(C): 36.4 (02 Dec 2019 09:35), Max: 36.7 (01 Dec 2019 13:22)  T(F): 97.5 (02 Dec 2019 09:35), Max: 98 (01 Dec 2019 13:22)  HR: 65 (02 Dec 2019 09:35) (57 - 74)  BP: 141/57 (02 Dec 2019 09:35) (92/54 - 144/56)  BP(mean): --  RR: 18 (02 Dec 2019 09:35) (17 - 18)  SpO2: 98% (02 Dec 2019 09:35) (96% - 100%)    I&O's Summary    01 Dec 2019 07:01  -  02 Dec 2019 07:00  --------------------------------------------------------  IN: 120 mL / OUT: 0 mL / NET: 120 mL        On Exam:    Constitutional: NAD, alert and oriented x 3, pale  Lungs:  Non-labored, decreased bs at bases, No wheezing, rales or rhonchi  Cardiovascular: RRR.  S1 and S2 positive.  No murmurs, rubs, gallops or clicks  Gastrointestinal: Bowel Sounds present, soft, nontender.   Lymph: No peripheral edema. No cervical lymphadenopathy.  Neurological: Alert, no focal deficits  Skin: No rashes or ulcers   Psych:  Mood & affect appropriate.    LABS: All Labs Reviewed:                        6.5    4.56  )-----------( 124      ( 02 Dec 2019 06:33 )             19.6                         7.1    5.93  )-----------( 136      ( 01 Dec 2019 15:52 )             20.7                         7.0    6.71  )-----------( 155      ( 01 Dec 2019 11:37 )             20.5     02 Dec 2019 06:35    139    |  97     |  28     ----------------------------<  114    3.5     |  28     |  0.73   01 Dec 2019 11:26    137    |  97     |  24     ----------------------------<  166    4.2     |  24     |  0.91     Ca    8.7        02 Dec 2019 06:35  Ca    8.8        01 Dec 2019 11:26    TPro  5.9    /  Alb  3.3    /  TBili  0.3    /  DBili  x      /  AST  26     /  ALT  12     /  AlkPhos  87     01 Dec 2019 11:26    PT/INR - ( 01 Dec 2019 11:37 )   PT: 12.0 sec;   INR: 1.05 ratio         PTT - ( 01 Dec 2019 11:37 )  PTT:29.1 sec      Blood Culture:         RADIOLOGY:    EKG: sr, rbbb, nsst abn, inf infarct

## 2019-12-02 NOTE — DIETITIAN INITIAL EVALUATION ADULT. - OTHER INFO
Currently patient has J-tube in place. However, no tube feeding. Pt tolerating PO  receiving Clear liquids only Currently patient has J-tube in place. However, no tube feeding. Pt tolerating PO  receiving Clear liquids only          Chart reviewed: Admission history  " Pt is a 89yo female with hx of achalasia s/p dilation treatment x 2, hiatal hernia s/p repair with mesh, multiple abd surgeries with recurrent SBO, recent partial esophagogastrectomy for a mesh eroding into esophagus s/p J-tube placement, presented with witnessed mechanical fall last night, GI consulted for hematemesis." Currently patient has J-tube in place. However, no tube feeding. Pt tolerating PO  receiving Clear liquids only          Chart reviewed: Admission history  " Pt is a 89yo female with hx of achalasia s/p dilation treatment x 2, hiatal hernia s/p repair with mesh, multiple abd surgeries with recurrent SBO, recent partial esophagogastrectomy for a mesh eroding into esophagus s/p J-tube placement, presented with witnessed mechanical fall last night, GI consulted for hematemesis. Concern noted for upper Gi bbled with coffee ground emesis. Noted the daughter refusesEGD" Currently patient has J-tube in place. However, no tube feeding. Pt tolerating PO  receiving  liquids only  Patient describes her PEG as "not used much". Patient's daughter lives with her and oversees her meals. Patient has h/o achalasia. Patient appears malnourished NPFE performed with severe muscle wasting and  , fat depletion (see physical appearance below).       Chart reviewed: Admission history  " Pt is a 89yo female with hx of achalasia s/p dilation treatment x 2, hiatal hernia s/p repair with mesh, multiple abd surgeries with recurrent SBO, recent partial esophagogastrectomy for a mesh eroding into esophagus s/p J-tube placement, presented with witnessed mechanical fall last night, GI consulted for hematemesis. Concern noted for upper GI bleed with coffee ground emesis. Noted the daughter refuses EGD"

## 2019-12-02 NOTE — DISCHARGE NOTE PROVIDER - CARE PROVIDER_API CALL
Donny Jefferson)  Orthopaedic Surgery  [address] [place] [address], [address]  [place] [address]  Phone: [phone]  Fax: [phone]  Follow Up Time: 1 week Donny Jefferson)  Orthopaedic Surgery  [address] [facility], [address]  [place] [address]  Phone: [phone]  Fax: [phone]  Follow Up Time: 1 week    Adria Staton)  Internal Medicine  [address] [address] [place] [address]  Phone: [phone]  Fax: [phone]  Follow Up Time: 1 week    Asif Jang)  Surgery; Thoracic Surgery  [address] [place] [address], [place] [address]  Phone: [phone]  Fax: [phone]  Follow Up Time: 1 week Donny Jefferson)  Orthopaedic Surgery  [address] [facility], [address]  [place] [address]  Phone: [phone]  Fax: [phone]  Follow Up Time: 1 week    Adria Staton)  Internal Medicine  [address] [address] [place] [address]  Phone: [phone]  Fax: [phone]  Follow Up Time: 1 week    Asif Jang)  Surgery; Thoracic Surgery  [address] [place] [address], [place] [address]  Phone: [phone]  Fax: [phone]  Scheduled Appointment: 12/19/2019

## 2019-12-02 NOTE — DISCHARGE NOTE PROVIDER - HOSPITAL COURSE
89yo female with hx of achalasia s/p dilation treatment x 2, hiatal hernia s/p repair with mesh, multiple abd surgeries with recurrent SBO, recent partial esophagogastrectomy mesh eroding esophagus, s/p J-tube placement, presented to the ED due to witnessed mechanical fall ;    L Sup/Inf Pubic Ramus Fractures        Pain control 89yo female with hx of achalasia s/p dilation treatment x 2, hiatal hernia s/p repair with mesh, multiple abd surgeries with recurrent SBO, recent partial esophagogastrectomy mesh eroding esophagus, s/p J-tube placement, presented to the ED due to witnessed mechanical fall ;    L Sup/Inf Pubic Ramus Fractures; seen by ortho-no surgical intervention; weight bearing as tolerated; seen by PT and advised rehab; pt family want to take pt home with home care/home assistance;    pt had an episode of hematemesis and resolved; had total 3 units of PRBC for 89yo female with hx of achalasia s/p dilation treatment x 2, hiatal hernia s/p repair with mesh, multiple abd surgeries with recurrent SBO, recent partial esophagogastrectomy mesh eroding esophagus, s/p J-tube placement, presented to the ED due to witnessed mechanical fall ;    L Sup/Inf Pubic Ramus Fractures; seen by ortho-no surgical intervention; weight bearing as tolerated; seen by PT and advised rehab; pt family want to take pt home with home care/home assistance;    pt had an episode of hematemesis and resolved; had total 3 units of PRBC for anemia; seen by GI and Patient's family is refusing EGD at this time. Pt will only consider EGD it if her cardiothoracic surgeon performs the procedure; per pt family they will follow up as outpatient 89yo female with hx of achalasia s/p dilation treatment x 2, hiatal hernia s/p repair with mesh, multiple abd surgeries with recurrent SBO, recent partial esophagogastrectomy mesh eroding esophagus, s/p J-tube placement, presented to the ED due to witnessed mechanical fall ;    L Sup/Inf Pubic Ramus Fractures; seen by ortho-no surgical intervention; weight bearing as tolerated; seen by PT and advised rehab; pt family want to take pt home with home care/home assistance;    pt had an episode of hematemesis and resolved; had total 3 units of PRBC for anemia; on discharge hb 9.8; seen by GI and Patient's family is refusing EGD at this time. Pt will only consider EGD it if her cardiothoracic surgeon performs the procedure; per pt family they will follow up as outpatient;    pt is cleared by MD for discharge home with home care/home assistance;

## 2019-12-02 NOTE — PROGRESS NOTE ADULT - ASSESSMENT
89yo female with hx of achalasia s/p dilation treatment x 2, hiatal hernia s/p repair with mesh, multiple abd surgeries with recurrent SBO, recent partial esophagogastrectomy mesh eroding esophagus, s/p J-tube placement,

## 2019-12-02 NOTE — DISCHARGE NOTE PROVIDER - PROVIDER TOKENS
PROVIDER:[TOKEN:[[id number]],FOLLOWUP:[1 week]] PROVIDER:[TOKEN:[[id number]],FOLLOWUP:[1 week]],PROVIDER:[[id number]],FOLLOWUP:[1 week]],PROVIDER:[TOKEN:[[id number]],FOLLOWUP:[1 week]] PROVIDER:[TOKEN:[[id number]],FOLLOWUP:[1 week]],PROVIDER:[[id number]],FOLLOWUP:[1 week]],PROVIDER:[TOKEN:[[id number]],SCHEDULEDAPPT:[12/19/2019]]

## 2019-12-02 NOTE — DIETITIAN INITIAL EVALUATION ADULT. - MALNUTRITION
chronic severe malnutrition Severe Chronic Malnutrition severe malnutrition in context of chronic illness

## 2019-12-02 NOTE — CONSULT NOTE ADULT - ASSESSMENT
Impression:  # Coffee ground emesis: Hx of rect surgeries; DDx PUD, gastropathy, bleed from surgical site, AVM  # Fall  # Achalasia     Recommendation:  - start pantoprazole 40mg IV BID  - trend CBC, CMP, INR  - 2 large bore IVs; active type and screen  - transfuse Hgb > 7, Platelets > 50  - plan for upper endoscopy tmro morning once optimized with blood  - please keep CLD and NPO after midnight  - supportive care as per primary team    Jeevan Zamudio  [phone]  Please call/page on call fellow on weekends and weekdays after 5pm Impression:  # Coffee ground emesis: Hx of rect surgeries; DDx PUD, gastropathy, bleed from surgical site, AVM  # Fall  # Achalasia     Recommendation:  - start pantoprazole IV confusions infusion  - trend CBC, CMP, INR  - 2 large bore IVs; active type and screen  - transfuse Hgb > 7, Platelets > 50  - Offered patient EGD for further evaluation and treatment of potential GI bleeding. The patient defers decision to her daughter. Discussed with patient's daughter over the phone - she is not amendable to EGD given her previous surgeries. She will only consider it if her cardiothoracic surgeon performs the procedure  - Would keep patient on clears only for now  - If patient's family changes their mind, keep NPO after midnight for EGD  - supportive care as per primary team    Jeevan Zamudio  [phone]  Please call/page on call fellow on weekends and weekdays after 5pm

## 2019-12-02 NOTE — CONSULT NOTE ADULT - ATTENDING COMMENTS
Patient seen and examined. Agree with above. Offered EGD, but patient's daughter does not want patient to undergo endoscopic evaluation at this time given her previous complicated history. Would continue PPI, monitor blood counts. Would not give more than clears, pending repeat CBCs and trends.

## 2019-12-02 NOTE — DISCHARGE NOTE PROVIDER - NSDCCPCAREPLAN_GEN_ALL_CORE_FT
PRINCIPAL DISCHARGE DIAGNOSIS  Diagnosis: Fall, initial encounter  Assessment and Plan of Treatment: L Sup/Inf Pubic Ramus Fractures  seen by physical therapy  activity as tolerated  physical therapy        SECONDARY DISCHARGE DIAGNOSES  Diagnosis: ROSSY (acute kidney injury)  Assessment and Plan of Treatment: avoid kidney toxic medication    Diagnosis: Other iron deficiency anemia  Assessment and Plan of Treatment: Other iron deficiency anemia  had hemetemesis-  blood transfusion to keep hb >7 PRINCIPAL DISCHARGE DIAGNOSIS  Diagnosis: Fall, initial encounter  Assessment and Plan of Treatment: L Sup/Inf Pubic Ramus Fractures  seen by ortho and advised weight bearing as tolerated  seen by physical therapy  activity as tolerated  physical therapy  follow up with ortho in 7-10 days        SECONDARY DISCHARGE DIAGNOSES  Diagnosis: ROSSY (acute kidney injury)  Assessment and Plan of Treatment: avoid kidney toxic medication    Diagnosis: Essential hypertension  Assessment and Plan of Treatment: Essential hypertension  continue home medication  monitor blood pressure    Diagnosis: Other iron deficiency anemia  Assessment and Plan of Treatment: Other iron deficiency anemia  hemetemesis-  seen by GI and as per discussion you did not want endoscopy done by GI  continue protonix  aspirin on hold   here and you will follow up with DR. Jang  blood transfusion to keep hb >7    Diagnosis: Paroxysmal atrial fibrillation  Assessment and Plan of Treatment: Paroxysmal atrial fibrillation  continue with metoprolol    Diagnosis: Postoperative hypothyroidism  Assessment and Plan of Treatment: Postoperative hypothyroidism  continue thyroid medication PRINCIPAL DISCHARGE DIAGNOSIS  Diagnosis: Fall, initial encounter  Assessment and Plan of Treatment: L Sup/Inf Pubic Ramus Fractures  seen by ortho and advised weight bearing as tolerated  seen by physical therapy  fall precaution  activity as tolerated  physical therapy  follow up with ortho in 7-10 days        SECONDARY DISCHARGE DIAGNOSES  Diagnosis: ROSSY (acute kidney injury)  Assessment and Plan of Treatment: avoid kidney toxic medication  stable  avoid kidney toxic medication like NSAIDS    Diagnosis: Essential hypertension  Assessment and Plan of Treatment: Essential hypertension  continue home medication  monitor blood pressure    Diagnosis: Other iron deficiency anemia  Assessment and Plan of Treatment: Other iron deficiency anemia  hemetemesis-  seen by GI and as per discussion you did not want endoscopy done by GI  continue protonix  aspirin on hold  hemoglobin on discharge 9.8  monitor for any bleeding   you will follow up with DR. Jang as outpatient; appointment on 12/19/19  blood transfusion to keep hb >7    Diagnosis: Paroxysmal atrial fibrillation  Assessment and Plan of Treatment: Paroxysmal atrial fibrillation  continue with metoprolol    Diagnosis: Postoperative hypothyroidism  Assessment and Plan of Treatment: Postoperative hypothyroidism  continue thyroid medication

## 2019-12-02 NOTE — DIETITIAN INITIAL EVALUATION ADULT. - REASON INDICATOR FOR ASSESSMENT
Nutrition consult poor PO 5 days PTA Nutrition consult poor PO 5 days PTA; admitted s/p fall Nutrition consult poor PO 5 days PTA; admitted s/p fall. patient has h/o PEG placement which she doesn't use as stated "unless she doesn't eat enough."

## 2019-12-02 NOTE — CONSULT NOTE ADULT - SUBJECTIVE AND OBJECTIVE BOX
Chief Complaint:  Patient is a 89y old  Female who presents with a chief complaint of Fall (02 Dec 2019 10:37)      HPI: Pt is a 89yo female with hx of achalasia s/p dilation treatment x 2, hiatal hernia s/p repair with mesh, multiple abd surgeries with recurrent SBO, recent partial esophagogastrectomy with a mesh eroding into esophagus s/p J-tube placement, presented with witnessed mechanical fall last night, GI consulted for hematemesis. Pt had a fall prior which was witnessed. In hospital pt reportedly had episode of dark emesis concerning for blood. She states she had been eating regular food at home including turkey and shrimp but it was cut small enough for her as a precautionary measure. She denies nausea, abdominal pain. She states she had BM that was dark/black appearing prior. Pt denies cp, palpitations, sob, fever or chills.      Allergies:  opioid-like analgesics (Other)      Home Medications:    Hospital Medications:  acetaminophen   Tablet .. 650 milliGRAM(s) Oral every 6 hours PRN  amLODIPine   Tablet 5 milliGRAM(s) Oral daily  bisacodyl 5 milliGRAM(s) Oral at bedtime PRN  furosemide   Injectable 40 milliGRAM(s) IV Push once  levothyroxine 137 MICROGram(s) Oral daily  metoprolol succinate ER 12.5 milliGRAM(s) Oral daily  pantoprazole Infusion 8 mG/Hr IV Continuous <Continuous>      PMHX/PSHX:  Atrial fibrillation  Achalasia  Hypertension  Hypothyroidism  History of repair of hiatal hernia  H/O colectomy  S/P appendectomy  S/P hysterectomy      Family history:  FH: heart disease      Social History:     ROS:     General:  No wt loss, fevers, chills, night sweats, fatigue,   Eyes:  Good vision, no reported pain  ENT:  No sore throat, pain, runny nose, dysphagia  CV:  No pain, palpitations, hypo/hypertension  Resp:  No dyspnea, cough, tachypnea, wheezing  GI:  See HPI  :  No pain, bleeding, incontinence, nocturia  Muscle:  No pain, weakness  Neuro:  No weakness, tingling, memory problems  Psych:  No fatigue, insomnia, mood problems, depression  Endocrine:  No polyuria, polydipsia, cold/heat intolerance  Heme:  No petechiae, ecchymosis, easy bruisability  Skin:  No rash, edema      PHYSICAL EXAM:     Vital Signs:  Vital Signs Last 24 Hrs  T(C): 36.4 (02 Dec 2019 09:35), Max: 36.7 (01 Dec 2019 13:22)  T(F): 97.5 (02 Dec 2019 09:35), Max: 98 (01 Dec 2019 13:22)  HR: 65 (02 Dec 2019 09:35) (57 - 66)  BP: 141/57 (02 Dec 2019 09:35) (92/54 - 144/56)  BP(mean): --  RR: 18 (02 Dec 2019 09:35) (17 - 18)  SpO2: 98% (02 Dec 2019 09:35) (96% - 100%)  Daily Height in cm: 167.64 (01 Dec 2019 15:14)    Daily Weight in k.4 (01 Dec 2019 15:14)    GENERAL:  elderly, frail appearing  HEENT:  NC/AT,  conjunctivae clear and pink  CHEST:  Full & symmetric excursion, no increased effort, breath sounds clear  HEART:  Regular rhythm, S1, S2, no murmur/rub/S3/S4, no abdominal bruit, no edema, no JVD  ABDOMEN:  Soft, non-tender, mildly distended, J tube noted in place, normoactive bowel sounds,  no masses , no hepatosplenomegaly  EXTREMITIES:  no cyanosis, clubbing or edema  SKIN:  ecchymoses on ext  NEURO:  Alert, oriented    LABS:                        6.5    4.56  )-----------( 124      ( 02 Dec 2019 06:33 )             19.6     12-    139  |  97  |  28<H>  ----------------------------<  114<H>  3.5   |  28  |  0.73    Ca    8.7      02 Dec 2019 06:35    TPro  5.9<L>  /  Alb  3.3  /  TBili  0.3  /  DBili  x   /  AST  26  /  ALT  12  /  AlkPhos  87  12    LIVER FUNCTIONS - ( 01 Dec 2019 11:26 )  Alb: 3.3 g/dL / Pro: 5.9 g/dL / ALK PHOS: 87 U/L / ALT: 12 U/L / AST: 26 U/L / GGT: x           PT/INR - ( 01 Dec 2019 11:37 )   PT: 12.0 sec;   INR: 1.05 ratio         PTT - ( 01 Dec 2019 11:37 )  PTT:29.1 sec        Imaging: Chief Complaint:  Patient is a 89y old  Female who presents with a chief complaint of Fall (02 Dec 2019 10:37)      HPI: Pt is a 89yo female with hx of achalasia s/p dilation treatment x 2, hiatal hernia s/p repair with mesh, multiple abd surgeries with recurrent SBO, recent partial esophagogastrectomy for a mesh eroding into esophagus s/p J-tube placement, presented with witnessed mechanical fall last night, GI consulted for hematemesis. Pt had a fall prior which was witnessed. In hospital pt reportedly had episode of dark emesis concerning for blood. She states she had been eating regular food at home including turkey and shrimp but it was cut small enough for her as a precautionary measure. She denies nausea, abdominal pain. She states she had BM that was dark/black appearing prior. Pt denies cp, palpitations, sob, fever or chills.       Allergies:  opioid-like analgesics (Other)      Home Medications:    Hospital Medications:  acetaminophen   Tablet .. 650 milliGRAM(s) Oral every 6 hours PRN  amLODIPine   Tablet 5 milliGRAM(s) Oral daily  bisacodyl 5 milliGRAM(s) Oral at bedtime PRN  furosemide   Injectable 40 milliGRAM(s) IV Push once  levothyroxine 137 MICROGram(s) Oral daily  metoprolol succinate ER 12.5 milliGRAM(s) Oral daily  pantoprazole Infusion 8 mG/Hr IV Continuous <Continuous>      PMHX/PSHX:  Atrial fibrillation  Achalasia  Hypertension  Hypothyroidism  History of repair of hiatal hernia  H/O colectomy  S/P appendectomy  S/P hysterectomy      Family history:  FH: heart disease      Social History: No illicit drugs or ETOH    ROS:     General:  No wt loss, fevers, chills, night sweats, fatigue,   Eyes:  Good vision, no reported pain  ENT:  No sore throat, pain, runny nose, dysphagia  CV:  No pain, palpitations, hypo/hypertension  Resp:  No dyspnea, cough, tachypnea, wheezing  GI:  See HPI  :  No pain, bleeding, incontinence, nocturia  Muscle:  No pain, weakness  Neuro:  No weakness, tingling, memory problems  Psych:  No fatigue, insomnia, mood problems, depression  Endocrine:  No polyuria, polydipsia, cold/heat intolerance  Heme:  No petechiae, ecchymosis, easy bruisability  Skin:  No rash, edema      PHYSICAL EXAM:     Vital Signs:  Vital Signs Last 24 Hrs  T(C): 36.4 (02 Dec 2019 09:35), Max: 36.7 (01 Dec 2019 13:22)  T(F): 97.5 (02 Dec 2019 09:35), Max: 98 (01 Dec 2019 13:22)  HR: 65 (02 Dec 2019 09:35) (57 - 66)  BP: 141/57 (02 Dec 2019 09:35) (92/54 - 144/56)  BP(mean): --  RR: 18 (02 Dec 2019 09:35) (17 - 18)  SpO2: 98% (02 Dec 2019 09:35) (96% - 100%)  Daily Height in cm: 167.64 (01 Dec 2019 15:14)    Daily Weight in k.4 (01 Dec 2019 15:14)    GENERAL:  elderly, frail appearing  HEENT:  NC/AT,  conjunctivae clear and pink  CHEST:  Full & symmetric excursion, no increased effort, breath sounds clear  HEART:  Regular rhythm, S1, S2  ABDOMEN:  Soft, non-tender, mildly distended, J tube noted in place, normoactive bowel sounds,  no masses , no hepatosplenomegaly  EXTREMITIES:  no cyanosis, clubbing or edema  SKIN:  ecchymoses on ext  NEURO:  Alert, oriented x 2    LABS:                        6.5    4.56  )-----------( 124      ( 02 Dec 2019 06:33 )             19.6     12-    139  |  97  |  28<H>  ----------------------------<  114<H>  3.5   |  28  |  0.73    Ca    8.7      02 Dec 2019 06:35    TPro  5.9<L>  /  Alb  3.3  /  TBili  0.3  /  DBili  x   /  AST  26  /  ALT  12  /  AlkPhos  87  12-    LIVER FUNCTIONS - ( 01 Dec 2019 11:26 )  Alb: 3.3 g/dL / Pro: 5.9 g/dL / ALK PHOS: 87 U/L / ALT: 12 U/L / AST: 26 U/L / GGT: x           PT/INR - ( 01 Dec 2019 11:37 )   PT: 12.0 sec;   INR: 1.05 ratio         PTT - ( 01 Dec 2019 11:37 )  PTT:29.1 sec

## 2019-12-02 NOTE — DIETITIAN INITIAL EVALUATION ADULT. - NS FNS WEIGHT CHANGE REASON
more recent weigh gain 10 lbs since last admission 10/15/2019/unintentional unintentional/more recent weight gain 10 lbs since last admission 10/15/2019

## 2019-12-02 NOTE — DISCHARGE NOTE PROVIDER - NSDCFUADDINST_GEN_ALL_CORE_FT
activity as tolerated; fall precaution; home assistance/home care activity as tolerated; fall precaution; home assistance/home care  flush J tube with 30 ml of water daily

## 2019-12-02 NOTE — CHART NOTE - NSCHARTNOTEFT_GEN_A_CORE
follow up- s/p coffee ground emesis; no emesis now; per d/w attending MD, 2 units PRBC for hb 6.5 and lasix 40mg ivp in-between PRBC; blood transfusion consent obtained;  pt is npo; ov IV protonix; spoke to [facility] for consult.d/w pt plan of [facility].  [place](NP)  [facility], [phone]

## 2019-12-02 NOTE — CONSULT NOTE ADULT - ASSESSMENT
Ms. Bess is a 89 year old female with achalasia s/p dilation treatment x 2, hiatal hernia s/p repair with mesh, multiple abd surgeries with recurrent SBO, recent partial esophagogastrectomy for mesh eroding esophagus with feeding jejunostomy on 10/22/2019, presented to the ED due to witnessed mechanical fall.  She has been found to be significantly anemic, though no chest pain or difficulty breathing. She also has an acute pelvic fracture from her fall.  Cardiac wise, she has a history of HTN and PAF.    - no sign of acute ischemia.  - her ekg continues to demonstrate a sr with rbbb and non-specific t wave abn    - no significant volume overload on exam, though with decreased breath sounds at lung bases  - echo from 10/2019 with mild mr, mild ai, mild dd and mild segmental lv dysfunction (inferior and inferolateral hypokinesis)  - would transfuse to keep Hb >8  - IV Lasix in between transfusions  - watch volume status closely    - PAF, though in SR  - no a/c in the setting of prior falls  - hold asa    - BP satisfactory  - continue with metoprolol with hold parameters  - can hold amlodipine    - Watch creatinine and electrolytes. Keep K>4, Mg>2  - GI evaluation. No cardiac contraindication to proceeding with EGD if needed.  - Will follow with you

## 2019-12-02 NOTE — CHART NOTE - NSCHARTNOTEFT_GEN_A_CORE
D/w patient daughter at length per patient request. Discussed need for endoscopy given anemia and concern for upper GI bleeding with coffee ground emesis. Daughter refusing EGD at this time by our GI team given her complex hx and recent surgery. She prefers Dr. Asif Jang be consulted, who is a thoracic surgeon from [facility]. Explained this may not likely be done given logistical issues however patient daughter refusing and understands medical situation.    Primary team can reach out to thoracic surgery or Dr. Jang office to further discuss case if needed. At this time, will hold off on GI intervention  Recommend IV ppi and monitor blood counts.    D/w attending

## 2019-12-02 NOTE — DIETITIAN INITIAL EVALUATION ADULT. - PHYSICAL APPEARANCE
underweight/emaciated/Admitted FTT Admitted FTT. NPFE performed with severe muscle wasting temporal , interosseous areas with protruding clavicles, reduced hand  strength, fat depletion  triceps,/underweight/emaciated

## 2019-12-02 NOTE — DIETITIAN INITIAL EVALUATION ADULT. - ENERGY NEEDS
Ht  5'6"   WT  108lbs  BMi= 17.6   Weight history reveals  52 lb weight loss over 5 years as noted, secondary to multiple surgeries GI complications, and swallow difficulty . IBW= 59kg   130 lbs

## 2019-12-02 NOTE — DISCHARGE NOTE PROVIDER - CARE PROVIDERS DIRECT ADDRESSES
,DirectAddress_Unknown ,DirectAddress_Unknown,DirectAddress_Unknown,[email or web address]

## 2019-12-02 NOTE — DIETITIAN INITIAL EVALUATION ADULT. - NS FNS REASON FOR WEIGHT CHANG
altered GI function (specify)/decreased po intake altered GI function (specify)/decreased po intake/h/o weight loss 52lbs

## 2019-12-03 LAB
ANION GAP SERPL CALC-SCNC: 13 MMOL/L — SIGNIFICANT CHANGE UP (ref 5–17)
BUN SERPL-MCNC: 21 MG/DL — SIGNIFICANT CHANGE UP (ref 7–23)
CALCIUM SERPL-MCNC: 8.5 MG/DL — SIGNIFICANT CHANGE UP (ref 8.4–10.5)
CHLORIDE SERPL-SCNC: 97 MMOL/L — SIGNIFICANT CHANGE UP (ref 96–108)
CO2 SERPL-SCNC: 27 MMOL/L — SIGNIFICANT CHANGE UP (ref 22–31)
CREAT SERPL-MCNC: 0.49 MG/DL — LOW (ref 0.5–1.3)
GLUCOSE SERPL-MCNC: 97 MG/DL — SIGNIFICANT CHANGE UP (ref 70–99)
HCT VFR BLD CALC: 25.3 % — LOW (ref 34.5–45)
HCT VFR BLD CALC: 27.4 % — LOW (ref 34.5–45)
HGB BLD-MCNC: 8.6 G/DL — LOW (ref 11.5–15.5)
HGB BLD-MCNC: 9.2 G/DL — LOW (ref 11.5–15.5)
MCHC RBC-ENTMCNC: 31.2 PG — SIGNIFICANT CHANGE UP (ref 27–34)
MCHC RBC-ENTMCNC: 31.5 PG — SIGNIFICANT CHANGE UP (ref 27–34)
MCHC RBC-ENTMCNC: 33.6 GM/DL — SIGNIFICANT CHANGE UP (ref 32–36)
MCHC RBC-ENTMCNC: 34 GM/DL — SIGNIFICANT CHANGE UP (ref 32–36)
MCV RBC AUTO: 92.7 FL — SIGNIFICANT CHANGE UP (ref 80–100)
MCV RBC AUTO: 92.9 FL — SIGNIFICANT CHANGE UP (ref 80–100)
PLATELET # BLD AUTO: 133 K/UL — LOW (ref 150–400)
PLATELET # BLD AUTO: 136 K/UL — LOW (ref 150–400)
POTASSIUM SERPL-MCNC: 3.1 MMOL/L — LOW (ref 3.5–5.3)
POTASSIUM SERPL-SCNC: 3.1 MMOL/L — LOW (ref 3.5–5.3)
RBC # BLD: 2.73 M/UL — LOW (ref 3.8–5.2)
RBC # BLD: 2.95 M/UL — LOW (ref 3.8–5.2)
RBC # FLD: 16.6 % — HIGH (ref 10.3–14.5)
RBC # FLD: 16.9 % — HIGH (ref 10.3–14.5)
SODIUM SERPL-SCNC: 137 MMOL/L — SIGNIFICANT CHANGE UP (ref 135–145)
WBC # BLD: 6.97 K/UL — SIGNIFICANT CHANGE UP (ref 3.8–10.5)
WBC # BLD: 7.14 K/UL — SIGNIFICANT CHANGE UP (ref 3.8–10.5)
WBC # FLD AUTO: 6.97 K/UL — SIGNIFICANT CHANGE UP (ref 3.8–10.5)
WBC # FLD AUTO: 7.14 K/UL — SIGNIFICANT CHANGE UP (ref 3.8–10.5)

## 2019-12-03 PROCEDURE — 99232 SBSQ HOSP IP/OBS MODERATE 35: CPT | Mod: GC

## 2019-12-03 PROCEDURE — 99232 SBSQ HOSP IP/OBS MODERATE 35: CPT

## 2019-12-03 RX ORDER — ACETAMINOPHEN 500 MG
650 TABLET ORAL EVERY 8 HOURS
Refills: 0 | Status: DISCONTINUED | OUTPATIENT
Start: 2019-12-03 | End: 2019-12-05

## 2019-12-03 RX ORDER — POTASSIUM CHLORIDE 20 MEQ
20 PACKET (EA) ORAL
Refills: 0 | Status: COMPLETED | OUTPATIENT
Start: 2019-12-03 | End: 2019-12-03

## 2019-12-03 RX ADMIN — Medication 650 MILLIGRAM(S): at 22:36

## 2019-12-03 RX ADMIN — AMLODIPINE BESYLATE 5 MILLIGRAM(S): 2.5 TABLET ORAL at 05:35

## 2019-12-03 RX ADMIN — Medication 12.5 MILLIGRAM(S): at 05:35

## 2019-12-03 RX ADMIN — Medication 137 MICROGRAM(S): at 05:35

## 2019-12-03 RX ADMIN — PANTOPRAZOLE SODIUM 40 MILLIGRAM(S): 20 TABLET, DELAYED RELEASE ORAL at 18:46

## 2019-12-03 RX ADMIN — Medication 20 MILLIEQUIVALENT(S): at 18:47

## 2019-12-03 RX ADMIN — Medication 20 MILLIEQUIVALENT(S): at 11:18

## 2019-12-03 RX ADMIN — Medication 20 MILLIEQUIVALENT(S): at 15:01

## 2019-12-03 RX ADMIN — PANTOPRAZOLE SODIUM 40 MILLIGRAM(S): 20 TABLET, DELAYED RELEASE ORAL at 05:35

## 2019-12-03 NOTE — PROGRESS NOTE ADULT - ASSESSMENT
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  She has been found to be significantly anemic, though no chest pain or difficulty breathing. She also has an acute pelvic fracture from her fall.  Cardiac wise, she has a history of HTN and PAF.    - no sign of acute ischemia.  - her ekg continues to demonstrate a sr with rbbb and non-specific t wave abn    - no significant volume overload on exam   - echo from 10/2019 with mild mr, mild ai, mild dd and mild segmental lv dysfunction (inferior and inferolateral hypokinesis)  - would transfuse to keep Hb >8, now 9.2  - IV Lasix in between transfusions  - watch volume status closely    - PAF, though in SR  - no a/c in the setting of prior falls  - hold asa    - BP satisfactory  - continue with metoprolol with hold parameters  - can hold amlodipine    - Watch creatinine and electrolytes. Keep K>4, Mg>2  - GI evaluation noted, family refusing egd at this time. No cardiac contraindication to proceeding with EGD if things change in this regard.  - Will follow with you

## 2019-12-03 NOTE — PROGRESS NOTE ADULT - SUBJECTIVE AND OBJECTIVE BOX
[facility]    Britni Kennedy, Faizan, Alan, Lizandro, Steven, Khadijah      [phone]    CHIEF COMPLAINT: Patient is a 89y old  Female who presents with a chief complaint of Fall (02 Dec 2019 14:55)      Follow Up: paf, mild lv dysfxn    Interim history: The patient reports no new symptoms.  Denies chest discomfort and shortness of breath.  No abdominal pain.  No new neurologic symptoms.      MEDICATIONS  (STANDING):  amLODIPine   Tablet 5 milliGRAM(s) Oral daily  levothyroxine 137 MICROGram(s) Oral daily  metoprolol succinate ER 12.5 milliGRAM(s) Oral daily  pantoprazole  Injectable 40 milliGRAM(s) IV Push two times a day  potassium chloride   Solution 20 milliEquivalent(s) Oral every 2 hours    MEDICATIONS  (PRN):  acetaminophen   Tablet .. 650 milliGRAM(s) Oral every 6 hours PRN Moderate Pain (4 - 6)  bisacodyl 5 milliGRAM(s) Oral at bedtime PRN Constipation      REVIEW OF SYSTEMS:  eye, ent, GI, , allergic, dermatologic, musculoskeletal and neurologic are negative except as described above    Vital Signs Last 24 Hrs  T(C): 36.4 (03 Dec 2019 09:46), Max: 37.2 (03 Dec 2019 00:31)  T(F): 97.5 (03 Dec 2019 09:46), Max: 98.9 (03 Dec 2019 00:31)  HR: 75 (03 Dec 2019 09:46) (65 - 87)  BP: 156/73 (03 Dec 2019 09:46) (131/68 - 156/73)  BP(mean): --  RR: 17 (03 Dec 2019 09:46) (17 - 18)  SpO2: 95% (03 Dec 2019 09:46) (94% - 99%)    I&O's Summary    02 Dec 2019 07:01  -  03 Dec 2019 07:00  --------------------------------------------------------  IN: 1530 mL / OUT: 1200 mL / NET: 330 mL        Telemetry past 24h:    PHYSICAL EXAM:    Constitutional: well-nourished, well-developed, NAD   HEENT:  MMM, sclerae anicteric, conjunctivae clear, no oral cyanosis.  Pulmonary: Non-labored, breath sounds are clear bilaterally, No wheezing, rales or rhonchi  Cardiovascular: Regular, S1 and S2.  No murmur.  No rubs, gallops or clicks  Gastrointestinal: Bowel Sounds present, soft, nontender.   Lymph: No peripheral edema.   Neurological: Alert, no focal deficits  Skin: No rashes.  Psych:  Mood & affect appropriate    LABS: All Labs Reviewed:                        9.2    7.14  )-----------( 136      ( 03 Dec 2019 08:53 )             27.4                         8.8    5.73  )-----------( 132      ( 02 Dec 2019 23:08 )             26.1                         6.5    4.56  )-----------( 124      ( 02 Dec 2019 06:33 )             19.6     03 Dec 2019 06:24    137    |  97     |  21     ----------------------------<  97     3.1     |  27     |  0.49   02 Dec 2019 06:35    139    |  97     |  28     ----------------------------<  114    3.5     |  28     |  0.73   01 Dec 2019 11:26    137    |  97     |  24     ----------------------------<  166    4.2     |  24     |  0.91     Ca    8.5        03 Dec 2019 06:24  Ca    8.7        02 Dec 2019 06:35  Ca    8.8        01 Dec 2019 11:26    TPro  5.9    /  Alb  3.3    /  TBili  0.3    /  DBili  x      /  AST  26     /  ALT  12     /  AlkPhos  87     01 Dec 2019 11:26    PT/INR - ( 01 Dec 2019 11:37 )   PT: 12.0 sec;   INR: 1.05 ratio         PTT - ( 01 Dec 2019 11:37 )  PTT:29.1 sec      Blood Culture:         RADIOLOGY:    EKG:    Echo:

## 2019-12-03 NOTE — PROGRESS NOTE ADULT - ATTENDING COMMENTS
Agree with above. As H/H is stable and there is no additional signs of bleeding, reasonable to advance diet as patient/family is refusing EGD at this time. Monitor CBC. Agree with above. As H/H is stable and there is no additional signs of bleeding, reasonable to advance diet as patient/family is refusing EGD at this time. Monitor CBC, continue PPI.

## 2019-12-03 NOTE — CHART NOTE - NSCHARTNOTEFT_GEN_A_CORE
Upon Nutritional Assessment by the Registered Dietitian your patient was determined to meet criteria / has evidence of the following diagnosis/diagnoses:          [ ]  Mild Protein Calorie Malnutrition        [ ]  Moderate Protein Calorie Malnutrition        [X ] Severe Protein Calorie Malnutrition        [ ] Unspecified Protein Calorie Malnutrition        [ X] Underweight / BMI <19        [ ] Morbid Obesity / BMI > 40      Findings as based on:  [X] Comprehensive nutrition assessment   [X ] Nutrition Focused Physical Exam severe muscle wasting and fat depletion      Nutrition Plan/Recommendations:  add ensure 2x daily, assist with tray preparation; monitor/encourage PO intake.         PROVIDER Section:     By signing this assessment you are acknowledging and agree with the diagnosis/diagnoses assigned by the Registered Dietitian    Comments:

## 2019-12-03 NOTE — PROGRESS NOTE ADULT - SUBJECTIVE AND OBJECTIVE BOX
Chief Complaint:  Patient is a 89y old  Female who presents with a chief complaint of Fall (03 Dec 2019 10:21)      Interval Events: Pt without further nausea or vomiting. She states she is hungry. Had brown BM reportedly.      Allergies:  opioid-like analgesics (Other)      Hospital Medications:  acetaminophen   Tablet .. 650 milliGRAM(s) Oral every 6 hours PRN  amLODIPine   Tablet 5 milliGRAM(s) Oral daily  bisacodyl 5 milliGRAM(s) Oral at bedtime PRN  levothyroxine 137 MICROGram(s) Oral daily  metoprolol succinate ER 12.5 milliGRAM(s) Oral daily  pantoprazole  Injectable 40 milliGRAM(s) IV Push two times a day  potassium chloride   Solution 20 milliEquivalent(s) Oral every 2 hours      PMHX/PSHX:  Atrial fibrillation  Achalasia  Hypertension  Hypothyroidism  History of repair of hiatal hernia  H/O colectomy  S/P appendectomy  S/P hysterectomy      Family history:  FH: heart disease      ROS:     General:  No wt loss, fevers, chills, night sweats, fatigue,   Eyes:  Good vision, no reported pain  ENT:  No sore throat, pain, runny nose, dysphagia  CV:  No pain, palpitations, hypo/hypertension  Resp:  No dyspnea, cough, tachypnea, wheezing  GI:  See HPI  :  No pain, bleeding, incontinence, nocturia  Muscle:  No pain, weakness  Neuro:  No weakness, tingling, memory problems  Psych:  No fatigue, insomnia, mood problems, depression  Endocrine:  No polyuria, polydipsia, cold/heat intolerance  Heme:  No petechiae, ecchymosis, easy bruisability  Skin:  No rash, edema      PHYSICAL EXAM:     Vital Signs:  Vital Signs Last 24 Hrs  T(C): 36.4 (03 Dec 2019 09:46), Max: 37.2 (03 Dec 2019 00:31)  T(F): 97.5 (03 Dec 2019 09:46), Max: 98.9 (03 Dec 2019 00:31)  HR: 75 (03 Dec 2019 09:46) (65 - 87)  BP: 156/73 (03 Dec 2019 09:46) (131/68 - 156/73)  BP(mean): --  RR: 17 (03 Dec 2019 09:46) (17 - 18)  SpO2: 95% (03 Dec 2019 09:46) (94% - 99%)  Daily     Daily Weight in k.9 (02 Dec 2019 15:43)    GENERAL:  elderly, frail appearing  HEENT:  NC/AT,  conjunctivae clear and pink  CHEST:  Full & symmetric excursion, no increased effort, breath sounds clear  HEART:  Regular rhythm, S1, S2  ABDOMEN:  Soft, non-tender, mildly distended, J tube noted in place, normoactive bowel sounds,  no masses , no hepatosplenomegaly  EXTREMITIES:  no cyanosis, clubbing or edema  SKIN:  ecchymoses on ext  NEURO:  Alert, oriented     LABS:                        9.2    7.14  )-----------( 136      ( 03 Dec 2019 08:53 )             27.4     12-03    137  |  97  |  21  ----------------------------<  97  3.1<L>   |  27  |  0.49<L>    Ca    8.5      03 Dec 2019 06:24                Imaging:

## 2019-12-03 NOTE — PHYSICAL THERAPY INITIAL EVALUATION ADULT - PERTINENT HX OF CURRENT PROBLEM, REHAB EVAL
89yo female with hx of achalasia s/p dilation treatment x 2, hiatal hernia s/p repair with mesh, multiple abd surgeries with recurrent SBO, recent partial esophagogastrectomy mesh eroding esophagus, s/p J-tube placement, presented to the ED due to witnessed mechanical fall last night.

## 2019-12-03 NOTE — PHYSICAL THERAPY INITIAL EVALUATION ADULT - ADDITIONAL COMMENTS
(continuation of H&P) Pt is s/p coffee ground emesis; no emesis now; per d/w attending MD, 2 units PRBC for hb 12/2. GI consulted; pt declined EGD.  Pt resides with her daughter in a private house with 8 steps to enter, + handrail and resides on main floor. Pt amb with RW PTA and had HHA M-F 9am -3pm.

## 2019-12-03 NOTE — PHYSICAL THERAPY INITIAL EVALUATION ADULT - PLANNED THERAPY INTERVENTIONS, PT EVAL
strengthening/bed mobility training/Stairs Goal: Pt will go up/ down 8 steps with + handrail and cga x 1 in 2 weeks./balance training/transfer training/gait training

## 2019-12-03 NOTE — PHYSICAL THERAPY INITIAL EVALUATION ADULT - GAIT DISTANCE, PT EVAL
Benefits, risks, and possible complications of procedure explained to patient/caregiver who verbalized understanding and gave verbal consent. x2/10 feet

## 2019-12-03 NOTE — PROGRESS NOTE ADULT - ASSESSMENT
89yo female with hx of achalasia s/p dilation treatment x 2, hiatal hernia s/p repair with mesh, multiple abd surgeries with recurrent SBO, recent partial esophagogastrectomy mesh eroding esophagus, s/p J-tube placement,

## 2019-12-03 NOTE — PROGRESS NOTE ADULT - ASSESSMENT
Impression:  # Coffee ground emesis: Hx of rect surgeries; DDx PUD, gastropathy, bleed from surgical site, AVM. No overt bleeding now and hgb improving. Received 2u prbs and went from 6-8.8 and now 9.2. Pt and daughter at this time not amenable to endoscopic eval given her complex surgical hx and concern for complications. D/w patient and family that endoscopic eval is recommended given anemia and hematemesis.   # Fall  # Achalasia     Recommendation:  - continue with PPI IV BID for 1 more day then switch to 40mg PO daily to treat empirically for PUD  - trend CBC, CMP, INR  - 2 large bore IVs; active type and screen  - transfuse Hgb > 7, Platelets > 50  - pt will only consider EGD it if her cardiothoracic surgeon performs the procedure; consider consultation with her prior surgeons  - advance diet as tolerated  - If patient's family/patient change their mind, keep NPO after midnight for EGD  - supportive care as per primary team Impression:  # Coffee ground emesis: Hx of rect surgeries; DDx PUD, gastropathy, bleed from surgical site, AVM. No overt bleeding now and hgb improving. Received 2u prbs and went from 6-8.8 and now 9.2. Pt and daughter at this time not amenable to endoscopic eval given her complex surgical hx and concern for complications. D/w patient and family that endoscopic eval is recommended given anemia and hematemesis.   # Fall  # Achalasia     Recommendation:  - continue with PPI IV for 1 more day then switch to 40mg PO daily to treat empirically for PUD  - trend CBC, CMP, INR  - 2 large bore IVs; active type and screen  - transfuse Hgb > 7, Platelets > 50  - Patient's family is refusing EGD at this time. Pt will only consider EGD it if her cardiothoracic surgeon performs the procedure; consider consultation with her prior surgeons  - advance diet as tolerated  - If patient's family/patient change their mind, keep NPO after midnight for EGD  - supportive care as per primary team

## 2019-12-03 NOTE — PHYSICAL THERAPY INITIAL EVALUATION ADULT - TRANSFER SAFETY CONCERNS NOTED: SIT/STAND, REHAB EVAL
decreased weight-shifting ability/decreased safety awareness/losing balance/decreased proprioception/decreased step length/decreased balance during turns

## 2019-12-03 NOTE — PROGRESS NOTE ADULT - SUBJECTIVE AND OBJECTIVE BOX
SUBJECTIVE / OVERNIGHT EVENTS: pt denies chest pain, shortness of breath , nausea,v       MEDICATIONS  (STANDING):  acetaminophen    Suspension .. 650 milliGRAM(s) Oral every 8 hours  amLODIPine   Tablet 5 milliGRAM(s) Oral daily  levothyroxine 137 MICROGram(s) Oral daily  metoprolol succinate ER 12.5 milliGRAM(s) Oral daily  pantoprazole  Injectable 40 milliGRAM(s) IV Push two times a day    MEDICATIONS  (PRN):  bisacodyl 5 milliGRAM(s) Oral at bedtime PRN Constipation    Vital Signs Last 24 Hrs  T(C): 36.3 (03 Dec 2019 19:25), Max: 37.2 (03 Dec 2019 00:31)  T(F): 97.4 (03 Dec 2019 19:25), Max: 98.9 (03 Dec 2019 00:31)  HR: 74 (03 Dec 2019 19:25) (65 - 87)  BP: 120/59 (03 Dec 2019 19:25) (109/74 - 156/73)  BP(mean): --  RR: 18 (03 Dec 2019 19:25) (17 - 18)  SpO2: 99% (03 Dec 2019 19:25) (94% - 99%)    CAPILLARY BLOOD GLUCOSE        I&O's Summary    02 Dec 2019 07:01  -  03 Dec 2019 07:00  --------------------------------------------------------  IN: 1530 mL / OUT: 1200 mL / NET: 330 mL    03 Dec 2019 07:01  -  03 Dec 2019 23:46  --------------------------------------------------------  IN: 250 mL / OUT: 0 mL / NET: 250 mL        Constitutional: No fever, fatigue  Skin: No rash.  Eyes: No recent vision problems or eye pain.  ENT: No congestion, ear pain, or sore throat.  Cardiovascular: No chest pain or palpation.  Respiratory: No cough, shortness of breath, congestion, or wheezing.  Gastrointestinal: No abdominal pain, nausea, vomiting, or diarrhea.  Genitourinary: No dysuria.  Musculoskeletal: No joint swelling.  Neurologic: No headache.    PHYSICAL EXAM:  GENERAL: NAD  EYES: EOMI, PERRLA  NECK: Supple, No JVD  CHEST/LUNG: dec breath sounds rt base  HEART:  S1 , S2 +  ABDOMEN: soft , bs+  EXTREMITIES:  no edema  NEUROLOGY:alert awake confused       LABS:                        8.6    6.97  )-----------( 133      ( 03 Dec 2019 22:32 )             25.3     12-03    137  |  97  |  21  ----------------------------<  97  3.1<L>   |  27  |  0.49<L>    Ca    8.5      03 Dec 2019 06:24                RADIOLOGY & ADDITIONAL TESTS:    Imaging Personally Reviewed:    Consultant(s) Notes Reviewed:      Care Discussed with Consultants/Other Providers:

## 2019-12-03 NOTE — PROGRESS NOTE ADULT - PROBLEM SELECTOR PLAN 2
coffee ground emesis - resolvedx  pts daughter declined further gi w/u   ppi , cont hb / hct closely

## 2019-12-04 ENCOUNTER — TRANSCRIPTION ENCOUNTER (OUTPATIENT)
Age: 84
End: 2019-12-04

## 2019-12-04 LAB
ANION GAP SERPL CALC-SCNC: 9 MMOL/L — SIGNIFICANT CHANGE UP (ref 5–17)
BLD GP AB SCN SERPL QL: NEGATIVE — SIGNIFICANT CHANGE UP
BUN SERPL-MCNC: 19 MG/DL — SIGNIFICANT CHANGE UP (ref 7–23)
CALCIUM SERPL-MCNC: 8.5 MG/DL — SIGNIFICANT CHANGE UP (ref 8.4–10.5)
CHLORIDE SERPL-SCNC: 98 MMOL/L — SIGNIFICANT CHANGE UP (ref 96–108)
CO2 SERPL-SCNC: 30 MMOL/L — SIGNIFICANT CHANGE UP (ref 22–31)
CREAT SERPL-MCNC: 0.49 MG/DL — LOW (ref 0.5–1.3)
GLUCOSE SERPL-MCNC: 117 MG/DL — HIGH (ref 70–99)
HCT VFR BLD CALC: 24.1 % — LOW (ref 34.5–45)
HCT VFR BLD CALC: 28.8 % — LOW (ref 34.5–45)
HGB BLD-MCNC: 8.1 G/DL — LOW (ref 11.5–15.5)
HGB BLD-MCNC: 9.9 G/DL — LOW (ref 11.5–15.5)
MAGNESIUM SERPL-MCNC: 1.8 MG/DL — SIGNIFICANT CHANGE UP (ref 1.6–2.6)
MCHC RBC-ENTMCNC: 30.9 PG — SIGNIFICANT CHANGE UP (ref 27–34)
MCHC RBC-ENTMCNC: 30.9 PG — SIGNIFICANT CHANGE UP (ref 27–34)
MCHC RBC-ENTMCNC: 33.6 GM/DL — SIGNIFICANT CHANGE UP (ref 32–36)
MCHC RBC-ENTMCNC: 34.4 GM/DL — SIGNIFICANT CHANGE UP (ref 32–36)
MCV RBC AUTO: 90 FL — SIGNIFICANT CHANGE UP (ref 80–100)
MCV RBC AUTO: 92 FL — SIGNIFICANT CHANGE UP (ref 80–100)
NRBC # BLD: 0 /100 WBCS — SIGNIFICANT CHANGE UP (ref 0–0)
PLATELET # BLD AUTO: 149 K/UL — LOW (ref 150–400)
PLATELET # BLD AUTO: 162 K/UL — SIGNIFICANT CHANGE UP (ref 150–400)
POTASSIUM SERPL-MCNC: 3.5 MMOL/L — SIGNIFICANT CHANGE UP (ref 3.5–5.3)
POTASSIUM SERPL-SCNC: 3.5 MMOL/L — SIGNIFICANT CHANGE UP (ref 3.5–5.3)
RBC # BLD: 2.62 M/UL — LOW (ref 3.8–5.2)
RBC # BLD: 3.2 M/UL — LOW (ref 3.8–5.2)
RBC # FLD: 15.8 % — HIGH (ref 10.3–14.5)
RBC # FLD: 16.6 % — HIGH (ref 10.3–14.5)
RH IG SCN BLD-IMP: POSITIVE — SIGNIFICANT CHANGE UP
SODIUM SERPL-SCNC: 137 MMOL/L — SIGNIFICANT CHANGE UP (ref 135–145)
WBC # BLD: 5.24 K/UL — SIGNIFICANT CHANGE UP (ref 3.8–10.5)
WBC # BLD: 5.34 K/UL — SIGNIFICANT CHANGE UP (ref 3.8–10.5)
WBC # FLD AUTO: 5.24 K/UL — SIGNIFICANT CHANGE UP (ref 3.8–10.5)
WBC # FLD AUTO: 5.34 K/UL — SIGNIFICANT CHANGE UP (ref 3.8–10.5)

## 2019-12-04 PROCEDURE — 99232 SBSQ HOSP IP/OBS MODERATE 35: CPT

## 2019-12-04 RX ORDER — POTASSIUM CHLORIDE 20 MEQ
20 PACKET (EA) ORAL ONCE
Refills: 0 | Status: COMPLETED | OUTPATIENT
Start: 2019-12-04 | End: 2019-12-04

## 2019-12-04 RX ORDER — FUROSEMIDE 40 MG
40 TABLET ORAL ONCE
Refills: 0 | Status: COMPLETED | OUTPATIENT
Start: 2019-12-04 | End: 2019-12-04

## 2019-12-04 RX ADMIN — Medication 650 MILLIGRAM(S): at 22:00

## 2019-12-04 RX ADMIN — Medication 20 MILLIEQUIVALENT(S): at 20:31

## 2019-12-04 RX ADMIN — Medication 40 MILLIGRAM(S): at 20:21

## 2019-12-04 RX ADMIN — PANTOPRAZOLE SODIUM 40 MILLIGRAM(S): 20 TABLET, DELAYED RELEASE ORAL at 20:24

## 2019-12-04 RX ADMIN — AMLODIPINE BESYLATE 5 MILLIGRAM(S): 2.5 TABLET ORAL at 05:00

## 2019-12-04 RX ADMIN — Medication 137 MICROGRAM(S): at 05:00

## 2019-12-04 RX ADMIN — PANTOPRAZOLE SODIUM 40 MILLIGRAM(S): 20 TABLET, DELAYED RELEASE ORAL at 05:00

## 2019-12-04 RX ADMIN — Medication 650 MILLIGRAM(S): at 21:07

## 2019-12-04 RX ADMIN — Medication 650 MILLIGRAM(S): at 00:24

## 2019-12-04 NOTE — PROGRESS NOTE ADULT - SUBJECTIVE AND OBJECTIVE BOX
[facility] -- Britni Kennedy, Faizan, Alan, Steven Bone Savella  Office # [phone]      Follow Up:  PAF, CHF    Subjective/Observations: Patient seen and examined. Events noted. Resting comfortably in bed. No complaints of chest pain, dyspnea, or palpitations reported. No signs of orthopnea or PND.       REVIEW OF SYSTEMS: All other review of systems is negative unless indicated above    PAST MEDICAL & SURGICAL HISTORY:  Atrial fibrillation  Achalasia  Hypertension  Hypothyroidism  History of repair of hiatal hernia  H/O colectomy  S/P appendectomy  S/P hysterectomy      MEDICATIONS  (STANDING):  acetaminophen    Suspension .. 650 milliGRAM(s) Oral every 8 hours  amLODIPine   Tablet 5 milliGRAM(s) Oral daily  levothyroxine 137 MICROGram(s) Oral daily  metoprolol succinate ER 12.5 milliGRAM(s) Oral daily  pantoprazole  Injectable 40 milliGRAM(s) IV Push two times a day    MEDICATIONS  (PRN):  bisacodyl 5 milliGRAM(s) Oral at bedtime PRN Constipation      Allergies    opioid-like analgesics (Other)    Intolerances            Vital Signs Last 24 Hrs  T(C): 36.3 (04 Dec 2019 04:49), Max: 36.4 (03 Dec 2019 09:46)  T(F): 97.3 (04 Dec 2019 04:49), Max: 97.5 (03 Dec 2019 09:46)  HR: 55 (04 Dec 2019 04:49) (55 - 75)  BP: 142/68 (04 Dec 2019 04:49) (109/74 - 156/73)  BP(mean): --  RR: 17 (04 Dec 2019 04:49) (17 - 18)  SpO2: 96% (04 Dec 2019 04:49) (95% - 99%)    I&O's Summary    03 Dec 2019 07:01  -  04 Dec 2019 07:00  --------------------------------------------------------  IN: 368 mL / OUT: 0 mL / NET: 368 mL          PHYSICAL EXAM:     Constitutional: NAD, awake    HEENT: Moist Mucous Membranes, Anicteric  Pulmonary: Decreased breath sounds b/l. No rales, crackles or wheeze appreciated.   Cardiovascular: Regular, S1 and S2, No murmurs, rubs, gallops or clicks  Gastrointestinal: Bowel Sounds present, soft, nontender.   Lymph: No peripheral edema. No lymphadenopathy.  Skin: No visible rashes or ulcers.  Psych:  Mood & affect appropriate for situation    LABS: All Labs Reviewed:                        8.1    5.34  )-----------( 149      ( 04 Dec 2019 07:49 )             24.1                         8.6    6.97  )-----------( 133      ( 03 Dec 2019 22:32 )             25.3                         9.2    7.14  )-----------( 136      ( 03 Dec 2019 08:53 )             27.4     04 Dec 2019 06:24    137    |  98     |  19     ----------------------------<  117    3.5     |  30     |  0.49   03 Dec 2019 06:24    137    |  97     |  21     ----------------------------<  97     3.1     |  27     |  0.49   02 Dec 2019 06:35    139    |  97     |  28     ----------------------------<  114    3.5     |  28     |  0.73     Ca    8.5        04 Dec 2019 06:24  Ca    8.5        03 Dec 2019 06:24  Ca    8.7        02 Dec 2019 06:35  Mg     1.8       04 Dec 2019 06:24    TPro  5.9    /  Alb  3.3    /  TBili  0.3    /  DBili  x      /  AST  26     /  ALT  12     /  AlkPhos  87     01 Dec 2019 11:26

## 2019-12-04 NOTE — PROGRESS NOTE ADULT - SUBJECTIVE AND OBJECTIVE BOX
SUBJECTIVE / OVERNIGHT EVENTS: pt denies chest pain, shortness of breath , nausea,v     MEDICATIONS  (STANDING):  acetaminophen    Suspension .. 650 milliGRAM(s) Oral every 8 hours  amLODIPine   Tablet 5 milliGRAM(s) Oral daily  levothyroxine 137 MICROGram(s) Oral daily  metoprolol succinate ER 12.5 milliGRAM(s) Oral daily  pantoprazole  Injectable 40 milliGRAM(s) IV Push two times a day    MEDICATIONS  (PRN):  bisacodyl 5 milliGRAM(s) Oral at bedtime PRN Constipation    Vital Signs Last 24 Hrs  T(C): 36.4 (04 Dec 2019 19:45), Max: 36.4 (04 Dec 2019 15:30)  T(F): 97.5 (04 Dec 2019 19:45), Max: 97.5 (04 Dec 2019 15:30)  HR: 64 (04 Dec 2019 19:45) (55 - 64)  BP: 158/72 (04 Dec 2019 20:45) (140/65 - 164/76)  BP(mean): --  RR: 18 (04 Dec 2019 19:45) (17 - 18)  SpO2: 100% (04 Dec 2019 19:45) (96% - 100%)    Constitutional: No fever, fatigue  Skin: No rash.  Eyes: No recent vision problems or eye pain.  ENT: No congestion, ear pain, or sore throat.  Cardiovascular: No chest pain or palpation.  Respiratory: No cough, shortness of breath, congestion, or wheezing.  Gastrointestinal: No abdominal pain, nausea, vomiting, or diarrhea.  Genitourinary: No dysuria.  Musculoskeletal: No joint swelling.  Neurologic: No headache.    PHYSICAL EXAM:  GENERAL: NAD  EYES: EOMI, PERRLA  NECK: Supple, No JVD  CHEST/LUNG: dec breath sounds rt base  HEART:  S1 , S2 +  ABDOMEN: soft , bs+  EXTREMITIES:  no edema  NEUROLOGY:alert awake confused     LABS:  12-04    137  |  98  |  19  ----------------------------<  117<H>  3.5   |  30  |  0.49<L>    Ca    8.5      04 Dec 2019 06:24  Mg     1.8     12-04      Creatinine Trend: 0.49 <--, 0.49 <--, 0.73 <--, 0.91 <--                        9.9    5.24  )-----------( 162      ( 04 Dec 2019 22:22 )             28.8     Urine Studies:                Imaging Personally Reviewed:    Consultant(s) Notes Reviewed:      Care Discussed with Consultants/Other Providers:

## 2019-12-04 NOTE — DISCHARGE NOTE NURSING/CASE MANAGEMENT/SOCIAL WORK - NSDCPNINST_GEN_ALL_CORE
call for  follow up appointment    with  primary care   md   doet  meds  activity  as per md    any severe pain nausea  vomiting fevers   any  problems  call md  call 911 [facility]  EMERGENCY ROOM

## 2019-12-04 NOTE — PROGRESS NOTE ADULT - ASSESSMENT
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  She has been found to be significantly anemic, though no chest pain or difficulty breathing. She also has an acute pelvic fracture from her fall.  Cardiac wise, she has a history of HTN and PAF.    - no sign of acute ischemia.  - her ekg continues to demonstrate a sr with rbbb and non-specific t wave abn    - no significant volume overload on exam   - echo from 10/2019 with mild mr, mild ai, mild dd and mild segmental lv dysfunction (inferior and inferolateral hypokinesis)  - would transfuse to keep Hb >8, now downtrending. monitor closely  - IV Lasix in between transfusions  - watch volume status closely    - PAF, though in SR  - no a/c in the setting of prior falls  - hold asa    - BP satisfactory  - continue with metoprolol with hold parameters  - can hold amlodipine    - Watch creatinine and electrolytes. Keep K>4, Mg>2  - GI evaluation noted, family refusing egd at this time. No cardiac contraindication to proceeding with EGD if things change in this regard.  - Will follow with you

## 2019-12-04 NOTE — DISCHARGE NOTE NURSING/CASE MANAGEMENT/SOCIAL WORK - PATIENT PORTAL LINK FT
You can access the FollowMyHealth Patient Portal offered by [facility] by registering at the following website: [email or web address]. By joining [facility]’s FollowMyHealth portal, you will also be able to view your health information using other applications (apps) compatible with our system.

## 2019-12-04 NOTE — PROGRESS NOTE ADULT - PROBLEM/PLAN-5
"        You have been provided with a certain amount of medication with a specified number of refills.  Please follow up within an adequate time before you run out of medications.    REFILLS FOR CONTROLLED SUBSTANCES WILL NOT BE GIVEN WITHOUT AN APPOINTMENT.  I will not honor or fill automated refill requests from pharmacies.  You must come in for an appointment to get refills.        Please book your next appointment for myself or therapist by phone by calling our office at [phone].          PLEASE BE AT LEAST 15 MINUTES EARLY FOR YOUR NEXT APPOINTMENT.  PLEASE, DO NOT BE LATE OR YOU WILL BE TURNED AWAY AND ASKED TO RESCHEDULE.  YOU MUST COME EARLY TO ALLOW TIME FOR CHECK-IN AS WELL AS GET YOUR VITAL SIGNS AND GO OVER YOUR MEDICATIONS.  [employer] is not fair to the patients who present after you and are on time for their appointments.  It causes a delay in the appointments for patients and staff.  IF YOU ARE LATE, THERE IS A POSSIBILITY THAT YOU WILL BE CHARGED FOR THE APPOINTMENT TIME PERSONALLY AND IT WILL NOT GO TO YOUR INSURANCE.  YOU MAY ALSO BE DISCHARGED FROM CLINIC with multiple "No Show" appointments.       -----------------------------------------------------------------------------------------------------------------  IF YOU FEEL SUICIDAL OR HAVING THOUGHTS OR PLANS TO HURT YOURSELF OR OTHERS, CALL 911 OR REPORT TO THE NEAREST EMERGENCY ROOM.  YOU CAN ALSO ACCESS THE FOLLOWING HOTLINE:    National Suicide Hotline Number 4-205-475-TALK (2942)                 " DISPLAY PLAN FREE TEXT

## 2019-12-05 VITALS
HEART RATE: 74 BPM | DIASTOLIC BLOOD PRESSURE: 78 MMHG | TEMPERATURE: 98 F | OXYGEN SATURATION: 100 % | SYSTOLIC BLOOD PRESSURE: 150 MMHG | RESPIRATION RATE: 16 BRPM

## 2019-12-05 LAB
HCT VFR BLD CALC: 29.3 % — LOW (ref 34.5–45)
HGB BLD-MCNC: 9.8 G/DL — LOW (ref 11.5–15.5)
MCHC RBC-ENTMCNC: 30.4 PG — SIGNIFICANT CHANGE UP (ref 27–34)
MCHC RBC-ENTMCNC: 33.4 GM/DL — SIGNIFICANT CHANGE UP (ref 32–36)
MCV RBC AUTO: 91 FL — SIGNIFICANT CHANGE UP (ref 80–100)
NRBC # BLD: 0 /100 WBCS — SIGNIFICANT CHANGE UP (ref 0–0)
PLATELET # BLD AUTO: 168 K/UL — SIGNIFICANT CHANGE UP (ref 150–400)
RBC # BLD: 3.22 M/UL — LOW (ref 3.8–5.2)
RBC # FLD: 15.9 % — HIGH (ref 10.3–14.5)
WBC # BLD: 4.87 K/UL — SIGNIFICANT CHANGE UP (ref 3.8–10.5)
WBC # FLD AUTO: 4.87 K/UL — SIGNIFICANT CHANGE UP (ref 3.8–10.5)

## 2019-12-05 PROCEDURE — 86923 COMPATIBILITY TEST ELECTRIC: CPT

## 2019-12-05 PROCEDURE — 72190 X-RAY EXAM OF PELVIS: CPT

## 2019-12-05 PROCEDURE — 36430 TRANSFUSION BLD/BLD COMPNT: CPT

## 2019-12-05 PROCEDURE — 80048 BASIC METABOLIC PNL TOTAL CA: CPT

## 2019-12-05 PROCEDURE — 86850 RBC ANTIBODY SCREEN: CPT

## 2019-12-05 PROCEDURE — 93005 ELECTROCARDIOGRAM TRACING: CPT

## 2019-12-05 PROCEDURE — 86901 BLOOD TYPING SEROLOGIC RH(D): CPT

## 2019-12-05 PROCEDURE — 85027 COMPLETE CBC AUTOMATED: CPT

## 2019-12-05 PROCEDURE — 86900 BLOOD TYPING SEROLOGIC ABO: CPT

## 2019-12-05 PROCEDURE — 97530 THERAPEUTIC ACTIVITIES: CPT

## 2019-12-05 PROCEDURE — 99285 EMERGENCY DEPT VISIT HI MDM: CPT

## 2019-12-05 PROCEDURE — 72131 CT LUMBAR SPINE W/O DYE: CPT

## 2019-12-05 PROCEDURE — 72192 CT PELVIS W/O DYE: CPT

## 2019-12-05 PROCEDURE — 80053 COMPREHEN METABOLIC PANEL: CPT

## 2019-12-05 PROCEDURE — 83735 ASSAY OF MAGNESIUM: CPT

## 2019-12-05 PROCEDURE — 85730 THROMBOPLASTIN TIME PARTIAL: CPT

## 2019-12-05 PROCEDURE — 76377 3D RENDER W/INTRP POSTPROCES: CPT

## 2019-12-05 PROCEDURE — 97162 PT EVAL MOD COMPLEX 30 MIN: CPT

## 2019-12-05 PROCEDURE — 97116 GAIT TRAINING THERAPY: CPT

## 2019-12-05 PROCEDURE — 99232 SBSQ HOSP IP/OBS MODERATE 35: CPT

## 2019-12-05 PROCEDURE — 85610 PROTHROMBIN TIME: CPT

## 2019-12-05 PROCEDURE — P9016: CPT

## 2019-12-05 RX ADMIN — Medication 137 MICROGRAM(S): at 05:30

## 2019-12-05 RX ADMIN — AMLODIPINE BESYLATE 5 MILLIGRAM(S): 2.5 TABLET ORAL at 05:30

## 2019-12-05 RX ADMIN — Medication 12.5 MILLIGRAM(S): at 05:30

## 2019-12-05 RX ADMIN — PANTOPRAZOLE SODIUM 40 MILLIGRAM(S): 20 TABLET, DELAYED RELEASE ORAL at 05:30

## 2019-12-05 NOTE — PROGRESS NOTE ADULT - ASSESSMENT
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  She has been found to be significantly anemic, though no chest pain or difficulty breathing. She also has an acute pelvic fracture from her fall.  Cardiac wise, she has a history of HTN and PAF.    - no sign of acute ischemia.  - her ekg continues to demonstrate a sr with rbbb and non-specific t wave abn    - no significant volume overload on exam   - echo from 10/2019 with mild mr, mild ai, mild dd and mild segmental lv dysfunction (inferior and inferolateral hypokinesis)  - would transfuse to keep Hb >8, now stabilized at 9.8  - watch volume status closely    - PAF, though in SR  - no a/c in the setting of prior falls  - hold asa    - BP satisfactory  - continue with metoprolol with hold parameters  - continue amlodipine    - Watch creatinine and electrolytes. Keep K>4, Mg>2  - Will follow with you

## 2019-12-05 NOTE — CHART NOTE - NSCHARTNOTEFT_GEN_A_CORE
follow up- pt is cleared by attending MD for discharge home with home care as per pt/family wish; discussed discharge medication/follow up.  [place](NP)  [facility], [phone]

## 2019-12-05 NOTE — DIETITIAN INITIAL EVALUATION ADULT. - ADD RECOMMEND
Nursing Transfer Note      12/5/2019     Transfer To: 948    Transfer via bed    Transfer with cardiac monitoring    Transported by Shawna RN, Miguel Angel, RN    Medicines sent: Bactroban, Zyprexia    Chart send with patient: Yes    Notified: spouse    Patient reassessed at: 12/5/19 1622     Upon arrival to floor: patient oriented to room, call bell in reach and bed in lowest position   continue ensure clears while on clear liquid diet add ensure 2dx daily, assist with tray preparation, monitor/encourage PO intake. continue ensure clears while on clear liquid diet

## 2019-12-05 NOTE — PROGRESS NOTE ADULT - SUBJECTIVE AND OBJECTIVE BOX
[facility] - Britni Kennedy, Faizan, Alan, Lizandro, Steven Juliajessee  Office Number:  [phone]    Patient resting comfortably in bed in NAD.  Laying flat with no respiratory distress.  No complaints of chest pain, dyspnea, palpitations, PND, or orthopnea.  feeling weak and frustrated about situation and hip    ROS: negative unless otherwise mentioned.    Telemetry:  off    MEDICATIONS  (STANDING):  acetaminophen    Suspension .. 650 milliGRAM(s) Oral every 8 hours  amLODIPine   Tablet 5 milliGRAM(s) Oral daily  levothyroxine 137 MICROGram(s) Oral daily  metoprolol succinate ER 12.5 milliGRAM(s) Oral daily  pantoprazole  Injectable 40 milliGRAM(s) IV Push two times a day    MEDICATIONS  (PRN):  bisacodyl 5 milliGRAM(s) Oral at bedtime PRN Constipation      Allergies    opioid-like analgesics (Other)    Intolerances        Vital Signs Last 24 Hrs  T(C): 36.4 (05 Dec 2019 09:37), Max: 36.4 (04 Dec 2019 15:30)  T(F): 97.6 (05 Dec 2019 09:37), Max: 97.6 (05 Dec 2019 09:37)  HR: 74 (05 Dec 2019 09:37) (60 - 76)  BP: 150/78 (05 Dec 2019 09:37) (129/68 - 164/76)  BP(mean): --  RR: 16 (05 Dec 2019 09:37) (16 - 18)  SpO2: 100% (05 Dec 2019 09:37) (100% - 100%)    I&O's Summary    04 Dec 2019 07:01  -  05 Dec 2019 07:00  --------------------------------------------------------  IN: 610 mL / OUT: 0 mL / NET: 610 mL        ON EXAM:    Constitutional: NAD, awake, frail  HEENT: Moist Mucous Membranes, Anicteric  Pulmonary: Decreased breath sounds b/l. No rales, crackles or wheeze appreciated.   Cardiovascular: Regular, S1 and S2, No murmurs, rubs, gallops or clicks  Gastrointestinal: Bowel Sounds present, soft, nontender.   Lymph: No peripheral edema. No lymphadenopathy.  Skin: No visible rashes or ulcers.  Psych:  Mood & affect appropriate for situation    LABS: All Labs Reviewed:                        9.8    4.87  )-----------( 168      ( 05 Dec 2019 06:47 )             29.3                         9.9    5.24  )-----------( 162      ( 04 Dec 2019 22:22 )             28.8                         8.1    5.34  )-----------( 149      ( 04 Dec 2019 07:49 )             24.1     04 Dec 2019 06:24    137    |  98     |  19     ----------------------------<  117    3.5     |  30     |  0.49   03 Dec 2019 06:24    137    |  97     |  21     ----------------------------<  97     3.1     |  27     |  0.49     Ca    8.5        04 Dec 2019 06:24  Ca    8.5        03 Dec 2019 06:24  Mg     1.8       04 Dec 2019 06:24            Blood Culture:

## 2019-12-19 ENCOUNTER — APPOINTMENT (OUTPATIENT)
Dept: THORACIC SURGERY | Facility: CLINIC | Age: 84
End: 2019-12-19
Payer: MEDICARE

## 2019-12-19 VITALS
WEIGHT: 108 LBS | SYSTOLIC BLOOD PRESSURE: 113 MMHG | TEMPERATURE: 97.5 F | OXYGEN SATURATION: 99 % | BODY MASS INDEX: 16 KG/M2 | HEART RATE: 64 BPM | RESPIRATION RATE: 17 BRPM | DIASTOLIC BLOOD PRESSURE: 66 MMHG | HEIGHT: 69 IN

## 2019-12-19 PROCEDURE — 99024 POSTOP FOLLOW-UP VISIT: CPT

## 2020-02-04 ENCOUNTER — APPOINTMENT (OUTPATIENT)
Dept: OTOLARYNGOLOGY | Facility: CLINIC | Age: 85
End: 2020-02-04

## 2020-02-20 ENCOUNTER — APPOINTMENT (OUTPATIENT)
Dept: THORACIC SURGERY | Facility: CLINIC | Age: 85
End: 2020-02-20
Payer: MEDICARE

## 2020-02-20 VITALS
OXYGEN SATURATION: 99 % | RESPIRATION RATE: 16 BRPM | BODY MASS INDEX: 15.51 KG/M2 | HEART RATE: 54 BPM | WEIGHT: 105 LBS | SYSTOLIC BLOOD PRESSURE: 156 MMHG | DIASTOLIC BLOOD PRESSURE: 71 MMHG

## 2020-02-20 DIAGNOSIS — B36.9 SUPERFICIAL MYCOSIS, UNSPECIFIED: ICD-10-CM

## 2020-02-20 PROCEDURE — 99213 OFFICE O/P EST LOW 20 MIN: CPT

## 2020-02-20 RX ORDER — NYSTATIN 100000 1/G
100000 POWDER TOPICAL 3 TIMES DAILY
Qty: 1 | Refills: 2 | Status: ACTIVE | COMMUNITY
Start: 2020-02-20 | End: 1900-01-01

## 2020-02-24 NOTE — REASON FOR VISIT
[Follow-Up: _____] : a [unfilled] follow-up visit [Other: _____] : [unfilled] [FreeTextEntry1] : achalasia and hiatal hernia

## 2020-02-24 NOTE — ASSESSMENT
[FreeTextEntry1] : 89 y/o F, never smoker, w/ multiple PMHx including HTN, Achalasia s/p dilation x 2, hiatal hernia s/o repair w/ Mesh, also hx of emergency Volvulus surgical repair in 4/2014.\par \par Now 4yr 6mo s/p EGD CRE balloon dilation of GE junction up to 16mm in size, Botox injection 100 units into the lower esophageal sphincter on 8/10/15. \par \par EGD dilation on 12/22/15.\par EGD, balloon dilation of GE junction on 3/3/16. Path showed JEFFRY.\par \par Now 3yr 10mo s/p EGD Lap Heller myotomy, and extensive lysis of adhesion on 4/13/16.\par \par Now 4 mo s/p Redo laparotomy. Esophagogastrectomy with formation of esophagogastrostomy anastomosis intraabdominally, EGD, 14 F jejunal feeding tube placement, left chest tube placement on 10/22/2019. Path of esophagogastrectomy revealed active esophagitis, GEJ ulceration, mural abscesses, and serosal fibrous adhesions, negative for GMS. Path of the proximal stomach revealed portion of stomach with chronic inactive gastritis and focal acute serositis and two reactive LNs. Neural hypertrophy and patchy paucity of ganlion cells is noted. The clinical hx of achalasia is noted. No evidence of malignancy is identified. \par \par I have reviewed the patient's medical records and diagnostic images at time of this office consultation and have made the following recommendation:\par 1. Encourage eating and Ensure\par 2. RTC in 6mo for f/u\par \par \par I personally performed the services described in the documentation, reviewed the documentation recorded by the scribe in my presence and it accurately and completely records my words and actions. \par \par I, Minh Legegtt, HARVEY, am scribing for and the presence of Dr. Asif Jang the following sections, HISTORY OF PRESENT ILLNESS, PAST MEDICAL/FAMILY/SOCIAL HISTORY; REVIEW OF SYSTEMS; VITAL SIGNS; PHYSICAL EXAM; DISPOSITION.\par

## 2020-02-24 NOTE — HISTORY OF PRESENT ILLNESS
[FreeTextEntry1] : 89 y/o F, never smoker, w/ multiple PMHx including HTN, Achalasia s/p dilation x 2, hiatal hernia s/o repair w/ Mesh, also hx of emergency Volvulus surgical repair in 4/2014.\par \par Now 4yr 6mo s/p EGD CRE balloon dilation of GE junction up to 16mm in size, Botox injection 100 units into the lower esophageal sphincter on 8/10/15. \par \par EGD dilation on 12/22/15.\par EGD, balloon dilation of GE junction on 3/3/16. Path showed JEFFRY.\par \par Now 3yr 10mo s/p EGD Lap Heller myotomy, and extensive lysis of adhesion on 4/13/16.\par \par Barium swallow on 11/6/2018:\par -Multiple tertiary contractions of the esophagus consistent with significant dysmotility\par -No evidence of achalasia\par \par Barium Esophagram on 6/18/19:\par - decreased motility in the distal half of the esophagus, w/ mildly dilated proximal esophagus\par - multiple tertiary contractions seen\par - findings suspicious for achalasia\par \par CT A/P w/ IV contrast on 8/27/19:\par - clips at the region of the GE junction r/t previous fundoplication\par - stomach is massively distended and enters the pelvis\par - High-grade small bowel obstruction w/ transition point in the Lt lower quadrant, likely r/t post-op adhesions\par \par Barium Esophagram on 10/4/19. The patient swallowed barium which prompted significant belching. The proximal esophagus is mildly dilated. The esophagus demonstrates normal course and contour. Multiple tertiary contractions were observed consistent with severe dysmotility. +GE reflux.\par \par Pt is s/p EGD on 10/8/19. \par \par CT C/A/P w/ contrast on 10/14/19:\par - Small amount of ascites, obscuring thorough visualization of the GE junction and distal esophagus \par - No gross abnormality identified in this location however\par - Dilated main, right and left pulmonary arteries which can be seen with pulmonary hypertension\par Patient was direct admitted to [address] [place] [address] for IV hydration on 10/16/2019. Dr. Elidia Mansfield did Manometry while patient is in house (10/16/19).\par \par Manometry on 10/16/2019: LES = 27.7 (13-43), UES = 65.1 (). 80% of swallows demonstrate absence of top portion of esophageal peristalsis, consistent with large breaks. The remaining 20% demonstrate small breaks. All swallows demonstrate low amplitude peristalsis. In a majority of swallows, there is some pan- esophageal pressurization indicate of fluid trapping. 100% of swallows demonstrate incomplete bolus clearance by impedence analysis. 3cm hiatal hernia. \par \par Now 4 mo s/p Redo laparotomy. Esophagogastrectomy with formation of esophagogastrostomy anastomosis intraabdominally, EGD, 14 F jejunal feeding tube placement, left chest tube placement on 10/22/2019. Path of esophagogastrectomy revealed active esophagitis, GEJ ulceration, mural abscesses, and serosal fibrous adhesions, negative for GMS. Path of the proximal stomach revealed portion of stomach with chronic inactive gastritis and focal acute serositis and two reactive LNs. Neural hypertrophy and patchy paucity of ganlion cells is noted. The clinical hx of achalasia is noted. No evidence of malignancy is identified. \par \par Post-op complicated with rapid A Fib and followed by cardiology, recommend to hold off on AC due to fall risk. Patient also required enhanced supervision for delirium which resolved. \par \par Patient d/c to [facility] on 10/30/2019 and went home on 11/14/2019. Patient weighted 112 lbs upon d/c from [facility] and currently weights 112.7 lbs. According to Dietician, there is no need for tube feeding. \par \par CXR on 11/21/19: new patchy right upper to midlung opacity of uncertain etiology; new small right pleural effusion.\par \par J tube is in place, no longer used since 11/7 as per patient, currently on PO regular diet, 3 meals a day. \par \par Of note, patient fell at home on 11/30. Admitted to [facility] on 12/1/2019 s/p left superior and inferior pubic rami, s/p 3U PRBC transfusion. D/c'd home on 12/05/2019. \par \par J-tube removed on 12/19/19. \par \par Pt presents today for follow up. States that she is eating better w/ mild regurgitation w/ liquids. Receiving physical therapy to walk with a walker. \par \par

## 2020-02-24 NOTE — PHYSICAL EXAM
[Auscultation Breath Sounds / Voice Sounds] : lungs were clear to auscultation bilaterally [Heart Rate And Rhythm] : heart rate was normal and rhythm regular [Murmurs] : no murmurs [Heart Sounds] : normal S1 and S2 [Heart Sounds Gallop] : no gallops [Examination Of The Chest] : the chest was normal in appearance [Heart Sounds Pericardial Friction Rub] : no pericardial rub [Diminished Respiratory Excursion] : normal chest expansion [Chest Visual Inspection Thoracic Asymmetry] : no chest asymmetry [Bowel Sounds] : normal bowel sounds [Abdomen Soft] : soft [Abdomen Tenderness] : non-tender [Abdomen Mass (___ Cm)] : no abdominal mass palpated [Skin Color & Pigmentation] : normal skin color and pigmentation [Skin Turgor] : normal skin turgor [] : no rash [Deep Tendon Reflexes (DTR)] : deep tendon reflexes were 2+ and symmetric [Sensation] : the sensory exam was normal to light touch and pinprick [No Focal Deficits] : no focal deficits [Oriented To Time, Place, And Person] : oriented to person, place, and time [Impaired Insight] : insight and judgment were intact [Affect] : the affect was normal

## 2020-02-24 NOTE — CONSULT LETTER
[Consult Letter:] : I had the pleasure of evaluating your patient, [unfilled]. [Dear  ___] : Dear  [unfilled], [( Thank you for referring [unfilled] for consultation for _____ )] : Thank you for referring [unfilled] for consultation for [unfilled] [Please see my note below.] : Please see my note below. [Consult Closing:] : Thank you very much for allowing me to participate in the care of this patient.  If you have any questions, please do not hesitate to contact me. [Sincerely,] : Sincerely, [FreeTextEntry2] : Adria Staton MD  [FreeTextEntry3] : Asif Jang MD, MPH \par System Director of Thoracic Surgery \par Director of [facility] [place] \par Professor Cardiovascular & Thoracic Surgery  \par [employer] [facility] [employer] [facility]\par

## 2020-03-16 ENCOUNTER — INPATIENT (INPATIENT)
Facility: HOSPITAL | Age: 85
LOS: 3 days | Discharge: HOME HEALTH | End: 2020-03-20
Attending: SURGERY | Admitting: SURGERY
Payer: MEDICARE

## 2020-03-16 VITALS
DIASTOLIC BLOOD PRESSURE: 66 MMHG | HEART RATE: 60 BPM | TEMPERATURE: 98 F | OXYGEN SATURATION: 100 % | RESPIRATION RATE: 16 BRPM | SYSTOLIC BLOOD PRESSURE: 164 MMHG

## 2020-03-16 DIAGNOSIS — Z90.49 ACQUIRED ABSENCE OF OTHER SPECIFIED PARTS OF DIGESTIVE TRACT: Chronic | ICD-10-CM

## 2020-03-16 DIAGNOSIS — Z98.890 OTHER SPECIFIED POSTPROCEDURAL STATES: Chronic | ICD-10-CM

## 2020-03-16 DIAGNOSIS — K56.609 UNSPECIFIED INTESTINAL OBSTRUCTION, UNSPECIFIED AS TO PARTIAL VERSUS COMPLETE OBSTRUCTION: ICD-10-CM

## 2020-03-16 DIAGNOSIS — Z90.710 ACQUIRED ABSENCE OF BOTH CERVIX AND UTERUS: Chronic | ICD-10-CM

## 2020-03-16 LAB
ALBUMIN SERPL ELPH-MCNC: 3.4 G/DL — SIGNIFICANT CHANGE UP (ref 3.3–5)
ALP SERPL-CCNC: 125 U/L — HIGH (ref 40–120)
ALT FLD-CCNC: 9 U/L — SIGNIFICANT CHANGE UP (ref 4–33)
ANION GAP SERPL CALC-SCNC: 11 MMO/L — SIGNIFICANT CHANGE UP (ref 7–14)
APPEARANCE UR: CLEAR — SIGNIFICANT CHANGE UP
APTT BLD: 31.7 SEC — SIGNIFICANT CHANGE UP (ref 27.5–36.3)
AST SERPL-CCNC: 13 U/L — SIGNIFICANT CHANGE UP (ref 4–32)
BACTERIA # UR AUTO: SIGNIFICANT CHANGE UP
BASE EXCESS BLDV CALC-SCNC: 4.9 MMOL/L — SIGNIFICANT CHANGE UP
BASOPHILS # BLD AUTO: 0.02 K/UL — SIGNIFICANT CHANGE UP (ref 0–0.2)
BASOPHILS NFR BLD AUTO: 0.3 % — SIGNIFICANT CHANGE UP (ref 0–2)
BILIRUB SERPL-MCNC: 0.4 MG/DL — SIGNIFICANT CHANGE UP (ref 0.2–1.2)
BILIRUB UR-MCNC: NEGATIVE — SIGNIFICANT CHANGE UP
BLD GP AB SCN SERPL QL: NEGATIVE — SIGNIFICANT CHANGE UP
BLOOD GAS VENOUS - CREATININE: 0.75 MG/DL — SIGNIFICANT CHANGE UP (ref 0.5–1.3)
BLOOD GAS VENOUS - FIO2: 21 — SIGNIFICANT CHANGE UP
BLOOD UR QL VISUAL: SIGNIFICANT CHANGE UP
BUN SERPL-MCNC: 30 MG/DL — HIGH (ref 7–23)
CALCIUM SERPL-MCNC: 8.9 MG/DL — SIGNIFICANT CHANGE UP (ref 8.4–10.5)
CHLORIDE BLDV-SCNC: 103 MMOL/L — SIGNIFICANT CHANGE UP (ref 96–108)
CHLORIDE SERPL-SCNC: 101 MMOL/L — SIGNIFICANT CHANGE UP (ref 98–107)
CO2 SERPL-SCNC: 28 MMOL/L — SIGNIFICANT CHANGE UP (ref 22–31)
COLOR SPEC: YELLOW — SIGNIFICANT CHANGE UP
CREAT SERPL-MCNC: 0.61 MG/DL — SIGNIFICANT CHANGE UP (ref 0.5–1.3)
EOSINOPHIL # BLD AUTO: 0.02 K/UL — SIGNIFICANT CHANGE UP (ref 0–0.5)
EOSINOPHIL NFR BLD AUTO: 0.3 % — SIGNIFICANT CHANGE UP (ref 0–6)
GAS PNL BLDV: 140 MMOL/L — SIGNIFICANT CHANGE UP (ref 136–146)
GLUCOSE BLDV-MCNC: 159 MG/DL — HIGH (ref 70–99)
GLUCOSE SERPL-MCNC: 157 MG/DL — HIGH (ref 70–99)
GLUCOSE UR-MCNC: NEGATIVE — SIGNIFICANT CHANGE UP
HCO3 BLDV-SCNC: 27 MMOL/L — SIGNIFICANT CHANGE UP (ref 20–27)
HCT VFR BLD CALC: 35.5 % — SIGNIFICANT CHANGE UP (ref 34.5–45)
HCT VFR BLDV CALC: 36.6 % — SIGNIFICANT CHANGE UP (ref 34.5–45)
HGB BLD-MCNC: 11.4 G/DL — LOW (ref 11.5–15.5)
HGB BLDV-MCNC: 11.9 G/DL — SIGNIFICANT CHANGE UP (ref 11.5–15.5)
IMM GRANULOCYTES NFR BLD AUTO: 0.5 % — SIGNIFICANT CHANGE UP (ref 0–1.5)
INR BLD: 1.12 — SIGNIFICANT CHANGE UP (ref 0.88–1.17)
KETONES UR-MCNC: NEGATIVE — SIGNIFICANT CHANGE UP
LACTATE BLDV-MCNC: 1.3 MMOL/L — SIGNIFICANT CHANGE UP (ref 0.5–2)
LEUKOCYTE ESTERASE UR-ACNC: NEGATIVE — SIGNIFICANT CHANGE UP
LIDOCAIN IGE QN: 14.6 U/L — SIGNIFICANT CHANGE UP (ref 7–60)
LYMPHOCYTES # BLD AUTO: 0.81 K/UL — LOW (ref 1–3.3)
LYMPHOCYTES # BLD AUTO: 13.7 % — SIGNIFICANT CHANGE UP (ref 13–44)
MCHC RBC-ENTMCNC: 30.5 PG — SIGNIFICANT CHANGE UP (ref 27–34)
MCHC RBC-ENTMCNC: 32.1 % — SIGNIFICANT CHANGE UP (ref 32–36)
MCV RBC AUTO: 94.9 FL — SIGNIFICANT CHANGE UP (ref 80–100)
MONOCYTES # BLD AUTO: 0.19 K/UL — SIGNIFICANT CHANGE UP (ref 0–0.9)
MONOCYTES NFR BLD AUTO: 3.2 % — SIGNIFICANT CHANGE UP (ref 2–14)
NEUTROPHILS # BLD AUTO: 4.83 K/UL — SIGNIFICANT CHANGE UP (ref 1.8–7.4)
NEUTROPHILS NFR BLD AUTO: 82 % — HIGH (ref 43–77)
NITRITE UR-MCNC: NEGATIVE — SIGNIFICANT CHANGE UP
NRBC # FLD: 0 K/UL — SIGNIFICANT CHANGE UP (ref 0–0)
PCO2 BLDV: 57 MMHG — HIGH (ref 41–51)
PH BLDV: 7.35 PH — SIGNIFICANT CHANGE UP (ref 7.32–7.43)
PH UR: 6.5 — SIGNIFICANT CHANGE UP (ref 5–8)
PLATELET # BLD AUTO: 223 K/UL — SIGNIFICANT CHANGE UP (ref 150–400)
PMV BLD: 10.7 FL — SIGNIFICANT CHANGE UP (ref 7–13)
PO2 BLDV: 32 MMHG — LOW (ref 35–40)
POTASSIUM BLDV-SCNC: 3.1 MMOL/L — LOW (ref 3.4–4.5)
POTASSIUM SERPL-MCNC: 3.2 MMOL/L — LOW (ref 3.5–5.3)
POTASSIUM SERPL-SCNC: 3.2 MMOL/L — LOW (ref 3.5–5.3)
PROT SERPL-MCNC: 6.4 G/DL — SIGNIFICANT CHANGE UP (ref 6–8.3)
PROT UR-MCNC: 100 — HIGH
PROTHROM AB SERPL-ACNC: 12.8 SEC — SIGNIFICANT CHANGE UP (ref 9.8–13.1)
RBC # BLD: 3.74 M/UL — LOW (ref 3.8–5.2)
RBC # FLD: 13.3 % — SIGNIFICANT CHANGE UP (ref 10.3–14.5)
RBC CASTS # UR COMP ASSIST: SIGNIFICANT CHANGE UP (ref 0–?)
RH IG SCN BLD-IMP: POSITIVE — SIGNIFICANT CHANGE UP
SAO2 % BLDV: 50.8 % — LOW (ref 60–85)
SODIUM SERPL-SCNC: 140 MMOL/L — SIGNIFICANT CHANGE UP (ref 135–145)
SP GR SPEC: > 1.04 — HIGH (ref 1–1.04)
UROBILINOGEN FLD QL: SIGNIFICANT CHANGE UP
WBC # BLD: 5.9 K/UL — SIGNIFICANT CHANGE UP (ref 3.8–10.5)
WBC # FLD AUTO: 5.9 K/UL — SIGNIFICANT CHANGE UP (ref 3.8–10.5)
WBC UR QL: SIGNIFICANT CHANGE UP (ref 0–?)

## 2020-03-16 PROCEDURE — 74177 CT ABD & PELVIS W/CONTRAST: CPT | Mod: 26

## 2020-03-16 PROCEDURE — 71045 X-RAY EXAM CHEST 1 VIEW: CPT | Mod: 26

## 2020-03-16 RX ORDER — ASPIRIN/CALCIUM CARB/MAGNESIUM 324 MG
300 TABLET ORAL DAILY
Refills: 0 | Status: DISCONTINUED | OUTPATIENT
Start: 2020-03-16 | End: 2020-03-19

## 2020-03-16 RX ORDER — SODIUM CHLORIDE 9 MG/ML
1000 INJECTION, SOLUTION INTRAVENOUS
Refills: 0 | Status: DISCONTINUED | OUTPATIENT
Start: 2020-03-16 | End: 2020-03-17

## 2020-03-16 RX ORDER — ACETAMINOPHEN 500 MG
1000 TABLET ORAL ONCE
Refills: 0 | Status: COMPLETED | OUTPATIENT
Start: 2020-03-16 | End: 2020-03-16

## 2020-03-16 RX ORDER — ASPIRIN/CALCIUM CARB/MAGNESIUM 324 MG
1 TABLET ORAL
Qty: 0 | Refills: 0 | DISCHARGE

## 2020-03-16 RX ORDER — ACETAMINOPHEN 500 MG
650 TABLET ORAL ONCE
Refills: 0 | Status: COMPLETED | OUTPATIENT
Start: 2020-03-16 | End: 2020-03-16

## 2020-03-16 RX ORDER — METOPROLOL TARTRATE 50 MG
5 TABLET ORAL EVERY 6 HOURS
Refills: 0 | Status: DISCONTINUED | OUTPATIENT
Start: 2020-03-16 | End: 2020-03-19

## 2020-03-16 RX ORDER — ONDANSETRON 8 MG/1
4 TABLET, FILM COATED ORAL ONCE
Refills: 0 | Status: COMPLETED | OUTPATIENT
Start: 2020-03-16 | End: 2020-03-16

## 2020-03-16 RX ORDER — ENOXAPARIN SODIUM 100 MG/ML
40 INJECTION SUBCUTANEOUS DAILY
Refills: 0 | Status: DISCONTINUED | OUTPATIENT
Start: 2020-03-16 | End: 2020-03-20

## 2020-03-16 RX ORDER — LEVOTHYROXINE SODIUM 125 MCG
70 TABLET ORAL AT BEDTIME
Refills: 0 | Status: DISCONTINUED | OUTPATIENT
Start: 2020-03-16 | End: 2020-03-19

## 2020-03-16 RX ORDER — SODIUM CHLORIDE 9 MG/ML
250 INJECTION INTRAMUSCULAR; INTRAVENOUS; SUBCUTANEOUS ONCE
Refills: 0 | Status: COMPLETED | OUTPATIENT
Start: 2020-03-16 | End: 2020-03-16

## 2020-03-16 RX ADMIN — SODIUM CHLORIDE 250 MILLILITER(S): 9 INJECTION INTRAMUSCULAR; INTRAVENOUS; SUBCUTANEOUS at 08:18

## 2020-03-16 RX ADMIN — Medication 1 ENEMA: at 13:30

## 2020-03-16 RX ADMIN — Medication 400 MILLIGRAM(S): at 16:26

## 2020-03-16 RX ADMIN — Medication 650 MILLIGRAM(S): at 08:05

## 2020-03-16 RX ADMIN — Medication 400 MILLIGRAM(S): at 08:18

## 2020-03-16 RX ADMIN — Medication 5 MILLIGRAM(S): at 22:28

## 2020-03-16 RX ADMIN — SODIUM CHLORIDE 100 MILLILITER(S): 9 INJECTION, SOLUTION INTRAVENOUS at 13:24

## 2020-03-16 RX ADMIN — ONDANSETRON 4 MILLIGRAM(S): 8 TABLET, FILM COATED ORAL at 07:53

## 2020-03-16 RX ADMIN — Medication 70 MICROGRAM(S): at 22:28

## 2020-03-16 NOTE — ED ADULT NURSE NOTE - OBJECTIVE STATEMENT
pt received in rm 26 AAO x 3. pt reports abdominal pain, nausea and multiple episodes of vomiting that began this morning. pt with hx of SBO's. LBM today. pt denies sob, chest pain, fevers, chills, diarrhea, recent travel, sick contacts. respirations even and unlabored. abdomen noted to be distended and tender to touch. healed pressure ulcer noted to sacrum. 20g IV placed to left ac.

## 2020-03-16 NOTE — ED ADULT NURSE REASSESSMENT NOTE - NS ED NURSE REASSESS COMMENT FT1
Rpt given by CHIP Guerrero. Pt. A&Ox3, c/o abdominal discomfort and distention. h/o SBO a few years ago. Pt. states she's slightly nauseous at this time. Zofran was given in previous shift. Denies any pain. IVF continued as ordered. Will continue to monitor.

## 2020-03-16 NOTE — ED PROVIDER NOTE - ATTENDING CONTRIBUTION TO CARE
Attending MD Zayas.  Agree with above.  Pt is a 89 yo female with pmhx of mult abdominal surgeries inc hysterectomy, appendectomy, multiple bowel obstructions presenting with abdominal pain that began at 0400 this morning after last BM at 0300 this morning.  PT’s abdominal pain is diffuse throughout entire abdomen assoc with 2 episodes emesis and abdominal distention.  She states that it feels ‘like obstruction’. Attending MD Zayas.  Agree with above.  Pt is a 89 yo female with pmhx of mult abdominal surgeries inc hysterectomy, appendectomy, multiple bowel obstructions presenting with abdominal pain that began at 0400 this morning after last BM at 0300 this morning.  PT’s abdominal pain is diffuse throughout entire abdomen assoc with 2 episodes emesis and abdominal distention.  She states that it feels ‘like obstruction’.    Pt hemodynamically stable in ED at time of signout to incoming team pending CT and dispo decision.

## 2020-03-16 NOTE — H&P ADULT - HISTORY OF PRESENT ILLNESS
A Team Surgery [id number]    HPI:  89 yo woman hx of achalasia s/p dilation x2, hiatal hernia s/p repair w/ mesh with esophageal erosion s/p partial esophagogastrectomy, J-tube placement (October 2019), J tube removed December 2019, presenting with abdominal pain since 3 am today. She went to the bathroom at 3 am to have a bowel movement, then noted pain "across her whole abdomen." She had one episode of emesis in the ED; she reports she continues to pass flatus. Denies nausea, obstipation, fevers, chills.    ED Course: afebrile, VSS. Labs wnl, no leukocytosis, jam lactate.

## 2020-03-16 NOTE — ED ADULT NURSE REASSESSMENT NOTE - NS ED NURSE REASSESS COMMENT FT1
Pt. given fleet enema as ordered. Pt. tolerated well but only had small BM. Surgery team at bedside aware. Will continue to monitor.

## 2020-03-16 NOTE — ED PROVIDER NOTE - OBJECTIVE STATEMENT
89F PMH AF, s/p hysterectomy, s/p appendectomy, bowel obstructions s/p colectomy presents with abdominal pain.  Patient reports pain started at 3am this morning.  Abdominal pain is diffuse and described as dull.  Patient also reports abdominal distension over the past day.  Patient reports nausea and 2 episodes of vomiting since onset of pain.  Patient reports last bowel movement occurred shortly prior to onset of pain early this morning.  Patient denies fevers, chills, chest pain, cough, dyspnea.

## 2020-03-16 NOTE — H&P ADULT - NSHPLABSRESULTS_GEN_ALL_CORE
CBC (03-16 @ 07:20)                              11.4<L>                         5.90    )----------------(  223        82.0<H>% Neutrophils, 13.7  % Lymphocytes, ANC: 4.83                                35.5                  BMP (03-16 @ 07:20)             140     |  101     |  30<H> 		Ca++ --      Ca 8.9                ---------------------------------( 157<H>		Mg --                 3.2<L>  |  28      |  0.61  			Ph --        LFTs (03-16 @ 07:20)      TPro 6.4 / Alb 3.4 / TBili 0.4 / DBili -- / AST 13 / ALT 9 / AlkPhos 125<H>    Coags (03-16 @ 07:20)  aPTT 31.7 / INR 1.12 / PT 12.8    ABG (03-16 @ 07:20)      /  /  /  /  / %     Lactate:   1.3    VBG (03-16 @ 07:20)     7.35 / 57<H> / 32<L> / 27 / 4.9 / 50.8<L>%      IMAGING:  < from: CT Abdomen and Pelvis w/ IV Cont (03.16.20 @ 09:55) >    FINDINGS:    LOWER CHEST: Bilateral groundglass opacities, right greater than left with mild intralobular septal thickening. Trace bilateral pleural effusions.    LIVER: Within normal limits.  BILE DUCTS: Normal caliber.  GALLBLADDER: The visualized.  SPLEEN: Within normal limits.  PANCREAS: Within normal limits.  ADRENALS: Within normal limits.  KIDNEYS/URETERS: Cyst in the lower pole of the left kidney.    BLADDER: Within normal limits.  REPRODUCTIVE ORGANS: Status post hysterectomy.    BOWEL: Dilated loops of small bowel up to 4.5 cm with a transition point in the right mid abdomen. Normal bowel enhancement pattern. No evidence of closed loop bowel obstruction. Postsurgical changes including multiple clips visualized in the stomach.  PERITONEUM: Diffuse ascites.  VESSELS: Atherosclerotic calcifications.  RETROPERITONEUM/LYMPH NODES: No lymphadenopathy.    ABDOMINAL WALL: Within normal limits.  BONES: Multilevel degenerative changes with disc space narrowing and osteophyte formation. Anterolisthesis of L3 on L4.    IMPRESSION:     Small bowel obstruction with a transition point in the right mid abdomen.    Bilateral groundglass opacities in the visualized portions of the lung, right greater than left. Likely represents pulmonary edema.     < end of copied text >

## 2020-03-16 NOTE — ED ADULT TRIAGE NOTE - CHIEF COMPLAINT QUOTE
Pt. brought to [facility] ED from [facility] for abdominal pain. Abdominal pain started at 0300. Denies diarrhea. Tarry vomitus noted by EMS. A & O x 3. Abdomen is distended. #20 S/L noted in left forearm. NAD noted. Pt. has history of SBO's Pt. had non-productive cough x 1 month ago. NAD noted. Pt. appears comfortable. Pt. brought to [facility] ED from [facility] for abdominal pain. Abdominal pain started at 0300. Denies diarrhea. Tarry vomitus noted by EMS. A & O x 3. Abdomen is distended. #20 S/L noted in left forearm. NAD noted. Pt. has history of SBO's and abdominal surgeries. Pt. had non-productive cough x 1 month ago. NAD noted. Pt. appears comfortable.

## 2020-03-16 NOTE — H&P ADULT - NSHPPHYSICALEXAM_GEN_ALL_CORE
ICU Vital Signs Last 24 Hrs  T(C): 36.6 (16 Mar 2020 11:12), Max: 36.7 (16 Mar 2020 07:44)  T(F): 97.8 (16 Mar 2020 11:12), Max: 98.1 (16 Mar 2020 07:44)  HR: 58 (16 Mar 2020 11:07) (58 - 60)  BP: 161/63 (16 Mar 2020 11:07) (161/63 - 164/66)  BP(mean): --  ABP: --  ABP(mean): --  RR: 16 (16 Mar 2020 11:07) (16 - 16)  SpO2: 99% (16 Mar 2020 11:07) (99% - 100%)    PHYSICAL EXAM:  GEN: NAD, resting quietly  PULM: symmetric chest rise bilaterally, no increased WOB  CV: regular rate, peripheral pulses intact  ABD: softly distended, LLQ TTP, no rebound, no guarding  EXTR: no cyanosis or edema, moving all extremities

## 2020-03-16 NOTE — ED ADULT NURSE NOTE - CHIEF COMPLAINT QUOTE
Pt. brought to [facility] ED from [facility] for abdominal pain. Abdominal pain started at 0300. Denies diarrhea. Tarry vomitus noted by EMS. A & O x 3. Abdomen is distended. #20 S/L noted in left forearm. NAD noted. Pt. has history of SBO's and abdominal surgeries. Pt. had non-productive cough x 1 month ago. NAD noted. Pt. appears comfortable.

## 2020-03-16 NOTE — H&P ADULT - ASSESSMENT
89 yo woman hx of partial esophagogastrectomy, J-tube placement (October 2019), J tube removed December 2019, presenting with SBO and significant stool burden.     - admit to A Team, Dr. Marcelo Jang  - will place NGT in ED  - NPO, IVF  - no standing pain medication  - bowel regimen, enemas  - VTE PPx: lovenox  - cont home meds: rectal ASA, metoprolol, amlodipine, PPI, levothyroxine    Patient discussed with Dr. Suhail ALBERT Team Surgery  [id number]

## 2020-03-16 NOTE — ED PROVIDER NOTE - CLINICAL SUMMARY MEDICAL DECISION MAKING FREE TEXT BOX
89F PMH AF, s/p hysterectomy, s/p appendectomy, bowel obstructions s/p colectomy presents with abdominal pain.  CT abdomen.  Labs.  UA.  Antiemetics, acetaminophen.  Reassess.

## 2020-03-16 NOTE — ED ADULT NURSE REASSESSMENT NOTE - NS ED NURSE REASSESS COMMENT FT1
Received report at 2200 from CHIP Damian. Patient has NG tube present, constant suction on, 700mL collected. Patient has maintenance fluids running. Patient vital signs as noted. Patient medicated as per MD order. Patient in no acute distress, respirations even and unlabored. Report given to floor, patient being transported at present time.

## 2020-03-17 LAB
ANION GAP SERPL CALC-SCNC: 16 MMO/L — HIGH (ref 7–14)
BUN SERPL-MCNC: 28 MG/DL — HIGH (ref 7–23)
CALCIUM SERPL-MCNC: 8.6 MG/DL — SIGNIFICANT CHANGE UP (ref 8.4–10.5)
CHLORIDE SERPL-SCNC: 103 MMOL/L — SIGNIFICANT CHANGE UP (ref 98–107)
CO2 SERPL-SCNC: 25 MMOL/L — SIGNIFICANT CHANGE UP (ref 22–31)
CREAT SERPL-MCNC: 0.58 MG/DL — SIGNIFICANT CHANGE UP (ref 0.5–1.3)
CULTURE RESULTS: SIGNIFICANT CHANGE UP
GLUCOSE SERPL-MCNC: 83 MG/DL — SIGNIFICANT CHANGE UP (ref 70–99)
HCT VFR BLD CALC: 31.2 % — LOW (ref 34.5–45)
HGB BLD-MCNC: 10.3 G/DL — LOW (ref 11.5–15.5)
MAGNESIUM SERPL-MCNC: 1.6 MG/DL — SIGNIFICANT CHANGE UP (ref 1.6–2.6)
MCHC RBC-ENTMCNC: 30.9 PG — SIGNIFICANT CHANGE UP (ref 27–34)
MCHC RBC-ENTMCNC: 33 % — SIGNIFICANT CHANGE UP (ref 32–36)
MCV RBC AUTO: 93.7 FL — SIGNIFICANT CHANGE UP (ref 80–100)
NRBC # FLD: 0 K/UL — SIGNIFICANT CHANGE UP (ref 0–0)
PHOSPHATE SERPL-MCNC: 4 MG/DL — SIGNIFICANT CHANGE UP (ref 2.5–4.5)
PLATELET # BLD AUTO: 211 K/UL — SIGNIFICANT CHANGE UP (ref 150–400)
PMV BLD: 10.8 FL — SIGNIFICANT CHANGE UP (ref 7–13)
POTASSIUM SERPL-MCNC: 3.5 MMOL/L — SIGNIFICANT CHANGE UP (ref 3.5–5.3)
POTASSIUM SERPL-SCNC: 3.5 MMOL/L — SIGNIFICANT CHANGE UP (ref 3.5–5.3)
RBC # BLD: 3.33 M/UL — LOW (ref 3.8–5.2)
RBC # FLD: 13.2 % — SIGNIFICANT CHANGE UP (ref 10.3–14.5)
SODIUM SERPL-SCNC: 144 MMOL/L — SIGNIFICANT CHANGE UP (ref 135–145)
SPECIMEN SOURCE: SIGNIFICANT CHANGE UP
WBC # BLD: 5.17 K/UL — SIGNIFICANT CHANGE UP (ref 3.8–10.5)
WBC # FLD AUTO: 5.17 K/UL — SIGNIFICANT CHANGE UP (ref 3.8–10.5)

## 2020-03-17 PROCEDURE — 74019 RADEX ABDOMEN 2 VIEWS: CPT | Mod: 26

## 2020-03-17 PROCEDURE — 71045 X-RAY EXAM CHEST 1 VIEW: CPT | Mod: 26

## 2020-03-17 RX ORDER — PANTOPRAZOLE SODIUM 20 MG/1
40 TABLET, DELAYED RELEASE ORAL DAILY
Refills: 0 | Status: DISCONTINUED | OUTPATIENT
Start: 2020-03-17 | End: 2020-03-19

## 2020-03-17 RX ORDER — POTASSIUM CHLORIDE 20 MEQ
10 PACKET (EA) ORAL
Refills: 0 | Status: COMPLETED | OUTPATIENT
Start: 2020-03-17 | End: 2020-03-17

## 2020-03-17 RX ORDER — HYDRALAZINE HCL 50 MG
10 TABLET ORAL ONCE
Refills: 0 | Status: COMPLETED | OUTPATIENT
Start: 2020-03-17 | End: 2020-03-17

## 2020-03-17 RX ORDER — MAGNESIUM SULFATE 500 MG/ML
2 VIAL (ML) INJECTION ONCE
Refills: 0 | Status: COMPLETED | OUTPATIENT
Start: 2020-03-17 | End: 2020-03-17

## 2020-03-17 RX ORDER — DEXTROSE MONOHYDRATE, SODIUM CHLORIDE, AND POTASSIUM CHLORIDE 50; .745; 4.5 G/1000ML; G/1000ML; G/1000ML
1000 INJECTION, SOLUTION INTRAVENOUS
Refills: 0 | Status: DISCONTINUED | OUTPATIENT
Start: 2020-03-17 | End: 2020-03-17

## 2020-03-17 RX ORDER — ACETAMINOPHEN 500 MG
750 TABLET ORAL ONCE
Refills: 0 | Status: COMPLETED | OUTPATIENT
Start: 2020-03-17 | End: 2020-03-17

## 2020-03-17 RX ORDER — DEXTROSE MONOHYDRATE, SODIUM CHLORIDE, AND POTASSIUM CHLORIDE 50; .745; 4.5 G/1000ML; G/1000ML; G/1000ML
1000 INJECTION, SOLUTION INTRAVENOUS
Refills: 0 | Status: DISCONTINUED | OUTPATIENT
Start: 2020-03-17 | End: 2020-03-20

## 2020-03-17 RX ADMIN — Medication 10 MILLIGRAM(S): at 01:08

## 2020-03-17 RX ADMIN — Medication 100 MILLIEQUIVALENT(S): at 10:56

## 2020-03-17 RX ADMIN — Medication 5 MILLIGRAM(S): at 10:59

## 2020-03-17 RX ADMIN — Medication 100 MILLIEQUIVALENT(S): at 12:23

## 2020-03-17 RX ADMIN — Medication 300 MILLIGRAM(S): at 22:12

## 2020-03-17 RX ADMIN — Medication 750 MILLIGRAM(S): at 03:26

## 2020-03-17 RX ADMIN — DEXTROSE MONOHYDRATE, SODIUM CHLORIDE, AND POTASSIUM CHLORIDE 100 MILLILITER(S): 50; .745; 4.5 INJECTION, SOLUTION INTRAVENOUS at 14:50

## 2020-03-17 RX ADMIN — Medication 10 MILLIGRAM(S): at 22:27

## 2020-03-17 RX ADMIN — ENOXAPARIN SODIUM 40 MILLIGRAM(S): 100 INJECTION SUBCUTANEOUS at 12:24

## 2020-03-17 RX ADMIN — Medication 70 MICROGRAM(S): at 22:12

## 2020-03-17 RX ADMIN — Medication 300 MILLIGRAM(S): at 03:00

## 2020-03-17 RX ADMIN — Medication 5 MILLIGRAM(S): at 04:50

## 2020-03-17 RX ADMIN — PANTOPRAZOLE SODIUM 40 MILLIGRAM(S): 20 TABLET, DELAYED RELEASE ORAL at 05:21

## 2020-03-17 RX ADMIN — SODIUM CHLORIDE 100 MILLILITER(S): 9 INJECTION, SOLUTION INTRAVENOUS at 05:20

## 2020-03-17 RX ADMIN — DEXTROSE MONOHYDRATE, SODIUM CHLORIDE, AND POTASSIUM CHLORIDE 100 MILLILITER(S): 50; .745; 4.5 INJECTION, SOLUTION INTRAVENOUS at 10:46

## 2020-03-17 RX ADMIN — Medication 5 MILLIGRAM(S): at 17:55

## 2020-03-17 RX ADMIN — Medication 50 GRAM(S): at 14:32

## 2020-03-17 NOTE — PHYSICAL THERAPY INITIAL EVALUATION ADULT - GENERAL OBSERVATIONS, REHAB EVAL
Patient received seated out of bed in a chair , (+) NG tube,(+) IV , (+) lopez , (+) distended abdomen , in no apparent distress , CHIP Castañeda is in the room.

## 2020-03-17 NOTE — PHYSICAL THERAPY INITIAL EVALUATION ADULT - GAIT DISTANCE, PT EVAL
forward and 5 feet back wards ,further distance restricted due to NG tube ,as per RN Maddy NG tube can't be clamped at this time./5 feet

## 2020-03-17 NOTE — PHYSICAL THERAPY INITIAL EVALUATION ADULT - PERTINENT HX OF CURRENT PROBLEM, REHAB EVAL
This is a 89 yo woman hx of partial esophagogastrectomy, J-tube placement (October 2019), J tube removed December 2019, presenting with SBO and significant stool burden.

## 2020-03-17 NOTE — PROGRESS NOTE ADULT - SUBJECTIVE AND OBJECTIVE BOX
A team Surgery  Pager: [phone]    Hospital Day #1 admitted with SBO.  History of partial esophagogastrectomy, J-Tube Placement (October 2019)      INTERVAL EVENTS/SUBJECTIVE: Patient resting comfortably, states she has had flatus overnight.  no bowel movement. no complaint of abdominal pain, no nausea no vomiting.    ______________________________________________  OBJECTIVE:   T(C): 36.9 (03-17-20 @ 04:50), Max: 36.9 (03-17-20 @ 04:50)  HR: 64 (03-17-20 @ 05:10) (58 - 72)  BP: 165/80 (03-17-20 @ 05:10) (129/75 - 181/89)  RR: 16 (03-17-20 @ 04:50) (16 - 20)  SpO2: 97% (03-17-20 @ 04:50) (97% - 99%)  Wt(kg): --  CAPILLARY BLOOD GLUCOSE    I&O's Detail    16 Mar 2020 07:01  -  17 Mar 2020 07:00  --------------------------------------------------------  IN:    IV PiggyBack: 75 mL    lactated ringers.: 800 mL  Total IN: 875 mL    OUT:    Nasoenteral Tube: 700 mL    Voided: 350 mL  Total OUT: 1050 mL    Total NET: -175 mL      Physical exam:   General: Alert and [place] x3, resting comfortably  Abd: soft, moderately distended, no pain on palpation throughout, tympanitic  ______________________________________________        BMP (03-17 @ 06:20)             144     |  103     |  28<H> 		Ca++ --      Ca 8.6                ---------------------------------( 83    		Mg 1.6                3.5     |  25      |  0.58  			Ph 4.0

## 2020-03-17 NOTE — PROGRESS NOTE ADULT - ASSESSMENT
Plan: 89y.o. F admitted with SBO.  History of partial esophagogastrectomy, J-Tube Placement (October 2019)  - Continue NGT  - Continue IV fluids  - OOB, ambulate  - Follow up GI function    DOUGLAS Dailey  A team surgery  [phone]

## 2020-03-18 LAB
ANION GAP SERPL CALC-SCNC: 10 MMO/L — SIGNIFICANT CHANGE UP (ref 7–14)
BUN SERPL-MCNC: 19 MG/DL — SIGNIFICANT CHANGE UP (ref 7–23)
CALCIUM SERPL-MCNC: 8.4 MG/DL — SIGNIFICANT CHANGE UP (ref 8.4–10.5)
CHLORIDE SERPL-SCNC: 103 MMOL/L — SIGNIFICANT CHANGE UP (ref 98–107)
CO2 SERPL-SCNC: 26 MMOL/L — SIGNIFICANT CHANGE UP (ref 22–31)
CREAT SERPL-MCNC: 0.47 MG/DL — LOW (ref 0.5–1.3)
GLUCOSE SERPL-MCNC: 139 MG/DL — HIGH (ref 70–99)
HCT VFR BLD CALC: 29.5 % — LOW (ref 34.5–45)
HGB BLD-MCNC: 10 G/DL — LOW (ref 11.5–15.5)
MAGNESIUM SERPL-MCNC: 1.7 MG/DL — SIGNIFICANT CHANGE UP (ref 1.6–2.6)
MCHC RBC-ENTMCNC: 31.5 PG — SIGNIFICANT CHANGE UP (ref 27–34)
MCHC RBC-ENTMCNC: 33.9 % — SIGNIFICANT CHANGE UP (ref 32–36)
MCV RBC AUTO: 93.1 FL — SIGNIFICANT CHANGE UP (ref 80–100)
NRBC # FLD: 0 K/UL — SIGNIFICANT CHANGE UP (ref 0–0)
PHOSPHATE SERPL-MCNC: 2.8 MG/DL — SIGNIFICANT CHANGE UP (ref 2.5–4.5)
PLATELET # BLD AUTO: 231 K/UL — SIGNIFICANT CHANGE UP (ref 150–400)
PMV BLD: 10.6 FL — SIGNIFICANT CHANGE UP (ref 7–13)
POTASSIUM SERPL-MCNC: 3.5 MMOL/L — SIGNIFICANT CHANGE UP (ref 3.5–5.3)
POTASSIUM SERPL-SCNC: 3.5 MMOL/L — SIGNIFICANT CHANGE UP (ref 3.5–5.3)
RBC # BLD: 3.17 M/UL — LOW (ref 3.8–5.2)
RBC # FLD: 13.1 % — SIGNIFICANT CHANGE UP (ref 10.3–14.5)
SODIUM SERPL-SCNC: 139 MMOL/L — SIGNIFICANT CHANGE UP (ref 135–145)
WBC # BLD: 3.88 K/UL — SIGNIFICANT CHANGE UP (ref 3.8–10.5)
WBC # FLD AUTO: 3.88 K/UL — SIGNIFICANT CHANGE UP (ref 3.8–10.5)

## 2020-03-18 RX ORDER — MAGNESIUM SULFATE 500 MG/ML
2 VIAL (ML) INJECTION ONCE
Refills: 0 | Status: COMPLETED | OUTPATIENT
Start: 2020-03-18 | End: 2020-03-18

## 2020-03-18 RX ORDER — ACETAMINOPHEN 500 MG
650 TABLET ORAL ONCE
Refills: 0 | Status: COMPLETED | OUTPATIENT
Start: 2020-03-18 | End: 2020-03-18

## 2020-03-18 RX ORDER — POTASSIUM CHLORIDE 20 MEQ
20 PACKET (EA) ORAL
Refills: 0 | Status: COMPLETED | OUTPATIENT
Start: 2020-03-18 | End: 2020-03-18

## 2020-03-18 RX ORDER — HYDRALAZINE HCL 50 MG
10 TABLET ORAL ONCE
Refills: 0 | Status: COMPLETED | OUTPATIENT
Start: 2020-03-18 | End: 2020-03-18

## 2020-03-18 RX ORDER — ACETAMINOPHEN 500 MG
1000 TABLET ORAL ONCE
Refills: 0 | Status: DISCONTINUED | OUTPATIENT
Start: 2020-03-18 | End: 2020-03-18

## 2020-03-18 RX ORDER — TETRACAINE/BENZOCAINE/BUTAMBEN 2%-14%-2%
1 OINTMENT (GRAM) TOPICAL THREE TIMES A DAY
Refills: 0 | Status: DISCONTINUED | OUTPATIENT
Start: 2020-03-18 | End: 2020-03-19

## 2020-03-18 RX ORDER — POTASSIUM CHLORIDE 20 MEQ
10 PACKET (EA) ORAL
Refills: 0 | Status: DISCONTINUED | OUTPATIENT
Start: 2020-03-18 | End: 2020-03-18

## 2020-03-18 RX ADMIN — Medication 70 MICROGRAM(S): at 22:40

## 2020-03-18 RX ADMIN — Medication 10 MILLIGRAM(S): at 19:33

## 2020-03-18 RX ADMIN — Medication 300 MILLIGRAM(S): at 22:40

## 2020-03-18 RX ADMIN — Medication 260 MILLIGRAM(S): at 22:56

## 2020-03-18 RX ADMIN — Medication 650 MILLIGRAM(S): at 23:26

## 2020-03-18 RX ADMIN — Medication 50 GRAM(S): at 17:56

## 2020-03-18 RX ADMIN — ENOXAPARIN SODIUM 40 MILLIGRAM(S): 100 INJECTION SUBCUTANEOUS at 12:37

## 2020-03-18 RX ADMIN — Medication 5 MILLIGRAM(S): at 16:56

## 2020-03-18 RX ADMIN — PANTOPRAZOLE SODIUM 40 MILLIGRAM(S): 20 TABLET, DELAYED RELEASE ORAL at 12:37

## 2020-03-18 RX ADMIN — DEXTROSE MONOHYDRATE, SODIUM CHLORIDE, AND POTASSIUM CHLORIDE 30 MILLILITER(S): 50; .745; 4.5 INJECTION, SOLUTION INTRAVENOUS at 16:48

## 2020-03-18 RX ADMIN — Medication 20 MILLIEQUIVALENT(S): at 17:55

## 2020-03-18 NOTE — PROGRESS NOTE ADULT - SUBJECTIVE AND OBJECTIVE BOX
INTERVAL HPI/OVERNIGHT EVENTS: Pt seen and examined. Pt states having large BM this morning. Denies N/V.    MEDICATIONS  (STANDING):  aspirin Suppository 300 milliGRAM(s) Rectal daily  dextrose 5% + sodium chloride 0.45% with potassium chloride 20 mEq/L 1000 milliLiter(s) (100 mL/Hr) IV Continuous <Continuous>  enoxaparin Injectable 40 milliGRAM(s) SubCutaneous daily  levothyroxine Injectable 70 MICROGram(s) IV Push at bedtime  metoprolol tartrate Injectable 5 milliGRAM(s) IV Push every 6 hours  pantoprazole  Injectable 40 milliGRAM(s) IV Push daily    MEDICATIONS  (PRN):  tetracaine/benzocaine/butamben Spray 1 Spray(s) Topical three times a day PRN NGT discomfort      Vital Signs Last 24 Hrs  T(C): 36.3 (18 Mar 2020 10:01), Max: 36.6 (17 Mar 2020 18:00)  T(F): 97.3 (18 Mar 2020 10:01), Max: 97.9 (17 Mar 2020 18:00)  HR: 57 (18 Mar 2020 10:01) (57 - 63)  BP: 165/86 (18 Mar 2020 10:01) (150/72 - 181/83)  BP(mean): --  RR: 16 (18 Mar 2020 10:01) (16 - 18)  SpO2: 98% (18 Mar 2020 10:01) (98% - 100%)    PHYSICAL EXAM:      Constitutional: NAD    Gastrointestinal: Abd soft, NT, distended          I&O's Detail    17 Mar 2020 07:01  -  18 Mar 2020 07:00  --------------------------------------------------------  IN:    dextrose 5% + sodium chloride 0.45% with potassium chloride 20 mEq/L: 1600 mL    dextrose 5% + sodium chloride 0.9% with potassium chloride 20 mEq/L: 400 mL    IV PiggyBack: 200 mL  Total IN: 2200 mL    OUT:    Nasoenteral Tube: 550 mL    Voided: 1000 mL  Total OUT: 1550 mL    Total NET: 650 mL      18 Mar 2020 07:01  -  18 Mar 2020 11:15  --------------------------------------------------------  IN:  Total IN: 0 mL    OUT:    Voided: 200 mL  Total OUT: 200 mL    Total NET: -200 mL          LABS:                        10.0   3.88  )-----------( 231      ( 18 Mar 2020 07:45 )             29.5     03-18    139  |  103  |  19  ----------------------------<  139<H>  3.5   |  26  |  0.47<L>    Ca    8.4      18 Mar 2020 07:45  Phos  2.8     03-18  Mg     1.7     03-18        Urinalysis Basic - ( 16 Mar 2020 18:20 )    Color: YELLOW / Appearance: CLEAR / SG: > 1.040 / pH: 6.5  Gluc: NEGATIVE / Ketone: NEGATIVE  / Bili: NEGATIVE / Urobili: TRACE   Blood: SMALL / Protein: 100 / Nitrite: NEGATIVE   Leuk Esterase: NEGATIVE / RBC: 3-5 / WBC 3-5   Sq Epi: x / Non Sq Epi: x / Bacteria: FEW        RADIOLOGY & ADDITIONAL STUDIES:

## 2020-03-18 NOTE — PROGRESS NOTE ADULT - ASSESSMENT
Plan: 89y.o. F admitted with SBO.  History of partial esophagogastrectomy, J-Tube Placement (October 2019)    - am labs  - Adv to [facility]D  - Continue IV fluids  - OOB, ambulate  - Plan for nursing home   - Follow up GI function      A team surgery  [id number]

## 2020-03-18 NOTE — PROGRESS NOTE ADULT - ASSESSMENT
Plan: 89y.o. F admitted with SBO.  History of partial esophagogastrectomy, J-Tube Placement (October 2019)  - Continue NGT  - Continue IV fluids  - OOB, ambulate  - Follow up GI function      A team surgery  [id number]

## 2020-03-18 NOTE — PROGRESS NOTE ADULT - SUBJECTIVE AND OBJECTIVE BOX
Interval events: Hypertensive overnight, received IV hydralazine x1 instead of standing IV metoprolol 2/2 slight bradycardia (HR 58).    S: No acute events overnight. Patient remains NPO w/NGT.     O: Vital Signs  T(C): 36.4 (03-18 @ 02:00), Max: 36.9 (03-17 @ 04:50)  HR: 61 (03-18 @ 02:00) (58 - 72)  BP: 166/81 (03-18 @ 02:00) (141/82 - 181/83)  RR: 16 (03-18 @ 02:00) (16 - 18)  SpO2: 98% (03-18 @ 02:00) (97% - 100%)  03-16-20 @ 07:01  -  03-17-20 @ 07:00  --------------------------------------------------------  IN: 875 mL / OUT: 1050 mL / NET: -175 mL    03-17-20 @ 07:01  -  03-18-20 @ 03:29  --------------------------------------------------------  IN: 1900 mL / OUT: 1225 mL / NET: 675 mL      General: alert and oriented, NAD  Resp: airway patent, respirations unlabored  CVS: regular rate and rhythm  Abd: soft, moderately distended, no pain on palpation throughout, tympanitic  Extremities: no edema  Skin: warm, dry, appropriate color                          10.3   5.17  )-----------( 211      ( 17 Mar 2020 06:20 )             31.2   03-17    144  |  103  |  28<H>  ----------------------------<  83  3.5   |  25  |  0.58    Ca    8.6      17 Mar 2020 06:20  Phos  4.0     03-17  Mg     1.6     03-17    TPro  6.4  /  Alb  3.4  /  TBili  0.4  /  DBili  x   /  AST  13  /  ALT  9   /  AlkPhos  125<H>  03-16

## 2020-03-18 NOTE — PROGRESS NOTE ADULT - ASSESSMENT
89F with h/o multiple abdominal surgeries presents with SBO s/p NG decompression, now with bowel fxn, NGT removed but abd still distended    - Sips of clears  - IV hydration  - OOB/amb with assistance

## 2020-03-18 NOTE — PROGRESS NOTE ADULT - SUBJECTIVE AND OBJECTIVE BOX
Patient seen and examined. No overnight events. Patient feels well,, oob/ambi, pain controlled, denies f/c/n/v/d/sob, +flatus/BM        SUBJECTIVE    OVERNIGHT EVENTS:    10-point review of systems completed and negative except as noted above.      OBJECTIVE    MEDICATIONS  aspirin Suppository 300 milliGRAM(s) Rectal daily  dextrose 5% + sodium chloride 0.45% with potassium chloride 20 mEq/L 1000 milliLiter(s) IV Continuous <Continuous>  enoxaparin Injectable 40 milliGRAM(s) SubCutaneous daily  levothyroxine Injectable 70 MICROGram(s) IV Push at bedtime  metoprolol tartrate Injectable 5 milliGRAM(s) IV Push every 6 hours  pantoprazole  Injectable 40 milliGRAM(s) IV Push daily  tetracaine/benzocaine/butamben Spray 1 Spray(s) Topical three times a day PRN      PHYSICAL EXAM  T(C): 36.6 (03-18-20 @ 05:00), Max: 36.6 (03-17-20 @ 10:19)  HR: 58 (03-18-20 @ 05:00) (58 - 72)  BP: 156/77 (03-18-20 @ 05:00) (141/82 - 181/83)  RR: 16 (03-18-20 @ 05:00) (16 - 18)  SpO2: 99% (03-18-20 @ 05:00) (98% - 100%)    03-17-20 @ 07:01  -  03-18-20 @ 07:00  --------------------------------------------------------  IN: 2200 mL / OUT: 1550 mL / NET: 650 mL        General: Appears well, NAD  Neuro: AAOx3  CHEST: Clear to auscultation bilaterally  CV: Regular rate and rhythm  Abdomen: soft, nontender, mild distention, no rebound or guarding  Extremities: Grossly symmetric    LABS                        10.0   3.88  )-----------( 231      ( 18 Mar 2020 07:45 )             29.5     03-18    139  |  103  |  19  ----------------------------<  139<H>  3.5   |  26  |  0.47<L>    Ca    8.4      18 Mar 2020 07:45  Phos  2.8     03-18  Mg     1.7     03-18        Urinalysis Basic - ( 16 Mar 2020 18:20 )    Color: YELLOW / Appearance: CLEAR / SG: > 1.040 / pH: 6.5  Gluc: NEGATIVE / Ketone: NEGATIVE  / Bili: NEGATIVE / Urobili: TRACE   Blood: SMALL / Protein: 100 / Nitrite: NEGATIVE   Leuk Esterase: NEGATIVE / RBC: 3-5 / WBC 3-5   Sq Epi: x / Non Sq Epi: x / Bacteria: FEW        RADIOLOGY & ADDITIONAL STUDIES

## 2020-03-19 LAB
ANION GAP SERPL CALC-SCNC: 14 MMO/L — SIGNIFICANT CHANGE UP (ref 7–14)
BUN SERPL-MCNC: 16 MG/DL — SIGNIFICANT CHANGE UP (ref 7–23)
CALCIUM SERPL-MCNC: 8.6 MG/DL — SIGNIFICANT CHANGE UP (ref 8.4–10.5)
CHLORIDE SERPL-SCNC: 102 MMOL/L — SIGNIFICANT CHANGE UP (ref 98–107)
CO2 SERPL-SCNC: 22 MMOL/L — SIGNIFICANT CHANGE UP (ref 22–31)
CREAT SERPL-MCNC: 0.46 MG/DL — LOW (ref 0.5–1.3)
GLUCOSE SERPL-MCNC: 114 MG/DL — HIGH (ref 70–99)
HCT VFR BLD CALC: 33.4 % — LOW (ref 34.5–45)
HGB BLD-MCNC: 11 G/DL — LOW (ref 11.5–15.5)
MAGNESIUM SERPL-MCNC: 2 MG/DL — SIGNIFICANT CHANGE UP (ref 1.6–2.6)
MCHC RBC-ENTMCNC: 30.3 PG — SIGNIFICANT CHANGE UP (ref 27–34)
MCHC RBC-ENTMCNC: 32.9 % — SIGNIFICANT CHANGE UP (ref 32–36)
MCV RBC AUTO: 92 FL — SIGNIFICANT CHANGE UP (ref 80–100)
NRBC # FLD: 0 K/UL — SIGNIFICANT CHANGE UP (ref 0–0)
PHOSPHATE SERPL-MCNC: 2.8 MG/DL — SIGNIFICANT CHANGE UP (ref 2.5–4.5)
PLATELET # BLD AUTO: 239 K/UL — SIGNIFICANT CHANGE UP (ref 150–400)
PMV BLD: 10.3 FL — SIGNIFICANT CHANGE UP (ref 7–13)
POTASSIUM SERPL-MCNC: 4.4 MMOL/L — SIGNIFICANT CHANGE UP (ref 3.5–5.3)
POTASSIUM SERPL-SCNC: 4.4 MMOL/L — SIGNIFICANT CHANGE UP (ref 3.5–5.3)
RBC # BLD: 3.63 M/UL — LOW (ref 3.8–5.2)
RBC # FLD: 12.9 % — SIGNIFICANT CHANGE UP (ref 10.3–14.5)
SODIUM SERPL-SCNC: 138 MMOL/L — SIGNIFICANT CHANGE UP (ref 135–145)
WBC # BLD: 4.68 K/UL — SIGNIFICANT CHANGE UP (ref 3.8–10.5)
WBC # FLD AUTO: 4.68 K/UL — SIGNIFICANT CHANGE UP (ref 3.8–10.5)

## 2020-03-19 PROCEDURE — 74018 RADEX ABDOMEN 1 VIEW: CPT | Mod: 26,76

## 2020-03-19 PROCEDURE — 70450 CT HEAD/BRAIN W/O DYE: CPT | Mod: 26

## 2020-03-19 RX ORDER — AMLODIPINE BESYLATE 2.5 MG/1
5 TABLET ORAL DAILY
Refills: 0 | Status: DISCONTINUED | OUTPATIENT
Start: 2020-03-19 | End: 2020-03-20

## 2020-03-19 RX ORDER — ASPIRIN/CALCIUM CARB/MAGNESIUM 324 MG
81 TABLET ORAL DAILY
Refills: 0 | Status: DISCONTINUED | OUTPATIENT
Start: 2020-03-19 | End: 2020-03-20

## 2020-03-19 RX ORDER — HYDRALAZINE HCL 50 MG
10 TABLET ORAL ONCE
Refills: 0 | Status: COMPLETED | OUTPATIENT
Start: 2020-03-19 | End: 2020-03-19

## 2020-03-19 RX ORDER — PANTOPRAZOLE SODIUM 20 MG/1
40 TABLET, DELAYED RELEASE ORAL
Refills: 0 | Status: DISCONTINUED | OUTPATIENT
Start: 2020-03-19 | End: 2020-03-20

## 2020-03-19 RX ORDER — MAGNESIUM SULFATE 500 MG/ML
2 VIAL (ML) INJECTION ONCE
Refills: 0 | Status: DISCONTINUED | OUTPATIENT
Start: 2020-03-19 | End: 2020-03-19

## 2020-03-19 RX ORDER — METOPROLOL TARTRATE 50 MG
12.5 TABLET ORAL DAILY
Refills: 0 | Status: DISCONTINUED | OUTPATIENT
Start: 2020-03-19 | End: 2020-03-20

## 2020-03-19 RX ORDER — POTASSIUM CHLORIDE 20 MEQ
10 PACKET (EA) ORAL
Refills: 0 | Status: DISCONTINUED | OUTPATIENT
Start: 2020-03-19 | End: 2020-03-19

## 2020-03-19 RX ORDER — LEVOTHYROXINE SODIUM 125 MCG
137 TABLET ORAL DAILY
Refills: 0 | Status: DISCONTINUED | OUTPATIENT
Start: 2020-03-19 | End: 2020-03-20

## 2020-03-19 RX ADMIN — PANTOPRAZOLE SODIUM 40 MILLIGRAM(S): 20 TABLET, DELAYED RELEASE ORAL at 14:18

## 2020-03-19 RX ADMIN — Medication 12.5 MILLIGRAM(S): at 14:19

## 2020-03-19 RX ADMIN — DEXTROSE MONOHYDRATE, SODIUM CHLORIDE, AND POTASSIUM CHLORIDE 30 MILLILITER(S): 50; .745; 4.5 INJECTION, SOLUTION INTRAVENOUS at 09:18

## 2020-03-19 RX ADMIN — Medication 10 MILLIGRAM(S): at 06:25

## 2020-03-19 RX ADMIN — Medication 10 MILLIGRAM(S): at 02:11

## 2020-03-19 RX ADMIN — Medication 81 MILLIGRAM(S): at 18:56

## 2020-03-19 RX ADMIN — ENOXAPARIN SODIUM 40 MILLIGRAM(S): 100 INJECTION SUBCUTANEOUS at 12:17

## 2020-03-19 RX ADMIN — AMLODIPINE BESYLATE 5 MILLIGRAM(S): 2.5 TABLET ORAL at 14:19

## 2020-03-19 NOTE — PROGRESS NOTE ADULT - ASSESSMENT
89F with h/o multiple abdominal surgeries presents with SBO s/p NG decompression, now with bowel fxn, NGT removed but abd still distended    - Backed to NPO  - AM labs  - IV hydration  - OOB/amb with assistance  - check GIF  - dvt ppx 89F with h/o multiple abdominal surgeries presents with SBO s/p NG decompression, now with bowel fxn, NGT removed but abd still distended    - Backed to NPO  - Abdominal xray- dilated loops   - CT head   - AM labs  - IV hydration  - OOB/amb with assistance  - Monitor GI function   - Home HTN meds   - Dvt ppx    A Team [id number]

## 2020-03-19 NOTE — PROGRESS NOTE ADULT - SUBJECTIVE AND OBJECTIVE BOX
Patient seen and examined. overnight had pain and distention in setting of no flatulence (after previous soft belly with flatulence) and pt backed to NPO. denies f/c/n/v/d/sob,      SUBJECTIVE    OVERNIGHT EVENTS:    10-point review of systems completed and negative except as noted above.      OBJECTIVE    MEDICATIONS  aspirin Suppository 300 milliGRAM(s) Rectal daily  dextrose 5% + sodium chloride 0.45% with potassium chloride 20 mEq/L 1000 milliLiter(s) IV Continuous <Continuous>  enoxaparin Injectable 40 milliGRAM(s) SubCutaneous daily  levothyroxine Injectable 70 MICROGram(s) IV Push at bedtime  metoprolol tartrate Injectable 5 milliGRAM(s) IV Push every 6 hours  pantoprazole  Injectable 40 milliGRAM(s) IV Push daily  tetracaine/benzocaine/butamben Spray 1 Spray(s) Topical three times a day PRN      PHYSICAL EXAM  T(C): 36.4 (03-18-20 @ 21:01), Max: 36.6 (03-18-20 @ 05:00)  HR: 57 (03-18-20 @ 21:01) (53 - 62)  BP: 164/72 (03-18-20 @ 21:01) (156/77 - 172/91)  RR: 17 (03-18-20 @ 21:01) (16 - 17)  SpO2: 100% (03-18-20 @ 21:01) (98% - 100%)    03-17-20 @ 07:01  -  03-18-20 @ 07:00  --------------------------------------------------------  IN: 2200 mL / OUT: 1550 mL / NET: 650 mL    03-18-20 @ 07:01  -  03-19-20 @ 01:29  --------------------------------------------------------  IN: 830 mL / OUT: 300 mL / NET: 530 mL        General: Appears well, NAD  Neuro: AAOx3  CHEST: Clear to auscultation bilaterally  CV: Regular rate and rhythm  Abdomen: soft, nontender, distended, no rebound or guarding  Extremities: Grossly symmetric    LABS                        10.0   3.88  )-----------( 231      ( 18 Mar 2020 07:45 )             29.5     03-18    139  |  103  |  19  ----------------------------<  139<H>  3.5   |  26  |  0.47<L>    Ca    8.4      18 Mar 2020 07:45  Phos  2.8     03-18  Mg     1.7     03-18            RADIOLOGY & ADDITIONAL STUDIES SUBJECTIVE  Patient seen and examined. Overnight had pain and distention in setting of no flatulence (after previous soft belly with flatulence) and pt backed to NPO. Denies f/c/n/v/d/sob,      OVERNIGHT EVENTS:    10-point review of systems completed and negative except as noted above.      OBJECTIVE    MEDICATIONS  aspirin Suppository 300 milliGRAM(s) Rectal daily  dextrose 5% + sodium chloride 0.45% with potassium chloride 20 mEq/L 1000 milliLiter(s) IV Continuous <Continuous>  enoxaparin Injectable 40 milliGRAM(s) SubCutaneous daily  levothyroxine Injectable 70 MICROGram(s) IV Push at bedtime  metoprolol tartrate Injectable 5 milliGRAM(s) IV Push every 6 hours  pantoprazole  Injectable 40 milliGRAM(s) IV Push daily  tetracaine/benzocaine/butamben Spray 1 Spray(s) Topical three times a day PRN      PHYSICAL EXAM  T(C): 36.4 (03-18-20 @ 21:01), Max: 36.6 (03-18-20 @ 05:00)  HR: 57 (03-18-20 @ 21:01) (53 - 62)  BP: 164/72 (03-18-20 @ 21:01) (156/77 - 172/91)  RR: 17 (03-18-20 @ 21:01) (16 - 17)  SpO2: 100% (03-18-20 @ 21:01) (98% - 100%)    03-17-20 @ 07:01  -  03-18-20 @ 07:00  --------------------------------------------------------  IN: 2200 mL / OUT: 1550 mL / NET: 650 mL    03-18-20 @ 07:01  -  03-19-20 @ 01:29  --------------------------------------------------------  IN: 830 mL / OUT: 300 mL / NET: 530 mL        General: Appears well, NAD  Neuro: AAOx3  CHEST: Clear to auscultation bilaterally  CV: Regular rate and rhythm  Abdomen: soft, nontender, distended, no rebound or guarding  Extremities: Grossly symmetric    LABS                        10.0   3.88  )-----------( 231      ( 18 Mar 2020 07:45 )             29.5     03-18    139  |  103  |  19  ----------------------------<  139<H>  3.5   |  26  |  0.47<L>    Ca    8.4      18 Mar 2020 07:45  Phos  2.8     03-18  Mg     1.7     03-18            RADIOLOGY & ADDITIONAL STUDIES

## 2020-03-19 NOTE — PROVIDER CONTACT NOTE (OTHER) - RECOMMENDATIONS
MD moss
MD notify
Notify MD
Hydralazine IVP?
Hydralazine IVP?
IVP hydralazine?
Lower IVF. Evaluate B/P medications
MD notified and made aware. Other HTN med? Hydralazine?
Notify MD, IVP hydralazine?
Notify MD. Replace NGT?

## 2020-03-19 NOTE — PROVIDER CONTACT NOTE (OTHER) - ASSESSMENT
/90, HR 59 asymptomatic at this time resting in bed
/91 HR 53
NAD
/82, HR 54. Due for metoprolol have to hold due to HR. no symptoms of hypertension at this time
/84, HR 62. Asymptomatic laying in bed
No s/s of SBO at this time. Denies nausea and no vomiting. Output since 8PM was 125ml. Stomach feels softly distended at this time. Pt states shes been passing gas
No s/s of hypertension. Not due for metoprolol until 4AM
No s/s of hypertension. Unable to give metoprolol due to HR
Patient A&Ox3, no complains of pain, chest pain, SOB, headache, palpitations. NGT in place, abdomen distended. Metropolol IVP given around 22:35 in ED, not due again until 4:00.
Respirations even and unlabored. Denies abdominal pain. Denies dizziness or chest pain. Tolerating sips of clears. Reports +BM.

## 2020-03-19 NOTE — PROVIDER CONTACT NOTE (OTHER) - ACTION/TREATMENT ORDERED:
MD aware, will order hydralazine
MD aware- will evaluate meds
MD will evaluate the meds and order as needed
MD aware, no actions at this time will discuss with team during rounds and decide how to proceed. Continue to monitor
MD aware, no actions at this time, continue to monitor
MD aware, will discuss with team whether NGT will be replaced or not
MD aware, will order one time dose of hydralazine
MD will assess patient medications and will place new orders. Will continue to monitor.
No actions at this time, continue to monitor patient. Notify MD of any changes
Seen and examined by MD.

## 2020-03-20 ENCOUNTER — TRANSCRIPTION ENCOUNTER (OUTPATIENT)
Age: 85
End: 2020-03-20

## 2020-03-20 VITALS
TEMPERATURE: 98 F | HEART RATE: 56 BPM | RESPIRATION RATE: 18 BRPM | DIASTOLIC BLOOD PRESSURE: 84 MMHG | OXYGEN SATURATION: 99 % | SYSTOLIC BLOOD PRESSURE: 168 MMHG

## 2020-03-20 LAB
ANION GAP SERPL CALC-SCNC: 9 MMO/L — SIGNIFICANT CHANGE UP (ref 7–14)
BUN SERPL-MCNC: 16 MG/DL — SIGNIFICANT CHANGE UP (ref 7–23)
CALCIUM SERPL-MCNC: 8.5 MG/DL — SIGNIFICANT CHANGE UP (ref 8.4–10.5)
CHLORIDE SERPL-SCNC: 102 MMOL/L — SIGNIFICANT CHANGE UP (ref 98–107)
CO2 SERPL-SCNC: 27 MMOL/L — SIGNIFICANT CHANGE UP (ref 22–31)
CREAT SERPL-MCNC: 0.53 MG/DL — SIGNIFICANT CHANGE UP (ref 0.5–1.3)
GLUCOSE SERPL-MCNC: 92 MG/DL — SIGNIFICANT CHANGE UP (ref 70–99)
HCT VFR BLD CALC: 35 % — SIGNIFICANT CHANGE UP (ref 34.5–45)
HGB BLD-MCNC: 11.6 G/DL — SIGNIFICANT CHANGE UP (ref 11.5–15.5)
MAGNESIUM SERPL-MCNC: 1.9 MG/DL — SIGNIFICANT CHANGE UP (ref 1.6–2.6)
MCHC RBC-ENTMCNC: 30.6 PG — SIGNIFICANT CHANGE UP (ref 27–34)
MCHC RBC-ENTMCNC: 33.1 % — SIGNIFICANT CHANGE UP (ref 32–36)
MCV RBC AUTO: 92.3 FL — SIGNIFICANT CHANGE UP (ref 80–100)
NRBC # FLD: 0 K/UL — SIGNIFICANT CHANGE UP (ref 0–0)
PHOSPHATE SERPL-MCNC: 3.1 MG/DL — SIGNIFICANT CHANGE UP (ref 2.5–4.5)
PLATELET # BLD AUTO: 226 K/UL — SIGNIFICANT CHANGE UP (ref 150–400)
PMV BLD: 10.8 FL — SIGNIFICANT CHANGE UP (ref 7–13)
POTASSIUM SERPL-MCNC: 4 MMOL/L — SIGNIFICANT CHANGE UP (ref 3.5–5.3)
POTASSIUM SERPL-SCNC: 4 MMOL/L — SIGNIFICANT CHANGE UP (ref 3.5–5.3)
RBC # BLD: 3.79 M/UL — LOW (ref 3.8–5.2)
RBC # FLD: 12.8 % — SIGNIFICANT CHANGE UP (ref 10.3–14.5)
SODIUM SERPL-SCNC: 138 MMOL/L — SIGNIFICANT CHANGE UP (ref 135–145)
WBC # BLD: 3.11 K/UL — LOW (ref 3.8–10.5)
WBC # FLD AUTO: 3.11 K/UL — LOW (ref 3.8–10.5)

## 2020-03-20 PROCEDURE — 99238 HOSP IP/OBS DSCHRG MGMT 30/<: CPT

## 2020-03-20 RX ADMIN — Medication 81 MILLIGRAM(S): at 12:38

## 2020-03-20 RX ADMIN — ENOXAPARIN SODIUM 40 MILLIGRAM(S): 100 INJECTION SUBCUTANEOUS at 12:38

## 2020-03-20 RX ADMIN — PANTOPRAZOLE SODIUM 40 MILLIGRAM(S): 20 TABLET, DELAYED RELEASE ORAL at 05:39

## 2020-03-20 RX ADMIN — Medication 137 MICROGRAM(S): at 05:39

## 2020-03-20 NOTE — DISCHARGE NOTE NURSING/CASE MANAGEMENT/SOCIAL WORK - NSSCTYPOFSERV_GEN_ALL_CORE
Visiting RN to see patient day after discharge and will call prior to visit. Physical therapy to follow another time and will call prior to visit.

## 2020-03-20 NOTE — DISCHARGE NOTE PROVIDER - NSDCCPCAREPLAN_GEN_ALL_CORE_FT
PRINCIPAL DISCHARGE DIAGNOSIS  Diagnosis: Bowel obstruction  Assessment and Plan of Treatment: ACTIVITY: No heavy lifting or straining. Otherwise, you may return to your usual level of physical activity. If you are taking narcotic pain medication DO NOT drive a car, operate machinery or make important decisions.  DIET: Return to your usual diet.  NOTIFY YOUR SURGEON IF YOU HAVE:  any fever (over 100.4 F) persistent nausea/vomiting, or abdominal pain that does not respond to over the counter pain medication, unable to urinate.  Please follow up with your primary care physician in one week regarding your hospitalization, bring copies of your discharge paperwork.  Please follow up with your surgeon, Dr. Marcelo Jang. Please call [phone] to make an appointment.

## 2020-03-20 NOTE — DISCHARGE NOTE PROVIDER - HOSPITAL COURSE
This patient is a 89 yo woman hx of partial esophagogastrectomy, J-tube placement (October 2019), J tube removed December 2019, presenting with SBO and significant stool burden. Patient was admitted to surgery service, made NPO, put on IVF, and had an NGT placed. By hospital day 3, patient began to have bowel function. NGT removed and patient started on clear liquid diet, tolerated well. Patient noted to be confused in the morning of hospital day 4, CT head obtained and No acute intracranial abnormality was noted. By hospital day 5, patient mentating well, tolerating regular diet, voiding, and remains without nausea, vomiting and abdominal pain. Patient stable for discharge home with outpatient follow up.

## 2020-03-20 NOTE — DISCHARGE NOTE NURSING/CASE MANAGEMENT/SOCIAL WORK - NSDCPNINST_GEN_ALL_CORE
Please NOTIFY MD for any of the following signs of infection (Fever >100.4, chills, increased redness, increased bleeding) uncontrolled pain not relieved by pain medications, persistent nausea/vomiting or inability to tolerate diet. No heavy lifting. Please drink 6-8 glasses of water daily to stay hydrated.

## 2020-03-20 NOTE — PROGRESS NOTE ADULT - ATTENDING COMMENTS
Patient seen and examined  Had flatus and a large bowel movement  Denies nausea or vomiting  NGT fell out overnight    Abd is soft, still distended, not tender    - Trial of clear liquids
Patient seen and examined  Passing some flatus earlier today  Denies nausea or vomiting  She feels she is less bloated    On exam: awake, alert  Abd is soft, distended, slight improved  No rebound, no guarding  NGT in place    - Continue NGT to wall suction  - Await more GI function  - Ambulate if tolerated
Patient seen and examined  Tolerating PO intake  Passing flatus and had a large bowel movement today    Abd is soft, less distended, not tender    - Regular diet  - If tolerates, will plan for discharge after lunch
Patient seen and examined  Denies nausea or vomiting  Denies abdominal pain  Had recorded stool this morning    On exam: awake, but confused  Breathing comfortably  Abd is soft, distended (unchanged), not tender    - CT head for AMS  - Clear liquid diet  - Monitor GI function

## 2020-03-20 NOTE — DISCHARGE NOTE PROVIDER - NSDCMRMEDTOKEN_GEN_ALL_CORE_FT
acetaminophen 325 mg oral tablet: 2 tab(s) orally every 6 hours, As needed, Mild Pain (1 - 3), Moderate Pain (4 - 6)  amLODIPine 5 mg oral tablet: 1 tab(s) orally once a day  aspirin 81 mg oral tablet: 1 tab(s) orally once a day  bisacodyl 5 mg oral delayed release tablet: 1 tab(s) orally once a day (at bedtime), As needed, Constipation  metoprolol succinate 25 mg oral tablet, extended release: 0.5 tab(s) orally once a day  Protonix 40 mg oral delayed release tablet: 1 tab(s) orally once a day   Synthroid 137 mcg (0.137 mg) oral tablet: 1 tab(s) orally once a day  **MUST BE GIVEN ON EMTPY STOMACH FOR ADEQUATE ABSORPTION**

## 2020-03-20 NOTE — PROGRESS NOTE ADULT - ASSESSMENT
89F with h/o multiple abdominal surgeries presents with SBO s/p NG decompression, now with bowel fxn, NGT removed but abd still distended      -Regular diet  - OOB/amb with assistance  - Monitor GI function   - Home HTN meds   - Dvt ppx  -Dc home with home PT    A Team [id number]

## 2020-03-20 NOTE — DISCHARGE NOTE PROVIDER - CARE PROVIDER_API CALL
Marcelo Jang)  Surgery  [address] [facility] [address], [address]  [place] [address]  Phone: [phone]  Fax: [phone]  Follow Up Time: Routine

## 2020-03-20 NOTE — PROGRESS NOTE ADULT - SUBJECTIVE AND OBJECTIVE BOX
SUBJECTIVE: Pt seen and examined at bedside with chief resident.        OBJECTIVE:    Vital Signs Last 24 Hrs  T(C): 36.4 (20 Mar 2020 01:54), Max: 36.5 (19 Mar 2020 13:20)  T(F): 97.5 (20 Mar 2020 01:54), Max: 97.7 (19 Mar 2020 13:20)  HR: 59 (20 Mar 2020 01:54) (54 - 65)  BP: 166/89 (20 Mar 2020 01:54) (109/82 - 175/78)  BP(mean): --  RR: 18 (20 Mar 2020 01:54) (16 - 18)  SpO2: 95% (20 Mar 2020 01:54) (95% - 99%)    General Appearance: Resting comfortably, no acute distress  Chest: non-labored breathing, no respiratory distress  CV: Pulse regular presently  Abdomen: Soft, non-tender, non-distended  Extremities: warm and well perfused    I&O's Summary    18 Mar 2020 07:01  -  19 Mar 2020 07:00  --------------------------------------------------------  IN: 1015 mL / OUT: 600 mL / NET: 415 mL    19 Mar 2020 07:01  -  20 Mar 2020 03:37  --------------------------------------------------------  IN: 1030 mL / OUT: 775 mL / NET: 255 mL      I&O's Detail    18 Mar 2020 07:01  -  19 Mar 2020 07:00  --------------------------------------------------------  IN:    dextrose 5% + sodium chloride 0.45% with potassium chloride 20 mEq/L: 700 mL    IV PiggyBack: 115 mL    Oral Fluid: 200 mL  Total IN: 1015 mL    OUT:    Voided: 600 mL  Total OUT: 600 mL    Total NET: 415 mL      19 Mar 2020 07:01  -  20 Mar 2020 03:37  --------------------------------------------------------  IN:    dextrose 5% + sodium chloride 0.45% with potassium chloride 20 mEq/L: 480 mL    Oral Fluid: 550 mL  Total IN: 1030 mL    OUT:    Voided: 775 mL  Total OUT: 775 mL    Total NET: 255 mL          LABS:                        11.0   4.68  )-----------( 239      ( 19 Mar 2020 06:15 )             33.4     03-19    138  |  102  |  16  ----------------------------<  114<H>  4.4   |  22  |  0.46<L>    Ca    8.6      19 Mar 2020 06:15  Phos  2.8     03-19  Mg     2.0     03-19            RADIOLOGY & ADDITIONAL STUDIES: SUBJECTIVE: Pt seen and examined at bedside with chief resident. No acute events overnight. Denies abdominal pain, discomfort, chest pain, SOB, cough, fever, chills, N/D. Patient is passing flatus and having BM.    OBJECTIVE:    Vital Signs Last 24 Hrs  T(C): 36.4 (20 Mar 2020 01:54), Max: 36.5 (19 Mar 2020 13:20)  T(F): 97.5 (20 Mar 2020 01:54), Max: 97.7 (19 Mar 2020 13:20)  HR: 59 (20 Mar 2020 01:54) (54 - 65)  BP: 166/89 (20 Mar 2020 01:54) (109/82 - 175/78)  RR: 18 (20 Mar 2020 01:54) (16 - 18)  SpO2: 95% (20 Mar 2020 01:54) (95% - 99%)    General Appearance: Resting comfortably, no acute distress  Chest: non-labored breathing, no respiratory distress  CV: Pulse regular presently  Abdomen: Softly distended, nontender  Extremities: warm and well perfused    I&O's Summary    18 Mar 2020 07:01  -  19 Mar 2020 07:00  --------------------------------------------------------  IN: 1015 mL / OUT: 600 mL / NET: 415 mL    19 Mar 2020 07:01  -  20 Mar 2020 03:37  --------------------------------------------------------  IN: 1030 mL / OUT: 775 mL / NET: 255 mL      I&O's Detail    18 Mar 2020 07:01  -  19 Mar 2020 07:00  --------------------------------------------------------  IN:    dextrose 5% + sodium chloride 0.45% with potassium chloride 20 mEq/L: 700 mL    IV PiggyBack: 115 mL    Oral Fluid: 200 mL  Total IN: 1015 mL    OUT:    Voided: 600 mL  Total OUT: 600 mL    Total NET: 415 mL      19 Mar 2020 07:01  -  20 Mar 2020 03:37  --------------------------------------------------------  IN:    dextrose 5% + sodium chloride 0.45% with potassium chloride 20 mEq/L: 480 mL    Oral Fluid: 550 mL  Total IN: 1030 mL    OUT:    Voided: 775 mL  Total OUT: 775 mL    Total NET: 255 mL          LABS:                        11.0   4.68  )-----------( 239      ( 19 Mar 2020 06:15 )             33.4     03-19    138  |  102  |  16  ----------------------------<  114<H>  4.4   |  22  |  0.46<L>    Ca    8.6      19 Mar 2020 06:15  Phos  2.8     03-19  Mg     2.0     03-19            RADIOLOGY & ADDITIONAL STUDIES:

## 2020-03-20 NOTE — DISCHARGE NOTE NURSING/CASE MANAGEMENT/SOCIAL WORK - PATIENT PORTAL LINK FT
You can access the FollowMyHealth Patient Portal offered by [facility] by registering at the following website: [email or web address]. By joining [facility]’s FollowMyHealth portal, you will also be able to view your health information using other applications (apps) compatible with our system.

## 2020-03-20 NOTE — PROGRESS NOTE ADULT - ASSESSMENT
89F with multiple abd surgeries presents with SBO. Pt having bowel movements and tolerating PO intake, abd still distended but nontender    - Reg diet - monitor for N/V  - OOB/amb with assistance  - dispo planning to home with PT once tolerating reg diet

## 2020-03-20 NOTE — PROGRESS NOTE ADULT - SUBJECTIVE AND OBJECTIVE BOX
INTERVAL HPI/OVERNIGHT EVENTS: Pt seen and examined. Pt states having BM this morning. Tolerating diet, denies N/V. Denies abd pain    MEDICATIONS  (STANDING):  amLODIPine   Tablet 5 milliGRAM(s) Oral daily  aspirin  chewable 81 milliGRAM(s) Oral daily  enoxaparin Injectable 40 milliGRAM(s) SubCutaneous daily  levothyroxine 137 MICROGram(s) Oral daily  metoprolol succinate ER 12.5 milliGRAM(s) Oral daily  pantoprazole    Tablet 40 milliGRAM(s) Oral before breakfast    MEDICATIONS  (PRN):      Vital Signs Last 24 Hrs  T(C): 36.3 (20 Mar 2020 05:21), Max: 36.5 (19 Mar 2020 13:20)  T(F): 97.4 (20 Mar 2020 05:21), Max: 97.7 (19 Mar 2020 13:20)  HR: 56 (20 Mar 2020 05:21) (56 - 62)  BP: 175/83 (20 Mar 2020 05:21) (139/88 - 175/83)  BP(mean): --  RR: 18 (20 Mar 2020 05:21) (16 - 18)  SpO2: 100% (20 Mar 2020 05:21) (95% - 100%)    PHYSICAL EXAM:      Constitutional: NAD    Respiratory: breathing without increased WOB    Gastrointestinal: abd soft, NT, distended                I&O's Detail    19 Mar 2020 07:01  -  20 Mar 2020 07:00  --------------------------------------------------------  IN:    dextrose 5% + sodium chloride 0.45% with potassium chloride 20 mEq/L: 720 mL    Oral Fluid: 790 mL  Total IN: 1510 mL    OUT:    Voided: 975 mL  Total OUT: 975 mL    Total NET: 535 mL          LABS:                        11.6   3.11  )-----------( 226      ( 20 Mar 2020 05:50 )             35.0     03-20    138  |  102  |  16  ----------------------------<  92  4.0   |  27  |  0.53    Ca    8.5      20 Mar 2020 05:50  Phos  3.1     03-20  Mg     1.9     03-20            RADIOLOGY & ADDITIONAL STUDIES:

## 2020-03-30 NOTE — PATIENT PROFILE ADULT - NSPROMUTPARTICIPCAREFT_GEN_A_NUR
03/29/20 2000 03/29/20 2100   [place] Violence Checklist   Confused 0 0   Irritable 1 0   Boisterous 1 0   Verbal Threats 0 0   Physical Threats 0 0   Attacking Objects 0 0   Total Score (Sum) 2 0   Upon admission, pt tearful, irritable, boisterous, and agitated. PRN PO zyprexa administered for BVC=2 and PRN ativan for anxiety and agitation at 2011, see MAR. Upon reassessment, pt was sleeping.    n/a

## 2020-07-20 ENCOUNTER — APPOINTMENT (OUTPATIENT)
Dept: THORACIC SURGERY | Facility: CLINIC | Age: 85
End: 2020-07-20
Payer: MEDICARE

## 2020-07-20 PROCEDURE — 99443: CPT | Mod: 95

## 2020-07-21 NOTE — CONSULT LETTER
[Dear  ___] : Dear  [unfilled], [Consult Letter:] : I had the pleasure of evaluating your patient, [unfilled]. [( Thank you for referring [unfilled] for consultation for _____ )] : Thank you for referring [unfilled] for consultation for [unfilled] [Please see my note below.] : Please see my note below. [Consult Closing:] : Thank you very much for allowing me to participate in the care of this patient.  If you have any questions, please do not hesitate to contact me. [Sincerely,] : Sincerely, [FreeTextEntry2] : Adria Staton MD  [FreeTextEntry3] : Asif Jang MD, MPH \par System Director of Thoracic Surgery \par Director of Comprehensive Lung and Foregut [place] \par Professor Cardiovascular & Thoracic Surgery  \par [facility] [employer] [facility]\par \par

## 2020-07-21 NOTE — ASSESSMENT
[FreeTextEntry1] : 89 y/o F, never smoker, w/ multiple PMHx including HTN, Achalasia s/p dilation x 2, hiatal hernia s/o repair w/ Mesh, also hx of emergency Volvulus surgical repair in 4/2014.\par \par Now ~5yr s/p EGD CRE balloon dilation of GE junction up to 16mm in size, Botox injection 100 units into the lower esophageal sphincter on 8/10/15. \par \par EGD dilation on 12/22/15.\par EGD, balloon dilation of GE junction on 3/3/16. Path showed JEFFRY.\par \par Now 4yr 3mo s/p EGD Lap Heller myotomy, and extensive lysis of adhesion on 4/13/16.\par \par Now 9 mo s/p Redo laparotomy. Esophagogastrectomy with formation of esophagogastrostomy anastomosis intraabdominally, EGD, 14 F jejunal feeding tube placement, left chest tube placement on 10/22/2019. Path of esophagogastrectomy revelaed active esophagitis, GEJ ulceration, mural abscesses, and serosal fibrous adhesions, negative for GMS. Path of the proximal stomach revealed portion of stomach with chronic inactive gastritis and focal acute serositis and two reactive LNs. Neural hypertrophy and patchy paucity of ganlion cells is noted. The clinical hx of achalasia is noted. No evidence of malignancy is identified. \par \par J-tube removed on 12/19/19. \par \par I have reviewed the patient's medical records and diagnostic images at time of this office consultation and have made the following recommendation:\par 1. I recommended puree diet, can take Lactate pill OTC PRN\par 2. RTC with Upper GI Series w/ small bowel follow through\par \par \par \par \par I personally performed the services described in the documentation, reviewed the documentation recorded by the scribe in my presence and it accurately and completely records my words and actions. \par \par I, Minh Leggett, HARVEY, am scribing for and the presence of Dr. Asif Jang the following sections, HISTORY OF PRESENT ILLNESS, PAST MEDICAL/FAMILY/SOCIAL HISTORY; REVIEW OF SYSTEMS; VITAL SIGNS; PHYSICAL EXAM; DISPOSITION.\par

## 2020-07-21 NOTE — HISTORY OF PRESENT ILLNESS
[Medical Office: ([facility])___] : at the medical office located in  [Home] : at home, [unfilled] , at the time of the visit. [Verbal consent obtained from patient] : the patient, [unfilled] [Other:____] : [unfilled] [FreeTextEntry1] : 89 y/o F, never smoker, w/ multiple PMHx including HTN, Achalasia s/p dilation x 2, hiatal hernia s/o repair w/ Mesh, also hx of emergency Volvulus surgical repair in 4/2014.\par \par Now ~5yr s/p EGD CRE balloon dilation of GE junction up to 16mm in size, Botox injection 100 units into the lower esophageal sphincter on 8/10/15. \par \par EGD dilation on 12/22/15.\par EGD, balloon dilation of GE junction on 3/3/16. Path showed JEFRFY.\par \par Now 4yr 3mo s/p EGD Lap Heller myotomy, and extensive lysis of adhesion on 4/13/16.\par \par Barium swallow on 11/6/2018:\par -Multiple tertiary contractions of the esophagus consistent with significant dysmotility\par -No evidence of achalasia\par \par Barium Esophagram on 6/18/19:\par - decreased motility in the distal half of the esophagus, w/ mildly dilated proximal esophagus\par - multiple tertiary contractions seen\par - findings suspicious for achalasia\par \par CT A/P w/ IV contrast on 8/27/19:\par - clips at the region of the GE junction r/t previous fundoplication\par - stomach is massively distended and enters the pelvis\par - High-grade small bowel obstruction w/ transition point in the Lt lower quadrant, likely r/t post-op adhesions\par \par Barium Esophagram on 10/4/19. The patient swallowed barium which prompted significant belching. The proximal esophagus is mildly dilated. The esophagus demonstrates normal course and contour. Multiple tertiary contractions were observed consistent with severe dysmotility. +GE reflux.\par \par Pt is s/p EGD on 10/8/19. \par \par CT C/A/P w/ contrast on 10/14/19:\par - Small amount of ascites, obscuring thorough visualization of the GE junction and distal esophagus \par - No gross abnormality identified in this location however\par - Dilated main, right and left pulmonary arteries which can be seen with pulmonary hypertension\par Patient was direct admitted to [address] [place] [address] for IV hydration on 10/16/2019. Dr. Elidia Mansfield did Manometry while patient is in house (10/16/19).\par \par Manometry on 10/16/2019: LES = 27.7 (13-43), UES = 65.1 (). 80% of swallows demonstrate absence of top portion of esophageal peristalsis, consistent with large breaks. The remaining 20% demonstrate small breaks. All swallows demonstrate low amplitude peristalsis. In a majority of swallows, there is some pan- esophageal pressurization indicate of fluid trapping. 100% of swallows demonstrate incomplete bolus clearance by impedence analysis. 3cm hiatal hernia. \par \par Now 9 mo s/p Redo laparotomy. Esophagogastrectomy with formation of esophagogastrostomy anastomosis intraabdominally, EGD, 14 F jejunal feeding tube placement, left chest tube placement on 10/22/2019. Path of esophagogastrectomy revelaed active esophagitis, GEJ ulceration, mural abscesses, and serosal fibrous adhesions, negative for GMS. Path of the proximal stomach revealed portion of stomach with chronic inactive gastritis and focal acute serositis and two reactive LNs. Neural hypertrophy and patchy paucity of ganlion cells is noted. The clinical hx of achalasia is noted. No evidence of malignancy is identified. \par \par Post-op complicated with rapid A Fib and followed by cardiology, recommend to hold off on AC due to fall risk. Patient also required enhanced supervision for delirium which resolved. \par \par Patient d/c to [facility] on 10/30/2019 and went home on 11/14/2019. Patient weighted 112 lbs upon d/c from [facility] and currently weights 112.7 lbs. According to Dietician, there is no need for tube feeding. \par \par CXR on 11/21/19: new patchy right upper to midlung opacity of uncertain etiology; new small right pleural effusion.\par \par J tube is in place, no longer used since 11/7 as per patient, currently on PO regular diet, 3 meals a day. She is tolerating well. Current weight is 108 lbs. \par \par Of note, patient fell at home on 11/30. Admitted to [facility] on 12/1/2019 s/p left superior and inferior pubic rami, s/p 3U PRBC transfusion. D/c'd home on 12/05/2019. Patient c/o pain and taking Tylenol with little relief. \par \par J-tube removed on 12/19/19. \par \par Of note, patient was hospitalized early March 2020 x 5 days at [facility] for small bowel obstruction.\par \par Pt presents today for follow up via telehealth, not eating well, +dysphagia to solids, lost 7 lbs (98 lbs currently), +regurgitation in the past few weeks, and acid reflux.

## 2020-07-28 ENCOUNTER — APPOINTMENT (OUTPATIENT)
Dept: RADIOLOGY | Facility: HOSPITAL | Age: 85
End: 2020-07-28
Payer: MEDICARE

## 2020-07-28 ENCOUNTER — RESULT REVIEW (OUTPATIENT)
Age: 85
End: 2020-07-28

## 2020-07-28 ENCOUNTER — OUTPATIENT (OUTPATIENT)
Dept: OUTPATIENT SERVICES | Facility: HOSPITAL | Age: 85
LOS: 1 days | End: 2020-07-28

## 2020-07-28 DIAGNOSIS — Z90.49 ACQUIRED ABSENCE OF OTHER SPECIFIED PARTS OF DIGESTIVE TRACT: Chronic | ICD-10-CM

## 2020-07-28 DIAGNOSIS — K22.0 ACHALASIA OF CARDIA: ICD-10-CM

## 2020-07-28 DIAGNOSIS — Z98.890 OTHER SPECIFIED POSTPROCEDURAL STATES: Chronic | ICD-10-CM

## 2020-07-28 DIAGNOSIS — Z90.710 ACQUIRED ABSENCE OF BOTH CERVIX AND UTERUS: Chronic | ICD-10-CM

## 2020-07-28 PROCEDURE — 74240 X-RAY XM UPR GI TRC 1CNTRST: CPT | Mod: 26

## 2020-07-30 ENCOUNTER — APPOINTMENT (OUTPATIENT)
Dept: THORACIC SURGERY | Facility: CLINIC | Age: 85
End: 2020-07-30
Payer: MEDICARE

## 2020-07-30 PROCEDURE — 99213 OFFICE O/P EST LOW 20 MIN: CPT | Mod: 95

## 2020-07-30 RX ORDER — METOCLOPRAMIDE HYDROCHLORIDE 5 MG/5ML
10 SOLUTION ORAL EVERY 6 HOURS
Qty: 2 | Refills: 2 | Status: ACTIVE | COMMUNITY
Start: 2020-07-30 | End: 1900-01-01

## 2020-07-31 NOTE — DATA REVIEWED
[FreeTextEntry1] : Upper GI Series w/ small bowel follow through on 7/28/2020:\par - tertiary contractions\par - delayed transit time throughout the small bowel w/o evidence of obstruction

## 2020-07-31 NOTE — CONSULT LETTER
[Consult Letter:] : I had the pleasure of evaluating your patient, [unfilled]. [( Thank you for referring [unfilled] for consultation for _____ )] : Thank you for referring [unfilled] for consultation for [unfilled] [Consult Closing:] : Thank you very much for allowing me to participate in the care of this patient.  If you have any questions, please do not hesitate to contact me. [Please see my note below.] : Please see my note below. [Sincerely,] : Sincerely, [Dear  ___] : Dear  [unfilled], [FreeTextEntry3] : Asif Jang MD, MPH \par System Director of Thoracic Surgery \par Director of Comprehensive Lung and Foregut [place] \par Professor Cardiovascular & Thoracic Surgery  \par [facility] [employer] [facility]\par \par [facility]\par [address]\par Oncology [address]\[place] [address]\par Tel: [phone]\par Fax: [phone]\par  [FreeTextEntry2] : Adria Staton MD

## 2020-07-31 NOTE — HISTORY OF PRESENT ILLNESS
[Home] : at home, [unfilled] , at the time of the visit. [Medical Office: ([facility])___] : at the medical office located in  [Other:____] : [unfilled] [Verbal consent obtained from patient] : the patient, [unfilled] [FreeTextEntry1] : 89 y/o F, never smoker, w/ multiple PMHx including HTN, Achalasia s/p dilation x 2, hiatal hernia s/o repair w/ Mesh, also hx of emergency Volvulus surgical repair in 4/2014.\par \par Now ~5yr s/p EGD CRE balloon dilation of GE junction up to 16mm in size, Botox injection 100 units into the lower esophageal sphincter on 8/10/15. \par \par EGD dilation on 12/22/15.\par EGD, balloon dilation of GE junction on 3/3/16. Path showed JEFFRY.\par \par Now 4yr 3mo s/p EGD Lap Heller myotomy, and extensive lysis of adhesion on 4/13/16.\par \par Barium swallow on 11/6/2018:\par -Multiple tertiary contractions of the esophagus consistent with significant dysmotility\par -No evidence of achalasia\par \par Barium Esophagram on 6/18/19:\par - decreased motility in the distal half of the esophagus, w/ mildly dilated proximal esophagus\par - multiple tertiary contractions seen\par - findings suspicious for achalasia\par \par CT A/P w/ IV contrast on 8/27/19:\par - clips at the region of the GE junction r/t previous fundoplication\par - stomach is massively distended and enters the pelvis\par - High-grade small bowel obstruction w/ transition point in the Lt lower quadrant, likely r/t post-op adhesions\par \par Barium Esophagram on 10/4/19. The patient swallowed barium which prompted significant belching. The proximal esophagus is mildly dilated. The esophagus demonstrates normal course and contour. Multiple tertiary contractions were observed consistent with severe dysmotility. +GE reflux.\par \par Pt is s/p EGD on 10/8/19. \par \par CT C/A/P w/ contrast on 10/14/19:\par - Small amount of ascites, obscuring thorough visualization of the GE junction and distal esophagus \par - No gross abnormality identified in this location however\par - Dilated main, right and left pulmonary arteries which can be seen with pulmonary hypertension\par Patient was direct admitted to [address] [place] [address] for IV hydration on 10/16/2019. Dr. Elidia Mansfield did Manometry while patient is in house (10/16/19).\par \par Manometry on 10/16/2019: LES = 27.7 (13-43), UES = 65.1 (). 80% of swallows demonstrate absence of top portion of esophageal peristalsis, consistent with large breaks. The remaining 20% demonstrate small breaks. All swallows demonstrate low amplitude peristalsis. In a majority of swallows, there is some pan- esophageal pressurization indicate of fluid trapping. 100% of swallows demonstrate incomplete bolus clearance by impedence analysis. 3cm hiatal hernia. \par \par Now 9 mo s/p Redo laparotomy. Esophagogastrectomy with formation of esophagogastrostomy anastomosis intraabdominally, EGD, 14 F jejunal feeding tube placement, left chest tube placement on 10/22/2019. Path of esophagogastrectomy revelaed active esophagitis, GEJ ulceration, mural abscesses, and serosal fibrous adhesions, negative for GMS. Path of the proximal stomach revealed portion of stomach with chronic inactive gastritis and focal acute serositis and two reactive LNs. Neural hypertrophy and patchy paucity of ganlion cells is noted. The clinical hx of achalasia is noted. No evidence of malignancy is identified. \par \par Post-op complicated with rapid A Fib and followed by cardiology, recommend to hold off on AC due to fall risk. Patient also required enhanced supervision for delirium which resolved. \par \par Patient d/c to [facility] on 10/30/2019 and went home on 11/14/2019. Patient weighted 112 lbs upon d/c from [facility] and currently weights 112.7 lbs. According to Dietician, there is no need for tube feeding. \par \par CXR on 11/21/19: new patchy right upper to midlung opacity of uncertain etiology; new small right pleural effusion.\par \par J tube is in place, no longer used since 11/7 as per patient, currently on PO regular diet, 3 meals a day. She is tolerating well. Current weight is 108 lbs. \par \par Of note, patient fell at home on 11/30. Admitted to [facility] on 12/1/2019 s/p left superior and inferior pubic rami, s/p 3U PRBC transfusion. D/c'd home on 12/05/2019. Patient c/o pain and taking Tylenol with little relief. \par \par J-tube removed on 12/19/19. \par \par Of note, patient was hospitalized early March 2020 x 5 days at [facility] for small bowel obstruction.\par \par Upper GI Series w/ small bowel follow through on 7/28/2020:\par - tertiary contractions\par - delayed transit time throughout the small bowel w/o evidence of obstruction\par \par Patient is followed today via Telehealth visit, continue to have dysphagia to solids and regurgitation.

## 2020-07-31 NOTE — ASSESSMENT
[FreeTextEntry1] : 89 y/o F, never smoker, w/ multiple PMHx including HTN, Achalasia s/p dilation x 2, hiatal hernia s/o repair w/ Mesh, also hx of emergency Volvulus surgical repair in 4/2014.\par \par Now ~5yr s/p EGD CRE balloon dilation of GE junction up to 16mm in size, Botox injection 100 units into the lower esophageal sphincter on 8/10/15. \par \par EGD dilation on 12/22/15.\par EGD, balloon dilation of GE junction on 3/3/16. Path showed JEFFRY.\par \par Now 4yr 3mo s/p EGD Lap Heller myotomy, and extensive lysis of adhesion on 4/13/16.\par \par Now 9 mo s/p Redo laparotomy. Esophagogastrectomy with formation of esophagogastrostomy anastomosis intraabdominally, EGD, 14 F jejunal feeding tube placement, left chest tube placement on 10/22/2019. Path of esophagogastrectomy revelaed active esophagitis, GEJ ulceration, mural abscesses, and serosal fibrous adhesions, negative for GMS. Path of the proximal stomach revealed portion of stomach with chronic inactive gastritis and focal acute serositis and two reactive LNs. Neural hypertrophy and patchy paucity of ganlion cells is noted. The clinical hx of achalasia is noted. No evidence of malignancy is identified. \par \par J-tube removed on 12/19/19. \par \par Upper GI Series w/ small bowel follow through on 7/28/2020:\par - tertiary contractions\par - delayed transit time throughout the small bowel w/o evidence of obstruction\par \par I have reviewed the patient's medical records and diagnostic images at time of this office consultation and have made the following recommendation:\par 1. I recommended a trial of Reglan QID\par 2. RTC in 2mo for follow up\par \par \par I personally performed the services described in the documentation, reviewed the documentation recorded by the scribe in my presence and it accurately and completely records my words and actions.\par \par I, Minh Leggett NP, am scribing for and the presence of DORCAS Gonsalez, the following sections HISTORY OF PRESENT ILLNESS, PAST MEDICAL/FAMILY/SOCIAL HISTORY; REVIEW OF SYSTEMS; VITAL SIGNS; PHYSICAL EXAM; DISPOSITION.\par \par

## 2020-08-13 ENCOUNTER — APPOINTMENT (OUTPATIENT)
Dept: THORACIC SURGERY | Facility: CLINIC | Age: 85
End: 2020-08-13

## 2020-08-13 DIAGNOSIS — K59.00 CONSTIPATION, UNSPECIFIED: ICD-10-CM

## 2020-08-13 RX ORDER — DOCUSATE SODIUM 100 MG/1
100 CAPSULE ORAL
Refills: 0 | Status: ACTIVE | COMMUNITY

## 2020-08-13 RX ORDER — LACTULOSE 10 G/15ML
10 SOLUTION ORAL DAILY
Qty: 1 | Refills: 0 | Status: ACTIVE | COMMUNITY
Start: 2020-08-13 | End: 1900-01-01

## 2020-08-20 NOTE — PATIENT PROFILE ADULT - NSTRANSFERBELONGINGSRESP_GEN_A_NUR
yes Is This A New Presentation, Or A Follow-Up?: Skin Lesions How Severe Is Your Skin Lesion?: mild Have Your Skin Lesions Been Treated?: been treated When Was It Treated?: 7/18/2019

## 2020-10-08 ENCOUNTER — APPOINTMENT (OUTPATIENT)
Dept: THORACIC SURGERY | Facility: CLINIC | Age: 85
End: 2020-10-08
Payer: MEDICARE

## 2020-10-08 PROCEDURE — 99215 OFFICE O/P EST HI 40 MIN: CPT | Mod: 95

## 2020-10-12 NOTE — CONSULT LETTER
[Dear  ___] : Dear  [unfilled], [Consult Letter:] : I had the pleasure of evaluating your patient, [unfilled]. [( Thank you for referring [unfilled] for consultation for _____ )] : Thank you for referring [unfilled] for consultation for [unfilled] [Please see my note below.] : Please see my note below. [Consult Closing:] : Thank you very much for allowing me to participate in the care of this patient.  If you have any questions, please do not hesitate to contact me. [Sincerely,] : Sincerely, [FreeTextEntry2] : Adria Staton MD  [FreeTextEntry3] : Asif Jang MD, MPH \par System Director of Thoracic Surgery \par Director of Comprehensive Lung and [facility]gut [place] \par Professor Cardiovascular & Thoracic Surgery  \par [facility] [employer] at [facility]\par \par [facility]\par [address]\par Oncology [address]\[place] [address]\par Tel: [phone]\par Fax: [phone]\par

## 2020-10-12 NOTE — ASSESSMENT
[FreeTextEntry1] : 89 y/o F, never smoker, w/ multiple PMHx including HTN, Achalasia s/p dilation x 2, hiatal hernia s/o repair w/ Mesh, also hx of emergency Volvulus surgical repair in 4/2014.\par \par Now 5yr s/p EGD CRE balloon dilation of GE junction up to 16mm in size, Botox injection 100 units into the lower esophageal sphincter on 8/10/15. \par \par EGD dilation on 12/22/15.\par EGD, balloon dilation of GE junction on 3/3/16. Path showed JEFFRY.\par \par Now 4.5yr s/p EGD Lap Heller myotomy, and extensive lysis of adhesion on 4/13/16.\par \par Pt is s/p EGD on 10/8/19. \par \par Now 9 mo s/p Redo laparotomy. Esophagogastrectomy with formation of esophagogastrostomy anastomosis intraabdominally, EGD, 14 F jejunal feeding tube placement, left chest tube placement on 10/22/2019. Path of esophagogastrectomy revealed active esophagitis, GEJ ulceration, mural abscesses, and serosal fibrous adhesions, negative for GMS. Path of the proximal stomach revealed portion of stomach with chronic inactive gastritis and focal acute serositis and two reactive LNs. Neural hypertrophy and patchy paucity of ganlion cells is noted. The clinical hx of achalasia is noted. No evidence of malignancy is identified. \par \par I have reviewed the patient's medical records and diagnostic images at time of this office consultation and have made the following recommendation:\par 1. To take Gas-X at bedtime\par 2. To take small meals\par 3. RTC in 1 mo for f/u\par \par \par I personally performed the services described in the documentation, reviewed the documentation recorded by the scribe in my presence and it accurately and completely records my words and actions.\par \par I, Minh Leggett, HARVEY, am scribing for and the presence of DORCAS Gonsalez, the following sections HISTORY OF PRESENT ILLNESS, PAST MEDICAL/FAMILY/SOCIAL HISTORY; REVIEW OF SYSTEMS; VITAL SIGNS; PHYSICAL EXAM; DISPOSITION.\par \par

## 2020-10-12 NOTE — HISTORY OF PRESENT ILLNESS
[Home] : at home, [unfilled] , at the time of the visit. [Medical Office: ([facility])___] : at the medical office located in  [Other:____] : [unfilled] [Verbal consent obtained from patient] : the patient, [unfilled] [FreeTextEntry1] : 89 y/o F, never smoker, w/ multiple PMHx including HTN, Achalasia s/p dilation x 2, hiatal hernia s/o repair w/ Mesh, also hx of emergency Volvulus surgical repair in 4/2014.\par \par Now 5yr s/p EGD CRE balloon dilation of GE junction up to 16mm in size, Botox injection 100 units into the lower esophageal sphincter on 8/10/15. \par \par EGD dilation on 12/22/15.\par EGD, balloon dilation of GE junction on 3/3/16. Path showed JEFFRY.\par \par Now 4.5yr s/p EGD Lap Heller myotomy, and extensive lysis of adhesion on 4/13/16.\par \par Barium swallow on 11/6/2018:\par -Multiple tertiary contractions of the esophagus consistent with significant dysmotility\par -No evidence of achalasia\par \par Barium Esophagram on 6/18/19:\par - decreased motility in the distal half of the esophagus, w/ mildly dilated proximal esophagus\par - multiple tertiary contractions seen\par - findings suspicious for achalasia\par \par CT A/P w/ IV contrast on 8/27/19:\par - clips at the region of the GE junction r/t previous fundoplication\par - stomach is massively distended and enters the pelvis\par - High-grade small bowel obstruction w/ transition point in the Lt lower quadrant, likely r/t post-op adhesions\par \par Barium Esophagram on 10/4/19. The patient swallowed barium which prompted significant belching. The proximal esophagus is mildly dilated. The esophagus demonstrates normal course and contour. Multiple tertiary contractions were observed consistent with severe dysmotility. +GE reflux.\par \par Pt is s/p EGD on 10/8/19. \par \par CT C/A/P w/ contrast on 10/14/19:\par - Small amount of ascites, obscuring thorough visualization of the GE junction and distal esophagus \par - No gross abnormality identified in this location however\par - Dilated main, right and left pulmonary arteries which can be seen with pulmonary hypertension\par Patient was direct admitted to [address] [place] [address] for IV hydration on 10/16/2019. Dr. Elidia Mansfield did Manometry while patient is in house (10/16/19).\par \par Manometry on 10/16/2019: LES = 27.7 (13-43), UES = 65.1 (). 80% of swallows demonstrate absence of top portion of esophageal peristalsis, consistent with large breaks. The remaining 20% demonstrate small breaks. All swallows demonstrate low amplitude peristalsis. In a majority of swallows, there is some pan- esophageal pressurization indicate of fluid trapping. 100% of swallows demonstrate incomplete bolus clearance by impedence analysis. 3cm hiatal hernia. \par \par Now 9 mo s/p Redo laparotomy. Esophagogastrectomy with formation of esophagogastrostomy anastomosis intraabdominally, EGD, 14 F jejunal feeding tube placement, left chest tube placement on 10/22/2019. Path of esophagogastrectomy revealed active esophagitis, GEJ ulceration, mural abscesses, and serosal fibrous adhesions, negative for GMS. Path of the proximal stomach revealed portion of stomach with chronic inactive gastritis and focal acute serositis and two reactive LNs. Neural hypertrophy and patchy paucity of ganlion cells is noted. The clinical hx of achalasia is noted. No evidence of malignancy is identified. \par \par Post-op complicated with rapid A Fib and followed by cardiology, recommend to hold off on AC due to fall risk. Patient also required enhanced supervision for delirium which resolved. \par \par Patient d/c to [facility] on 10/30/2019 and went home on 11/14/2019. Patient weighted 112 lbs upon d/c from [facility] and currently weights 112.7 lbs. According to Dietician, there is no need for tube feeding. \par \par CXR on 11/21/19: new patchy right upper to midlung opacity of uncertain etiology; new small right pleural effusion.\par \par J tube is in place, no longer used since 11/7 as per patient, currently on PO regular diet, 3 meals a day. She is tolerating well. Current weight is 108 lbs. \par \par Of note, patient fell at home on 11/30. Admitted to [facility] on 12/1/2019 s/p left superior and inferior pubic rami, s/p 3U PRBC transfusion. D/c'd home on 12/05/2019. Patient c/o pain and taking Tylenol with little relief. \par \par J-tube removed on 12/19/19. \par \par Of note, patient was hospitalized early March 2020 x 5 days at [facility] for small bowel obstruction.\par \par Upper GI Series w/ small bowel follow through on 7/28/2020:\par - tertiary contractions\par - delayed transit time throughout the small bowel w/o evidence of obstruction\par \par Trial of Reglan prescribed, patient did not tolerate well.\par \par Patient is followed today via Telehealth visit.

## 2020-11-02 NOTE — PATIENT PROFILE ADULT - CAREGIVER OBTAIN DETAILS
HPI     Mr. Taran Hernandes was referred by PCP for an eye exam.    Patient complains of occasional dry eyes. Uses visine prn.   Happy with vision. Uses OTC reading glasses only.  S/p CE IOL OU    Would patient like a refraction today? No     Paitent denies diplopia, headaches, flashes/floaters, itching, tearing,   burning, redness, and pain.    (+)drops, visine prn    (-)Diabetes    OCULAR HISTORY  Last Eye Exam: 3/28/19 with Dr Corey  (+)eye surgery, PCIOL OU    (-)diagnosed or treated for any eye conditions or diseases, n/a    FAMILY HISTORY  (-)Glaucoma        Last edited by Becka King, OD on 11/2/2020 10:25 AM. (History)            Assessment /Plan     For exam results, see Encounter Report.    Hypertensive retinopathy of both eyes    Essential hypertension  -     Ambulatory referral/consult to Optometry    Pseudophakia of both eyes    Dry eye syndrome of both eyes    Presbyopia      1-2. Vessel changes, OU. Stable since previous exam (2019).   Discussed importance of BP control, taking medications as directed, and following-up with primary care. If changes noted in vision, RTC. Monitor yearly unless changes noted sooner.     3. PCIOL clear and centered OU. Monitor yearly.     4. Educated pt on findings. Recommended ATs BID-TID for added lubrication and comfort. D/c visine. Monitor.     5. Continue use of OTC reading glasses prn. Monitor yearly.       RTC in 1 year for annual eye exam or sooner if needed.              
Patient will wait for daughter to come visit and provide additional information.

## 2021-01-02 NOTE — CONSULT NOTE ADULT - SUBJECTIVE AND OBJECTIVE BOX
[facility]   [address] [facility], [facility] [address] [facility], [facility]  Phone: [phone]   [phone]       Nephrology Progress Note  Wilfred Bernard     1967     [id number]  Date of Admission : 12/21/2020 01/02/21    CC: Follow up for YISSEL    Assessment and Plan   YISSEL  - ATN from Sustained Hypotension and rapid lowering of BP   - UA : trace blood and 3+ protein --> serologic w/u neg except + gammopathy screen  - renal US : unremarkable except for benign cyst   - stable renal function  - ok for diuresis to help w/ extubation  - daily labs and I/Os    Haemophilus/Streptococcus pneumonia:  - abx per ID    Resp failure:  - on the vent  - LE dopplers neg and CTA neg on 12/31  - plan for extubation today     Malignant HTN   Hypertensive Urgency   - negative UDS  - resolved     Anemia:  - hgb stable  - + M spike along with elevated K light chains and IgM levels  - will need heme eval eventually     Interval History:  Seen and examined. Remains sedated on the vent. Cr better, UOP 2.7 L in the past 24 hrs. Possible SBT and extubation today. Review of Systems: Review of systems not obtained due to patient factors.     Current Medications:   Current Facility-Administered Medications   Medication Dose Route Frequency    furosemide (LASIX) injection 40 mg  40 mg IntraVENous Q6H    methylPREDNISolone (PF) (SOLU-MEDROL) injection 20 mg  20 mg IntraVENous DAILY    QUEtiapine (SEROquel) tablet 100 mg  100 mg Oral TID    melatonin tablet 3 mg  3 mg Oral QHS    famotidine (PF) (PEPCID) 20 mg in 0.9% sodium chloride 10 mL injection  20 mg IntraVENous A06I    multivit-folic acid-herbal 133 (WELLESSE PLUS) oral liquid 30 mL  30 mL Oral DAILY    0.9% sodium chloride infusion  3 mL/hr IntraVENous CONTINUOUS    0.9% sodium chloride infusion  5 mL/hr IntraVENous CONTINUOUS    docusate (COLACE) 50 mg/5 mL oral liquid 100 mg  100 mg Oral DAILY    ampicillin-sulbactam (UNASYN) 3 g in 0.9% sodium chloride (MBP/ADV) 100 mL MBP  3 g IntraVENous Q6H    fentaNYL citrate (PF) injection  mcg   mcg IntraVENous Q1H PRN    acetaminophen (TYLENOL) solution 650 mg  650 mg Per NG tube Q6H PRN    nicotine (NICODERM CQ) 14 mg/24 hr patch 1 Patch  1 Patch TransDERmal DAILY    labetaloL (NORMODYNE;TRANDATE) injection 20 mg  20 mg IntraVENous Q6H PRN    hydrALAZINE (APRESOLINE) 20 mg/mL injection 20 mg  20 mg IntraVENous Q6H PRN    amLODIPine (NORVASC) tablet 5 mg  5 mg Oral DAILY    scopolamine (TRANSDERM-SCOP) 1 mg over 3 days 1 Patch  1 Patch TransDERmal Q72H    cloNIDine HCL (CATAPRES) tablet 0.1 mg  0.1 mg Oral BID    dexmedeTOMidine in 0.9 % NaCl (PRECEDEX) 400 mcg/100 mL (4 mcg/mL) infusion soln  0.1-1.4 mcg/kg/hr IntraVENous TITRATE    enoxaparin (LOVENOX) injection 40 mg  40 mg SubCUTAneous DAILY    chlorhexidine (ORAL CARE KIT) 0.12 % mouthwash 15 mL  15 mL Oral Q12H    senna (SENOKOT) tablet 17.2 mg  2 Tab Oral DAILY    sodium chloride (NS) flush 5-40 mL  5-40 mL IntraVENous Q8H    sodium chloride (NS) flush 5-40 mL  5-40 mL IntraVENous PRN    acetaminophen (TYLENOL) tablet 650 mg  650 mg Oral Q6H PRN    Or    acetaminophen (TYLENOL) suppository 650 mg  650 mg Rectal Q6H PRN    polyethylene glycol (MIRALAX) packet 17 g  17 g Oral DAILY PRN    promethazine (PHENERGAN) tablet 12.5 mg  12.5 mg Oral Q6H PRN    Or    ondansetron (ZOFRAN) injection 4 mg  4 mg IntraVENous Q6H PRN    propofol (DIPRIVAN) 10 mg/mL infusion  0-50 mcg/kg/min IntraVENous TITRATE      No Known Allergies    Objective:  Vitals:    Vitals:    01/02/21 0600 01/02/21 0700 01/02/21 0800 01/02/21 0823   BP:       Pulse: 61 61 61 62   Resp: 20 20 20 20   Temp:   97.7 °F (36.5 °C)    SpO2: 97% 97% 97% 96%   Weight:       Height:         Intake and Output:  No intake/output data recorded.   12/31 1901 - 01/02 0700  In: 5010.7 [I.V.:4700.7]  Out: 4775 [Urine:4575; Drains:200]    Physical Examination:  General:  On vent   HEENT: Frontal lipoma   Lungs : diminished   CVS: RRR,s1,s2, normal, No murmur   Extremities: NO Edema  Neurologic: sedated on vent   Psych: Unable to assess       []    High complexity decision making was performed  []    Patient is at high-risk of decompensation with multiple organ involvement    Lab Data Personally Reviewed: I have reviewed all the pertinent labs, microbiology data and radiology studies during assessment. Recent Labs     01/02/21 0359 01/01/21  0354 12/31/20  0425    141 141   K 4.6 3.9 4.2   * 116* 113*   CO2 21 23 23   * 135* 160*   BUN 37* 40* 46*   CREA 1.08 1.14 1.43*   CA 9.1 9.0 9.1   MG 2.2 2.2 2.5*   PHOS 3.0 3.0 3.4     Recent Labs     01/02/21 0359 01/01/21  0354 12/31/20  0425   WBC 9.1 9.2 9.9   HGB 12.4 12.6 12.8   HCT 39.3 41.1 41.4   * 406* 403*     No results found for: SDES  Lab Results   Component Value Date/Time    Culture result: SCANT NORMAL RESPIRATORY ALDO 12/28/2020 04:56 PM    Culture result: MRSA NOT PRESENT 12/24/2020 09:20 AM    Culture result:  12/24/2020 09:20 AM         Screening of patient nares for MRSA is for surveillance purposes and, if positive, to facilitate isolation considerations in high risk settings. It is not intended for automatic decolonization interventions per se as regimens are not sufficiently effective to warrant routine use.     Culture result: NO GROWTH 2 DAYS 12/23/2020 04:16 PM    Culture result: (A) 12/22/2020 08:45 AM     RARE HAEMOPHILUS INFLUENZAE BETA LACTAMASE NEGATIVE    Culture result: RARE NORMAL RESPIRATORY ALDO 12/22/2020 08:45 AM     Recent Results (from the past 24 hour(s))   MAGNESIUM    Collection Time: 01/02/21  3:59 AM   Result Value Ref Range    Magnesium 2.2 1.6 - 2.4 mg/dL   PHOSPHORUS    Collection Time: 01/02/21  3:59 AM   Result Value Ref Range    Phosphorus 3.0 2.6 - 4.7 MG/DL   CBC W/O DIFF    Collection Time: 01/02/21  3:59 AM   Result Value Ref Range    WBC 9.1 4.1 - 11.1 K/uL    RBC 4.60 4.10 - 5.70 M/uL    HGB 12.4 12.1 - 17.0 g/dL    HCT 39.3 36.6 - 50.3 %    MCV 85.4 80.0 - 99.0 FL    MCH 27.0 26.0 - 34.0 PG    MCHC 31.6 30.0 - 36.5 g/dL    RDW 15.2 (H) 11.5 - 14.5 %    PLATELET 100 (H) 169 - 400 K/uL    MPV 10.3 8.9 - 12.9 FL    NRBC 0.0 0  WBC    ABSOLUTE NRBC 0.00 0.00 - 3.45 K/uL   METABOLIC PANEL, BASIC    Collection Time: 01/02/21  3:59 AM   Result Value Ref Range    Sodium 142 136 - 145 mmol/L    Potassium 4.6 3.5 - 5.1 mmol/L    Chloride 118 (H) 97 - 108 mmol/L    CO2 21 21 - 32 mmol/L    Anion gap 3 (L) 5 - 15 mmol/L    Glucose 133 (H) 65 - 100 mg/dL    BUN 37 (H) 6 - 20 MG/DL    Creatinine 1.08 0.70 - 1.30 MG/DL    BUN/Creatinine ratio 34 (H) 12 - 20      GFR est AA >60 >60 ml/min/1.73m2    GFR est non-AA >60 >60 ml/min/1.73m2    Calcium 9.1 8.5 - 10.1 MG/DL   POC EG7    Collection Time: 01/02/21  4:19 AM   Result Value Ref Range    Calcium, ionized (POC) 1.34 (H) 1.12 - 1.32 mmol/L    FIO2 (POC) 40 %    pH (POC) 7.39 7.35 - 7.45      pCO2 (POC) 32.6 (L) 35.0 - 45.0 MMHG    HCO3 (POC) 19.7 (L) 22 - 26 MMOL/L    Base deficit (POC) 5 mmol/L    Site DRAWN FROM ARTERIAL LINE      Device: VENT      Mode ASSIST CONTROL      Tidal volume 420 ml    Set Rate 20 bpm    PEEP/CPAP (POC) 8 cmH2O    Allens test (POC) N/A      Specimen type (POC) ARTERIAL      Volume control YES     POC EG7    Collection Time: 01/02/21  4:24 AM   Result Value Ref Range    Calcium, ionized (POC) 1.30 1.12 - 1.32 mmol/L    FIO2 (POC) 40 %    pH (POC) 7.39 7.35 - 7.45      pCO2 (POC) 30.9 (L) 35.0 - 45.0 MMHG    pO2 (POC) 76 (L) 80 - 100 MMHG    HCO3 (POC) 18.9 (L) 22 - 26 MMOL/L    Base deficit (POC) 6 mmol/L    sO2 (POC) 95 92 - 97 %    Site DRAWN FROM ARTERIAL LINE      Device: VENT      Mode ASSIST CONTROL      Tidal volume 420 ml    Set Rate 20 bpm    PEEP/CPAP (POC) 8 cmH2O    Allens test (POC) N/A      Specimen type (POC) ARTERIAL      Volume control YES             Total time spent with patient:  xxx Chief Complaint:  Patient is a 89y old  Female who presents with a chief complaint of SBO (01 Sep 2019 04:51)    HPI:  89 year old female with hx of hypothyroidism, A-fib (on Eliquis), DDD, HTN, GERD, achalasia s/p myotomy (2016), volvulus s/p surgical repair, SBO s/p ex-lap with lysis of adhesions initially presented with abdominal pain, nausea, vomiting and abdominal distention in the setting of SBO seen on CT. SBO resolved with NGT and NPO. Course complicated by black stools and BRBPR. Patient reports she has history of hemorrhoids and commonly has episodes of BRBPR at home when straining. She has never had black stools prior to this    Allergies:  No Known Allergies    Home Medications:  Reviewed    Hospital Medications:  lactated ringers. 1000 milliLiter(s) IV Continuous <Continuous>  levothyroxine 137 MICROGram(s) Oral daily  pantoprazole  Injectable 40 milliGRAM(s) IV Push two times a day    PMHX/PSHX:  Atrial fibrillation  Achalasia  Hypertension  Hypothyroidism  History of repair of hiatal hernia  H/O colectomy  S/P appendectomy  S/P hysterectomy    Family history:    No pertinent family hx    Social History:   Denies tobacco use, EtOH use or illicit drug use     ROS:   General:  No fevers, chills  ENT:  No sore throat or dysphagia  CV:  No pain or palpitations  Resp:  No dyspnea, cough, wheezing  GI:  No pain, No nausea, No vomiting,  No rectal bleeding, No tarry stools  Skin:  No rash or edema      PHYSICAL EXAM:   GENERAL:  NAD, Appears stated age  HEENT:  NC/AT,  conjunctivae clear and pink, sclera -anicteric  CHEST:  CTA B/L, Normal effort  HEART:  RRR S1/S2, No murmurs  ABDOMEN:  Soft, non-tender, non-distended, BS+  EXTEREMITIES:  No cyanosis  SKIN:  Warm & Dry  NEURO:  Alert, oriented    Vital Signs:  Vital Signs Last 24 Hrs  T(C): 36.4 (01 Sep 2019 16:50), Max: 36.9 (31 Aug 2019 21:08)  T(F): 97.6 (01 Sep 2019 16:50), Max: 98.5 (31 Aug 2019 21:08)  HR: 64 (01 Sep 2019 16:50) (54 - 69)  BP: 146/68 (01 Sep 2019 16:50) (141/56 - 164/74)  BP(mean): --  RR: 17 (01 Sep 2019 16:50) (16 - 17)  SpO2: 100% (01 Sep 2019 16:50) (97% - 100%)  Daily     Daily     LABS:                        6.8    5.30  )-----------( 239      ( 01 Sep 2019 03:55 )             21.8     Mean Cell Volume: 93.6 fL (09-01-19 @ 03:55)    09-01    137  |  101  |  27<H>  ----------------------------<  101<H>  4.3   |  27  |  0.49<L>    Ca    8.3<L>      01 Sep 2019 03:55  Phos  2.3     09-01  Mg     1.9     09-01                          6.8    5.30  )-----------( 239      ( 01 Sep 2019 03:55 )             21.8                         6.7    5.66  )-----------( 215      ( 01 Sep 2019 01:20 )             20.3                         8.4    4.92  )-----------( 274      ( 31 Aug 2019 05:30 )             26.3                         8.9    5.15  )-----------( 283      ( 30 Aug 2019 05:37 )             28.8     Imaging: min.                               Care Plan discussed with:  Patient     Family      RN      Consulting Physician [id number] [facility],         I have reviewed the flowsheets. Chart and Pertinent Notes have been reviewed. No change in PMH ,family and social history from Consult note.       Breann Murphy MD

## 2021-01-14 ENCOUNTER — APPOINTMENT (OUTPATIENT)
Dept: THORACIC SURGERY | Facility: CLINIC | Age: 86
End: 2021-01-14
Payer: MEDICARE

## 2021-01-14 DIAGNOSIS — K22.0 ACHALASIA OF CARDIA: ICD-10-CM

## 2021-01-14 DIAGNOSIS — Z87.19 OTHER SPECIFIED POSTPROCEDURAL STATES: ICD-10-CM

## 2021-01-14 DIAGNOSIS — Z98.890 OTHER SPECIFIED POSTPROCEDURAL STATES: ICD-10-CM

## 2021-01-14 PROCEDURE — 99213 OFFICE O/P EST LOW 20 MIN: CPT | Mod: 95

## 2021-01-15 PROBLEM — Z98.890 HISTORY OF REPAIR OF HIATAL HERNIA: Status: ACTIVE | Noted: 2018-11-07

## 2021-01-15 PROBLEM — K22.0 ACHALASIA: Status: ACTIVE | Noted: 2017-04-05

## 2021-01-20 NOTE — HISTORY OF PRESENT ILLNESS
[Home] : at home, [unfilled] , at the time of the visit. [Medical Office: ([facility])___] : at the medical office located in  [Other:____] : [unfilled] [Verbal consent obtained from patient] : the patient, [unfilled] [FreeTextEntry1] : \par 89 y/o F, never smoker, w/ multiple PMHx including HTN, Achalasia s/p dilation x 2, hiatal hernia s/o repair w/ Mesh, also hx of emergency Volvulus surgical repair in 4/2014.\par \par Now ~5.5 yrs s/p EGD CRE balloon dilation of GE junction up to 16mm in size, Botox injection 100 units into the lower esophageal sphincter on 8/10/15. \par \par EGD dilation on 12/22/15.\par EGD, balloon dilation of GE junction on 3/3/16. Path showed JEFFRY.\par \par Now 4 yrs 9 mo s/p EGD Lap Heller myotomy, and extensive lysis of adhesion on 4/13/16.\par \par Pt is s/p EGD on 10/8/19. \par \par CT C/A/P w/ contrast on 10/14/19:\par - Small amount of ascites, obscuring thorough visualization of the GE junction and distal esophagus \par - No gross abnormality identified in this location however\par - Dilated main, right and left pulmonary arteries which can be seen with pulmonary hypertension\par Patient was direct admitted to [address] [place] [address] for IV hydration on 10/16/2019. Dr. Elidia Mansfield did Manometry while patient is in house (10/16/19).\par \par Manometry on 10/16/2019: LES = 27.7 (13-43), UES = 65.1 (). 80% of swallows demonstrate absence of top portion of esophageal peristalsis, consistent with large breaks. The remaining 20% demonstrate small breaks. All swallows demonstrate low amplitude peristalsis. In a majority of swallows, there is some pan- esophageal pressurization indicate of fluid trapping. 100% of swallows demonstrate incomplete bolus clearance by impedence analysis. 3cm hiatal hernia. \par \par Now 1 yr 3 mo s/p Redo laparotomy. Esophagogastrectomy with formation of esophagogastrostomy anastomosis intraabdominally, EGD, 14 F jejunal feeding tube placement, left chest tube placement on 10/22/2019. Path of esophagogastrectomy revealed active esophagitis, GEJ ulceration, mural abscesses, and serosal fibrous adhesions, negative for GMS. Path of the proximal stomach revealed portion of stomach with chronic inactive gastritis and focal acute serositis and two reactive LNs. Neural hypertrophy and patchy paucity of ganlion cells is noted. The clinical hx of achalasia is noted. No evidence of malignancy is identified. \par \par Post-op complicated with rapid A Fib and followed by cardiology, recommend to hold off on AC due to fall risk. Patient also required enhanced supervision for delirium which resolved. \par \par J-tube removed on 12/19/19. \par \par Of note, patient was hospitalized early March 2020 x 5 days at [facility] for small bowel obstruction.\par \par Upper GI Series w/ small bowel follow through on 7/28/2020:\par - tertiary contractions\par - delayed transit time throughout the small bowel w/o evidence of obstruction\par \par Patient is followed today via Telehealth visit, requesting home care services. Reported wt loss, currently 98 lbs, had a change of mental status in Dec, and poor intake.\par

## 2021-01-20 NOTE — ASSESSMENT
[FreeTextEntry1] : 89 y/o F, never smoker, w/ multiple PMHx including HTN, Achalasia s/p dilation x 2, hiatal hernia s/o repair w/ Mesh, also hx of emergency Volvulus surgical repair in 4/2014.\par \par I have reviewed the patient's medical records and diagnostic images at time of this office consultation and have made the following recommendation:\par 1. Patient has advanced stg achalasia, slowly deteriorating, requesting more assistance at home, I instructed patient to consult with PCP for home care services.\par \par \par I personally performed the services described in the documentation, reviewed the documentation recorded by the scribe in my presence and it accurately and completely records my words and actions.\par \par I, Minh Leggett NP, am scribing for and the presence of DOCRAS Gonsalez, the following sections HISTORY OF PRESENT ILLNESS, PAST MEDICAL/FAMILY/SOCIAL HISTORY; REVIEW OF SYSTEMS; VITAL SIGNS; PHYSICAL EXAM; DISPOSITION.\par \par \par \par

## 2021-01-20 NOTE — CONSULT LETTER
[Dear  ___] : Dear  [unfilled], [Courtesy Letter:] : I had the pleasure of seeing your patient, [unfilled], in my office today. [Please see my note below.] : Please see my note below. [Consult Closing:] : Thank you very much for allowing me to participate in the care of this patient.  If you have any questions, please do not hesitate to contact me. [Sincerely,] : Sincerely, [FreeTextEntry2] : Adria Staton MD  [FreeTextEntry3] : Asif Jang MD, MPH \par System Director of Thoracic Surgery \par Director of Comprehensive Lung and Foregut [place] \par Professor Cardiovascular & Thoracic Surgery  \par [employer] [facility]\par \par [facility]\par [address]\par Oncology [address]\[place] [address]\par Tel: [phone]\par Fax: [phone]\par

## 2021-10-13 NOTE — PHYSICAL EXAM
Ear Star Wedge Flap Text: The defect edges were debeveled with a #15 blade scalpel.  Given the location of the defect and the proximity to free margins (helical rim) an ear star wedge flap was deemed most appropriate.  Using a sterile surgical marker, the appropriate flap was drawn incorporating the defect and placing the expected incisions between the helical rim and antihelix where possible.  The area thus outlined was incised through and through with a #15 scalpel blade. Complex Repair And Single Advancement Flap Text: The defect edges were debeveled with a #15 scalpel blade.  The primary defect was closed partially with a complex linear closure.  Given the location of the remaining defect, shape of the defect and the proximity to free margins a single advancement flap was deemed most appropriate for complete closure of the defect.  Using a sterile surgical marker, an appropriate advancement flap was drawn incorporating the defect and placing the expected incisions within the relaxed skin tension lines where possible.    The area thus outlined was incised deep to adipose tissue with a #15 scalpel blade.  The skin margins were undermined to an appropriate distance in all directions utilizing iris scissors. Bill For Surgical Tray: no [JVD] : no jugular venous distention  Slit Excision Additional Text (Leave Blank If You Do Not Want): A linear line was drawn on the skin overlying the lesion. An incision was made slowly until the lesion was visualized.  Once visualized, the lesion was removed with blunt dissection. [Alert] : alert Consent was obtained from the patient. The risks and benefits to therapy were discussed in detail. Specifically, the risks of infection, scarring, bleeding, prolonged wound healing, incomplete removal, allergy to anesthesia, nerve injury and recurrence were addressed. Prior to the procedure, the treatment site was clearly identified and confirmed by the patient. All components of Universal Protocol/PAUSE Rule completed. [Oriented to Person] : oriented to person Skin Substitute Text: The defect edges were debeveled with a #15 scalpel blade.  Given the location of the defect, shape of the defect and the proximity to free margins a skin substitute graft was deemed most appropriate.  The graft material was trimmed to fit the size of the defect. The graft was then placed in the primary defect and oriented appropriately. [de-identified] : cachexia [de-identified] : resolving right aman orbital ecchymosis Distance Of Undermining In Cm (Required): 0.7 [de-identified] : non labored Retention Suture Bite Size: 3 mm [de-identified] : ambulates with walker [de-identified] : extremely atrophic Repair Type: Complex [FreeTextEntry1] : arm- healed A-T Advancement Flap Text: The defect edges were debeveled with a #15 scalpel blade.  Given the location of the defect, shape of the defect and the proximity to free margins an A-T advancement flap was deemed most appropriate.  Using a sterile surgical marker, an appropriate advancement flap was drawn incorporating the defect and placing the expected incisions within the relaxed skin tension lines where possible.    The area thus outlined was incised deep to adipose tissue with a #15 scalpel blade.  The skin margins were undermined to an appropriate distance in all directions utilizing iris scissors. Double O-Z Plasty Text: The defect edges were debeveled with a #15 scalpel blade.  Given the location of the defect, shape of the defect and the proximity to free margins a Double O-Z plasty (double transposition flap) was deemed most appropriate.  Using a sterile surgical marker, the appropriate transposition flaps were drawn incorporating the defect and placing the expected incisions within the relaxed skin tension lines where possible. The area thus outlined was incised deep to adipose tissue with a #15 scalpel blade.  The skin margins were undermined to an appropriate distance in all directions utilizing iris scissors.  Hemostasis was achieved with electrocautery.  The flaps were then transposed into place, one clockwise and the other counterclockwise, and anchored with interrupted buried subcutaneous sutures. Suturegard Intro: Intraoperative tissue expansion was performed, utilizing the SUTUREGARD device, in order to reduce wound tension. Deep Sutures: 4-0 Maxon Wound Care: No ointment Show Additional Anesthesia Variables: Yes Helical Rim Text: The closure involved the helical rim. Mercedes Flap Text: The defect edges were debeveled with a #15 scalpel blade.  Given the location of the defect, shape of the defect and the proximity to free margins a Mercedes flap was deemed most appropriate.  Using a sterile surgical marker, an appropriate advancement flap was drawn incorporating the defect and placing the expected incisions within the relaxed skin tension lines where possible. The area thus outlined was incised deep to adipose tissue with a #15 scalpel blade.  The skin margins were undermined to an appropriate distance in all directions utilizing iris scissors. Island Pedicle Flap With Canthal Suspension Text: The defect edges were debeveled with a #15 scalpel blade.  Given the location of the defect, shape of the defect and the proximity to free margins an island pedicle advancement flap was deemed most appropriate.  Using a sterile surgical marker, an appropriate advancement flap was drawn incorporating the defect, outlining the appropriate donor tissue and placing the expected incisions within the relaxed skin tension lines where possible. The area thus outlined was incised deep to adipose tissue with a #15 scalpel blade.  The skin margins were undermined to an appropriate distance in all directions around the primary defect and laterally outward around the island pedicle utilizing iris scissors.  There was minimal undermining beneath the pedicle flap. A suspension suture was placed in the canthal tendon to prevent tension and prevent ectropion. Complex Repair And V-Y Plasty Text: The defect edges were debeveled with a #15 scalpel blade.  The primary defect was closed partially with a complex linear closure.  Given the location of the remaining defect, shape of the defect and the proximity to free margins a V-Y plasty was deemed most appropriate for complete closure of the defect.  Using a sterile surgical marker, an appropriate advancement flap was drawn incorporating the defect and placing the expected incisions within the relaxed skin tension lines where possible.    The area thus outlined was incised deep to adipose tissue with a #15 scalpel blade.  The skin margins were undermined to an appropriate distance in all directions utilizing iris scissors. Complex Repair And Burow's Graft Text: The defect edges were debeveled with a #15 scalpel blade.  The primary defect was closed partially with a complex linear closure.  Given the location of the defect, shape of the defect, the proximity to free margins and the presence of a standing cone deformity a Burow's graft was deemed most appropriate to repair the remaining defect.  The graft was trimmed to fit the size of the remaining defect.  The graft was then placed in the primary defect, oriented appropriately, and sutured into place. Lazy S Intermediate Repair Preamble Text (Leave Blank If You Do Not Want): Undermining was performed with blunt dissection. Dermal Closure: simple interrupted Ftsg Text: The defect edges were debeveled with a #15 scalpel blade.  Given the location of the defect, shape of the defect and the proximity to free margins a full thickness skin graft was deemed most appropriate.  Using a sterile surgical marker, the primary defect shape was transferred to the donor site. The area thus outlined was incised deep to adipose tissue with a #15 scalpel blade.  The harvested graft was then trimmed of adipose tissue until only dermis and epidermis was left.  The skin margins of the secondary defect were undermined to an appropriate distance in all directions utilizing iris scissors.  The secondary defect was closed with interrupted buried subcutaneous sutures.  The skin edges were then re-apposed with running  sutures.  The skin graft was then placed in the primary defect and oriented appropriately. Second Skin Substitute Units (Will Override Primary Defect Units If Greater Than 0): 0 Orbicularis Oris Muscle Flap Text: The defect edges were debeveled with a #15 scalpel blade.  Given that the defect affected the competency of the oral sphincter an obicularis oris muscle flap was deemed most appropriate to restore this competency and normal muscle function.  Using a sterile surgical marker, an appropriate flap was drawn incorporating the defect. The area thus outlined was incised with a #15 scalpel blade. Home Suture Removal Text: Patient was provided a home suture removal kit and will remove their sutures at home.  If they have any questions or difficulties they will call the office. Cheek-To-Nose Interpolation Flap Text: A decision was made to reconstruct the defect utilizing an interpolation axial flap and a staged reconstruction.  A telfa template was made of the defect.  This telfa template was then used to outline the Cheek-To-Nose Interpolation flap.  The donor area for the pedicle flap was then injected with anesthesia.  The flap was excised through the skin and subcutaneous tissue down to the layer of the underlying musculature.  The interpolation flap was carefully excised within this deep plane to maintain its blood supply.  The edges of the donor site were undermined.   The donor site was closed in a primary fashion.  The pedicle was then rotated into position and sutured.  Once the tube was sutured into place, adequate blood supply was confirmed with blanching and refill.  The pedicle was then wrapped with xeroform gauze and dressed appropriately with a telfa and gauze bandage to ensure continued blood supply and protect the attached pedicle. Complex Repair Preamble Text (Leave Blank If You Do Not Want): Extensive wide undermining was performed. Staged Advancement Flap Text: The defect edges were debeveled with a #15 scalpel blade.  Given the location of the defect, shape of the defect and the proximity to free margins a staged advancement flap was deemed most appropriate.  Using a sterile surgical marker, an appropriate advancement flap was drawn incorporating the defect and placing the expected incisions within the relaxed skin tension lines where possible. The area thus outlined was incised deep to adipose tissue with a #15 scalpel blade.  The skin margins were undermined to an appropriate distance in all directions utilizing iris scissors. Partial Purse String (Simple) Text: Given the location of the defect and the characteristics of the surrounding skin a simple purse string closure was deemed most appropriate.  Undermining was performed circumferentially around the surgical defect.  A purse string suture was then placed and tightened. Wound tension of the circular defect prevented complete closure of the wound. Repair Performed By Another Provider Text (Leave Blank If You Do Not Want): After the tissue was excised the defect was repaired by another provider. Pre-Excision Curettage Text (Leave Blank If You Do Not Want): Prior to drawing the surgical margin the visible lesion was removed with electrodesiccation and curettage to clearly define the lesion size. Advancement-Rotation Flap Text: The defect edges were debeveled with a #15 scalpel blade.  Given the location of the defect, shape of the defect and the proximity to free margins an advancement-rotation flap was deemed most appropriate.  Using a sterile surgical marker, an appropriate flap was drawn incorporating the defect and placing the expected incisions within the relaxed skin tension lines where possible. The area thus outlined was incised deep to adipose tissue with a #15 scalpel blade.  The skin margins were undermined to an appropriate distance in all directions utilizing iris scissors. Complex Repair And Transposition Flap Text: The defect edges were debeveled with a #15 scalpel blade.  The primary defect was closed partially with a complex linear closure.  Given the location of the remaining defect, shape of the defect and the proximity to free margins a transposition flap was deemed most appropriate for complete closure of the defect.  Using a sterile surgical marker, an appropriate advancement flap was drawn incorporating the defect and placing the expected incisions within the relaxed skin tension lines where possible.    The area thus outlined was incised deep to adipose tissue with a #15 scalpel blade.  The skin margins were undermined to an appropriate distance in all directions utilizing iris scissors. Spiral Flap Text: The defect edges were debeveled with a #15 scalpel blade.  Given the location of the defect, shape of the defect and the proximity to free margins a spiral flap was deemed most appropriate.  Using a sterile surgical marker, an appropriate rotation flap was drawn incorporating the defect and placing the expected incisions within the relaxed skin tension lines where possible. The area thus outlined was incised deep to adipose tissue with a #15 scalpel blade.  The skin margins were undermined to an appropriate distance in all directions utilizing iris scissors. Complex Repair And Ftsg Text: The defect edges were debeveled with a #15 scalpel blade.  The primary defect was closed partially with a complex linear closure.  Given the location of the defect, shape of the defect and the proximity to free margins a full thickness skin graft was deemed most appropriate to repair the remaining defect.  The graft was trimmed to fit the size of the remaining defect.  The graft was then placed in the primary defect, oriented appropriately, and sutured into place. Post-Care Instructions: I reviewed with the patient in detail post-care instructions. Patient is not to engage in any heavy lifting, exercise, or swimming for the next 14 days. Should the patient develop any fevers, chills, bleeding, severe pain patient will contact the office immediately. Cheek Interpolation Flap Text: A decision was made to reconstruct the defect utilizing an interpolation axial flap and a staged reconstruction.  A telfa template was made of the defect.  This telfa template was then used to outline the Cheek Interpolation flap.  The donor area for the pedicle flap was then injected with anesthesia.  The flap was excised through the skin and subcutaneous tissue down to the layer of the underlying musculature.  The interpolation flap was carefully excised within this deep plane to maintain its blood supply.  The edges of the donor site were undermined.   The donor site was closed in a primary fashion.  The pedicle was then rotated into position and sutured.  Once the tube was sutured into place, adequate blood supply was confirmed with blanching and refill.  The pedicle was then wrapped with xeroform gauze and dressed appropriately with a telfa and gauze bandage to ensure continued blood supply and protect the attached pedicle. Dermal Autograft Text: The defect edges were debeveled with a #15 scalpel blade.  Given the location of the defect, shape of the defect and the proximity to free margins a dermal autograft was deemed most appropriate.  Using a sterile surgical marker, the primary defect shape was transferred to the donor site. The area thus outlined was incised deep to adipose tissue with a #15 scalpel blade.  The harvested graft was then trimmed of adipose and epidermal tissue until only dermis was left.  The skin graft was then placed in the primary defect and oriented appropriately. Saucerization Excision Additional Text (Leave Blank If You Do Not Want): The margin was drawn around the clinically apparent lesion.  Incisions were then made along these lines, in a tangential fashion, to the appropriate tissue plane and the lesion was extirpated. Bilateral Helical Rim Advancement Flap Text: The defect edges were debeveled with a #15 blade scalpel.  Given the location of the defect and the proximity to free margins (helical rim) a bilateral helical rim advancement flap was deemed most appropriate.  Using a sterile surgical marker, the appropriate advancement flaps were drawn incorporating the defect and placing the expected incisions between the helical rim and antihelix where possible.  The area thus outlined was incised through and through with a #15 scalpel blade.  With a skin hook and iris scissors, the flaps were gently and sharply undermined and freed up. Surgeon Performing The Repair (Optional): Dr. Mandeep Ramirez Lip Wedge Excision Repair Text: Given the location of the defect and the proximity to free margins a full thickness wedge repair was deemed most appropriate.  Using a sterile surgical marker, the appropriate repair was drawn incorporating the defect and placing the expected incisions perpendicular to the vermilion border.  The vermilion border was also meticulously outlined to ensure appropriate reapproximation during the repair.  The area thus outlined was incised through and through with a #15 scalpel blade.  The muscularis and dermis were reaproximated with deep sutures following hemostasis. Care was taken to realign the vermilion border before proceeding with the superficial closure.  Once the vermilion was realigned the superfical and mucosal closure was finished. Complex Repair And Skin Substitute Graft Text: The defect edges were debeveled with a #15 scalpel blade.  The primary defect was closed partially with a complex linear closure.  Given the location of the remaining defect, shape of the defect and the proximity to free margins a skin substitute graft was deemed most appropriate to repair the remaining defect.  The graft was trimmed to fit the size of the remaining defect.  The graft was then placed in the primary defect, oriented appropriately, and sutured into place. Nasalis-Muscle-Based Myocutaneous Island Pedicle Flap Text: Using a #15 blade, an incision was made around the donor flap to the level of the nasalis muscle. Wide lateral undermining was then performed in both the subcutaneous plane above the nasalis muscle, and in a submuscular plane just above periosteum. This allowed the formation of a free nasalis muscle axial pedicle (based on the angular artery) which was still attached to the actual cutaneous flap, increasing its mobility and vascular viability. Hemostasis was obtained with pinpoint electrocoagulation. The flap was mobilized into position and the pivotal anchor points positioned and stabilized with buried interrupted sutures. Subcutaneous and dermal tissues were closed in a multilayered fashion with sutures. Tissue redundancies were excised, and the epidermal edges were apposed without significant tension and sutured with sutures. Number Of Hemigard Strips Per Side: 1 Complex Repair And O-L Flap Text: The defect edges were debeveled with a #15 scalpel blade.  The primary defect was closed partially with a complex linear closure.  Given the location of the remaining defect, shape of the defect and the proximity to free margins an O-L flap was deemed most appropriate for complete closure of the defect.  Using a sterile surgical marker, an appropriate flap was drawn incorporating the defect and placing the expected incisions within the relaxed skin tension lines where possible.    The area thus outlined was incised deep to adipose tissue with a #15 scalpel blade.  The skin margins were undermined to an appropriate distance in all directions utilizing iris scissors. O-Z Plasty Text: The defect edges were debeveled with a #15 scalpel blade.  Given the location of the defect, shape of the defect and the proximity to free margins an O-Z plasty (double transposition flap) was deemed most appropriate.  Using a sterile surgical marker, the appropriate transposition flaps were drawn incorporating the defect and placing the expected incisions within the relaxed skin tension lines where possible.    The area thus outlined was incised deep to adipose tissue with a #15 scalpel blade.  The skin margins were undermined to an appropriate distance in all directions utilizing iris scissors.  Hemostasis was achieved with electrocautery.  The flaps were then transposed into place, one clockwise and the other counterclockwise, and anchored with interrupted buried subcutaneous sutures. Crescentic Advancement Flap Text: The defect edges were debeveled with a #15 scalpel blade.  Given the location of the defect and the proximity to free margins a crescentic advancement flap was deemed most appropriate.  Using a sterile surgical marker, the appropriate advancement flap was drawn incorporating the defect and placing the expected incisions within the relaxed skin tension lines where possible.    The area thus outlined was incised deep to adipose tissue with a #15 scalpel blade.  The skin margins were undermined to an appropriate distance in all directions utilizing iris scissors. Island Pedicle Flap Text: The defect edges were debeveled with a #15 scalpel blade.  Given the location of the defect, shape of the defect and the proximity to free margins an island pedicle advancement flap was deemed most appropriate.  Using a sterile surgical marker, an appropriate advancement flap was drawn incorporating the defect, outlining the appropriate donor tissue and placing the expected incisions within the relaxed skin tension lines where possible.    The area thus outlined was incised deep to adipose tissue with a #15 scalpel blade.  The skin margins were undermined to an appropriate distance in all directions around the primary defect and laterally outward around the island pedicle utilizing iris scissors.  There was minimal undermining beneath the pedicle flap. Complex Repair And Xenograft Text: The defect edges were debeveled with a #15 scalpel blade.  The primary defect was closed partially with a complex linear closure.  Given the location of the defect, shape of the defect and the proximity to free margins a xenograft was deemed most appropriate to repair the remaining defect.  The graft was trimmed to fit the size of the remaining defect.  The graft was then placed in the primary defect, oriented appropriately, and sutured into place. Nasal Turnover Hinge Flap Text: The defect edges were debeveled with a #15 scalpel blade.  Given the size, depth, location of the defect and the defect being full thickness a nasal turnover hinge flap was deemed most appropriate.  Using a sterile surgical marker, an appropriate hinge flap was drawn incorporating the defect. The area thus outlined was incised with a #15 scalpel blade. The flap was designed to recreate the nasal mucosal lining and the alar rim. The skin margins were undermined to an appropriate distance in all directions utilizing iris scissors. Excision Depth: adipose tissue Epidermal Autograft Text: The defect edges were debeveled with a #15 scalpel blade.  Given the location of the defect, shape of the defect and the proximity to free margins an epidermal autograft was deemed most appropriate.  Using a sterile surgical marker, the primary defect shape was transferred to the donor site. The epidermal graft was then harvested.  The skin graft was then placed in the primary defect and oriented appropriately. Elliptical Excision Additional Text (Leave Blank If You Do Not Want): The margin was drawn around the clinically apparent lesion.  An elliptical shape was then drawn on the skin incorporating the lesion and margins.  Incisions were then made along these lines to the appropriate tissue plane and the lesion was extirpated. Complex Repair And Dorsal Nasal Flap Text: The defect edges were debeveled with a #15 scalpel blade.  The primary defect was closed partially with a complex linear closure.  Given the location of the remaining defect, shape of the defect and the proximity to free margins a dorsal nasal flap was deemed most appropriate for complete closure of the defect.  Using a sterile surgical marker, an appropriate flap was drawn incorporating the defect and placing the expected incisions within the relaxed skin tension lines where possible.    The area thus outlined was incised deep to adipose tissue with a #15 scalpel blade.  The skin margins were undermined to an appropriate distance in all directions utilizing iris scissors. Rotation Flap Text: The defect edges were debeveled with a #15 scalpel blade.  Given the location of the defect, shape of the defect and the proximity to free margins a rotation flap was deemed most appropriate.  Using a sterile surgical marker, an appropriate rotation flap was drawn incorporating the defect and placing the expected incisions within the relaxed skin tension lines where possible.    The area thus outlined was incised deep to adipose tissue with a #15 scalpel blade.  The skin margins were undermined to an appropriate distance in all directions utilizing iris scissors. Excision Method: Elliptical Billing Type: Third-Party Bill Paramedian Forehead Flap Text: A decision was made to reconstruct the defect utilizing an interpolation axial flap and a staged reconstruction.  A telfa template was made of the defect.  This telfa template was then used to outline the paramedian forehead pedicle flap.  The donor area for the pedicle flap was then injected with anesthesia.  The flap was excised through the skin and subcutaneous tissue down to the layer of the underlying musculature.  The pedicle flap was carefully excised within this deep plane to maintain its blood supply.  The edges of the donor site were undermined.   The donor site was closed in a primary fashion.  The pedicle was then rotated into position and sutured.  Once the tube was sutured into place, adequate blood supply was confirmed with blanching and refill.  The pedicle was then wrapped with xeroform gauze and dressed appropriately with a telfa and gauze bandage to ensure continued blood supply and protect the attached pedicle. Dorsal Nasal Flap Text: The defect edges were debeveled with a #15 scalpel blade.  Given the location of the defect and the proximity to free margins a dorsal nasal flap was deemed most appropriate.  Using a sterile surgical marker, an appropriate dorsal nasal flap was drawn around the defect.    The area thus outlined was incised deep to adipose tissue with a #15 scalpel blade.  The skin margins were undermined to an appropriate distance in all directions utilizing iris scissors. Helical Rim Advancement Flap Text: The defect edges were debeveled with a #15 blade scalpel.  Given the location of the defect and the proximity to free margins (helical rim) a double helical rim advancement flap was deemed most appropriate.  Using a sterile surgical marker, the appropriate advancement flaps were drawn incorporating the defect and placing the expected incisions between the helical rim and antihelix where possible.  The area thus outlined was incised through and through with a #15 scalpel blade.  With a skin hook and iris scissors, the flaps were gently and sharply undermined and freed up. Partial Purse String (Intermediate) Text: Given the location of the defect and the characteristics of the surrounding skin an intermediate purse string closure was deemed most appropriate.  Undermining was performed circumferentially around the surgical defect.  A purse string suture was then placed and tightened. Wound tension of the circular defect prevented complete closure of the wound. Length To Time In Minutes Device Was In Place: 10 Anesthesia Volume In Cc: 3 Perilesional Excision Additional Text (Leave Blank If You Do Not Want): The margin was drawn around the clinically apparent lesion. Incisions were then made along these lines to the appropriate tissue plane and the lesion was extirpated. Zygomaticofacial Flap Text: Given the location of the defect, shape of the defect and the proximity to free margins a zygomaticofacial flap was deemed most appropriate for repair.  Using a sterile surgical marker, the appropriate flap was drawn incorporating the defect and placing the expected incisions within the relaxed skin tension lines where possible. The area thus outlined was incised deep to adipose tissue with a #15 scalpel blade with preservation of a vascular pedicle.  The skin margins were undermined to an appropriate distance in all directions utilizing iris scissors.  The flap was then placed into the defect and anchored with interrupted buried subcutaneous sutures. Positioning (Leave Blank If You Do Not Want): The patient was placed in a comfortable position exposing the surgical site. Chonodrocutaneous Helical Advancement Flap Text: The defect edges were debeveled with a #15 scalpel blade.  Given the location of the defect and the proximity to free margins a chondrocutaneous helical advancement flap was deemed most appropriate.  Using a sterile surgical marker, the appropriate advancement flap was drawn incorporating the defect and placing the expected incisions within the relaxed skin tension lines where possible.    The area thus outlined was incised deep to adipose tissue with a #15 scalpel blade.  The skin margins were undermined to an appropriate distance in all directions utilizing iris scissors. Path Notes (To The Dermatopathologist): Please check margins. O-T Plasty Text: The defect edges were debeveled with a #15 scalpel blade.  Given the location of the defect, shape of the defect and the proximity to free margins an O-T plasty was deemed most appropriate.  Using a sterile surgical marker, an appropriate O-T plasty was drawn incorporating the defect and placing the expected incisions within the relaxed skin tension lines where possible.    The area thus outlined was incised deep to adipose tissue with a #15 scalpel blade.  The skin margins were undermined to an appropriate distance in all directions utilizing iris scissors. Complex Repair And Rhombic Flap Text: The defect edges were debeveled with a #15 scalpel blade.  The primary defect was closed partially with a complex linear closure.  Given the location of the remaining defect, shape of the defect and the proximity to free margins a rhombic flap was deemed most appropriate for complete closure of the defect.  Using a sterile surgical marker, an appropriate advancement flap was drawn incorporating the defect and placing the expected incisions within the relaxed skin tension lines where possible.    The area thus outlined was incised deep to adipose tissue with a #15 scalpel blade.  The skin margins were undermined to an appropriate distance in all directions utilizing iris scissors. V-Y Flap Text: The defect edges were debeveled with a #15 scalpel blade.  Given the location of the defect, shape of the defect and the proximity to free margins a V-Y flap was deemed most appropriate.  Using a sterile surgical marker, an appropriate advancement flap was drawn incorporating the defect and placing the expected incisions within the relaxed skin tension lines where possible.    The area thus outlined was incised deep to adipose tissue with a #15 scalpel blade.  The skin margins were undermined to an appropriate distance in all directions utilizing iris scissors. Bi-Rhombic Flap Text: The defect edges were debeveled with a #15 scalpel blade.  Given the location of the defect and the proximity to free margins a bi-rhombic flap was deemed most appropriate.  Using a sterile surgical marker, an appropriate rhombic flap was drawn incorporating the defect. The area thus outlined was incised deep to adipose tissue with a #15 scalpel blade.  The skin margins were undermined to an appropriate distance in all directions utilizing iris scissors. Size Of Margin In Cm: 0.4 Purse String (Simple) Text: Given the location of the defect and the characteristics of the surrounding skin a purse string simple closure was deemed most appropriate.  Undermining was performed circumferentially around the surgical defect.  A purse string suture was then placed and tightened. Epidermal Closure: none-dermabond Mustarde Flap Text: The defect edges were debeveled with a #15 scalpel blade.  Given the size, depth and location of the defect and the proximity to free margins a Mustarde flap was deemed most appropriate.  Using a sterile surgical marker, an appropriate flap was drawn incorporating the defect. The area thus outlined was incised with a #15 scalpel blade.  The skin margins were undermined to an appropriate distance in all directions utilizing iris scissors. Composite Graft Text: The defect edges were debeveled with a #15 scalpel blade.  Given the location of the defect, shape of the defect, the proximity to free margins and the fact the defect was full thickness a composite graft was deemed most appropriate.  The defect was outline and then transferred to the donor site.  A full thickness graft was then excised from the donor site. The graft was then placed in the primary defect, oriented appropriately and then sutured into place.  The secondary defect was then repaired using a primary closure. Fusiform Excision Additional Text (Leave Blank If You Do Not Want): The margin was drawn around the clinically apparent lesion.  A fusiform shape was then drawn on the skin incorporating the lesion and margins.  Incisions were then made along these lines to the appropriate tissue plane and the lesion was extirpated. Epidermal Sutures: Dermabond Dressing: dry sterile dressing Trilobed Flap Text: The defect edges were debeveled with a #15 scalpel blade.  Given the location of the defect and the proximity to free margins a trilobed flap was deemed most appropriate.  Using a sterile surgical marker, an appropriate trilobed flap drawn around the defect.    The area thus outlined was incised deep to adipose tissue with a #15 scalpel blade.  The skin margins were undermined to an appropriate distance in all directions utilizing iris scissors. Complex Repair And O-T Advancement Flap Text: The defect edges were debeveled with a #15 scalpel blade.  The primary defect was closed partially with a complex linear closure.  Given the location of the remaining defect, shape of the defect and the proximity to free margins an O-T advancement flap was deemed most appropriate for complete closure of the defect.  Using a sterile surgical marker, an appropriate advancement flap was drawn incorporating the defect and placing the expected incisions within the relaxed skin tension lines where possible.    The area thus outlined was incised deep to adipose tissue with a #15 scalpel blade.  The skin margins were undermined to an appropriate distance in all directions utilizing iris scissors. Burow's Advancement Flap Text: The defect edges were debeveled with a #15 scalpel blade.  Given the location of the defect and the proximity to free margins a Burow's advancement flap was deemed most appropriate.  Using a sterile surgical marker, the appropriate advancement flap was drawn incorporating the defect and placing the expected incisions within the relaxed skin tension lines where possible.    The area thus outlined was incised deep to adipose tissue with a #15 scalpel blade.  The skin margins were undermined to an appropriate distance in all directions utilizing iris scissors. Keystone Flap Text: The defect edges were debeveled with a #15 scalpel blade.  Given the location of the defect, shape of the defect a keystone flap was deemed most appropriate.  Using a sterile surgical marker, an appropriate keystone flap was drawn incorporating the defect, outlining the appropriate donor tissue and placing the expected incisions within the relaxed skin tension lines where possible. The area thus outlined was incised deep to adipose tissue with a #15 scalpel blade.  The skin margins were undermined to an appropriate distance in all directions around the primary defect and laterally outward around the flap utilizing iris scissors. Double O-Z Flap Text: The defect edges were debeveled with a #15 scalpel blade.  Given the location of the defect, shape of the defect and the proximity to free margins a Double O-Z flap was deemed most appropriate.  Using a sterile surgical marker, an appropriate transposition flap was drawn incorporating the defect and placing the expected incisions within the relaxed skin tension lines where possible. The area thus outlined was incised deep to adipose tissue with a #15 scalpel blade.  The skin margins were undermined to an appropriate distance in all directions utilizing iris scissors. Complex Repair And Rotation Flap Text: The defect edges were debeveled with a #15 scalpel blade.  The primary defect was closed partially with a complex linear closure.  Given the location of the remaining defect, shape of the defect and the proximity to free margins a rotation flap was deemed most appropriate for complete closure of the defect.  Using a sterile surgical marker, an appropriate advancement flap was drawn incorporating the defect and placing the expected incisions within the relaxed skin tension lines where possible.    The area thus outlined was incised deep to adipose tissue with a #15 scalpel blade.  The skin margins were undermined to an appropriate distance in all directions utilizing iris scissors. Complex Repair And Z Plasty Text: The defect edges were debeveled with a #15 scalpel blade.  The primary defect was closed partially with a complex linear closure.  Given the location of the remaining defect, shape of the defect and the proximity to free margins a Z plasty was deemed most appropriate for complete closure of the defect.  Using a sterile surgical marker, an appropriate advancement flap was drawn incorporating the defect and placing the expected incisions within the relaxed skin tension lines where possible.    The area thus outlined was incised deep to adipose tissue with a #15 scalpel blade.  The skin margins were undermined to an appropriate distance in all directions utilizing iris scissors. Eye Clamp Note Details: An eye clamp was used during the procedure. Posterior Auricular Interpolation Flap Text: A decision was made to reconstruct the defect utilizing an interpolation axial flap and a staged reconstruction.  A telfa template was made of the defect.  This telfa template was then used to outline the posterior auricular interpolation flap.  The donor area for the pedicle flap was then injected with anesthesia.  The flap was excised through the skin and subcutaneous tissue down to the layer of the underlying musculature.  The pedicle flap was carefully excised within this deep plane to maintain its blood supply.  The edges of the donor site were undermined.   The donor site was closed in a primary fashion.  The pedicle was then rotated into position and sutured.  Once the tube was sutured into place, adequate blood supply was confirmed with blanching and refill.  The pedicle was then wrapped with xeroform gauze and dressed appropriately with a telfa and gauze bandage to ensure continued blood supply and protect the attached pedicle. Complex Repair And Tissue Cultured Epidermal Autograft Text: The defect edges were debeveled with a #15 scalpel blade.  The primary defect was closed partially with a complex linear closure.  Given the location of the defect, shape of the defect and the proximity to free margins an tissue cultured epidermal autograft was deemed most appropriate to repair the remaining defect.  The graft was trimmed to fit the size of the remaining defect.  The graft was then placed in the primary defect, oriented appropriately, and sutured into place. Z Plasty Text: The lesion was extirpated to the level of the fat with a #15 scalpel blade.  Given the location of the defect, shape of the defect and the proximity to free margins a Z-plasty was deemed most appropriate for repair.  Using a sterile surgical marker, the appropriate transposition arms of the Z-plasty were drawn incorporating the defect and placing the expected incisions within the relaxed skin tension lines where possible.    The area thus outlined was incised deep to adipose tissue with a #15 scalpel blade.  The skin margins were undermined to an appropriate distance in all directions utilizing iris scissors.  The opposing transposition arms were then transposed into place in opposite direction and anchored with interrupted buried subcutaneous sutures. Hatchet Flap Text: The defect edges were debeveled with a #15 scalpel blade.  Given the location of the defect, shape of the defect and the proximity to free margins a hatchet flap was deemed most appropriate.  Using a sterile surgical marker, an appropriate hatchet flap was drawn incorporating the defect and placing the expected incisions within the relaxed skin tension lines where possible.    The area thus outlined was incised deep to adipose tissue with a #15 scalpel blade.  The skin margins were undermined to an appropriate distance in all directions utilizing iris scissors. Detail Level: Detailed Complex Repair And A-T Advancement Flap Text: The defect edges were debeveled with a #15 scalpel blade.  The primary defect was closed partially with a complex linear closure.  Given the location of the remaining defect, shape of the defect and the proximity to free margins an A-T advancement flap was deemed most appropriate for complete closure of the defect.  Using a sterile surgical marker, an appropriate advancement flap was drawn incorporating the defect and placing the expected incisions within the relaxed skin tension lines where possible.    The area thus outlined was incised deep to adipose tissue with a #15 scalpel blade.  The skin margins were undermined to an appropriate distance in all directions utilizing iris scissors. Hemostasis: Electrocautery Purse String (Intermediate) Text: Given the location of the defect and the characteristics of the surrounding skin a purse string intermediate closure was deemed most appropriate.  Undermining was performed circumfirentially around the surgical defect.  A purse string suture was then placed and tightened. Bilobed Transposition Flap Text: The defect edges were debeveled with a #15 scalpel blade.  Given the location of the defect and the proximity to free margins a bilobed transposition flap was deemed most appropriate.  Using a sterile surgical marker, an appropriate bilobe flap drawn around the defect.    The area thus outlined was incised deep to adipose tissue with a #15 scalpel blade.  The skin margins were undermined to an appropriate distance in all directions utilizing iris scissors. Anesthesia Type: 1% lidocaine with epinephrine W Plasty Text: The lesion was extirpated to the level of the fat with a #15 scalpel blade.  Given the location of the defect, shape of the defect and the proximity to free margins a W-plasty was deemed most appropriate for repair.  Using a sterile surgical marker, the appropriate transposition arms of the W-plasty were drawn incorporating the defect and placing the expected incisions within the relaxed skin tension lines where possible.    The area thus outlined was incised deep to adipose tissue with a #15 scalpel blade.  The skin margins were undermined to an appropriate distance in all directions utilizing iris scissors.  The opposing transposition arms were then transposed into place in opposite direction and anchored with interrupted buried subcutaneous sutures. Hemigard Postcare Instructions: The HEMIGARD strips are to remain completely dry for at least 5-7 days. O-Z Flap Text: The defect edges were debeveled with a #15 scalpel blade.  Given the location of the defect, shape of the defect and the proximity to free margins an O-Z flap was deemed most appropriate.  Using a sterile surgical marker, an appropriate transposition flap was drawn incorporating the defect and placing the expected incisions within the relaxed skin tension lines where possible. The area thus outlined was incised deep to adipose tissue with a #15 scalpel blade.  The skin margins were undermined to an appropriate distance in all directions utilizing iris scissors. Complex Repair And Melolabial Flap Text: The defect edges were debeveled with a #15 scalpel blade.  The primary defect was closed partially with a complex linear closure.  Given the location of the remaining defect, shape of the defect and the proximity to free margins a melolabial flap was deemed most appropriate for complete closure of the defect.  Using a sterile surgical marker, an appropriate advancement flap was drawn incorporating the defect and placing the expected incisions within the relaxed skin tension lines where possible.    The area thus outlined was incised deep to adipose tissue with a #15 scalpel blade.  The skin margins were undermined to an appropriate distance in all directions utilizing iris scissors. Peng Advancement Flap Text: The defect edges were debeveled with a #15 scalpel blade.  Given the location of the defect, shape of the defect and the proximity to free margins a Peng advancement flap was deemed most appropriate.  Using a sterile surgical marker, an appropriate advancement flap was drawn incorporating the defect and placing the expected incisions within the relaxed skin tension lines where possible. The area thus outlined was incised deep to adipose tissue with a #15 scalpel blade.  The skin margins were undermined to an appropriate distance in all directions utilizing iris scissors. Island Pedicle Flap-Requiring Vessel Identification Text: The defect edges were debeveled with a #15 scalpel blade.  Given the location of the defect, shape of the defect and the proximity to free margins an island pedicle advancement flap was deemed most appropriate.  Using a sterile surgical marker, an appropriate advancement flap was drawn, based on the axial vessel mentioned above, incorporating the defect, outlining the appropriate donor tissue and placing the expected incisions within the relaxed skin tension lines where possible.    The area thus outlined was incised deep to adipose tissue with a #15 scalpel blade.  The skin margins were undermined to an appropriate distance in all directions around the primary defect and laterally outward around the island pedicle utilizing iris scissors.  There was minimal undermining beneath the pedicle flap. Complex Repair And W Plasty Text: The defect edges were debeveled with a #15 scalpel blade.  The primary defect was closed partially with a complex linear closure.  Given the location of the remaining defect, shape of the defect and the proximity to free margins a W plasty was deemed most appropriate for complete closure of the defect.  Using a sterile surgical marker, an appropriate advancement flap was drawn incorporating the defect and placing the expected incisions within the relaxed skin tension lines where possible.    The area thus outlined was incised deep to adipose tissue with a #15 scalpel blade.  The skin margins were undermined to an appropriate distance in all directions utilizing iris scissors. Suturegard Retention Suture: 2-0 Nylon Scalpel Size: 15 blade Intermediate / Complex Repair - Final Wound Length In Cm: 3.5 Cartilage Graft Text: The defect edges were debeveled with a #15 scalpel blade.  Given the location of the defect, shape of the defect, the fact the defect involved a full thickness cartilage defect a cartilage graft was deemed most appropriate.  An appropriate donor site was identified, cleansed, and anesthetized. The cartilage graft was then harvested and transferred to the recipient site, oriented appropriately and then sutured into place.  The secondary defect was then repaired using a primary closure. Complex Repair And Dermal Autograft Text: The defect edges were debeveled with a #15 scalpel blade.  The primary defect was closed partially with a complex linear closure.  Given the location of the defect, shape of the defect and the proximity to free margins an dermal autograft was deemed most appropriate to repair the remaining defect.  The graft was trimmed to fit the size of the remaining defect.  The graft was then placed in the primary defect, oriented appropriately, and sutured into place. Curvilinear Excision Additional Text (Leave Blank If You Do Not Want): The margin was drawn around the clinically apparent lesion.  A curvilinear shape was then drawn on the skin incorporating the lesion and margins.  Incisions were then made along these lines to the appropriate tissue plane and the lesion was extirpated. Rhomboid Transposition Flap Text: The defect edges were debeveled with a #15 scalpel blade.  Given the location of the defect and the proximity to free margins a rhomboid transposition flap was deemed most appropriate.  Using a sterile surgical marker, an appropriate rhomboid flap was drawn incorporating the defect.    The area thus outlined was incised deep to adipose tissue with a #15 scalpel blade.  The skin margins were undermined to an appropriate distance in all directions utilizing iris scissors. Mastoid Interpolation Flap Text: A decision was made to reconstruct the defect utilizing an interpolation axial flap and a staged reconstruction.  A telfa template was made of the defect.  This telfa template was then used to outline the mastoid interpolation flap.  The donor area for the pedicle flap was then injected with anesthesia.  The flap was excised through the skin and subcutaneous tissue down to the layer of the underlying musculature.  The pedicle flap was carefully excised within this deep plane to maintain its blood supply.  The edges of the donor site were undermined.   The donor site was closed in a primary fashion.  The pedicle was then rotated into position and sutured.  Once the tube was sutured into place, adequate blood supply was confirmed with blanching and refill.  The pedicle was then wrapped with xeroform gauze and dressed appropriately with a telfa and gauze bandage to ensure continued blood supply and protect the attached pedicle. Muscle Hinge Flap Text: The defect edges were debeveled with a #15 scalpel blade.  Given the size, depth and location of the defect and the proximity to free margins a muscle hinge flap was deemed most appropriate.  Using a sterile surgical marker, an appropriate hinge flap was drawn incorporating the defect. The area thus outlined was incised with a #15 scalpel blade.  The skin margins were undermined to an appropriate distance in all directions utilizing iris scissors. Complex Repair And Modified Advancement Flap Text: The defect edges were debeveled with a #15 scalpel blade.  The primary defect was closed partially with a complex linear closure.  Given the location of the remaining defect, shape of the defect and the proximity to free margins a modified advancement flap was deemed most appropriate for complete closure of the defect.  Using a sterile surgical marker, an appropriate advancement flap was drawn incorporating the defect and placing the expected incisions within the relaxed skin tension lines where possible.    The area thus outlined was incised deep to adipose tissue with a #15 scalpel blade.  The skin margins were undermined to an appropriate distance in all directions utilizing iris scissors. Information: Selecting Yes will display possible errors in your note based on the variables you have selected. This validation is only offered as a suggestion for you. PLEASE NOTE THAT THE VALIDATION TEXT WILL BE REMOVED WHEN YOU FINALIZE YOUR NOTE. IF YOU WANT TO FAX A PRELIMINARY NOTE YOU WILL NEED TO TOGGLE THIS TO 'NO' IF YOU DO NOT WANT IT IN YOUR FAXED NOTE. Advancement Flap (Double) Text: The defect edges were debeveled with a #15 scalpel blade.  Given the location of the defect and the proximity to free margins a double advancement flap was deemed most appropriate.  Using a sterile surgical marker, the appropriate advancement flaps were drawn incorporating the defect and placing the expected incisions within the relaxed skin tension lines where possible.    The area thus outlined was incised deep to adipose tissue with a #15 scalpel blade.  The skin margins were undermined to an appropriate distance in all directions utilizing iris scissors. Transposition Flap Text: The defect edges were debeveled with a #15 scalpel blade.  Given the location of the defect and the proximity to free margins a transposition flap was deemed most appropriate.  Using a sterile surgical marker, an appropriate transposition flap was drawn incorporating the defect.    The area thus outlined was incised deep to adipose tissue with a #15 scalpel blade.  The skin margins were undermined to an appropriate distance in all directions utilizing iris scissors. Complex Repair And Double M Plasty Text: The defect edges were debeveled with a #15 scalpel blade.  The primary defect was closed partially with a complex linear closure.  Given the location of the remaining defect, shape of the defect and the proximity to free margins a double M plasty was deemed most appropriate for complete closure of the defect.  Using a sterile surgical marker, an appropriate advancement flap was drawn incorporating the defect and placing the expected incisions within the relaxed skin tension lines where possible.    The area thus outlined was incised deep to adipose tissue with a #15 scalpel blade.  The skin margins were undermined to an appropriate distance in all directions utilizing iris scissors. Nostril Rim Text: The closure involved the nostril rim. Burow's Graft Text: The defect edges were debeveled with a #15 scalpel blade.  Given the location of the defect, shape of the defect, the proximity to free margins and the presence of a standing cone deformity a Burow's skin graft was deemed most appropriate. The standing cone was removed and this tissue was then trimmed to the shape of the primary defect. The adipose tissue was also removed until only dermis and epidermis were left.  The skin margins of the secondary defect were undermined to an appropriate distance in all directions utilizing iris scissors.  The secondary defect was closed with interrupted buried subcutaneous sutures.  The skin edges were then re-apposed with running  sutures.  The skin graft was then placed in the primary defect and oriented appropriately. Mucosal Advancement Flap Text: Given the location of the defect, shape of the defect and the proximity to free margins a mucosal advancement flap was deemed most appropriate. Incisions were made with a 15 blade scalpel in the appropriate fashion along the cutaneous vermilion border and the mucosal lip. The remaining actinically damaged mucosal tissue was excised.  The mucosal advancement flap was then elevated to the gingival sulcus with care taken to preserve the neurovascular structures and advanced into the primary defect. Care was taken to ensure that precise realignment of the vermilion border was achieved. Complex Repair And Epidermal Autograft Text: The defect edges were debeveled with a #15 scalpel blade.  The primary defect was closed partially with a complex linear closure.  Given the location of the defect, shape of the defect and the proximity to free margins an epidermal autograft was deemed most appropriate to repair the remaining defect.  The graft was trimmed to fit the size of the remaining defect.  The graft was then placed in the primary defect, oriented appropriately, and sutured into place. Melolabial Interpolation Flap Text: A decision was made to reconstruct the defect utilizing an interpolation axial flap and a staged reconstruction.  A telfa template was made of the defect.  This telfa template was then used to outline the melolabial interpolation flap.  The donor area for the pedicle flap was then injected with anesthesia.  The flap was excised through the skin and subcutaneous tissue down to the layer of the underlying musculature.  The pedicle flap was carefully excised within this deep plane to maintain its blood supply.  The edges of the donor site were undermined.   The donor site was closed in a primary fashion.  The pedicle was then rotated into position and sutured.  Once the tube was sutured into place, adequate blood supply was confirmed with blanching and refill.  The pedicle was then wrapped with xeroform gauze and dressed appropriately with a telfa and gauze bandage to ensure continued blood supply and protect the attached pedicle. Xenograft Text: The defect edges were debeveled with a #15 scalpel blade.  Given the location of the defect, shape of the defect and the proximity to free margins a xenograft was deemed most appropriate.  The graft was then trimmed to fit the size of the defect.  The graft was then placed in the primary defect and oriented appropriately. Bilobed Flap Text: The defect edges were debeveled with a #15 scalpel blade.  Given the location of the defect and the proximity to free margins a bilobe flap was deemed most appropriate.  Using a sterile surgical marker, an appropriate bilobe flap drawn around the defect.    The area thus outlined was incised deep to adipose tissue with a #15 scalpel blade.  The skin margins were undermined to an appropriate distance in all directions utilizing iris scissors. Rhombic Flap Text: The defect edges were debeveled with a #15 scalpel blade.  Given the location of the defect and the proximity to free margins a rhombic flap was deemed most appropriate.  Using a sterile surgical marker, an appropriate rhombic flap was drawn incorporating the defect.    The area thus outlined was incised deep to adipose tissue with a #15 scalpel blade.  The skin margins were undermined to an appropriate distance in all directions utilizing iris scissors. Where Do You Want The Question To Include Opioid Counseling Located?: Case Summary Tab Undermining Type: Entire Wound Advancement Flap (Single) Text: The defect edges were debeveled with a #15 scalpel blade.  Given the location of the defect and the proximity to free margins a single advancement flap was deemed most appropriate.  Using a sterile surgical marker, an appropriate advancement flap was drawn incorporating the defect and placing the expected incisions within the relaxed skin tension lines where possible.    The area thus outlined was incised deep to adipose tissue with a #15 scalpel blade.  The skin margins were undermined to an appropriate distance in all directions utilizing iris scissors. H Plasty Text: Given the location of the defect, shape of the defect and the proximity to free margins a H-plasty was deemed most appropriate for repair.  Using a sterile surgical marker, the appropriate advancement arms of the H-plasty were drawn incorporating the defect and placing the expected incisions within the relaxed skin tension lines where possible. The area thus outlined was incised deep to adipose tissue with a #15 scalpel blade. The skin margins were undermined to an appropriate distance in all directions utilizing iris scissors.  The opposing advancement arms were then advanced into place in opposite direction and anchored with interrupted buried subcutaneous sutures. Hemigard Intro: Due to skin fragility and wound tension, it was decided to use HEMIGARD adhesive retention suture devices to permit a linear closure. The skin was cleaned and dried for a 6cm distance away from the wound. Excessive hair, if present, was removed to allow for adhesion. Double Island Pedicle Flap Text: The defect edges were debeveled with a #15 scalpel blade.  Given the location of the defect, shape of the defect and the proximity to free margins a double island pedicle advancement flap was deemed most appropriate.  Using a sterile surgical marker, an appropriate advancement flap was drawn incorporating the defect, outlining the appropriate donor tissue and placing the expected incisions within the relaxed skin tension lines where possible.    The area thus outlined was incised deep to adipose tissue with a #15 scalpel blade.  The skin margins were undermined to an appropriate distance in all directions around the primary defect and laterally outward around the island pedicle utilizing iris scissors.  There was minimal undermining beneath the pedicle flap. Complex Repair And Bilobe Flap Text: The defect edges were debeveled with a #15 scalpel blade.  The primary defect was closed partially with a complex linear closure.  Given the location of the remaining defect, shape of the defect and the proximity to free margins a bilobe flap was deemed most appropriate for complete closure of the defect.  Using a sterile surgical marker, an appropriate advancement flap was drawn incorporating the defect and placing the expected incisions within the relaxed skin tension lines where possible.    The area thus outlined was incised deep to adipose tissue with a #15 scalpel blade.  The skin margins were undermined to an appropriate distance in all directions utilizing iris scissors. O-L Flap Text: The defect edges were debeveled with a #15 scalpel blade.  Given the location of the defect, shape of the defect and the proximity to free margins an O-L flap was deemed most appropriate.  Using a sterile surgical marker, an appropriate advancement flap was drawn incorporating the defect and placing the expected incisions within the relaxed skin tension lines where possible.    The area thus outlined was incised deep to adipose tissue with a #15 scalpel blade.  The skin margins were undermined to an appropriate distance in all directions utilizing iris scissors. Melolabial Transposition Flap Text: The defect edges were debeveled with a #15 scalpel blade.  Given the location of the defect and the proximity to free margins a melolabial flap was deemed most appropriate.  Using a sterile surgical marker, an appropriate melolabial transposition flap was drawn incorporating the defect.    The area thus outlined was incised deep to adipose tissue with a #15 scalpel blade.  The skin margins were undermined to an appropriate distance in all directions utilizing iris scissors. Excisional Biopsy Additional Text (Leave Blank If You Do Not Want): The margin was drawn around the clinically apparent lesion. An elliptical shape was then drawn on the skin incorporating the lesion and margins.  Incisions were then made along these lines to the appropriate tissue plane and the lesion was extirpated. Tissue Cultured Epidermal Autograft Text: The defect edges were debeveled with a #15 scalpel blade.  Given the location of the defect, shape of the defect and the proximity to free margins a tissue cultured epidermal autograft was deemed most appropriate.  The graft was then trimmed to fit the size of the defect.  The graft was then placed in the primary defect and oriented appropriately. Complex Repair And Split-Thickness Skin Graft Text: The defect edges were debeveled with a #15 scalpel blade.  The primary defect was closed partially with a complex linear closure.  Given the location of the defect, shape of the defect and the proximity to free margins a split thickness skin graft was deemed most appropriate to repair the remaining defect.  The graft was trimmed to fit the size of the remaining defect.  The graft was then placed in the primary defect, oriented appropriately, and sutured into place. Star Wedge Flap Text: The defect edges were debeveled with a #15 scalpel blade.  Given the location of the defect, shape of the defect and the proximity to free margins a star wedge flap was deemed most appropriate.  Using a sterile surgical marker, an appropriate rotation flap was drawn incorporating the defect and placing the expected incisions within the relaxed skin tension lines where possible. The area thus outlined was incised deep to adipose tissue with a #15 scalpel blade.  The skin margins were undermined to an appropriate distance in all directions utilizing iris scissors. Debridement Text: The wound edges were debrided prior to proceeding with the closure to facilitate wound healing. Split-Thickness Skin Graft Text: The defect edges were debeveled with a #15 scalpel blade.  Given the location of the defect, shape of the defect and the proximity to free margins a split thickness skin graft was deemed most appropriate.  Using a sterile surgical marker, the primary defect shape was transferred to the donor site. The split thickness graft was then harvested.  The skin graft was then placed in the primary defect and oriented appropriately. Banner Transposition Flap Text: The defect edges were debeveled with a #15 scalpel blade.  Given the location of the defect and the proximity to free margins a Banner transposition flap was deemed most appropriate.  Using a sterile surgical marker, an appropriate flap drawn around the defect. The area thus outlined was incised deep to adipose tissue with a #15 scalpel blade.  The skin margins were undermined to an appropriate distance in all directions utilizing iris scissors. Complex Repair And Double Advancement Flap Text: The defect edges were debeveled with a #15 scalpel blade.  The primary defect was closed partially with a complex linear closure.  Given the location of the remaining defect, shape of the defect and the proximity to free margins a double advancement flap was deemed most appropriate for complete closure of the defect.  Using a sterile surgical marker, an appropriate advancement flap was drawn incorporating the defect and placing the expected incisions within the relaxed skin tension lines where possible.    The area thus outlined was incised deep to adipose tissue with a #15 scalpel blade.  The skin margins were undermined to an appropriate distance in all directions utilizing iris scissors. No Repair - Repaired With Adjacent Surgical Defect Text (Leave Blank If You Do Not Want): After the excision the defect was repaired concurrently with another surgical defect which was in close approximation. Retention Suture Text: Retention sutures were placed to support the closure and prevent dehiscence. Alar Island Pedicle Flap Text: The defect edges were debeveled with a #15 scalpel blade.  Given the location of the defect, shape of the defect and the proximity to the alar rim an island pedicle advancement flap was deemed most appropriate.  Using a sterile surgical marker, an appropriate advancement flap was drawn incorporating the defect, outlining the appropriate donor tissue and placing the expected incisions within the nasal ala running parallel to the alar rim. The area thus outlined was incised with a #15 scalpel blade.  The skin margins were undermined minimally to an appropriate distance in all directions around the primary defect and laterally outward around the island pedicle utilizing iris scissors.  There was minimal undermining beneath the pedicle flap. O-T Advancement Flap Text: The defect edges were debeveled with a #15 scalpel blade.  Given the location of the defect, shape of the defect and the proximity to free margins an O-T advancement flap was deemed most appropriate.  Using a sterile surgical marker, an appropriate advancement flap was drawn incorporating the defect and placing the expected incisions within the relaxed skin tension lines where possible.    The area thus outlined was incised deep to adipose tissue with a #15 scalpel blade.  The skin margins were undermined to an appropriate distance in all directions utilizing iris scissors. Modified Advancement Flap Text: The defect edges were debeveled with a #15 scalpel blade.  Given the location of the defect, shape of the defect and the proximity to free margins a modified advancement flap was deemed most appropriate.  Using a sterile surgical marker, an appropriate advancement flap was drawn incorporating the defect and placing the expected incisions within the relaxed skin tension lines where possible.    The area thus outlined was incised deep to adipose tissue with a #15 scalpel blade.  The skin margins were undermined to an appropriate distance in all directions utilizing iris scissors. Complex Repair And M Plasty Text: The defect edges were debeveled with a #15 scalpel blade.  The primary defect was closed partially with a complex linear closure.  Given the location of the remaining defect, shape of the defect and the proximity to free margins an M plasty was deemed most appropriate for complete closure of the defect.  Using a sterile surgical marker, an appropriate advancement flap was drawn incorporating the defect and placing the expected incisions within the relaxed skin tension lines where possible.    The area thus outlined was incised deep to adipose tissue with a #15 scalpel blade.  The skin margins were undermined to an appropriate distance in all directions utilizing iris scissors. Interpolation Flap Text: A decision was made to reconstruct the defect utilizing an interpolation axial flap and a staged reconstruction.  A telfa template was made of the defect.  This telfa template was then used to outline the interpolation flap.  The donor area for the pedicle flap was then injected with anesthesia.  The flap was excised through the skin and subcutaneous tissue down to the layer of the underlying musculature.  The interpolation flap was carefully excised within this deep plane to maintain its blood supply.  The edges of the donor site were undermined.   The donor site was closed in a primary fashion.  The pedicle was then rotated into position and sutured.  Once the tube was sutured into place, adequate blood supply was confirmed with blanching and refill.  The pedicle was then wrapped with xeroform gauze and dressed appropriately with a telfa and gauze bandage to ensure continued blood supply and protect the attached pedicle. V-Y Plasty Text: The defect edges were debeveled with a #15 scalpel blade.  Given the location of the defect, shape of the defect and the proximity to free margins an V-Y advancement flap was deemed most appropriate.  Using a sterile surgical marker, an appropriate advancement flap was drawn incorporating the defect and placing the expected incisions within the relaxed skin tension lines where possible.    The area thus outlined was incised deep to adipose tissue with a #15 scalpel blade.  The skin margins were undermined to an appropriate distance in all directions utilizing iris scissors. Suturegard Body: The suture ends were repeatedly re-tightened and re-clamped to achieve the desired tissue expansion. Estimated Blood Loss (Cc): minimal Vermilion Border Text: The closure involved the vermilion border.

## 2022-03-23 NOTE — PATIENT PROFILE ADULT - NSPROSPHOSPCHAPLAINYN_GEN_A_NUR
"Subjective:       Patient ID: Phillip Russell is a 33 y.o. female.    Vitals:  height is 5' 4" (1.626 m) and weight is 86.2 kg (190 lb). Her tympanic temperature is 98.9 °F (37.2 °C). Her blood pressure is 110/67 and her pulse is 73. Her respiration is 18 and oxygen saturation is 96%.     Chief Complaint: Dizziness    Patient is 32 y/o with hx of anxiety and insomnia who states that she passed out in her bathroom yesterday around 9:00 a.m. and apparently was found by her son around 12 p.m.. 911 was not called and pt did not seek medical attention. Patient does not remember feeling lightheaded or having any headache or other symptoms prior to passing out.  She denies any history of syncopal episodes.  States that for the remainder of the day she had mild headache and some body aches but otherwise felt okay.  Patient did not eat any breakfast but did have some orange juice prior to the incident.  She states that she has been feeling lightheaded on and off for well over a month.  Today she continues to have some light headedness, mostly with standing up, and body aches. Unsure of any head injury but denies any bruising, swelling, or abrasions. Denies any issues with blood pressure or blood sugar in the past.  Denies any new medications.  Denies any drug use.  Denies any chest pain, palpitations, shortness of breath, numbness/tingling, seizures, nausea/vomiting, urinary symptoms.  Patient states that she has established with a PCP but has not had a in-person annual in over year.    Dizziness:   Chronicity:  New  Onset:  Yesterday  Progression since onset:  Unchanged  Frequency:  Constantly  Pain Scale:  9/10  Severity:  Severe  Duration:  Off/on all day  Initial Spell Date and Length:  1 hour  Frequency of Spells:  Hourly  Duration of Spells:  1 hour   Associated symptoms: headaches, light-headedness and syncope.no hearing loss, no ear congestion, no ear pain, no fever, no tinnitus, no nausea, no vomiting, no " diaphoresis, no aural fullness, no weakness, no visual disturbances, no palpitations, no panic, no facial weakness, no slurred speech, no numbness in extremities and no chest pain.  Aggravated by:  Getting up, lying down, bending and other (sleeping )  Treatments tried:  Nothing  Improvements on treatment:  No relief   PMH includes: ear infections and anxiety.no strokes, no cardiac surgery, no neurologic disease, no head trauma, no balance testing, no ear trauma, no ear surgery, no head trauma, no ear tubes, no environmental allergies, no MRI head and no CT head.      Constitution: Negative for appetite change, chills, sweating, fatigue and fever.   HENT: Negative for ear pain, tinnitus, hearing loss, sore throat and trouble swallowing.    Neck: neck negative.   Cardiovascular: Positive for passing out. Negative for chest pain, leg swelling and palpitations.   Eyes: Negative.    Respiratory: Negative for chest tightness, cough, shortness of breath and wheezing.    Gastrointestinal: Negative for abdominal pain, nausea, vomiting and diarrhea.   Genitourinary: Negative.    Musculoskeletal: Positive for muscle ache.   Skin: Negative.    Allergic/Immunologic: Negative for environmental allergies.   Neurological: Positive for dizziness, light-headedness and headaches. Negative for history of vertigo, speech difficulty, coordination disturbances, altered mental status, numbness, tingling and seizures.   Psychiatric/Behavioral: Negative for altered mental status.       Objective:      Physical Exam   Constitutional: She is oriented to person, place, and time. She appears well-developed.  Non-toxic appearance. She does not appear ill. No distress.      Comments:No acute distress. On cell phone. Interactive and appears comfortable.    HENT:   Head: Normocephalic and atraumatic. Head is without abrasion and without contusion.   Ears:   Right Ear: External ear normal.   Left Ear: External ear normal.   Nose: Nose normal.    Mouth/Throat: Mucous membranes are moist. Oropharynx is clear.   Eyes: Conjunctivae, EOM and lids are normal. Pupils are equal, round, and reactive to light.      extraocular movement intact   Neck: Neck supple.   Cardiovascular: Normal rate, regular rhythm and normal heart sounds.   Pulmonary/Chest: Effort normal and breath sounds normal.   Abdominal: Normal appearance.   Musculoskeletal: Normal range of motion.         General: Normal range of motion.      Right lower leg: No edema.      Left lower leg: No edema.      Comments: FROM in all extremities. Mild ttp to thoracic paraspinal region.    Neurological: no focal deficit. She is alert and oriented to person, place, and time. She has normal sensation and intact cranial nerves. She displays no weakness. She has a normal Finger-Nose-Finger Test. She shows no pronator drift. Gait normal. GCS eye subscore is 4. GCS verbal subscore is 5. GCS motor subscore is 6.   Skin: Skin is warm, dry, not diaphoretic, not pale and no rash. Capillary refill takes less than 2 seconds. jaundice  Psychiatric: Her behavior is normal.         Results for orders placed or performed in visit on 03/23/22   POCT Glucose, Hand-Held Device   Result Value Ref Range    POC Glucose 92 70 - 110 MG/DL     The EKG shows a sinus bradycardia t a rate of 59, there are not ST Changes. There is not a previous EKG for comparison. This EKG was interpreted by me.      Assessment:       1. Syncope, unspecified syncope type    2. Light headedness    3. Body aches          Plan:       Discussed with patient that if she was unconscious for 3 hours she should have been evaluated in the emergency room immediately following the incident.  Vital signs are stable at this time and pt is neurologically intact on exam. No acute distress or severe dizziness at this time. BG within normal limits.  Patient continues to have lightheadedness on and off which she says has been present for over a month and denies any  further syncopal episodes since yesterday.  Patient was advised to monitor symptoms closely.  Rest and drink plenty of fluids. Make sure to eat consistently throughout the day. Discussed red flag warning signs for possible concussion given it is unknown if she hit her head during the fall.  Also discussed taking time with positional changes to avoid orthostatic hypotension. If symptoms worsen or if patient passes out again she needs to go to the emergency room immediately.  Otherwise follow up with PCP in the next 2-3 days for further evaluation.    Syncope, unspecified syncope type  -     Orthostatic vital signs  -     POCT Glucose, Hand-Held Device  -     EKG 12-lead    Light headedness  -     Orthostatic vital signs  -     POCT Glucose, Hand-Held Device  -     EKG 12-lead    Body aches  -     ibuprofen (ADVIL,MOTRIN) 600 MG tablet; Take 1 tablet (600 mg total) by mouth every 6 (six) hours as needed for Pain.  Dispense: 30 tablet; Refill: 0                      yes

## 2022-05-14 NOTE — DISCHARGE NOTE PROVIDER - NSDCDCMDCOMP_GEN_ALL_CORE
This document is complete and the patient is ready for discharge. <-------- Click here to INCLUDE CoVID-19 Discharge Instructions

## 2022-06-07 NOTE — DIETITIAN INITIAL EVALUATION ADULT. - PROBLEM SELECTOR PLAN 5
Detail Level: Simple Other (Free Text): Discussed stopping Skyrizi when trying to get pregnant. Discussed waiting at least 6-12 weeks post Skyrizi injection before trying to conceive. Discussed starting Cimzia if she had a psoriasis flare up. Render Risk Assessment In Note?: no Note Text (......Xxx Chief Complaint.): This diagnosis correlates with the -Chronic  -BP normal to borderline soft readings  -Will start home Metoprolol and Amlodipine with holding parameters  -Pt will benefit with discontinuing Amlodipine  -Continue to monitor vitals

## 2023-03-17 NOTE — PATIENT PROFILE ADULT - NSASFUNCLEVELADLTRANSFER_GEN_A_NUR
@1104 PS Chandrakant Roy Per Irma Garner  RE:Screw in hand  @[id number] Chandrakant Roy called back and spoke to Irma Garner [id number]
Discharge instructions reviewed without questions. No further needs voiced at this time. Ambulatory from department in stable condition.        Azalea Benitez RN  03/17/23 [id number]
3 = assistive equipment and person
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
## 2023-09-05 NOTE — INPATIENT CERTIFICATION FOR MEDICARE PATIENTS - IN ORDER TO MEET MEDICARE REQUIREMENTS.
In order to meet Medicare requirements, the clinical documentation must support the information cited in the admission order.  Please be sure to provide detailed and clear documentation about the following in the admitting note/history and physical:
Self Administrated
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
## 2024-10-04 NOTE — DISCHARGE NOTE PROVIDER - NSDCHHHOMEBOUND_GEN_ALL_CORE
[facility]    Order for diagnostic mammogram.    Signed and faxed back.   
Fall risk

## 2025-02-12 NOTE — PROVIDER CONTACT NOTE (OTHER) - DATE AND TIME:
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    SUBJECTIVE                                                                                                               Patient reports no new pain since last PT visit.Patient report pain been the same since last visit. Patient reports independent and comfortable with HEP and trying the best as body allows.      OBJECTIVE                                                                                                                                       Treatment     Therapeutic Exercise  Therapeutic exercise prescription designed to address ROM/arthrokinematics and strength deficits as identified in initial evaluation in order to achieve long term goals set in initial evaluation.   Parallel bar  March in place 10 x2 right and left  Hip abduction right and left 10x2  Hip flexion 10x2 right and left  knee flexion 10x2 right and left  Side stepping 4 step 10 x2  Heel raise 10x2  Cone tap with foot with hip flexion right and left 10x2 right and left    Gym Ex Large Bed  Supine beginners bridge 3 sec hold 10x2   Supine SQE 5 sec hold 10 x2 right and left  Supine adductor squeeze 5 sec hold 10 x2  Green band knee blow out 10x2  Gym ball roll with knee band hip flex 70 degrees 10 x1  Seated opp leg and arm raise 5x1 right and left    Manual Therapy   Manual therapy techniques chosen to address soft tissue and joint restrictions as noted in intial evaluation in order to allow for increased ability to perform therapeutic exercise program to achieve long term goals.     None initiated to this date    Therapeutic Activity  None Initiated to this date.    Skilled input: verbal instruction/cues and posture correction    Writer verbally educated and received verbal consent for  hand placement, positioning of patient, and techniques to be performed today from patient for therapist position for techniques, hand placement and palpation for techniques and modality application as described above and how they are pertinent to the patient's plan of care.  Home Exercise Program  Access Code: [email or web address]  URL: [email or web address]  Date: 02/05/2025  Prepared by: Marivel Kahn    Exercises  - Supine Hip Adduction Isometric with Ball  - 2-3 x daily - 7 x weekly - 2 sets - 10 reps - 5 hold  - Bent Knee Fallouts  - 2-3 x daily - 7 x weekly - 2 sets - 10 reps - 5 hold  - Seated Long Arc Quad  - 2-3 x daily - 7 x weekly - 2 sets - 10 reps - 5 hold  - Supine March  - 2-3 x daily - 7 x weekly - 2 sets - 10 reps  - Supine Quad Set  - 2-3 x daily - 7 x weekly - 2 sets - 10 reps - 5 hold  - Standing March with Counter Support  - 2-3 x daily - 7 x weekly - 2 sets - 10 reps      ASSESSMENT                                                                                                            Patient couldn't hold adductor squeeze for 5 sec so decrease to 3 sec hold.Patient has difficulties co-ordination opp arm and leg.Patient required frequent breaks to complete task.Patient tend to hold breath with all the exercise and needs frequent reminder to avoid holding of breath.Patient has been educated regarding effect of exercise and delayed onset of muscle soreness after exercise.  Education:   - Present and ready to learn: patient  - Results of above outlined education: Verbalizes understanding and Demonstrates understanding    PLAN                                                                                                                           Suggestions for next session as indicated: Progress per plan of care       Therapy procedure time and total treatment time can be found documented on the Time Entry flowsheet     26-Oct-2019 08:00